# Patient Record
Sex: MALE | Race: WHITE | NOT HISPANIC OR LATINO | Employment: OTHER | URBAN - NONMETROPOLITAN AREA
[De-identification: names, ages, dates, MRNs, and addresses within clinical notes are randomized per-mention and may not be internally consistent; named-entity substitution may affect disease eponyms.]

---

## 2017-01-18 ENCOUNTER — OFFICE VISIT (OUTPATIENT)
Dept: FAMILY MEDICINE | Facility: OTHER | Age: 62
End: 2017-01-18
Attending: FAMILY MEDICINE
Payer: MEDICARE

## 2017-01-18 VITALS
HEART RATE: 87 BPM | HEIGHT: 68 IN | OXYGEN SATURATION: 98 % | DIASTOLIC BLOOD PRESSURE: 74 MMHG | RESPIRATION RATE: 16 BRPM | SYSTOLIC BLOOD PRESSURE: 124 MMHG | WEIGHT: 149 LBS | TEMPERATURE: 98.7 F | BODY MASS INDEX: 22.58 KG/M2

## 2017-01-18 DIAGNOSIS — Z79.899 CONTROLLED SUBSTANCE AGREEMENT SIGNED: ICD-10-CM

## 2017-01-18 DIAGNOSIS — M54.40 CHRONIC MIDLINE LOW BACK PAIN WITH SCIATICA, SCIATICA LATERALITY UNSPECIFIED: ICD-10-CM

## 2017-01-18 DIAGNOSIS — G89.4 CHRONIC PAIN SYNDROME: ICD-10-CM

## 2017-01-18 DIAGNOSIS — Z71.89 ACP (ADVANCE CARE PLANNING): Chronic | ICD-10-CM

## 2017-01-18 DIAGNOSIS — Z78.9 HEALTH MAINTENANCE ALTERATION: ICD-10-CM

## 2017-01-18 DIAGNOSIS — Z13.6 ENCOUNTER FOR SCREENING FOR CARDIOVASCULAR DISORDERS: Primary | ICD-10-CM

## 2017-01-18 DIAGNOSIS — Z13.220 LIPID SCREENING: ICD-10-CM

## 2017-01-18 DIAGNOSIS — Z12.11 COLON CANCER SCREENING: ICD-10-CM

## 2017-01-18 DIAGNOSIS — G89.29 CHRONIC MIDLINE LOW BACK PAIN WITH SCIATICA, SCIATICA LATERALITY UNSPECIFIED: ICD-10-CM

## 2017-01-18 DIAGNOSIS — L72.3 SEBACEOUS CYST: ICD-10-CM

## 2017-01-18 PROCEDURE — 99214 OFFICE O/P EST MOD 30 MIN: CPT | Performed by: FAMILY MEDICINE

## 2017-01-18 PROCEDURE — 99212 OFFICE O/P EST SF 10 MIN: CPT

## 2017-01-18 ASSESSMENT — ANXIETY QUESTIONNAIRES
6. BECOMING EASILY ANNOYED OR IRRITABLE: MORE THAN HALF THE DAYS
5. BEING SO RESTLESS THAT IT IS HARD TO SIT STILL: SEVERAL DAYS
2. NOT BEING ABLE TO STOP OR CONTROL WORRYING: MORE THAN HALF THE DAYS
GAD7 TOTAL SCORE: 14
IF YOU CHECKED OFF ANY PROBLEMS ON THIS QUESTIONNAIRE, HOW DIFFICULT HAVE THESE PROBLEMS MADE IT FOR YOU TO DO YOUR WORK, TAKE CARE OF THINGS AT HOME, OR GET ALONG WITH OTHER PEOPLE: SOMEWHAT DIFFICULT
7. FEELING AFRAID AS IF SOMETHING AWFUL MIGHT HAPPEN: NEARLY EVERY DAY
1. FEELING NERVOUS, ANXIOUS, OR ON EDGE: SEVERAL DAYS
3. WORRYING TOO MUCH ABOUT DIFFERENT THINGS: MORE THAN HALF THE DAYS

## 2017-01-18 ASSESSMENT — PAIN SCALES - GENERAL: PAINLEVEL: MODERATE PAIN (5)

## 2017-01-18 ASSESSMENT — PATIENT HEALTH QUESTIONNAIRE - PHQ9: 5. POOR APPETITE OR OVEREATING: NEARLY EVERY DAY

## 2017-01-18 NOTE — MR AVS SNAPSHOT
After Visit Summary   1/18/2017    Jorge A Mendosa    MRN: [id number]           Patient Information     Date Of Birth          1955        Visit Information        Provider Department      1/18/2017 10:15 AM Mukund Hamlin MD [facility]        Today's Diagnoses     Encounter for screening for cardiovascular disorders     -  1     Chronic pain syndrome         Chronic midline low back pain with sciatica, sciatica laterality unspecified         Controlled substance agreement signed         ACP (advance care planning)         Lipid screening         Health maintenance alteration         Sebaceous cyst         Colon cancer screening           Care Instructions    F/u with ongoing concerns.         Follow-ups after your visit        Additional Services     GENERAL SURG ADULT REFERRAL       Your provider has referred you to: general surgery    Please be aware that coverage of these services is subject to the terms and limitations of your health insurance plan.  Call member services at your health plan with any benefit or coverage questions.      Please bring the following with you to your appointment:    (1) Any X-Rays, CTs or MRIs which have been performed.  Contact the facility where they were done to arrange for  prior to your scheduled appointment.   (2) List of current medications   (3) This referral request   (4) Any documents/labs given to you for this referral                  Your next 10 appointments already scheduled     Feb 09, 2017  2:00 PM   (Arrive by 1:45 PM)   CONSULT with Washington Frazier DO   [facility] ([facility])    [address] [place] [address]   [phone]              Future tests that were ordered for you today     Open Future Orders        Priority Expected Expires Ordered    Hepatitis C antibody Routine  5/18/2017 1/18/2017    Lipid Profile (Chol, Trig, HDL, LDL calc) Routine  5/18/2017 1/18/2017            Who to  "contact     If you have questions or need follow up information about today's clinic visit or your schedule please contact [facility] directly at [phone].  Normal or non-critical lab and imaging results will be communicated to you by MyChart, letter or phone within 4 business days after the clinic has received the results. If you do not hear from us within 7 days, please contact the clinic through MyChart or phone. If you have a critical or abnormal lab result, we will notify you by phone as soon as possible.  Submit refill requests through [employer] or call your pharmacy and they will forward the refill request to us. Please allow 3 business days for your refill to be completed.          Additional Information About Your Visit        Care EveryWhere ID     This is your Care EveryWhere ID. This could be used by other organizations to access your [employer] medical records  [id number]        Your Vitals Were     Pulse Temperature Respirations Height BMI (Body Mass Index) Pulse Oximetry    87 98.7  F (37.1  C) (Tympanic) 16 5' 8\" (1.727 m) 22.66 kg/m2 98%       Blood Pressure from Last 3 Encounters:   01/18/17 124/74   08/17/16 124/86   04/07/16 128/80    Weight from Last 3 Encounters:   01/18/17 149 lb (67.586 kg)   08/17/16 144 lb (65.318 kg)   04/07/16 152 lb (68.947 kg)              We Performed the Following     GENERAL SURG ADULT REFERRAL        Primary Care Provider Office Phone # Fax #    Mukund Hamlin [phone]       [address] [address]        Thank you!     Thank you for choosing [facility]  for your care. Our goal is always to provide you with excellent care. Hearing back from our patients is one way we can continue to improve our services. Please take a few minutes to complete the written survey that you may receive in the mail after your visit with us. Thank you!             Your Updated Medication List - Protect " others around you: Learn how to safely use, store and throw away your medicines at www.disposemymeds.org.          This list is accurate as of: 1/18/17  1:00 PM.  Always use your most recent med list.                   Brand Name Dispense Instructions for use    ADVIL 200 MG capsule   Generic drug:  ibuprofen      Take 200 mg by mouth every 4 hours as needed.       ALEVE PO      Take 1 tablet by mouth. As directed when needed       EXCEDRIN PO      PRN if don't have aleve or advil       omeprazole 40 MG capsule    priLOSEC    90 capsule    Take 1 capsule (40 mg) by mouth daily Take 30-60 minutes before a meal.       oxyCODONE 30 MG 12 hr tablet    OXYCONTIN    60 tablet    Take 1 tablet (30 mg) by mouth every 12 hours       oxyCODONE-acetaminophen  MG per tablet    PERCOCET    120 tablet    Take 1 tablet by mouth every 6 hours as needed for pain       VITAMIN A      1 daily       VITAMIN C PO      1 daily

## 2017-01-18 NOTE — NURSING NOTE
"Chief Complaint   Patient presents with     Pain     chronic pain follow up & med refill     *_* Health Care Directive *_*     given paperwork today       Initial /74 mmHg  Pulse 87  Temp(Src) 98.7  F (37.1  C) (Tympanic)  Resp 16  Ht 5' 8\" (1.727 m)  Wt 149 lb (67.586 kg)  BMI 22.66 kg/m2  SpO2 98% Estimated body mass index is 22.66 kg/(m^2) as calculated from the following:    Height as of this encounter: 5' 8\" (1.727 m).    Weight as of this encounter: 149 lb (67.586 kg).  BP completed using cuff size: regular  Gay Chaves LPN      "
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
## 2017-01-19 ASSESSMENT — PATIENT HEALTH QUESTIONNAIRE - PHQ9: SUM OF ALL RESPONSES TO PHQ QUESTIONS 1-9: 13

## 2017-01-19 ASSESSMENT — ANXIETY QUESTIONNAIRES: GAD7 TOTAL SCORE: 14

## 2017-01-25 DIAGNOSIS — G89.4 CHRONIC PAIN SYNDROME: Primary | ICD-10-CM

## 2017-01-25 RX ORDER — OXYCODONE HYDROCHLORIDE 30 MG/1
30 TABLET, FILM COATED, EXTENDED RELEASE ORAL EVERY 12 HOURS
Qty: 60 TABLET | Refills: 0 | Status: SHIPPED | OUTPATIENT
Start: 2017-01-27 | End: 2017-02-23

## 2017-01-25 NOTE — TELEPHONE ENCOUNTER
oxyCODONE (OXYCONTIN) 30 MG 12 hr tablet      Last Written Prescription Date: 12/28/2016  Last Fill Quantity: 60,  # refills: 0   Last Office Visit with [facility], [facility] or [facility] prescribing provider: 01/18/2017

## 2017-01-30 DIAGNOSIS — G89.4 CHRONIC PAIN SYNDROME: Primary | ICD-10-CM

## 2017-01-30 NOTE — TELEPHONE ENCOUNTER
Notified patient via phone that Dr. Hamlin will look at his refill request tomorrow.  Gay Chaves LPN

## 2017-01-30 NOTE — TELEPHONE ENCOUNTER
percocet      Last Written Prescription Date: 12/28/16  Last Fill Quantity: 120,  # refills: 0   Last Office Visit with [facility], [facility] or [facility] prescribing provider: 1/18/17

## 2017-01-31 RX ORDER — OXYCODONE AND ACETAMINOPHEN 10; 325 MG/1; MG/1
1 TABLET ORAL EVERY 6 HOURS PRN
Qty: 120 TABLET | Refills: 0 | Status: SHIPPED | OUTPATIENT
Start: 2017-01-31 | End: 2017-02-28

## 2017-02-23 ENCOUNTER — TELEPHONE (OUTPATIENT)
Dept: FAMILY MEDICINE | Facility: OTHER | Age: 62
End: 2017-02-23

## 2017-02-23 DIAGNOSIS — G89.4 CHRONIC PAIN SYNDROME: ICD-10-CM

## 2017-02-23 RX ORDER — OXYCODONE HYDROCHLORIDE 30 MG/1
30 TABLET, FILM COATED, EXTENDED RELEASE ORAL EVERY 12 HOURS
Qty: 60 TABLET | Refills: 0 | Status: SHIPPED | OUTPATIENT
Start: 2017-02-26 | End: 2017-03-21

## 2017-02-23 NOTE — TELEPHONE ENCOUNTER
Reason for call:  Medication    1. Medication Name? Oxycodone and Percocet  2. Is this request for a refill?  Yes  3. What Pharmacy do you use?  [facility]  4. Have you contacted your pharmacy? Yes  5. If yes, when? Tuesday and today  (Please note that the turn-around-time for prescriptions is 72 business hours; I am sending your request at this time. SEND TO  [facility]  )  Description:  Jorge A only has pills for through Saturday and needs the prescription filled before weekend.  Was an appointment offered for this a call? No  Preferred method for responding to this   [phone]  If we cannot reach you directly, may we leave a detailed response at the number you provided ?  Yes

## 2017-02-23 NOTE — TELEPHONE ENCOUNTER
Pt calling stating that the pharmacy told him that they called stating they made a mistake last month and he was to call and confirm we received the call from them as his scripts are now  and he would like to get them on track again. So he could have them both together. If you have any questions please call him or the pharmacy manager Isaias at [facility].

## 2017-02-23 NOTE — TELEPHONE ENCOUNTER
Last visit: 1.18.17  Last refill: Oxycontin 1.27.17 #60 - This is pended and post dated for 2.26.17 (patients pharmacy is open Sunday)  Percocet 1.31.17 #120- did not pend this.  Should be good until March 1st.  Please advise if you want to fill this. Thank you

## 2017-02-23 NOTE — TELEPHONE ENCOUNTER
Rx has already been picked by the pt. Pt states last month's Rx's were not requested together and this caused him to have to use more of one pain med to make up for this and now he is short of the other. The pharmacist is trying to work with him on this. He states the pharmacist called the clinic and spoke to someone but there are no calls logged. I spoke to Dr Hamlin and notified the pt he needs to be seen to straighten this out. Please schedule him on 02/28/17 at 8:30 am. Pt is aware. Please return when done and I will call the pharmacy to discuss.

## 2017-02-27 NOTE — TELEPHONE ENCOUNTER
Pt was supposed to be scheduled for tomorrow at 8:30, but was scheduled for today at 8:30 am. Tomorrow's schedule is full at 8:30 am now, can you call him at schedule him at 3:15pm arriving at 3 pm instead?

## 2017-02-27 NOTE — TELEPHONE ENCOUNTER
Called at 10:33am 2/27/17 mail box of phone is full so cant leave a message. Need to schedule appt Tue 2/28/17 at 3:15pm arrive 3pm Dr Boubacar Blackwell

## 2017-02-28 ENCOUNTER — OFFICE VISIT (OUTPATIENT)
Dept: FAMILY MEDICINE | Facility: OTHER | Age: 62
End: 2017-02-28
Attending: FAMILY MEDICINE
Payer: MEDICARE

## 2017-02-28 VITALS
TEMPERATURE: 98.9 F | RESPIRATION RATE: 20 BRPM | HEIGHT: 68 IN | DIASTOLIC BLOOD PRESSURE: 68 MMHG | OXYGEN SATURATION: 95 % | BODY MASS INDEX: 22.58 KG/M2 | HEART RATE: 65 BPM | SYSTOLIC BLOOD PRESSURE: 132 MMHG | WEIGHT: 149 LBS

## 2017-02-28 DIAGNOSIS — G89.4 CHRONIC PAIN SYNDROME: ICD-10-CM

## 2017-02-28 PROCEDURE — 99213 OFFICE O/P EST LOW 20 MIN: CPT | Performed by: FAMILY MEDICINE

## 2017-02-28 PROCEDURE — 99212 OFFICE O/P EST SF 10 MIN: CPT

## 2017-02-28 RX ORDER — OXYCODONE AND ACETAMINOPHEN 10; 325 MG/1; MG/1
1 TABLET ORAL EVERY 6 HOURS PRN
Qty: 120 TABLET | Refills: 0 | Status: SHIPPED | OUTPATIENT
Start: 2017-02-28 | End: 2017-03-27

## 2017-02-28 ASSESSMENT — PAIN SCALES - GENERAL: PAINLEVEL: SEVERE PAIN (6)

## 2017-02-28 NOTE — NURSING NOTE
"Chief Complaint   Patient presents with     Musculoskeletal Problem     Follow up on pain medication for his back pain. Discuss refill fax from Pharmacy.        Initial /68  Pulse 65  Temp 98.9  F (37.2  C) (Tympanic)  Resp 20  Ht 5' 8\" (1.727 m)  Wt 149 lb (67.6 kg)  SpO2 95%  BMI 22.66 kg/m2 Estimated body mass index is 22.66 kg/(m^2) as calculated from the following:    Height as of this encounter: 5' 8\" (1.727 m).    Weight as of this encounter: 149 lb (67.6 kg).  Medication Reconciliation: complete   Dorothy Quintero      "

## 2017-02-28 NOTE — MR AVS SNAPSHOT
"              After Visit Summary   2/28/2017    Jorge A Mendosa    MRN: [id number]           Patient Information     Date Of Birth          1955        Visit Information        Provider Department      2/28/2017 3:15 PM Mukund Hamlin MD [facility]        Today's Diagnoses     Chronic pain syndrome          Care Instructions    F/u with ongoing concerns.         Follow-ups after your visit        Your next 10 appointments already scheduled     Aug 27, 2017 11:30 AM CDT   (Arrive by 11:15 AM)   CONSULT with Washington Frazier DO   [facility] ([facility]    [address] [facility] [address]   [phone]              Who to contact     If you have questions or need follow up information about today's clinic visit or your schedule please contact [facility] directly at [phone].  Normal or non-critical lab and imaging results will be communicated to you by MyChart, letter or phone within 4 business days after the clinic has received the results. If you do not hear from us within 7 days, please contact the clinic through MyChart or phone. If you have a critical or abnormal lab result, we will notify you by phone as soon as possible.  Submit refill requests through PHRQL or call your pharmacy and they will forward the refill request to us. Please allow 3 business days for your refill to be completed.          Additional Information About Your Visit        Care EveryWhere ID     This is your Care EveryWhere ID. This could be used by other organizations to access your [place] medical records  [id number]        Your Vitals Were     Pulse Temperature Respirations Height Pulse Oximetry BMI (Body Mass Index)    65 98.9  F (37.2  C) (Tympanic) 20 5' 8\" (1.727 m) 95% 22.66 kg/m2       Blood Pressure from Last 3 Encounters:   02/28/17 132/68   01/18/17 124/74   08/17/16 124/86    Weight from Last 3 Encounters:   02/28/17 149 lb (67.6 kg)   01/18/17 149 " lb (67.6 kg)   08/17/16 144 lb (65.3 kg)              Today, you had the following     No orders found for display         Where to get your medicines      Some of these will need a paper prescription and others can be bought over the counter.  Ask your nurse if you have questions.     Bring a paper prescription for each of these medications     oxyCODONE-acetaminophen  MG per tablet          Primary Care Provider Office Phone # Fax #    Mukund Hamlin [phone]       [address] [facility] [place] [address]        Thank you!     Thank you for choosing [facility]  for your care. Our goal is always to provide you with excellent care. Hearing back from our patients is one way we can continue to improve our services. Please take a few minutes to complete the written survey that you may receive in the mail after your visit with us. Thank you!             Your Updated Medication List - Protect others around you: Learn how to safely use, store and throw away your medicines at www.disposemymeds.org.          This list is accurate as of: 2/28/17 11:59 PM.  Always use your most recent med list.                   Brand Name Dispense Instructions for use    ADVIL 200 MG capsule   Generic drug:  ibuprofen      Take 200 mg by mouth every 4 hours as needed.       ALEVE PO      Take 1 tablet by mouth. As directed when needed       EXCEDRIN PO      PRN if don't have aleve or advil       omeprazole 40 MG capsule    priLOSEC    90 capsule    Take 1 capsule (40 mg) by mouth daily Take 30-60 minutes before a meal.       oxyCODONE 30 MG 12 hr tablet    OXYCONTIN    60 tablet    Take 1 tablet (30 mg) by mouth every 12 hours       oxyCODONE-acetaminophen  MG per tablet    PERCOCET    120 tablet    Take 1 tablet by mouth every 6 hours as needed for pain       VITAMIN A      1 daily       VITAMIN C PO      1 daily
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
## 2017-03-05 NOTE — TELEPHONE ENCOUNTER
Called informed written RX is ready to  at  [facility]   Normal rate, regular rhythm.  Heart sounds S1, S2.

## 2017-03-21 DIAGNOSIS — G89.4 CHRONIC PAIN SYNDROME: ICD-10-CM

## 2017-03-21 RX ORDER — OXYCODONE HYDROCHLORIDE 30 MG/1
30 TABLET, FILM COATED, EXTENDED RELEASE ORAL EVERY 12 HOURS
Qty: 60 TABLET | Refills: 0 | Status: SHIPPED | OUTPATIENT
Start: 2017-03-27 | End: 2017-04-25

## 2017-03-21 NOTE — TELEPHONE ENCOUNTER
oxycontin      Last Written Prescription Date: 2/26/17  Last Fill Quantity:60,  # refills: 0   Last Office Visit with [facility], [facility] or [facility] prescribing provider: 2/28/17

## 2017-03-27 DIAGNOSIS — G89.4 CHRONIC PAIN SYNDROME: ICD-10-CM

## 2017-03-27 RX ORDER — OXYCODONE AND ACETAMINOPHEN 10; 325 MG/1; MG/1
1 TABLET ORAL EVERY 6 HOURS PRN
Qty: 120 TABLET | Refills: 0 | Status: SHIPPED | OUTPATIENT
Start: 2017-03-27 | End: 2017-04-25

## 2017-03-27 NOTE — TELEPHONE ENCOUNTER
percocet      Last Written Prescription Date: 2/28/17  Last Fill Quantity: 120,  # refills: 0   Last Office Visit with [facility], [facility] or [facility] prescribing provider: 2/28/17

## 2017-04-25 DIAGNOSIS — G89.4 CHRONIC PAIN SYNDROME: ICD-10-CM

## 2017-04-25 RX ORDER — OXYCODONE AND ACETAMINOPHEN 10; 325 MG/1; MG/1
1 TABLET ORAL EVERY 6 HOURS PRN
Qty: 120 TABLET | Refills: 0 | Status: SHIPPED | OUTPATIENT
Start: 2017-04-25 | End: 2017-05-25

## 2017-04-25 RX ORDER — OXYCODONE HYDROCHLORIDE 30 MG/1
30 TABLET, FILM COATED, EXTENDED RELEASE ORAL EVERY 12 HOURS
Qty: 60 TABLET | Refills: 0 | Status: SHIPPED | OUTPATIENT
Start: 2017-04-25 | End: 2017-05-25

## 2017-04-25 NOTE — TELEPHONE ENCOUNTER
oxycontin      Last Written Prescription Date: 3/27/17  Last Fill Quantity: 60,  # refills: 0   Last Office Visit with [facility], [facility] or [facility] prescribing provider: 2/28/17                                             percocet      Last Written Prescription Date: 3/28/17  Last Fill Quantity: 120,  # refills: 0   Last Office Visit with [facility], [facility] or [facility] prescribing provider: 2/28/17

## 2017-05-25 ENCOUNTER — HOSPITAL ENCOUNTER (EMERGENCY)
Facility: HOSPITAL | Age: 62
Discharge: HOME | End: 2017-05-25
Attending: NURSE PRACTITIONER | Admitting: NURSE PRACTITIONER
Payer: MEDICARE

## 2017-05-25 VITALS
DIASTOLIC BLOOD PRESSURE: 103 MMHG | SYSTOLIC BLOOD PRESSURE: 140 MMHG | TEMPERATURE: 98.6 F | RESPIRATION RATE: 16 BRPM | OXYGEN SATURATION: 97 % | HEART RATE: 77 BPM

## 2017-05-25 DIAGNOSIS — G89.4 CHRONIC PAIN SYNDROME: ICD-10-CM

## 2017-05-25 DIAGNOSIS — G89.29 CHRONIC MIDLINE LOW BACK PAIN WITH SCIATICA, SCIATICA LATERALITY UNSPECIFIED: ICD-10-CM

## 2017-05-25 DIAGNOSIS — M54.40 CHRONIC MIDLINE LOW BACK PAIN WITH SCIATICA, SCIATICA LATERALITY UNSPECIFIED: ICD-10-CM

## 2017-05-25 PROCEDURE — 99213 OFFICE O/P EST LOW 20 MIN: CPT

## 2017-05-25 PROCEDURE — 99213 OFFICE O/P EST LOW 20 MIN: CPT | Performed by: NURSE PRACTITIONER

## 2017-05-25 RX ORDER — OXYCODONE HYDROCHLORIDE 30 MG/1
30 TABLET, FILM COATED, EXTENDED RELEASE ORAL EVERY 12 HOURS
Qty: 60 TABLET | Refills: 0 | Status: SHIPPED | OUTPATIENT
Start: 2017-05-25 | End: 2017-06-13

## 2017-05-25 RX ORDER — OXYCODONE AND ACETAMINOPHEN 10; 325 MG/1; MG/1
1 TABLET ORAL EVERY 4 HOURS PRN
Qty: 1 TABLET | Refills: 0 | Status: SHIPPED | OUTPATIENT
Start: 2017-05-25 | End: 2017-06-13

## 2017-05-25 RX ORDER — OXYCODONE AND ACETAMINOPHEN 10; 325 MG/1; MG/1
1 TABLET ORAL EVERY 6 HOURS PRN
Qty: 120 TABLET | Refills: 0 | Status: SHIPPED | OUTPATIENT
Start: 2017-05-25 | End: 2017-06-13

## 2017-05-25 RX ORDER — OXYCODONE HYDROCHLORIDE 30 MG/1
30 TABLET, FILM COATED, EXTENDED RELEASE ORAL EVERY 12 HOURS
Qty: 1 TABLET | Refills: 0 | Status: SHIPPED | OUTPATIENT
Start: 2017-05-25 | End: 2017-06-13

## 2017-05-25 ASSESSMENT — ENCOUNTER SYMPTOMS
CARDIOVASCULAR NEGATIVE: 1
RESPIRATORY NEGATIVE: 1
GASTROINTESTINAL NEGATIVE: 1
BACK PAIN: 1
CONSTITUTIONAL NEGATIVE: 1

## 2017-05-25 NOTE — TELEPHONE ENCOUNTER
percocet      Last Written Prescription Date: 4/25/17  Last Fill Quantity: 120,  # refills: 0   Last Office Visit with [facility] [place], [email or web address] or [facility] prescribing provider: 2/28/17                                             oxycontin      Last Written Prescription Date: 4/25/17  Last Fill Quantity: 60,  # refills: 0   Last Office Visit with [facility] [place], [email or web address] or [facility] prescribing provider: 2/28/17

## 2017-05-25 NOTE — ED AVS SNAPSHOT
[facility]    [address] [place] [address]    Phone:  [phone]                                       Jorge A Mendosa   MRN: [id number]    Department:  [facility]   Date of Visit:  5/25/2017           After Visit Summary Signature Page     I have received my discharge instructions, and my questions have been answered. I have discussed any challenges I see with this plan with the nurse or doctor.    ..........................................................................................................................................  Patient/Patient Representative Signature      ..........................................................................................................................................  Patient Representative Print Name and Relationship to Patient    ..................................................               ................................................  Date                                            Time    ..........................................................................................................................................  Reviewed by Signature/Title    ...................................................              ..............................................  Date                                                            Time

## 2017-05-25 NOTE — ED AVS SNAPSHOT
[facility]    [address]    [facility] [address]    Phone:  [phone]                                       Jorge A Mendosa   MRN: [id number]    Department:  [facility]   Date of Visit:  5/25/2017           Patient Information     Date Of Birth          1955        Your diagnoses for this visit were:     Chronic midline low back pain with sciatica, sciatica laterality unspecified        You were seen by Dorothy Hoskins NP.      Follow-up Information     Follow up with Mukund Hamlin MD.    Specialty:  Family Practice    Why:  As needed, If symptoms worsen    Contact information:    [facility]  [address]  [facility] [address] [phone]          Follow up with [facility].    Specialty:  EMERGENCY MEDICINE    Why:  As needed, If symptoms worsen    Contact information:    [address]  [facility] [address] [phone]    Additional information:    From [facility]: Take [address]. Turn left at [address]. Turn left at the first stoplight on [address]. At the first stop sign, take a right onto [address]. Take a left into the parking lot and continue through until you reach the North enterance of the building.       From [place]: Take [address]. Take the [address] ramp towards [place]. Turn left onto [address]. Take a slight right onto [address]. Turn left at the first stoplight on [address]. At the first stop sign, take a right onto [address]. Take a left into the parking lot and continue through until you reach the North enterance of the building.       From [place]: Take [address]. Take a right at [address]. At the first stop sign, take a right onto [address]. Take a left into the parking lot and continue through until you reach the North enterance of the building.         Discharge Instructions       Get to [place]  for a refill on narcotics tomorrow    Future Appointments        Provider Department Dept Phone Center    8/27/2017 11:30 AM Washington Frazier, DO [facility] [phone] [place]         Review of your medicines      Our records show that you are taking the medicines listed below. If these are incorrect, please call your family doctor or clinic.        Dose / Directions Last dose taken    ADVIL 200 MG capsule   Dose:  200 mg   Generic drug:  ibuprofen        Take 200 mg by mouth every 4 hours as needed.   Refills:  0        ALEVE PO   Dose:  1 tablet        Take 1 tablet by mouth. As directed when needed   Refills:  0        EXCEDRIN PO        PRN if don't have aleve or advil   Refills:  0        omeprazole 40 MG capsule   Commonly known as:  priLOSEC   Dose:  40 mg   Quantity:  90 capsule        Take 1 capsule (40 mg) by mouth daily Take 30-60 minutes before a meal.   Refills:  3        VITAMIN A        1 daily   Refills:  0        VITAMIN C PO        1 daily   Refills:  0          ASK your doctor about these medications        Dose / Directions Last dose taken    * oxyCODONE 30 MG 12 hr tablet   Commonly known as:  OXYCONTIN   Dose:  30 mg   What changed:  Another medication with the same name was added. Make sure you understand how and when to take each.   Quantity:  60 tablet   Ask about: Which instructions should I use?        Take 1 tablet (30 mg) by mouth every 12 hours   Refills:  0        * oxyCODONE 30 MG 12 hr tablet   Commonly known as:  OXYCONTIN   Dose:  30 mg   What changed:  You were already taking a medication with the same name, and this prescription was added. Make sure you understand how and when to take each.   Quantity:  1 tablet   Ask about: Which instructions should I use?        Take 1 tablet (30 mg) by mouth every 12 hours   Refills:  0        * oxyCODONE-acetaminophen  MG per tablet   Commonly known as:  PERCOCET   Dose:  1 tablet   What changed:  Another  medication with the same name was added. Make sure you understand how and when to take each.   Quantity:  120 tablet   Ask about: Which instructions should I use?        Take 1 tablet by mouth every 6 hours as needed for pain   Refills:  0        * oxyCODONE-acetaminophen  MG per tablet   Commonly known as:  PERCOCET   Dose:  1 tablet   What changed:  You were already taking a medication with the same name, and this prescription was added. Make sure you understand how and when to take each.   Quantity:  1 tablet   Ask about: Which instructions should I use?        Take 1 tablet by mouth every 4 hours as needed for moderate to severe pain   Refills:  0        * Notice:  This list has 4 medication(s) that are the same as other medications prescribed for you. Read the directions carefully, and ask your doctor or other care provider to review them with you.            Prescriptions were sent or printed at these locations (2 Prescriptions)                   [facility] - [place], [place] - [address] [facility] [address] [address] [facility] [address] [place] [address]    Telephone:  [phone]   Fax:  [phone]   Hours:                  Printed at Department/Unit printer (2 of 2)         oxyCODONE-acetaminophen (PERCOCET)  MG per tablet               oxyCODONE (OXYCONTIN) 30 MG 12 hr tablet                Orders Needing Specimen Collection     None      Pending Results     No orders found from 5/23/2017 to 5/26/2017.            Pending Culture Results     No orders found from 5/23/2017 to 5/26/2017.            Thank you for choosing [facility]       Thank you for choosing [facility] for your care. Our goal is always to provide you with excellent care. Hearing back from our patients is one way we can continue to improve our services. Please take a few minutes to complete the written survey that you may receive in the mail after you visit with us. Thank you!        [email or web address] Information     [employer] lets you send messages to your doctor,  "view your test results, renew your prescriptions, schedule appointments and more. To sign up, go to [email or web address] . Click on \"Log in\" on the left side of the screen, which will take you to the Welcome page. Then click on \"Sign up Now\" on the right side of the page.     You will be asked to enter the access code listed below, as well as some personal information. Please follow the directions to create your username and password.     Your access code is: [email or web address]  Expires: 2017  6:05 PM     Your access code will  in 90 days. If you need help or a new code, please call your [place] clinic or [phone].        Care EveryWhere ID     This is your Care EveryWhere ID. This could be used by other organizations to access your [place] medical records  [id number]        After Visit Summary       This is your record. Keep this with you and show to your community pharmacist(s) and doctor(s) at your next visit.                  "

## 2017-05-25 NOTE — ED PROVIDER NOTES
History     Chief Complaint   Patient presents with     Medication Refill     The history is provided by the patient. No  was used.     Jorge A Mendosa is a 61 year old male who presents needing a refill on his oxycontin 30 mg and his percocet 10/325.  He had requested this earlier in the day at [facility] and had not heard back from them. He takes these for chronic pain syndrome.  He was last filled on 4/25/2017. He states he was told that his rx would not be refilled however;  it appears in Epic from the documentation that it was refilled for a one month supply.  He is obviously anxious about this and is concerned about the long week end    I have reviewed the Medications, Allergies, Past Medical and Surgical History, and Social History in the Epic system.    Review of Systems   Constitutional: Negative.    HENT: Negative.    Respiratory: Negative.    Cardiovascular: Negative.    Gastrointestinal: Negative.    Genitourinary: Negative.    Musculoskeletal: Positive for back pain.   Skin: Negative.        Physical Exam   BP: (!) 140/103  Pulse: 77  Temp: 98.6  F (37  C)  Resp: 16  SpO2: 97 %  Physical Exam   Constitutional: He is oriented to person, place, and time. He appears well-developed and well-nourished. No distress.   HENT:   Head: Normocephalic and atraumatic.   Eyes: Conjunctivae are normal.   Neck: Normal range of motion.   Cardiovascular: Normal rate.    Pulmonary/Chest: Effort normal.   Musculoskeletal: Normal range of motion.   Moving adequately , gait is intact   Neurological: He is alert and oriented to person, place, and time.   Skin: Skin is warm and dry. He is not diaphoretic.   Nursing note and vitals reviewed.      ED Course     ED Course     Procedures               Labs Ordered and Resulted from Time of ED Arrival Up to the Time of Departure from the ED - No data to display    Assessments & Plan (with Medical Decision Making)     I have reviewed the nursing  notes.    I have reviewed the findings, diagnosis, plan and need for follow up with the patient.    Discharge Medication List as of 5/25/2017  6:05 PM      START taking these medications    Details   !! oxyCODONE-acetaminophen (PERCOCET)  MG per tablet Take 1 tablet by mouth every 4 hours as needed for moderate to severe pain, Disp-1 tablet, R-0, Local Print      !! oxyCODONE (OXYCONTIN) 30 MG 12 hr tablet Take 1 tablet (30 mg) by mouth every 12 hours, Disp-1 tablet, R-0, Local Print       !! - Potential duplicate medications found. Please discuss with provider.        Pt was provided with one each of the medications he usually takes .  He needs to check with [facility] in the AM for the rest of the refills and the hard copy  Pt verbalizes understanding and agreement with plan.  Follow up for worsening symptoms or concerns    Final diagnoses:   Chronic midline low back pain with sciatica, sciatica laterality unspecified       5/25/2017   [facility]     Brigid Thurman NP  05/1955

## 2017-05-25 NOTE — ED NOTES
Ambulated to . Here for medication refill because Primary Doctor is on vacation. He states he is having increased pain in both back and shoulders. The NP would not sign them. Complaints of generalized pain. Rating 7 out of 10.

## 2017-05-26 NOTE — TELEPHONE ENCOUNTER
Notified patient via phone that written Rx is ready for pickup at the  of the [facility].   aGy Chaves LPN

## 2017-06-13 ENCOUNTER — OFFICE VISIT (OUTPATIENT)
Dept: FAMILY MEDICINE | Facility: OTHER | Age: 62
End: 2017-06-13
Attending: FAMILY MEDICINE
Payer: MEDICARE

## 2017-06-13 VITALS
SYSTOLIC BLOOD PRESSURE: 110 MMHG | BODY MASS INDEX: 22.28 KG/M2 | DIASTOLIC BLOOD PRESSURE: 70 MMHG | HEIGHT: 68 IN | HEART RATE: 71 BPM | WEIGHT: 147 LBS | TEMPERATURE: 98.7 F | OXYGEN SATURATION: 97 %

## 2017-06-13 DIAGNOSIS — M54.40 CHRONIC MIDLINE LOW BACK PAIN WITH SCIATICA, SCIATICA LATERALITY UNSPECIFIED: Primary | ICD-10-CM

## 2017-06-13 DIAGNOSIS — G89.4 CHRONIC PAIN SYNDROME: ICD-10-CM

## 2017-06-13 DIAGNOSIS — G89.29 CHRONIC MIDLINE LOW BACK PAIN WITH SCIATICA, SCIATICA LATERALITY UNSPECIFIED: Primary | ICD-10-CM

## 2017-06-13 PROCEDURE — 99213 OFFICE O/P EST LOW 20 MIN: CPT | Performed by: FAMILY MEDICINE

## 2017-06-13 PROCEDURE — 99212 OFFICE O/P EST SF 10 MIN: CPT

## 2017-06-13 RX ORDER — OXYCODONE AND ACETAMINOPHEN 10; 325 MG/1; MG/1
1 TABLET ORAL EVERY 6 HOURS PRN
Qty: 120 TABLET | Refills: 0 | Status: SHIPPED | OUTPATIENT
Start: 2017-06-13 | End: 2017-07-20

## 2017-06-13 RX ORDER — OXYCODONE HYDROCHLORIDE 30 MG/1
30 TABLET, FILM COATED, EXTENDED RELEASE ORAL EVERY 12 HOURS
Qty: 60 TABLET | Refills: 0 | Status: SHIPPED | OUTPATIENT
Start: 2017-06-13 | End: 2017-07-20

## 2017-06-13 ASSESSMENT — PAIN SCALES - GENERAL: PAINLEVEL: MODERATE PAIN (5)

## 2017-06-13 NOTE — NURSING NOTE
"Chief Complaint   Patient presents with     Musculoskeletal Problem     Follow up from urgent care back pain.        Initial /70  Pulse 71  Temp 98.7  F (37.1  C) (Tympanic)  Ht 5' 8\" (1.727 m)  Wt 147 lb (66.7 kg)  SpO2 97%  BMI 22.35 kg/m2 Estimated body mass index is 22.35 kg/(m^2) as calculated from the following:    Height as of this encounter: 5' 8\" (1.727 m).    Weight as of this encounter: 147 lb (66.7 kg).  Medication Reconciliation: complete   Dorothy Quintero      "
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
## 2017-06-13 NOTE — MR AVS SNAPSHOT
"              After Visit Summary   6/13/2017    Jorge A Mendosa    MRN: [id number]           Patient Information     Date Of Birth          1955        Visit Information        Provider Department      6/13/2017 1:15 PM Mukund Hamlin MD [facility]        Today's Diagnoses     Chronic midline low back pain with sciatica, sciatica laterality unspecified    -  1    Chronic pain syndrome          Care Instructions    F/u with ongoing concerns.           Follow-ups after your visit        Your next 10 appointments already scheduled     Aug 27, 2017 11:30 AM CDT   (Arrive by 11:15 AM)   CONSULT with Washington Frazier,    [facility] ([facility]    [address]  [place] [address]   [phone]              Who to contact     If you have questions or need follow up information about today's clinic visit or your schedule please contact [facility] directly at [phone].  Normal or non-critical lab and imaging results will be communicated to you by MyChart, letter or phone within 4 business days after the clinic has received the results. If you do not hear from us within 7 days, please contact the clinic through [employer] or phone. If you have a critical or abnormal lab result, we will notify you by phone as soon as possible.  Submit refill requests through [employer] or call your pharmacy and they will forward the refill request to us. Please allow 3 business days for your refill to be completed.          Additional Information About Your Visit        [employer]where[employer] Information     [employer] lets you send messages to your doctor, view your test results, renew your prescriptions, schedule appointments and more. To sign up, go to [email or web address] . Click on \"Log in\" on the left side of the screen, which will take you to the Welcome page. Then click on \"Sign up Now\" on the right side of the page.     You will be asked to enter the access code " "listed below, as well as some personal information. Please follow the directions to create your username and password.     Your access code is: [email or web address]  Expires: 2017  6:05 PM     Your access code will  in 90 days. If you need help or a new code, please call your [facility] or [phone].        Care EveryWhere ID     This is your Care EveryWhere ID. This could be used by other organizations to access your [facility] medical records  [id number]        Your Vitals Were     Pulse Temperature Height Pulse Oximetry BMI (Body Mass Index)       71 98.7  F (37.1  C) (Tympanic) 5' 8\" (1.727 m) 97% 22.35 kg/m2        Blood Pressure from Last 3 Encounters:   17 110/70   17 (!) 140/103   17 132/68    Weight from Last 3 Encounters:   17 147 lb (66.7 kg)   17 149 lb (67.6 kg)   17 149 lb (67.6 kg)              Today, you had the following     No orders found for display         Today's Medication Changes          These changes are accurate as of: 17  1:26 PM.  If you have any questions, ask your nurse or doctor.               These medicines have changed or have updated prescriptions.        Dose/Directions    oxyCODONE 30 MG 12 hr tablet   Commonly known as:  OXYCONTIN   This may have changed:  Another medication with the same name was removed. Continue taking this medication, and follow the directions you see here.   Used for:  Chronic pain syndrome   Changed by:  Mukund Hamlin MD        Dose:  30 mg   Take 1 tablet (30 mg) by mouth every 12 hours   Quantity:  60 tablet   Refills:  0       oxyCODONE-acetaminophen  MG per tablet   Commonly known as:  PERCOCET   This may have changed:  Another medication with the same name was removed. Continue taking this medication, and follow the directions you see here.   Used for:  Chronic pain syndrome   Changed by:  Mukund Hamlin MD        Dose:  1 tablet   Take 1 tablet by mouth every 6 hours as needed for " pain   Quantity:  120 tablet   Refills:  0         Stop taking these medicines if you haven't already. Please contact your care team if you have questions.     VITAMIN A   Stopped by:  Mukund Hamlin MD                Where to get your medicines      Some of these will need a paper prescription and others can be bought over the counter.  Ask your nurse if you have questions.     Bring a paper prescription for each of these medications     oxyCODONE 30 MG 12 hr tablet    oxyCODONE-acetaminophen  MG per tablet                Primary Care Provider Office Phone # Fax #    Mukund Hamlin [phone]       [facility] [address] [facility] [address]        Thank you!     Thank you for choosing [facility]  for your care. Our goal is always to provide you with excellent care. Hearing back from our patients is one way we can continue to improve our services. Please take a few minutes to complete the written survey that you may receive in the mail after your visit with us. Thank you!             Your Updated Medication List - Protect others around you: Learn how to safely use, store and throw away your medicines at www.disposemymeds.org.          This list is accurate as of: 6/13/17  1:26 PM.  Always use your most recent med list.                   Brand Name Dispense Instructions for use    ADVIL 200 MG capsule   Generic drug:  ibuprofen      Take 200 mg by mouth every 4 hours as needed.       ALEVE PO      Take 1 tablet by mouth. As directed when needed       EXCEDRIN PO      PRN if don't have aleve or advil       omeprazole 40 MG capsule    priLOSEC    90 capsule    Take 1 capsule (40 mg) by mouth daily Take 30-60 minutes before a meal.       oxyCODONE 30 MG 12 hr tablet    OXYCONTIN    60 tablet    Take 1 tablet (30 mg) by mouth every 12 hours       oxyCODONE-acetaminophen  MG per tablet    PERCOCET    120 tablet    Take 1 tablet by mouth every 6 hours as  needed for pain       VITAMIN A PO      Take by mouth daily       VITAMIN C PO      1 daily

## 2017-07-20 DIAGNOSIS — G89.4 CHRONIC PAIN SYNDROME: ICD-10-CM

## 2017-07-20 RX ORDER — OXYCODONE HYDROCHLORIDE 30 MG/1
30 TABLET, FILM COATED, EXTENDED RELEASE ORAL EVERY 12 HOURS
Qty: 60 TABLET | Refills: 0 | Status: SHIPPED | OUTPATIENT
Start: 2017-07-20 | End: 2017-08-17

## 2017-07-20 RX ORDER — OXYCODONE AND ACETAMINOPHEN 10; 325 MG/1; MG/1
1 TABLET ORAL EVERY 6 HOURS PRN
Qty: 120 TABLET | Refills: 0 | Status: SHIPPED | OUTPATIENT
Start: 2017-07-20 | End: 2017-08-17

## 2017-07-20 NOTE — TELEPHONE ENCOUNTER
percocet      Last Written Prescription Date: 6/13/17  Last Fill Quantity: 120,  # refills: 0   Last Office Visit with [facility], [facility] or [facility] prescribing provider: 6/13/17                                             oxycontin      Last Written Prescription Date: 6/13/17  Last Fill Quantity: 60,  # refills: 0   Last Office Visit with [facility], [facility] or [facility] prescribing provider: 6/13/17

## 2017-08-17 DIAGNOSIS — G89.4 CHRONIC PAIN SYNDROME: ICD-10-CM

## 2017-08-17 RX ORDER — OXYCODONE HYDROCHLORIDE 30 MG/1
30 TABLET, FILM COATED, EXTENDED RELEASE ORAL EVERY 12 HOURS
Qty: 60 TABLET | Refills: 0 | Status: SHIPPED | OUTPATIENT
Start: 2017-08-17 | End: 2017-09-14

## 2017-08-17 RX ORDER — OXYCODONE AND ACETAMINOPHEN 10; 325 MG/1; MG/1
1 TABLET ORAL EVERY 6 HOURS PRN
Qty: 120 TABLET | Refills: 0 | Status: SHIPPED | OUTPATIENT
Start: 2017-08-17 | End: 2017-09-14

## 2017-08-17 NOTE — TELEPHONE ENCOUNTER
Last Visit: 6/13/17  FELIBERTO Hamlin    Controlled Substance Refill Request for Oxycontin  Problem List Complete:  Yes  Last fill: 7/20/17 #60, 0 R.      Controlled Substance Refill Request for Percocet  Problem List Complete:  Yes  Last fill: 7/20/17 #120, 0 R.    Medications pended.  Thank you.

## 2017-09-14 DIAGNOSIS — G89.4 CHRONIC PAIN SYNDROME: ICD-10-CM

## 2017-09-14 RX ORDER — OXYCODONE HYDROCHLORIDE 30 MG/1
30 TABLET, FILM COATED, EXTENDED RELEASE ORAL EVERY 12 HOURS
Qty: 60 TABLET | Refills: 0 | Status: SHIPPED | OUTPATIENT
Start: 2017-09-14 | End: 2017-10-12

## 2017-09-14 RX ORDER — OXYCODONE AND ACETAMINOPHEN 10; 325 MG/1; MG/1
1 TABLET ORAL EVERY 6 HOURS PRN
Qty: 120 TABLET | Refills: 0 | Status: SHIPPED | OUTPATIENT
Start: 2017-09-14 | End: 2017-10-12

## 2017-09-14 NOTE — TELEPHONE ENCOUNTER
oxycontin      Last Written Prescription Date: 8/17/17  Last Fill Quantity: 60,  # refills: 0   Last Office Visit with [facility], [facility] or [facility] prescribing provider: 6/13/17                                         Next 5 appointments (look out 90 days)     Sep 18, 2017  9:45 AM CDT   (Arrive by 9:30 AM)   SHORT with Muknud Hamlin MD   [facility] ([facility] )    [address]  [facility] [address]   [phone]                 percocet     Last Written Prescription Date: 8/17/17  Last Fill Quantity: 120,  # refills: 0   Last Office Visit with [facility], [facility] or [facility] prescribing provider: 6/13/17                                         Next 5 appointments (look out 90 days)     Sep 18, 2017  9:45 AM CDT   (Arrive by 9:30 AM)   SHORT with Mukund Hamlin MD   [facility] ([facility] )    [address]  [facility] [address]   [phone]

## 2017-09-18 ENCOUNTER — OFFICE VISIT (OUTPATIENT)
Dept: FAMILY MEDICINE | Facility: OTHER | Age: 62
End: 2017-09-18
Attending: FAMILY MEDICINE
Payer: MEDICARE

## 2017-09-18 VITALS
HEART RATE: 76 BPM | TEMPERATURE: 98.2 F | OXYGEN SATURATION: 97 % | HEIGHT: 68 IN | BODY MASS INDEX: 23.04 KG/M2 | DIASTOLIC BLOOD PRESSURE: 68 MMHG | SYSTOLIC BLOOD PRESSURE: 115 MMHG | WEIGHT: 152 LBS

## 2017-09-18 DIAGNOSIS — G89.4 CHRONIC PAIN SYNDROME: ICD-10-CM

## 2017-09-18 DIAGNOSIS — M54.40 CHRONIC MIDLINE LOW BACK PAIN WITH SCIATICA, SCIATICA LATERALITY UNSPECIFIED: ICD-10-CM

## 2017-09-18 DIAGNOSIS — G89.29 CHRONIC MIDLINE LOW BACK PAIN WITH SCIATICA, SCIATICA LATERALITY UNSPECIFIED: ICD-10-CM

## 2017-09-18 DIAGNOSIS — Z12.11 ENCOUNTER FOR SCREENING COLONOSCOPY: Primary | ICD-10-CM

## 2017-09-18 PROCEDURE — 99213 OFFICE O/P EST LOW 20 MIN: CPT | Performed by: FAMILY MEDICINE

## 2017-09-18 PROCEDURE — 99212 OFFICE O/P EST SF 10 MIN: CPT

## 2017-09-18 PROCEDURE — 80307 DRUG TEST PRSMV CHEM ANLYZR: CPT | Mod: ZL | Performed by: FAMILY MEDICINE

## 2017-09-18 ASSESSMENT — PAIN SCALES - GENERAL: PAINLEVEL: MILD PAIN (3)

## 2017-09-18 NOTE — NURSING NOTE
"Chief Complaint   Patient presents with     RECHECK     follow up colonoscopy       Initial /68 (BP Location: Right arm, Cuff Size: Adult Regular)  Pulse 76  Temp 98.2  F (36.8  C) (Tympanic)  Ht 5' 8\" (1.727 m)  Wt 152 lb (68.9 kg)  SpO2 97%  BMI 23.11 kg/m2 Estimated body mass index is 23.11 kg/(m^2) as calculated from the following:    Height as of this encounter: 5' 8\" (1.727 m).    Weight as of this encounter: 152 lb (68.9 kg).  Medication Reconciliation: complete     Ele Garcia      "

## 2017-09-18 NOTE — MR AVS SNAPSHOT
After Visit Summary   9/18/2017    Jorge A Mendosa    MRN: [id number]           Patient Information     Date Of Birth          1955        Visit Information        Provider Department      9/18/2017 9:45 AM Mukund Hamlin MD [facility]        Today's Diagnoses     Encounter for screening colonoscopy    -  1    Chronic pain syndrome        Chronic midline low back pain with sciatica, sciatica laterality unspecified          Care Instructions    F/u with ongoing concerns.           Follow-ups after your visit        Your next 10 appointments already scheduled     Sep 28, 2017  2:45 PM CDT   (Arrive by 2:30 PM)   CONSULT with Washington Frazier,    [facility] ([facility] )    [address]  [facility] [address]   [phone]            Dec 18, 2017  8:45 AM CST   (Arrive by 8:30 AM)   PHYSICAL with Mukund Hamlin MD   [facility] ([facility] )    [address]  [facility] [address]   [phone]              Who to contact     If you have questions or need follow up information about today's clinic visit or your schedule please contact [facility] directly at [phone].  Normal or non-critical lab and imaging results will be communicated to you by MyChart, letter or phone within 4 business days after the clinic has received the results. If you do not hear from us within 7 days, please contact the clinic through MyChart or phone. If you have a critical or abnormal lab result, we will notify you by phone as soon as possible.  Submit refill requests through [employer] or call your pharmacy and they will forward the refill request to us. Please allow 3 business days for your refill to be completed.          Additional Information About Your Visit        [employer] Information     [employer] lets you send messages to your doctor, view your test results, renew your prescriptions, schedule  "appointments and more. To sign up, go to [email or web address] . Click on \"Log in\" on the left side of the screen, which will take you to the Welcome page. Then click on \"Sign up Now\" on the right side of the page.     You will be asked to enter the access code listed below, as well as some personal information. Please follow the directions to create your username and password.     Your access code is: [email or web address]  Expires: 2017 11:02 AM     Your access code will  in 90 days. If you need help or a new code, please call your [place] clinic or [phone].        Care EveryWhere ID     This is your Care EveryWhere ID. This could be used by other organizations to access your [place] medical records  [id number]        Your Vitals Were     Pulse Temperature Height Pulse Oximetry BMI (Body Mass Index)       76 98.2  F (36.8  C) (Tympanic) 5' 8\" (1.727 m) 97% 23.11 kg/m2        Blood Pressure from Last 3 Encounters:   17 115/68   17 110/70   17 (!) 140/103    Weight from Last 3 Encounters:   17 152 lb (68.9 kg)   17 147 lb (66.7 kg)   17 149 lb (67.6 kg)              We Performed the Following     Pain Drug Scr UR W Rptd Meds        Primary Care Provider Office Phone # Fax #    Mukund Hamlin [phone]       [address] [address]        Equal Access to Services     MAXIMINO KOLB : Hadii faraz sheth Sodivya, waaxda luqadaha, qaybta kaalmada porfirio, veda amaya. So [facility] [phone].    ATENCIÓN: Si habla español, tiene a escalante disposición servicios gratuitos de asistencia lingüística. Chon al [phone].    We comply with applicable federal civil rights laws and [place] laws. We do not discriminate on the basis of race, color, national origin, age, disability sex, sexual orientation or gender identity.            Thank you!     Thank you for choosing [facility]  for your care. Our goal is always to provide you with excellent care. Hearing back from our patients is one way we can continue to improve our services. Please take a few minutes to complete the written survey that you may receive in the mail after your visit with us. Thank you!             Your Updated Medication List - Protect others around you: Learn how to safely use, store and throw away your medicines at www.disposemymeds.org.          This list is accurate as of: 9/18/17 11:02 AM.  Always use your most recent med list.                   Brand Name Dispense Instructions for use Diagnosis    ADVIL 200 MG capsule   Generic drug:  ibuprofen      Take 200 mg by mouth every 4 hours as needed.        ALEVE PO      Take 1 tablet by mouth. As directed when needed        EXCEDRIN PO      PRN if don't have aleve or advil        omeprazole 40 MG capsule    priLOSEC    90 capsule    Take 1 capsule (40 mg) by mouth daily Take 30-60 minutes before a meal.    Abdominal pain, epigastric       oxyCODONE 30 MG 12 hr tablet    OXYCONTIN    60 tablet    Take 1 tablet (30 mg) by mouth every 12 hours    Chronic pain syndrome       oxyCODONE-acetaminophen  MG per tablet    PERCOCET    120 tablet    Take 1 tablet by mouth every 6 hours as needed for pain    Chronic pain syndrome       VITAMIN A PO      Take by mouth daily        VITAMIN C PO      1 daily
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
## 2017-09-23 LAB — PAIN DRUG SCR UR W RPTD MEDS: NORMAL

## 2017-09-28 ENCOUNTER — OFFICE VISIT (OUTPATIENT)
Dept: FAMILY MEDICINE | Facility: OTHER | Age: 62
End: 2017-09-28
Attending: FAMILY MEDICINE
Payer: MEDICARE

## 2017-09-28 VITALS
SYSTOLIC BLOOD PRESSURE: 128 MMHG | HEART RATE: 104 BPM | DIASTOLIC BLOOD PRESSURE: 76 MMHG | HEIGHT: 68 IN | RESPIRATION RATE: 20 BRPM | TEMPERATURE: 99.3 F | WEIGHT: 145 LBS | OXYGEN SATURATION: 99 % | BODY MASS INDEX: 21.98 KG/M2

## 2017-09-28 DIAGNOSIS — R10.13 ABDOMINAL PAIN, EPIGASTRIC: ICD-10-CM

## 2017-09-28 DIAGNOSIS — G89.29 OTHER CHRONIC PAIN: Primary | ICD-10-CM

## 2017-09-28 DIAGNOSIS — Z72.0 TOBACCO ABUSE: ICD-10-CM

## 2017-09-28 DIAGNOSIS — Z71.6 TOBACCO ABUSE COUNSELING: ICD-10-CM

## 2017-09-28 PROCEDURE — 99212 OFFICE O/P EST SF 10 MIN: CPT

## 2017-09-28 PROCEDURE — 99213 OFFICE O/P EST LOW 20 MIN: CPT | Performed by: FAMILY MEDICINE

## 2017-09-28 RX ORDER — OMEPRAZOLE 40 MG/1
40 CAPSULE, DELAYED RELEASE ORAL DAILY
Qty: 90 CAPSULE | Refills: 3 | Status: SHIPPED | OUTPATIENT
Start: 2017-09-28 | End: 2018-09-18

## 2017-09-28 ASSESSMENT — ANXIETY QUESTIONNAIRES
1. FEELING NERVOUS, ANXIOUS, OR ON EDGE: MORE THAN HALF THE DAYS
5. BEING SO RESTLESS THAT IT IS HARD TO SIT STILL: SEVERAL DAYS
2. NOT BEING ABLE TO STOP OR CONTROL WORRYING: SEVERAL DAYS
6. BECOMING EASILY ANNOYED OR IRRITABLE: SEVERAL DAYS
3. WORRYING TOO MUCH ABOUT DIFFERENT THINGS: MORE THAN HALF THE DAYS
IF YOU CHECKED OFF ANY PROBLEMS ON THIS QUESTIONNAIRE, HOW DIFFICULT HAVE THESE PROBLEMS MADE IT FOR YOU TO DO YOUR WORK, TAKE CARE OF THINGS AT HOME, OR GET ALONG WITH OTHER PEOPLE: SOMEWHAT DIFFICULT
7. FEELING AFRAID AS IF SOMETHING AWFUL MIGHT HAPPEN: SEVERAL DAYS
GAD7 TOTAL SCORE: 10

## 2017-09-28 ASSESSMENT — PAIN SCALES - GENERAL: PAINLEVEL: SEVERE PAIN (6)

## 2017-09-28 ASSESSMENT — PATIENT HEALTH QUESTIONNAIRE - PHQ9
SUM OF ALL RESPONSES TO PHQ QUESTIONS 1-9: 13
5. POOR APPETITE OR OVEREATING: MORE THAN HALF THE DAYS
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
## 2017-09-28 NOTE — PATIENT INSTRUCTIONS

## 2017-09-28 NOTE — NURSING NOTE
"Chief Complaint   Patient presents with     Results     Pt is in for a FU on his UDS results.       Initial /76 (BP Location: Left arm, Patient Position: Chair, Cuff Size: Adult Regular)  Pulse 104  Temp 99.3  F (37.4  C) (Tympanic)  Resp 20  Ht 5' 8\" (1.727 m)  Wt 145 lb (65.8 kg)  SpO2 99%  BMI 22.05 kg/m2 Estimated body mass index is 22.05 kg/(m^2) as calculated from the following:    Height as of this encounter: 5' 8\" (1.727 m).    Weight as of this encounter: 145 lb (65.8 kg).  Medication Reconciliation: complete   Acacia Monzon    "

## 2017-09-29 ASSESSMENT — ANXIETY QUESTIONNAIRES: GAD7 TOTAL SCORE: 10

## 2017-10-12 DIAGNOSIS — G89.4 CHRONIC PAIN SYNDROME: ICD-10-CM

## 2017-10-12 RX ORDER — OXYCODONE HYDROCHLORIDE 30 MG/1
30 TABLET, FILM COATED, EXTENDED RELEASE ORAL EVERY 12 HOURS
Qty: 60 TABLET | Refills: 0 | Status: SHIPPED | OUTPATIENT
Start: 2017-10-12 | End: 2017-11-09

## 2017-10-12 RX ORDER — OXYCODONE AND ACETAMINOPHEN 10; 325 MG/1; MG/1
1 TABLET ORAL EVERY 6 HOURS PRN
Qty: 120 TABLET | Refills: 0 | Status: SHIPPED | OUTPATIENT
Start: 2017-10-12 | End: 2017-11-09

## 2017-10-12 NOTE — TELEPHONE ENCOUNTER
oxycontin      Last Written Prescription Date: 9/14/17  Last Fill Quantity: 60,  # refills: 0   Last Office Visit with [facility], [facility] or [facility] prescribing provider: 9/28/17                                         Next 5 appointments (look out 90 days)     Dec 18, 2017  8:45 AM CST   (Arrive by 8:30 AM)   PHYSICAL with Mukund Hamlin MD   [facility] ([facility] )    [address]  [facility] [place] [address]   [phone]                  percocet      Last Written Prescription Date: 9/14/17  Last Fill Quantity: 120,  # refills: 0   Last Office Visit with [facility], [facility] or [facility] prescribing provider: 9/28/17                                         Next 5 appointments (look out 90 days)     Dec 18, 2017  8:45 AM CST   (Arrive by 8:30 AM)   PHYSICAL with Mukund Hamlin MD   [facility] ([facility] )    [address]  [facility] [address]   [phone]

## 2017-10-13 ENCOUNTER — TELEPHONE (OUTPATIENT)
Dept: FAMILY MEDICINE | Facility: OTHER | Age: 62
End: 2017-10-13

## 2017-11-09 DIAGNOSIS — G89.4 CHRONIC PAIN SYNDROME: ICD-10-CM

## 2017-11-09 RX ORDER — OXYCODONE AND ACETAMINOPHEN 10; 325 MG/1; MG/1
1 TABLET ORAL EVERY 6 HOURS PRN
Qty: 120 TABLET | Refills: 0 | Status: SHIPPED | OUTPATIENT
Start: 2017-11-09 | End: 2017-12-07

## 2017-11-09 RX ORDER — OXYCODONE HYDROCHLORIDE 30 MG/1
30 TABLET, FILM COATED, EXTENDED RELEASE ORAL EVERY 12 HOURS
Qty: 60 TABLET | Refills: 0 | Status: SHIPPED | OUTPATIENT
Start: 2017-11-09 | End: 2017-12-07

## 2017-11-09 RX ORDER — OXYCODONE AND ACETAMINOPHEN 10; 325 MG/1; MG/1
1 TABLET ORAL EVERY 6 HOURS PRN
Qty: 120 TABLET | Refills: 0 | OUTPATIENT
Start: 2017-11-09

## 2017-11-09 NOTE — TELEPHONE ENCOUNTER
Oxycodone/Acetaminophen      Last Written Prescription Date: 10/12/17  Last Fill Quantity: 120,  # refills: 0   Last Office Visit with [facility], [facility] or [facility] prescribing provider: 9/28/17                                         Next 5 appointments (look out 90 days)     Dec 18, 2017  8:45 AM CST   (Arrive by 8:30 AM)   PHYSICAL with Mukund Hamlin MD   [facility] ([facility] )    [address] [facility] [address]   [phone]                   b

## 2017-11-09 NOTE — TELEPHONE ENCOUNTER
Oxycontin      Last Written Prescription Date: 10/12/17  Last Fill Quantity: 60,  # refills: 0   Last Office Visit with FM[facility], [facility] or  Health prescribing provider: 9/28/17                                         Next 5 appointments (look out 90 days)     Dec 18, 2017  8:45 AM CST   (Arrive by 8:30 AM)   PHYSICAL with Mukund Hamlin MD   [facility] ([facility] )    [address]  [facility] [address]   [phone]                    Oxycodone/Acetaminophen      Last Written Prescription Date:10/12/17  Last Fill Quantity: 120 ,  # refills: 0   Last Office Visit with G, UMP or  Health prescribing provider: 9/28/17                                         Next 5 appointments (look out 90 days)     Dec 18, 2017  8:45 AM CST   (Arrive by 8:30 AM)   PHYSICAL with Mukund Hamlin MD   [facility] ([facility] )    [address]  [facility] [address]   [phone]

## 2017-11-09 NOTE — TELEPHONE ENCOUNTER
Oxycodone/Acetaminaphen      Last Written Prescription Date: ***  Last Fill Quantity: ***,  # refills: ***   Last Office Visit with FM[facility], [facility] or [facility] prescribing provider: ***                                         Next 5 appointments (look out 90 days)     Dec 18, 2017  8:45 AM CST   (Arrive by 8:30 AM)   PHYSICAL with Mukund Hamlin MD   [facility] ([facility] )    [address] [facility] [address]   [phone]

## 2017-12-07 DIAGNOSIS — G89.4 CHRONIC PAIN SYNDROME: ICD-10-CM

## 2017-12-07 RX ORDER — OXYCODONE AND ACETAMINOPHEN 10; 325 MG/1; MG/1
1 TABLET ORAL EVERY 6 HOURS PRN
Qty: 120 TABLET | Refills: 0 | Status: SHIPPED | OUTPATIENT
Start: 2017-12-07 | End: 2017-12-27

## 2017-12-07 RX ORDER — OXYCODONE HYDROCHLORIDE 30 MG/1
30 TABLET, FILM COATED, EXTENDED RELEASE ORAL EVERY 12 HOURS
Qty: 60 TABLET | Refills: 0 | Status: SHIPPED | OUTPATIENT
Start: 2017-12-07 | End: 2017-12-18

## 2017-12-18 ENCOUNTER — OFFICE VISIT (OUTPATIENT)
Dept: FAMILY MEDICINE | Facility: OTHER | Age: 62
End: 2017-12-18
Attending: FAMILY MEDICINE
Payer: MEDICARE

## 2017-12-18 VITALS
BODY MASS INDEX: 20.88 KG/M2 | TEMPERATURE: 97.9 F | HEIGHT: 67 IN | WEIGHT: 133 LBS | SYSTOLIC BLOOD PRESSURE: 100 MMHG | DIASTOLIC BLOOD PRESSURE: 68 MMHG | HEART RATE: 92 BPM

## 2017-12-18 DIAGNOSIS — Z12.11 SPECIAL SCREENING FOR MALIGNANT NEOPLASMS, COLON: ICD-10-CM

## 2017-12-18 DIAGNOSIS — M54.40 CHRONIC MIDLINE LOW BACK PAIN WITH SCIATICA, SCIATICA LATERALITY UNSPECIFIED: ICD-10-CM

## 2017-12-18 DIAGNOSIS — G89.4 CHRONIC PAIN SYNDROME: ICD-10-CM

## 2017-12-18 DIAGNOSIS — R53.81 MALAISE: ICD-10-CM

## 2017-12-18 DIAGNOSIS — G89.29 CHRONIC MIDLINE LOW BACK PAIN WITH SCIATICA, SCIATICA LATERALITY UNSPECIFIED: ICD-10-CM

## 2017-12-18 DIAGNOSIS — Z00.00 ROUTINE GENERAL MEDICAL EXAMINATION AT A HEALTH CARE FACILITY: ICD-10-CM

## 2017-12-18 DIAGNOSIS — Z11.59 NEED FOR HEPATITIS C SCREENING TEST: Primary | ICD-10-CM

## 2017-12-18 LAB
ALBUMIN SERPL-MCNC: 4.6 G/DL (ref 3.4–5)
ALP SERPL-CCNC: 105 U/L (ref 40–150)
ALT SERPL W P-5'-P-CCNC: 43 U/L (ref 0–70)
ANION GAP SERPL CALCULATED.3IONS-SCNC: 15 MMOL/L (ref 3–14)
AST SERPL W P-5'-P-CCNC: 22 U/L (ref 0–45)
BASOPHILS # BLD AUTO: 0 10E9/L (ref 0–0.2)
BASOPHILS NFR BLD AUTO: 0.2 %
BILIRUB SERPL-MCNC: 0.9 MG/DL (ref 0.2–1.3)
BUN SERPL-MCNC: 34 MG/DL (ref 7–30)
CALCIUM SERPL-MCNC: 9.3 MG/DL (ref 8.5–10.1)
CHLORIDE SERPL-SCNC: 100 MMOL/L (ref 94–109)
CHOLEST SERPL-MCNC: 204 MG/DL
CO2 SERPL-SCNC: 21 MMOL/L (ref 20–32)
CREAT SERPL-MCNC: 1.1 MG/DL (ref 0.66–1.25)
DIFFERENTIAL METHOD BLD: ABNORMAL
EOSINOPHIL # BLD AUTO: 0 10E9/L (ref 0–0.7)
EOSINOPHIL NFR BLD AUTO: 0.1 %
ERYTHROCYTE [DISTWIDTH] IN BLOOD BY AUTOMATED COUNT: 13.5 % (ref 10–15)
GFR SERPL CREATININE-BSD FRML MDRD: 68 ML/MIN/1.7M2
GLUCOSE SERPL-MCNC: 129 MG/DL (ref 70–99)
HCT VFR BLD AUTO: 50.1 % (ref 40–53)
HDLC SERPL-MCNC: 52 MG/DL
HGB BLD-MCNC: 16.8 G/DL (ref 13.3–17.7)
LDLC SERPL CALC-MCNC: 128 MG/DL
LYMPHOCYTES # BLD AUTO: 1.4 10E9/L (ref 0.8–5.3)
LYMPHOCYTES NFR BLD AUTO: 10.4 %
MCH RBC QN AUTO: 31 PG (ref 26.5–33)
MCHC RBC AUTO-ENTMCNC: 33.5 G/DL (ref 31.5–36.5)
MCV RBC AUTO: 92 FL (ref 78–100)
MONOCYTES # BLD AUTO: 1.1 10E9/L (ref 0–1.3)
MONOCYTES NFR BLD AUTO: 8.5 %
NEUTROPHILS # BLD AUTO: 10.5 10E9/L (ref 1.6–8.3)
NEUTROPHILS NFR BLD AUTO: 80.8 %
NONHDLC SERPL-MCNC: 152 MG/DL
PLATELET # BLD AUTO: 282 10E9/L (ref 150–450)
POTASSIUM SERPL-SCNC: 3.2 MMOL/L (ref 3.4–5.3)
PROT SERPL-MCNC: 8.7 G/DL (ref 6.8–8.8)
RBC # BLD AUTO: 5.42 10E12/L (ref 4.4–5.9)
SODIUM SERPL-SCNC: 136 MMOL/L (ref 133–144)
TRIGL SERPL-MCNC: 121 MG/DL
WBC # BLD AUTO: 13 10E9/L (ref 4–11)

## 2017-12-18 PROCEDURE — 80061 LIPID PANEL: CPT | Mod: ZL | Performed by: FAMILY MEDICINE

## 2017-12-18 PROCEDURE — G0472 HEP C SCREEN HIGH RISK/OTHER: HCPCS | Mod: ZL | Performed by: FAMILY MEDICINE

## 2017-12-18 PROCEDURE — 85025 COMPLETE CBC W/AUTO DIFF WBC: CPT | Mod: ZL | Performed by: FAMILY MEDICINE

## 2017-12-18 PROCEDURE — 99396 PREV VISIT EST AGE 40-64: CPT | Performed by: FAMILY MEDICINE

## 2017-12-18 PROCEDURE — 36415 COLL VENOUS BLD VENIPUNCTURE: CPT | Mod: ZL | Performed by: FAMILY MEDICINE

## 2017-12-18 PROCEDURE — 80053 COMPREHEN METABOLIC PANEL: CPT | Mod: ZL | Performed by: FAMILY MEDICINE

## 2017-12-18 PROCEDURE — 80307 DRUG TEST PRSMV CHEM ANLYZR: CPT | Mod: ZL | Performed by: FAMILY MEDICINE

## 2017-12-18 ASSESSMENT — PAIN SCALES - GENERAL: PAINLEVEL: MODERATE PAIN (5)

## 2017-12-18 ASSESSMENT — ANXIETY QUESTIONNAIRES
2. NOT BEING ABLE TO STOP OR CONTROL WORRYING: MORE THAN HALF THE DAYS
GAD7 TOTAL SCORE: 13
1. FEELING NERVOUS, ANXIOUS, OR ON EDGE: MORE THAN HALF THE DAYS
4. TROUBLE RELAXING: MORE THAN HALF THE DAYS
IF YOU CHECKED OFF ANY PROBLEMS ON THIS QUESTIONNAIRE, HOW DIFFICULT HAVE THESE PROBLEMS MADE IT FOR YOU TO DO YOUR WORK, TAKE CARE OF THINGS AT HOME, OR GET ALONG WITH OTHER PEOPLE: VERY DIFFICULT
7. FEELING AFRAID AS IF SOMETHING AWFUL MIGHT HAPPEN: MORE THAN HALF THE DAYS
5. BEING SO RESTLESS THAT IT IS HARD TO SIT STILL: SEVERAL DAYS
3. WORRYING TOO MUCH ABOUT DIFFERENT THINGS: MORE THAN HALF THE DAYS
6. BECOMING EASILY ANNOYED OR IRRITABLE: MORE THAN HALF THE DAYS

## 2017-12-18 ASSESSMENT — PATIENT HEALTH QUESTIONNAIRE - PHQ9: SUM OF ALL RESPONSES TO PHQ QUESTIONS 1-9: 11

## 2017-12-18 NOTE — NURSING NOTE
"Chief Complaint   Patient presents with     Physical       Initial /68  Pulse 92  Temp 97.9  F (36.6  C)  Ht 5' 6.75\" (1.695 m)  Wt 133 lb (60.3 kg)  BMI 20.99 kg/m2 Estimated body mass index is 20.99 kg/(m^2) as calculated from the following:    Height as of this encounter: 5' 6.75\" (1.695 m).    Weight as of this encounter: 133 lb (60.3 kg).  Medication Reconciliation: complete   Sarah Paula    "

## 2017-12-18 NOTE — MR AVS SNAPSHOT
After Visit Summary   12/18/2017    Jorge A Mendosa    MRN: [id number]           Patient Information     Date Of Birth          1955        Visit Information        Provider Department      12/18/2017 8:45 AM Mukund Hamlin MD [facility]        Today's Diagnoses     Need for hepatitis C screening test    -  1    Special screening for malignant neoplasms, colon        Routine general medical examination at a health care facility        Chronic midline low back pain with sciatica, sciatica laterality unspecified        Chronic pain syndrome        Malaise          Care Instructions      Preventive Health Recommendations  Male Ages 50 - 64    Yearly exam:             See your health care provider every year in order to  o   Review health changes.   o   Discuss preventive care.    o   Review your medicines if your doctor has prescribed any.     Have a cholesterol test every 5 years, or more frequently if you are at risk for high cholesterol/heart disease.     Have a diabetes test (fasting glucose) every three years. If you are at risk for diabetes, you should have this test more often.     Have a colonoscopy at age 50, or have a yearly FIT test (stool test). These exams will check for colon cancer.      Talk with your health care provider about whether or not a prostate cancer screening test (PSA) is right for you.    You should be tested each year for STDs (sexually transmitted diseases), if you re at risk.     Shots: Get a flu shot each year. Get a tetanus shot every 10 years.     Nutrition:    Eat at least 5 servings of fruits and vegetables daily.     Eat whole-grain bread, whole-wheat pasta and brown rice instead of white grains and rice.     Talk to your provider about Calcium and Vitamin D.     Lifestyle    Exercise for at least 150 minutes a week (30 minutes a day, 5 days a week). This will help you control your weight and prevent disease.     Limit alcohol to one drink per  day.     No smoking.     Wear sunscreen to prevent skin cancer.     See your dentist every six months for an exam and cleaning.     See your eye doctor every 1 to 2 years.            Follow-ups after your visit        Additional Services     GENERAL SURG ADULT REFERRAL       Your provider has referred you to: [facility]: [facility] Telma Marques [phone]   [email or web address]    Please be aware that coverage of these services is subject to the terms and limitations of your health insurance plan.  Call member services at your health plan with any benefit or coverage questions.      Please bring the following with you to your appointment:    (1) Any X-Rays, CTs or MRIs which have been performed.  Contact the facility where they were done to arrange for  prior to your scheduled appointment.   (2) List of current medications   (3) This referral request   (4) Any documents/labs given to you for this referral                  Who to contact     If you have questions or need follow up information about today's clinic visit or your schedule please contact [facility] directly at [phone].  Normal or non-critical lab and imaging results will be communicated to you by MyChart, letter or phone within 4 business days after the clinic has received the results. If you do not hear from us within 7 days, please contact the clinic through MyChart or phone. If you have a critical or abnormal lab result, we will notify you by phone as soon as possible.  Submit refill requests through [employer] or call your pharmacy and they will forward the refill request to us. Please allow 3 business days for your refill to be completed.          Additional Information About Your Visit        Care EveryWhere ID     This is your Care EveryWhere ID. This could be used by other organizations to access your [place] medical records  [id number]        Your Vitals Were      "Pulse Temperature Height BMI (Body Mass Index)          92 97.9  F (36.6  C) 5' 6.75\" (1.695 m) 20.99 kg/m2         Blood Pressure from Last 3 Encounters:   12/18/17 100/68   09/28/17 128/76   09/18/17 115/68    Weight from Last 3 Encounters:   12/18/17 133 lb (60.3 kg)   09/28/17 145 lb (65.8 kg)   09/18/17 152 lb (68.9 kg)              We Performed the Following     CBC with platelets and differential     Comprehensive metabolic panel (BMP + Alb, Alk Phos, ALT, AST, Total. Bili, TP)     GENERAL SURG ADULT REFERRAL     Hepatitis C Screen Reflex to HCV RNA Quant and Genotype     Lipid Profile (Chol, Trig, HDL, LDL calc)     Pain Drug Scr UR W Rptd Meds          Today's Medication Changes          These changes are accurate as of: 12/18/17  9:16 AM.  If you have any questions, ask your nurse or doctor.               Stop taking these medicines if you haven't already. Please contact your care team if you have questions.     oxyCODONE 30 MG 12 hr tablet   Commonly known as:  OXYCONTIN   Stopped by:  Mukund Hamlin MD                    Primary Care Provider Office Phone # Fax #    Mukund Hamlin [phone]       [address] [address]        Equal Access to Services     LIZZ KOLB AH: Hadii aad ku hadasho Soomaali, waaxda luqadaha, qaybta kaalmada adeegyada, waxay caliin haytedn sue amaya. So [facility] [phone].    ATENCIÓN: Si habla español, tiene a escalante disposición servicios gratuitos de asistencia lingüística. Chon al [phone].    We comply with applicable federal civil rights laws and [place] laws. We do not discriminate on the basis of race, color, national origin, age, disability, sex, sexual orientation, or gender identity.            Thank you!     Thank you for choosing [facility]  for your care. Our goal is always to provide you with excellent care. Hearing back from our patients is one way we can continue to improve our " services. Please take a few minutes to complete the written survey that you may receive in the mail after your visit with us. Thank you!             Your Updated Medication List - Protect others around you: Learn how to safely use, store and throw away your medicines at www.disposemymeds.org.          This list is accurate as of: 12/18/17  9:16 AM.  Always use your most recent med list.                   Brand Name Dispense Instructions for use Diagnosis    ADVIL 200 MG capsule   Generic drug:  ibuprofen      Take 200 mg by mouth every 4 hours as needed.        ALEVE PO      Take 1 tablet by mouth. As directed when needed        EXCEDRIN PO      PRN if don't have aleve or advil        omeprazole 40 MG capsule    priLOSEC    90 capsule    Take 1 capsule (40 mg) by mouth daily Take 30-60 minutes before a meal.    Abdominal pain, epigastric       oxyCODONE-acetaminophen  MG per tablet    PERCOCET    120 tablet    Take 1 tablet by mouth every 6 hours as needed for pain    Chronic pain syndrome       VITAMIN A PO      Take by mouth daily        VITAMIN C PO      1 daily

## 2017-12-18 NOTE — PROGRESS NOTES
SUBJECTIVE:   CC: Jorge A Mendosa is an 62 year old male who presents for preventative health visit.     Healthy Habits:    Do you get at least three servings of calcium containing foods daily (dairy, green leafy vegetables, etc.)? yes    Amount of exercise or daily activities, outside of work: none    Problems taking medications regularly No    Medication side effects: No    Have you had an eye exam in the past two years? no    Do you see a dentist twice per year? No-once    Do you have sleep apnea, excessive snoring or daytime drowsiness?yes-daytime drowsiness           Today's PHQ-2 Score: PHQ-2 ( 1999 Pf[employer]) 3/11/2015 3/11/2015   Q1: Little interest or pleasure in doing things 0 0   Q2: Feeling down, depressed or hopeless 0 0   PHQ-2 Score 0 0         Abuse: Current or Past(Physical, Sexual or Emotional)- No  Do you feel safe in your environment - Yes  Social History   Substance Use Topics     Smoking status: Former Smoker     Packs/day: 0.30     Types: Cigarettes     Smokeless tobacco: Never Used      Comment: Passive Exposure (NO)     Alcohol use No      If you drink alcohol do you typically have >3 drinks per day or >7 drinks per week? No                      Last PSA: No results found for: PSA    Reviewed orders with patient. Reviewed health maintenance and updated orders accordingly - Yes  Labs reviewed in EPIC    Reviewed and updated as needed this visit by clinical staff  Tobacco  Allergies  Meds  Med Hx  Surg Hx  Fam Hx  Soc Hx        Reviewed and updated as needed this visit by Provider              ROS:  C: positive GI bug with vomiting and diarrhea, improving, as well as some prominent URI sx.    I: NEGATIVE for worrisome rashes, moles or lesions  E: NEGATIVE for vision changes or irritation  ENT: NEGATIVE for ear, mouth and throat problems  R: NEGATIVE for significant cough or SOB except above.   CV: NEGATIVE for chest pain, palpitations or peripheral edema  GI: NEGATIVE for nausea,  "abdominal pain, heartburn, or change in bowel habits   male: negative for dysuria, hematuria, decreased urinary stream, erectile dysfunction, urethral discharge  M: NEGATIVE for significant arthralgias or myalgia  N: NEGATIVE for weakness, dizziness or paresthesias  P: NEGATIVE for changes in mood or affect    OBJECTIVE:   /68  Pulse 92  Temp 97.9  F (36.6  C)  Ht 5' 6.75\" (1.695 m)  Wt 133 lb (60.3 kg)  BMI 20.99 kg/m2  EXAM:  GENERAL: healthy, alert and no distress  EYES: Eyes grossly normal to inspection, PERRL and conjunctivae and sclerae normal  HENT: ear canals and TM's normal, nose and mouth without ulcers or lesions  NECK: no adenopathy, no asymmetry, masses, or scars and thyroid normal to palpation  RESP: lungs clear to auscultation - no rales, rhonchi or wheezes  CV: regular rate and rhythm, normal S1 S2, no S3 or S4, no murmur, click or rub, no peripheral edema and peripheral pulses strong  ABDOMEN: soft, nontender, no hepatosplenomegaly, no masses and bowel sounds normal  MS: no gross musculoskeletal defects noted, no edema  SKIN: no suspicious lesions or rashes  NEURO: Normal strength and tone, mentation intact and speech normal  PSYCH: mentation appears normal, affect normal/bright    ASSESSMENT/PLAN:       ICD-10-CM    1. Need for hepatitis C screening test Z11.59 Hepatitis C Screen Reflex to HCV RNA Quant and Genotype     CBC with platelets and differential   2. Special screening for malignant neoplasms, colon Z12.11 GENERAL SURG ADULT REFERRAL     CBC with platelets and differential   3. Routine general medical examination at a health care facility Z00.00 Lipid Profile (Chol, Trig, HDL, LDL calc)     Comprehensive metabolic panel (BMP + Alb, Alk Phos, ALT, AST, Total. Bili, TP)     CBC with platelets and differential   4. Chronic midline low back pain with sciatica, sciatica laterality unspecified M54.40 Pain Drug Scr UR W Rptd Meds    G89.29    5. Chronic pain syndrome G89.4 Pain Drug " "Scr UR W Rptd Meds   6. Malaise R53.81 CBC with platelets and differential   update labs and chronic pain.  UDS and contract done today.  Passed on rectal exam with the diarrhea at his request.      COUNSELING:  Reviewed preventive health counseling, as reflected in patient instructions       reports that he has quit smoking. His smoking use included Cigarettes. He smoked 0.30 packs per day. He has never used smokeless tobacco.      Estimated body mass index is 20.99 kg/(m^2) as calculated from the following:    Height as of this encounter: 5' 6.75\" (1.695 m).    Weight as of this encounter: 133 lb (60.3 kg).         Counseling Resources:  ATP IV Guidelines  Pooled Cohorts Equation Calculator  FRAX Risk Assessment  ICSI Preventive Guidelines  Dietary Guidelines for Americans, 2010  USDA's MyPlate  ASA Prophylaxis  Lung CA Screening    Mukund Hamlin MD  [facility]  "

## 2017-12-18 NOTE — LETTER
[facility] [place]    12/18/17    Patient: Jorge A Mendosa  YOB: 1955  Medical Record Number: [id number]                                                                  Controlled Substance Agreement  I understand that my care provider has prescribed controlled substances (narcotics, tranquilizers, and/or stimulants) to help manage my condition(s).  I am taking this medicine to help me function or work.  I know that this is strong medicine.  It could have serious side effects and even cause a dependency on the drug.  If I stop these medicines suddenly, I could have severe withdrawal symptoms.    The risks, benefits, and side effects of these medication(s) were explained to me.  I agree that:  1. I will take part in other treatments as advised by my provider.  This may be psychiatry or counseling, physical therapy, behavioral therapy, group treatment, or a referral to a pain clinic.  I will reduce or stop my medicine when my provider tells me to do so.   2. I will take my medicines as prescribed.  I will not change the dose or schedule unless my provider tells me to.  There will be no refills if I  run out early.   I may be contacted at any time without warning and asked to complete a drug test or pill count.   3. I will keep all my appointments at the clinic.  If I miss appointments or fail to follow instructions, my provider may stop my medicine.  4. I will not ask other providers to prescribe controlled substances. And I will not accept controlled substances from other people. If I need another prescribed controlled substance for a new reason, I will notify my provider within one business day.  5. If I enroll in the [place] Medical Marijuana program, I will tell my provider.  I will also sign an agreement to share my medical records with my provider.  6. I will use one pharmacy to fill all of my controlled substance prescriptions.  If my prescription is mailed to my pharmacy, it may take 5 to 7 days  for my medicine to be ready.  7. I understand that my provider, clinic care team, and pharmacy can track controlled substance prescriptions from other providers through a central database (prescription monitoring program).  8. I will bring in my list of medications (or my medicine bottles) each time I come to the clinic.  REV-  04/2016                                                                                                                                            Page 1 of 2      [facility]    12/18/17    Patient: Jorge A Mendosa  YOB: 1955  Medical Record Number: [id number]    9. Refills of controlled substances will be made only during office hours.  It is up to me to make sure that I do not run out of my medicines on weekends or holidays.    10. I am responsible for my prescriptions.  If the medicine is lost or stolen, it will not be replaced.   I also agree not to share these medicines with anyone.  11. I agree to not use ANY illegal or recreational drugs.  This includes marijuana, cocaine, bath salts or other drugs.  I agree not to use alcohol unless my provider says I may.  I agree to give urine samples whenever asked.  If I fail to give a urine sample, the provider may stop my medicine.     12. I will tell my nurse or provider right away if I become pregnant or have a new medical problem treated outside of [facility].  13. I understand that this medicine can affect my thinking and judgment.  It may be unsafe for me to drive, use machinery and do dangerous tasks.  I will not do any of these things until I know how the medicine affects me.  If my dose changes, I will wait to see how it affects me.  I will contact my provider if I have concerns about medicine side effects.  I understand that if I do not follow any of the conditions above, my prescriptions or treatment may be stopped.    I agree that my provider, clinic care team, and pharmacy may work with any city, state or  federal law enforcement agency that investigates the misuse, sale, or other diversion of my controlled medicine. I will allow my provider to discuss my care with or share a copy of this agreement with any other treating provider, pharmacy or emergency room where I receive care.  I agree to give up (waive) any right of privacy or confidentiality with respect to these authorizations.   I have read this agreement and have asked questions about anything I did not understand.   ___________________________________    ___________________________  Patient Signature                                                           Date and Time  ___________________________________     ____________________________  Witness                                                                            Date and Time  ___________________________________  Mukund Hamlin MD  REV-  04/2016                                                                                                                                                                 Page 2 of 2

## 2017-12-19 LAB — HCV AB SERPL QL IA: NONREACTIVE

## 2017-12-19 ASSESSMENT — ANXIETY QUESTIONNAIRES: GAD7 TOTAL SCORE: 13

## 2017-12-22 LAB — PAIN DRUG SCR UR W RPTD MEDS: NORMAL

## 2017-12-27 ENCOUNTER — TELEPHONE (OUTPATIENT)
Dept: FAMILY MEDICINE | Facility: OTHER | Age: 62
End: 2017-12-27

## 2017-12-27 NOTE — TELEPHONE ENCOUNTER
10:11 AM    Reason for Call: OVERBOOK    Patient is having the following symptoms: med check for 2 days.    The patient is requesting an appointment for sometime the week of 01/01/18 with .    Was an appointment offered for this call? No  If yes : Appointment type              Date    Preferred method for responding to this message: Telephone Call  What is your phone number ?    If we cannot reach you directly, may we leave a detailed response at the number you provided? Yes    Can this message wait until your PCP/provider returns, if unavailable today? Not applicable, PCP is in    [facility]

## 2018-01-03 ENCOUNTER — OFFICE VISIT (OUTPATIENT)
Dept: FAMILY MEDICINE | Facility: OTHER | Age: 63
End: 2018-01-03
Attending: FAMILY MEDICINE
Payer: MEDICARE

## 2018-01-03 VITALS
DIASTOLIC BLOOD PRESSURE: 66 MMHG | HEIGHT: 67 IN | BODY MASS INDEX: 22.91 KG/M2 | HEART RATE: 69 BPM | WEIGHT: 146 LBS | SYSTOLIC BLOOD PRESSURE: 114 MMHG | TEMPERATURE: 98.2 F | OXYGEN SATURATION: 99 %

## 2018-01-03 DIAGNOSIS — G89.29 OTHER CHRONIC PAIN: Primary | ICD-10-CM

## 2018-01-03 PROCEDURE — G0463 HOSPITAL OUTPT CLINIC VISIT: HCPCS

## 2018-01-03 PROCEDURE — 99214 OFFICE O/P EST MOD 30 MIN: CPT | Performed by: FAMILY MEDICINE

## 2018-01-03 RX ORDER — OXYCODONE AND ACETAMINOPHEN 5; 325 MG/1; MG/1
1 TABLET ORAL EVERY 8 HOURS PRN
Qty: 30 TABLET | Refills: 0 | Status: SHIPPED | OUTPATIENT
Start: 2018-01-03 | End: 2018-09-18

## 2018-01-03 ASSESSMENT — ANXIETY QUESTIONNAIRES
GAD7 TOTAL SCORE: 14
6. BECOMING EASILY ANNOYED OR IRRITABLE: MORE THAN HALF THE DAYS
7. FEELING AFRAID AS IF SOMETHING AWFUL MIGHT HAPPEN: MORE THAN HALF THE DAYS
3. WORRYING TOO MUCH ABOUT DIFFERENT THINGS: MORE THAN HALF THE DAYS
5. BEING SO RESTLESS THAT IT IS HARD TO SIT STILL: MORE THAN HALF THE DAYS
IF YOU CHECKED OFF ANY PROBLEMS ON THIS QUESTIONNAIRE, HOW DIFFICULT HAVE THESE PROBLEMS MADE IT FOR YOU TO DO YOUR WORK, TAKE CARE OF THINGS AT HOME, OR GET ALONG WITH OTHER PEOPLE: VERY DIFFICULT
2. NOT BEING ABLE TO STOP OR CONTROL WORRYING: MORE THAN HALF THE DAYS
4. TROUBLE RELAXING: MORE THAN HALF THE DAYS
1. FEELING NERVOUS, ANXIOUS, OR ON EDGE: MORE THAN HALF THE DAYS

## 2018-01-03 ASSESSMENT — PATIENT HEALTH QUESTIONNAIRE - PHQ9: SUM OF ALL RESPONSES TO PHQ QUESTIONS 1-9: 11

## 2018-01-03 ASSESSMENT — PAIN SCALES - GENERAL: PAINLEVEL: SEVERE PAIN (7)
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
## 2018-01-03 NOTE — MR AVS SNAPSHOT
"              After Visit Summary   1/3/2018    Jorge A Mendosa    MRN: [id number]           Patient Information     Date Of Birth          1955        Visit Information        Provider Department      1/3/2018 9:30 AM Mukund Hamlin MD [facility]        Today's Diagnoses     Other chronic pain    -  1      Care Instructions    F/u with ongoing concerns.           Follow-ups after your visit        Who to contact     If you have questions or need follow up information about today's clinic visit or your schedule please contact [facility] directly at [phone].  Normal or non-critical lab and imaging results will be communicated to you by [employer], letter or phone within 4 business days after the clinic has received the results. If you do not hear from us within 7 days, please contact the clinic through [employer] or phone. If you have a critical or abnormal lab result, we will notify you by phone as soon as possible.  Submit refill requests through [employer] or call your pharmacy and they will forward the refill request to us. Please allow 3 business days for your refill to be completed.          Additional Information About Your Visit        MyChart Information     RawData lets you send messages to your doctor, view your test results, renew your prescriptions, schedule appointments and more. To sign up, go to [email or web address] . Click on \"Log in\" on the left side of the screen, which will take you to the Welcome page. Then click on \"Sign up Now\" on the right side of the page.     You will be asked to enter the access code listed below, as well as some personal information. Please follow the directions to create your username and password.     Your access code is: [email or web address]  Expires: 4/3/2018 12:32 PM     Your access code will  in 90 days. If you need help or a new code, please call your [facility] or [phone].        Care EveryWhere ID     This is your " "Care EveryWhere ID. This could be used by other organizations to access your [facility] medical records  [id number]        Your Vitals Were     Pulse Temperature Height Pulse Oximetry BMI (Body Mass Index)       69 98.2  F (36.8  C) 5' 6.75\" (1.695 m) 99% 23.04 kg/m2        Blood Pressure from Last 3 Encounters:   01/03/18 114/66   12/18/17 100/68   09/28/17 128/76    Weight from Last 3 Encounters:   01/03/18 146 lb (66.2 kg)   12/18/17 133 lb (60.3 kg)   09/28/17 145 lb (65.8 kg)              Today, you had the following     No orders found for display         Today's Medication Changes          These changes are accurate as of: 1/3/18 12:32 PM.  If you have any questions, ask your nurse or doctor.               Start taking these medicines.        Dose/Directions    oxyCODONE-acetaminophen 5-325 MG per tablet   Commonly known as:  PERCOCET   Used for:  Other chronic pain   Started by:  Mukund Hamlin MD        Dose:  1 tablet   Take 1 tablet by mouth every 8 hours as needed for pain   Quantity:  30 tablet   Refills:  0            Where to get your medicines      Some of these will need a paper prescription and others can be bought over the counter.  Ask your nurse if you have questions.     Bring a paper prescription for each of these medications     oxyCODONE-acetaminophen 5-325 MG per tablet                Primary Care Provider Office Phone # Fax #    Mukund Hamlin [phone]       [address] [address]        Equal Access to Services     MAXIMINO KOLB : Hadii faraz ku hadasho Soomaali, waaxda luqadaha, qaybta kaalmada adeegyada, veda idinitza amaya. So [facility] [phone].    ATENCIÓN: Si habla español, tiene a escalante disposición servicios gratuitos de asistencia lingüística. Llame al [phone].    We comply with applicable federal civil rights laws and [place] laws. We do not discriminate on the basis of race, color, national " origin, age, disability, sex, sexual orientation, or gender identity.            Thank you!     Thank you for choosing [facility]  for your care. Our goal is always to provide you with excellent care. Hearing back from our patients is one way we can continue to improve our services. Please take a few minutes to complete the written survey that you may receive in the mail after your visit with us. Thank you!             Your Updated Medication List - Protect others around you: Learn how to safely use, store and throw away your medicines at www.disposemymeds.org.          This list is accurate as of: 1/3/18 12:32 PM.  Always use your most recent med list.                   Brand Name Dispense Instructions for use Diagnosis    ADVIL 200 MG capsule   Generic drug:  ibuprofen      Take 200 mg by mouth every 4 hours as needed.        ALEVE PO      Take 1 tablet by mouth. As directed when needed        EXCEDRIN PO      PRN if don't have aleve or advil        omeprazole 40 MG capsule    priLOSEC    90 capsule    Take 1 capsule (40 mg) by mouth daily Take 30-60 minutes before a meal.    Abdominal pain, epigastric       oxyCODONE-acetaminophen 5-325 MG per tablet    PERCOCET    30 tablet    Take 1 tablet by mouth every 8 hours as needed for pain    Other chronic pain       VITAMIN A PO      Take by mouth daily        VITAMIN C PO      1 daily

## 2018-01-03 NOTE — NURSING NOTE
"Chief Complaint   Patient presents with     Abnormal Labs     Urine drug screen        Initial /66  Pulse 69  Temp 98.2  F (36.8  C)  Ht 5' 6.75\" (1.695 m)  Wt 146 lb (66.2 kg)  SpO2 99%  BMI 23.04 kg/m2 Estimated body mass index is 23.04 kg/(m^2) as calculated from the following:    Height as of this encounter: 5' 6.75\" (1.695 m).    Weight as of this encounter: 146 lb (66.2 kg).  Medication Reconciliation: complete   Sarah Paula    "

## 2018-01-04 ENCOUNTER — TELEPHONE (OUTPATIENT)
Dept: SURGERY | Facility: OTHER | Age: 63
End: 2018-01-04

## 2018-01-04 ASSESSMENT — ANXIETY QUESTIONNAIRES: GAD7 TOTAL SCORE: 14

## 2018-01-04 NOTE — TELEPHONE ENCOUNTER
2nd attempt to contact patient to schedule meet and greet colonoscopy. Writer left vm to call us back to get scheduled.     Krysten Tavarez

## 2018-01-24 ENCOUNTER — TELEPHONE (OUTPATIENT)
Dept: SURGERY | Facility: OTHER | Age: 63
End: 2018-01-24

## 2018-01-24 NOTE — TELEPHONE ENCOUNTER
Attempted to contact patient 3x via phone with no call back, letter sent with all contact information to set up meet and greet colonoscopy. Candy Dalton LPN
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
## 2018-09-18 ENCOUNTER — OFFICE VISIT (OUTPATIENT)
Dept: FAMILY MEDICINE | Facility: OTHER | Age: 63
End: 2018-09-18
Attending: FAMILY MEDICINE
Payer: MEDICARE

## 2018-09-18 VITALS
OXYGEN SATURATION: 97 % | DIASTOLIC BLOOD PRESSURE: 66 MMHG | HEART RATE: 74 BPM | SYSTOLIC BLOOD PRESSURE: 118 MMHG | WEIGHT: 139 LBS | BODY MASS INDEX: 21.82 KG/M2 | HEIGHT: 67 IN

## 2018-09-18 DIAGNOSIS — R49.0 HOARSENESS OF VOICE: ICD-10-CM

## 2018-09-18 DIAGNOSIS — Z11.4 SCREENING FOR HUMAN IMMUNODEFICIENCY VIRUS: Primary | ICD-10-CM

## 2018-09-18 DIAGNOSIS — R10.13 ABDOMINAL PAIN, EPIGASTRIC: ICD-10-CM

## 2018-09-18 DIAGNOSIS — Z71.6 TOBACCO ABUSE COUNSELING: ICD-10-CM

## 2018-09-18 DIAGNOSIS — Z72.0 TOBACCO ABUSE: ICD-10-CM

## 2018-09-18 PROCEDURE — 99214 OFFICE O/P EST MOD 30 MIN: CPT | Performed by: FAMILY MEDICINE

## 2018-09-18 PROCEDURE — G0463 HOSPITAL OUTPT CLINIC VISIT: HCPCS

## 2018-09-18 RX ORDER — OMEPRAZOLE 40 MG/1
40 CAPSULE, DELAYED RELEASE ORAL DAILY
Qty: 90 CAPSULE | Refills: 3 | Status: SHIPPED | OUTPATIENT
Start: 2018-09-18 | End: 2019-10-29

## 2018-09-18 ASSESSMENT — ANXIETY QUESTIONNAIRES
2. NOT BEING ABLE TO STOP OR CONTROL WORRYING: MORE THAN HALF THE DAYS
GAD7 TOTAL SCORE: 17
5. BEING SO RESTLESS THAT IT IS HARD TO SIT STILL: MORE THAN HALF THE DAYS
7. FEELING AFRAID AS IF SOMETHING AWFUL MIGHT HAPPEN: NEARLY EVERY DAY
1. FEELING NERVOUS, ANXIOUS, OR ON EDGE: MORE THAN HALF THE DAYS
6. BECOMING EASILY ANNOYED OR IRRITABLE: NEARLY EVERY DAY
IF YOU CHECKED OFF ANY PROBLEMS ON THIS QUESTIONNAIRE, HOW DIFFICULT HAVE THESE PROBLEMS MADE IT FOR YOU TO DO YOUR WORK, TAKE CARE OF THINGS AT HOME, OR GET ALONG WITH OTHER PEOPLE: VERY DIFFICULT
3. WORRYING TOO MUCH ABOUT DIFFERENT THINGS: MORE THAN HALF THE DAYS

## 2018-09-18 ASSESSMENT — PATIENT HEALTH QUESTIONNAIRE - PHQ9: 5. POOR APPETITE OR OVEREATING: NEARLY EVERY DAY

## 2018-09-18 ASSESSMENT — PAIN SCALES - GENERAL: PAINLEVEL: MODERATE PAIN (5)

## 2018-09-18 NOTE — NURSING NOTE
"Chief Complaint   Patient presents with     Throat Problem       Initial /66  Pulse 74  Ht 5' 6.75\" (1.695 m)  Wt 139 lb (63 kg)  SpO2 97%  BMI 21.93 kg/m2 Estimated body mass index is 21.93 kg/(m^2) as calculated from the following:    Height as of this encounter: 5' 6.75\" (1.695 m).    Weight as of this encounter: 139 lb (63 kg).  Medication Reconciliation: complete    Sarah Paula LPN  "

## 2018-09-18 NOTE — MR AVS SNAPSHOT
After Visit Summary   9/18/2018    Jorge A Mendosa    MRN: [id number]           Patient Information     Date Of Birth          1955        Visit Information        Provider Department      9/18/2018 10:15 AM Mukund Hamlin MD [facility]        Today's Diagnoses     Screening for human immunodeficiency virus    -  1    Abdominal pain, epigastric        Tobacco abuse        Tobacco abuse counseling        Hoarseness of voice          Care Instructions      HOW TO QUIT SMOKING  Smoking is one of the hardest habits to break. About half of all those who have ever smoked have been able to quit, and most of those (about 70%) who still smoke want to quit. Here are some of the best ways to stop smoking.     KEEP TRYING:  It takes most smokers about 8 tries before they are finally able to fully quit. So, the more often you try and fail, the better your chance of quitting the next time! So, don't give up!    GO COLD TURKEY:  Most ex-smokers quit cold turkey. Trying to cut back gradually doesn't seem to work as well, perhaps because it continues the smoking habit. Also, it is possible to fool yourself by inhaling more while smoking fewer cigarettes. This results in the same amount of nicotine in your body!    GET SUPPORT:  Support programs can make an important difference, especially for the heavy smoker. These groups offer lectures, methods to change your behavior and peer support. Call the free national Quitline for more information. 800-QUIT-NOW (888-776-4922). Low-cost or free programs are offered by many hospitals, local chapters of the American Lung Association (915-806-8404) and the American Cancer Society (760-904-2953). Support at home is important too. Non-smokers can help by offering praise and encouragement. If the smoker fails to quit, encourage them to try again!    OVER-THE-COUNTER MEDICINES:  For those who can't quit on their own, Nicotine Replacement Therapy (NRT)  may make quitting much easier. Certain aids such as the nicotine patch, gum and lozenge are available without a prescription. However, it is best to use these under the guidance of your doctor. The skin patch provides a steady supply of nicotine to the body. Nicotine gum and lozenge gives temporary bursts of low levels of nicotine. Both methods take the edge off the craving for cigarettes. WARNING: If you feel symptoms of nicotine overdose, such as nausea, vomiting, dizziness, weakness, or fast heartbeat, stop using these and see your doctor.    PRESCRIPTION MEDICINES:  After evaluating your smoking patterns and prior attempts at quitting, your doctor may offer a prescription medicine such as bupropion (Zyban, Wellbutrin), varenicline (Chantix, Champix), a niocotine inhaler or nasal spray. Each has its unique advantage and side effects which your doctor can review with you.    HEALTH BENEFITS OF QUITTING:  The benefits of quitting start right away and keep improving the longer you go without smokin minutes: blood pressure and pulse return to normal  8 hours: oxygen levels return to normal  2 days: ability to smell and taste begins to improve as damaged nerves start to regrow  2-3 weeks: circulation and lung function improves  1-9 months: decreased cough, congestion and shortness of breath; less tired  1 year: risk of heart attack decreases by half  5 years: risk of lung cancer decreases by half; risk of stroke becomes the same as a non-smoker  For information about how to quit smoking, visit the following links:  National Cancer [place] ,   Clearing the Air, Quit Smoking Today   - an online booklet. http://www.smokefree.gov/pubs/clearing_the_air.pdf  Smokefree.gov http://smokefree.gov/  QuitNet http://www.quitnet.com/    8460-4502 Raine Gomez, [address] [place] [address], [place] [address]. All rights reserved. This information is not intended as a substitute for professional medical care. Always follow your  healthcare professional's instructions.    The Benefits of Living Smoke Free  What do you want to gain from quitting? Check off some reasons to quit.  Health Benefits  ___ Reduce my risk of lung cancer, heart disease, chronic lung disease  ___ Have fewer wrinkles and softer skin  ___ Improve my sense of taste and smell  ___ For pregnant women--reduce the risk of having a miscarriage, stillbirth, premature birth, or low-birth-weight baby  Personal Benefits  ___ Feel more in control of my life  ___ Have better-smelling hair, breath, clothes, home, and car  ___ Save time by not having to take smoke breaks, buy cigarettes, or hunt for a light  ___ Have whiter teeth  Family Benefits  ___ Reduce my children s respiratory tract infections  ___ Set a good example for my children  ___ Reduce my family s cancer risk  Financial Benefits  ___ Save hundreds of dollars each year that would be spent on cigarettes  ___ Save money on medical bills  ___ Save on life, health, and car insurance premiums    Those Dollars Add Up!  Cigarettes are expensive, and getting more expensive all the time. Do you realize how much money you are spending on cigarettes per year? What is the average amount you spend on a pack of cigarettes? What is the average number of packs that you smoke per day? Using your answers to these questions, fill in this formula to help you find out:  ($ _____ per pack) ×  ( _____ number of packs per day) × (365 days) =  $ _____ yearly cost of smoking  Besides tobacco, there are other costs, including extra cleaning bills and replacement costs for clothing and furniture; medical expenses for smoking-related illnesses; and higher health, life, and car insurance premiums.    Cigars and Pipes Count Too!  Cigars and pipes are also dangerous. So are smokeless (chewing) tobacco and snuff. All of these products contain nicotine, a highly addictive substance that has harmful effects on your body. Quitting smoking means giving up  all tobacco products.      6426-7105 [facility], [address] [place] [address], [place] [address]. All rights reserved. This information is not intended as a substitute for professional medical care. Always follow your healthcare professional's instructions.          Follow-ups after your visit        Additional Services     OTOLARYNGOLOGY REFERRAL       Your provider has referred you to: ENT    Please be aware that coverage of these services is subject to the terms and limitations of your health insurance plan.  Call member services at your health plan with any benefit or coverage questions.      Please bring the following with you to your appointment:    (1) Any X-Rays, CTs or MRIs which have been performed.  Contact the facility where they were done to arrange for  prior to your scheduled appointment.   (2) List of current medications  (3) This referral request   (4) Any documents/labs given to you for this referral                  Your next 10 appointments already scheduled     Sep 27, 2018  9:45 AM CDT   (Arrive by 9:30 AM)   New Visit with Kailyn Campbell PA-C   [facility]    [address] [place] [address]   [phone]              Who to contact     If you have questions or need follow up information about today's clinic visit or your schedule please contact [facility] directly at [phone].  Normal or non-critical lab and imaging results will be communicated to you by MyChart, letter or phone within 4 business days after the clinic has received the results. If you do not hear from us within 7 days, please contact the clinic through MyChart or phone. If you have a critical or abnormal lab result, we will notify you by phone as soon as possible.  Submit refill requests through [employer] or call your pharmacy and they will forward the refill request to us. Please allow 3 business days for your refill to be completed.     "      Additional Information About Your Visit        Care EveryWhere ID     This is your Care EveryWhere ID. This could be used by other organizations to access your [place] medical records  [id number]        Your Vitals Were     Pulse Height Pulse Oximetry BMI (Body Mass Index)          74 5' 6.75\" (1.695 m) 97% 21.93 kg/m2         Blood Pressure from Last 3 Encounters:   09/18/18 118/66   01/03/18 114/66   12/18/17 100/68    Weight from Last 3 Encounters:   09/18/18 139 lb (63 kg)   01/03/18 146 lb (66.2 kg)   12/18/17 133 lb (60.3 kg)              We Performed the Following     OTOLARYNGOLOGY REFERRAL     Tobacco Cessation - Order to Satisfy Health Maintenance          Today's Medication Changes          These changes are accurate as of 9/18/18 12:36 PM.  If you have any questions, ask your nurse or doctor.               Stop taking these medicines if you haven't already. Please contact your care team if you have questions.     oxyCODONE-acetaminophen 5-325 MG per tablet   Commonly known as:  PERCOCET   Stopped by:  Mukund Hamlin MD                Where to get your medicines      These medications were sent to [id number] [place]  [address] [address]  [address], [place] [address]     Phone:  [phone]     omeprazole 40 MG capsule                Primary Care Provider Office Phone # Fax #    Mukund Hamlin [phone]       [address] [place] [address]        Equal Access to Services     [facility] : Hadii faraz ku hadasho Soomaali, waaxda luqadaha, qaybta kaalmada adeegyada, waxmadi rosario velasco . So [facility] [phone].    ATENCIÓN: Si habla español, tiene a escalante disposición servicios gratuitos de asistencia lingüística. Llame al [phone].    We comply with applicable federal civil rights laws and [place] laws. We do not discriminate on the basis of race, color, national origin, age, disability, sex, sexual orientation, or gender " identity.            Thank you!     Thank you for choosing [facility]  for your care. Our goal is always to provide you with excellent care. Hearing back from our patients is one way we can continue to improve our services. Please take a few minutes to complete the written survey that you may receive in the mail after your visit with us. Thank you!             Your Updated Medication List - Protect others around you: Learn how to safely use, store and throw away your medicines at www.disposemymeds.org.          This list is accurate as of 9/18/18 12:36 PM.  Always use your most recent med list.                   Brand Name Dispense Instructions for use Diagnosis    ADVIL 200 MG capsule   Generic drug:  ibuprofen      Take 200 mg by mouth every 4 hours as needed.        ALEVE PO      Take 1 tablet by mouth. As directed when needed        EXCEDRIN PO      PRN if don't have aleve or advil        omeprazole 40 MG capsule    priLOSEC    90 capsule    Take 1 capsule (40 mg) by mouth daily Take 30-60 minutes before a meal.    Abdominal pain, epigastric       VITAMIN A PO      Take by mouth daily        VITAMIN C PO      1 daily

## 2018-09-18 NOTE — PATIENT INSTRUCTIONS

## 2018-09-20 ASSESSMENT — ANXIETY QUESTIONNAIRES: GAD7 TOTAL SCORE: 17

## 2018-09-20 ASSESSMENT — PATIENT HEALTH QUESTIONNAIRE - PHQ9: SUM OF ALL RESPONSES TO PHQ QUESTIONS 1-9: 19

## 2018-09-27 ENCOUNTER — OFFICE VISIT (OUTPATIENT)
Dept: OTOLARYNGOLOGY | Facility: OTHER | Age: 63
End: 2018-09-27
Attending: FAMILY MEDICINE
Payer: MEDICARE

## 2018-09-27 ENCOUNTER — HOSPITAL ENCOUNTER (OUTPATIENT)
Dept: CT IMAGING | Facility: HOSPITAL | Age: 63
Discharge: HOME | End: 2018-09-27
Attending: PHYSICIAN ASSISTANT | Admitting: PHYSICIAN ASSISTANT
Payer: MEDICARE

## 2018-09-27 VITALS
DIASTOLIC BLOOD PRESSURE: 78 MMHG | SYSTOLIC BLOOD PRESSURE: 100 MMHG | BODY MASS INDEX: 20.82 KG/M2 | HEART RATE: 69 BPM | OXYGEN SATURATION: 98 % | HEIGHT: 68 IN | WEIGHT: 137.4 LBS | TEMPERATURE: 96.7 F

## 2018-09-27 DIAGNOSIS — J38.3 VOCAL CORD MASS: ICD-10-CM

## 2018-09-27 DIAGNOSIS — Z71.6 TOBACCO ABUSE COUNSELING: ICD-10-CM

## 2018-09-27 DIAGNOSIS — J34.2 DNS (DEVIATED NASAL SEPTUM): ICD-10-CM

## 2018-09-27 DIAGNOSIS — J38.3 VOCAL CORD MASS: Primary | ICD-10-CM

## 2018-09-27 DIAGNOSIS — J38.1 REINKE'S EDEMA OF VOCAL FOLDS: ICD-10-CM

## 2018-09-27 DIAGNOSIS — R49.0 DYSPHONIA: ICD-10-CM

## 2018-09-27 DIAGNOSIS — J34.89 NASAL SEPTAL SPUR: ICD-10-CM

## 2018-09-27 DIAGNOSIS — R49.0 HOARSENESS OF VOICE: ICD-10-CM

## 2018-09-27 PROCEDURE — 92511 NASOPHARYNGOSCOPY: CPT | Performed by: PHYSICIAN ASSISTANT

## 2018-09-27 PROCEDURE — 25500064 ZZH RX 255 OP 636: Performed by: RADIOLOGY

## 2018-09-27 PROCEDURE — 70491 CT SOFT TISSUE NECK W/DYE: CPT | Mod: TC

## 2018-09-27 PROCEDURE — 99214 OFFICE O/P EST MOD 30 MIN: CPT | Mod: 25 | Performed by: PHYSICIAN ASSISTANT

## 2018-09-27 PROCEDURE — G0463 HOSPITAL OUTPT CLINIC VISIT: HCPCS

## 2018-09-27 PROCEDURE — 31575 DIAGNOSTIC LARYNGOSCOPY: CPT | Performed by: PHYSICIAN ASSISTANT

## 2018-09-27 RX ORDER — IOPAMIDOL 612 MG/ML
100 INJECTION, SOLUTION INTRAVASCULAR ONCE
Status: COMPLETED | OUTPATIENT
Start: 2018-09-27 | End: 2018-09-27

## 2018-09-27 RX ADMIN — IOPAMIDOL 100 ML: 612 INJECTION, SOLUTION INTRAVENOUS at 10:42

## 2018-09-27 NOTE — PATIENT INSTRUCTIONS
Start Prilosec daily  Follow LPR precautions  Start Biotene as directed.   Follow up with Dr. Mcnally to schedule surgery.  Complete CT scans.   Quit tobacco.     Thank you for allowing [facility] and our ENT team to participate in your care.  If your medications are too expensive, please give the nurse a call.  We can possibly change this medication.  If you have a scheduling or an appointment question please contact [facility] at their direct line [phone].   ALL nursing questions or concerns can be directed to your ENT nurse at: [phone] [facility]    Adult lifestyle changes to prevent LPR reviewed      Avoid eating and drinking within two to three hours prior to bedtime    Do not drink alcohol    Eat small meals and slowly    Limit problem foods:    o Caffeine  o Carbonated drinks  o Chocolate  o Peppermint  o Tomato  o Citrus fruits  o Fatty and fried foods      Lose weight    Quit smoking    Wear loose clothing

## 2018-09-27 NOTE — NURSING NOTE
"Chief Complaint   Patient presents with     New Patient     Hoarseness of voice Dr. Hamlin referred        Initial /78 (BP Location: Left arm, Patient Position: Sitting, Cuff Size: Adult Large)  Pulse 69  Temp 96.7  F (35.9  C) (Tympanic)  Ht 5' 8\" (1.727 m)  Wt 137 lb 6.4 oz (62.3 kg)  SpO2 98%  BMI 20.89 kg/m2 Estimated body mass index is 20.89 kg/(m^2) as calculated from the following:    Height as of this encounter: 5' 8\" (1.727 m).    Weight as of this encounter: 137 lb 6.4 oz (62.3 kg).  Medication Reconciliation: complete    Irena Pino LPN    "

## 2018-09-27 NOTE — MR AVS SNAPSHOT
After Visit Summary   9/27/2018    Jorge A Mendosa    MRN: [id number]           Patient Information     Date Of Birth          1955        Visit Information        Provider Department      9/27/2018 9:45 AM Kailyn Campbell PA-C [facility]        Today's Diagnoses     Vocal cord mass    -  1    Hoarseness of voice        Ilia's edema of vocal folds          Care Instructions    Start Prilosec daily  Follow LPR precautions  Start Biotene as directed.   Follow up with Dr. Mcnally to schedule surgery.  Complete CT scans.   Quit tobacco.     Thank you for allowing [facility] and our ENT team to participate in your care.  If your medications are too expensive, please give the nurse a call.  We can possibly change this medication.  If you have a scheduling or an appointment question please contact [facility] Coordinator at their direct line [phone].   ALL nursing questions or concerns can be directed to your ENT nurse at: [phone] [facility]    Adult lifestyle changes to prevent LPR reviewed      Avoid eating and drinking within two to three hours prior to bedtime    Do not drink alcohol    Eat small meals and slowly    Limit problem foods:    o Caffeine  o Carbonated drinks  o Chocolate  o Peppermint  o Tomato  o Citrus fruits  o Fatty and fried foods      Lose weight    Quit smoking    Wear loose clothing            Follow-ups after your visit        Future tests that were ordered for you today     Open Future Orders        Priority Expected Expires Ordered    CT Soft Tissue Neck w Contrast Routine  9/27/2019 9/27/2018            Who to contact     If you have questions or need follow up information about today's clinic visit or your schedule please contact [facility] [place] [facility] directly at [phone].  Normal or non-critical lab and imaging results will be communicated to you by MyChart, letter or phone within 4 business days after the clinic  "has received the results. If you do not hear from us within 7 days, please contact the clinic through [employer] or phone. If you have a critical or abnormal lab result, we will notify you by phone as soon as possible.  Submit refill requests through [employer] or call your pharmacy and they will forward the refill request to us. Please allow 3 business days for your refill to be completed.          Additional Information About Your Visit        Care EveryWhere ID     This is your Care EveryWhere ID. This could be used by other organizations to access your [place] [facility] medical records  [id number]        Your Vitals Were     Pulse Temperature Height Pulse Oximetry BMI (Body Mass Index)       69 96.7  F (35.9  C) (Tympanic) 5' 8\" (1.727 m) 98% 20.89 kg/m2        Blood Pressure from Last 3 Encounters:   09/27/18 100/78   09/18/18 118/66   01/03/18 114/66    Weight from Last 3 Encounters:   09/27/18 137 lb 6.4 oz (62.3 kg)   09/18/18 139 lb (63 kg)   01/03/18 146 lb (66.2 kg)               Primary Care Provider Office Phone # Fax #    Mukund Hamlin [phone]       [address] [place] [address]        Equal Access to Services     LIZZ KOLB AH: Hadii aad ku hadasho Soomaali, waaxda luqadaha, qaybta kaalmada adeegyada, waxay idiin haytedn sue jenkins laadelina amaya. So [facility] [phone].    ATENCIÓN: Si habla español, tiene a escalante disposición servicios gratuitos de asistencia lingüística. Llame al [phone].    We comply with applicable federal civil rights laws and [place] laws. We do not discriminate on the basis of race, color, national origin, age, disability, sex, sexual orientation, or gender identity.            Thank you!     Thank you for choosing [facility]  for your care. Our goal is always to provide you with excellent care. Hearing back from our patients is one way we can continue to improve our services. Please take a few minutes to complete the written survey that " you may receive in the mail after your visit with us. Thank you!             Your Updated Medication List - Protect others around you: Learn how to safely use, store and throw away your medicines at www.disposemymeds.org.          This list is accurate as of 9/27/18 10:09 AM.  Always use your most recent med list.                   Brand Name Dispense Instructions for use Diagnosis    ADVIL 200 MG capsule   Generic drug:  ibuprofen      Take 200 mg by mouth every 4 hours as needed.        ALEVE PO      Take 1 tablet by mouth. As directed when needed        EXCEDRIN PO      PRN if don't have aleve or advil        omeprazole 40 MG capsule    priLOSEC    90 capsule    Take 1 capsule (40 mg) by mouth daily Take 30-60 minutes before a meal.    Abdominal pain, epigastric       VITAMIN A PO      Take by mouth daily        VITAMIN C PO      1 daily
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
## 2018-10-04 ENCOUNTER — OFFICE VISIT (OUTPATIENT)
Dept: OTOLARYNGOLOGY | Facility: OTHER | Age: 63
End: 2018-10-04
Attending: OTOLARYNGOLOGY
Payer: MEDICARE

## 2018-10-04 VITALS
SYSTOLIC BLOOD PRESSURE: 128 MMHG | HEIGHT: 68 IN | TEMPERATURE: 96.8 F | HEART RATE: 74 BPM | WEIGHT: 142 LBS | BODY MASS INDEX: 21.52 KG/M2 | DIASTOLIC BLOOD PRESSURE: 70 MMHG

## 2018-10-04 DIAGNOSIS — R49.0 DYSPHONIA: ICD-10-CM

## 2018-10-04 DIAGNOSIS — Z71.6 TOBACCO ABUSE COUNSELING: ICD-10-CM

## 2018-10-04 DIAGNOSIS — J38.7 LARYNGEAL MASS: Primary | ICD-10-CM

## 2018-10-04 DIAGNOSIS — J38.3 LESION OF TRUE VOCAL CORD: ICD-10-CM

## 2018-10-04 PROCEDURE — 99215 OFFICE O/P EST HI 40 MIN: CPT | Mod: 25 | Performed by: OTOLARYNGOLOGY

## 2018-10-04 PROCEDURE — G0463 HOSPITAL OUTPT CLINIC VISIT: HCPCS

## 2018-10-04 PROCEDURE — 31575 DIAGNOSTIC LARYNGOSCOPY: CPT | Performed by: OTOLARYNGOLOGY

## 2018-10-04 ASSESSMENT — PAIN SCALES - GENERAL: PAINLEVEL: SEVERE PAIN (7)

## 2018-10-04 NOTE — LETTER
"    10/4/2018         RE: Jorge A Mendosa  [address]   [address]  [facility] [address]        Dear Colleague,    Thank you for referring your patient, Jorge A Mendosa, to the [facility]. Please see a copy of my visit note below.    Otolaryngology Progress Note      History of Present Illness   Patient presents with:  vocal cord mass: Pt is here for a f/u vocal cord mass.  Pt had a CT neck completed.  Pt is very hoarse.      Jorge A Mendosa is a 63 year old male   who presents for f/u of dysphonia and a larygneal mass.    He has a several year hx of hoarseness  This progressed over the past month to the point where he felt he should seek care.  He saw Kailyn Campbell ENT PA last week and she noted a vocal cord mass.    He denies shortness of breath, wheezing or stridor  No weight loss, fever, chills or night sweats  No otalgia  No dysphagia    40 pack year history of tobacco use, states he is quitting today  Denies alcohol use  He drinks adequate water  No aphonia    He has a hx of severe heartburn but this has resolved with use of Prilosec  No current reflux complaints    Physical Exam  /70 (BP Location: Left arm, Cuff Size: Adult Regular)  Pulse 74  Temp 96.8  F (36  C) (Tympanic)  Ht 5' 8\" (1.727 m)  Wt 142 lb (64.4 kg)  BMI 21.59 kg/m2  General - The patient is well nourished and well developed, and appears to have good nutritional status.  Alert and oriented to person and place, interactive. Voice is rough in quality  Head and Face - Normocephalic and atraumatic, with no gross asymmetry noted of the contour of the facial features.  The facial nerve is intact, with strong symmetric movements.  Neck-no palpable lymphadenopathy or thyroid mass.  Trachea is midline.  Eyes - Extraocular movements intact.   Ears- External auditory canals are patent, tympanic membranes are intact without effusion or worrisome retractions   Nose - Nasal mucosa is pink and moist with no abnormal mucus.  " The septum was grossly non-obstructive, turbinates of normal size and position.  No polyps, masses, or purulence noted on examination.  Mouth - Examination of the oral cavity shows pink, healthy, moist mucosa.  No lesions or ulceration noted.  The dentition are in good repair.  The tongue is mobile and midline.  Throat - The walls of the oropharynx were smooth, pink, moist, symmetric, and had no lesions or ulcerations.  The tonsillar pillars and soft palate were symmetric.  The uvula was midline on elevation.    Grade 2 symmetric tonsils  Bimanual palpation reveals no oropharygneal mass at tongue base or tonsils    Attempts at mirror laryngoscopy were not possible due to gag reflex.  Therefore I proceeded with a fiberoptic examination after informed consent.  First I sprayed both sides of the nose with a mixture of lidocaine and neosynephrine.  I then passed the scope through the nasal cavity.     The nasopharynx was mucosally covered and symmetric.  The eustachian tube openings were unobstructed.  Going further down I had a clear view of the base of tongue which had normal appearing lingual tonsillar tissue.  The base of tongue was free of lesions, and the vallecula was open.  The epiglottis was smooth and mucosally covered.  The supraglottic larynx was then clearly visualized.  The vocal cords are mobile, no restriction in abduction or adduction.  There is an exophytic white mass at the anterior commissure.  This may be limited to the left anterior cord but it is impossible to tell on awake exam. Appears to cross or at least efface the anterior commissure and involve the right anterior true cord.   The pyriform sinuses were open and without sabrina mass or pooling of secretions upon valsalva, and the limited view of the postcricoid region did not show any lesions.  The patient tolerated the procedure well.    CT neck was personally reiviewed dated 9/27/18  Quality is poor due to motion artifact.  He states he felt  his airway was swelling and he felt he had to repeatedly swallow during the procedure as he felt he was choking.  This resolved quickly after the exam.   NO sabrina cervical lymphadenopathy noted  Remainder of laryngeal imaging is limited      Impression/Plan  Jorge A Mendosa is a 63 year old male    ICD-10-CM    1. Laryngeal mass J38.7    2. 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 Dysphonia R49.0    4. 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    I discussed the risks and complications of microdirect laryngoscopy with biopsies, frozens, including anesthesia, bleeding, infection, injury to teeth, gingiva, tongue, larynx, trachea and esophagus, benign versus malignant pathology and need for further surgery.   The patient was told this is diagnostic and not therapeutic.  No guarantees were given regarding resolution of dysphonia.  He understands surgery is not curative and he will need additional treatment.   All questions are answered and wishes are to proceed with surgery.  He will need absolute voice rest for 1 week after surgery  Jorge A is thankful and states he will quit smoking as of right now    Continue prilosec and reflux precautions  Continue adequate water intake  Tobacco cessation was strongly encouraged.  The associated risk of head and neck cancer was discussed.  Every year of cessation offers health benefits. This was discussed with the patient today and they voiced understanding.  Quit tools and a nicotine patch were offered.    Total exam time was over 40 minutes with over 25 minutes of this time spent in direct patient education, counseling and coordination of care.  We discussed in depth the importance of tobacco cession, post op voice rest, hydration, gastroesophageal reflux  disease control .      Taisha Mcnally D.O.  Otolaryngology/Head and Neck Surgery  Allergy            Again, thank you for allowing me to participate in the care of your patient.        Sincerely,        Taisha Mcnally MD

## 2018-10-04 NOTE — PATIENT INSTRUCTIONS
Thank you for allowing Dr. Mcnally and our ENT team to participate in your care.  If your medications are too expensive, please give the nurse a call.  We can possibly change this medication.  If you have a scheduling or an appointment question please contact [facility] Coordinator at their direct line [phone].   ALL nursing questions or concerns can be directed to your ENT nurse at: [phone] - [facility]    Follow up in surgery  Continue Adequate Water Intake  Congratulations on Quitting Smoking!    HOW TO PREPARE-      You need to have a scheduled Pre-Op with your primary care physician within 30 days of your scheduled surgery. You should be set up with this before you leave today.       You need a friend or family member available to drive you home AND stay with you for 24 hours after you leave the hospital. You will not be allowed to drive yourself. IF you need to take a taxi or the bus you MUST have a responsible person to ride with you. YOUR PROCEDURE WILL BE CANCELLED IF YOU DO NOT HAVE A RESPONSIBLE ADULT TO DRIVE YOU HOME.       You CANNOT have anything to eat or drink after midnight the night before your surgery, including water and coffee. Your stomach needs to be completely empty. Do NOT chew gum, suck on hard candy, or smoke. You can brush your teeth the morning of surgery.       The Surgery Education Nurses will call you  1-2 weeks prior to your surgery date at  [phone] or toll free [phone]. Please have your medication and allergy lists ready.      Stop your aspirin or other NSAIDs(Ibuprofen, Motrin, Aleve, Celebrex, Naproxen, etc...) 7 days before your surgery.      Hospital admitting will call you the day before your surgery with your exact arrival time.       Please call your primary care physician if you should become ill within 24 hours of scheduled surgery. (ex.vomiting, diarrhea, fever)          You will need to wash the night before AND the morning of you  procedure with a liquid antibacterial soap, like Dial.

## 2018-10-04 NOTE — MR AVS SNAPSHOT
After Visit Summary   10/4/2018    Jorge A Mendosa    MRN: [id number]           Patient Information     Date Of Birth          1955        Visit Information        Provider Department      10/4/2018 1:45 PM Taisha Mcnally MD [facility]        Care Instructions    Thank you for allowing Dr. Mcnally and our ENT team to participate in your care.  If your medications are too expensive, please give the nurse a call.  We can possibly change this medication.  If you have a scheduling or an appointment question please contact [facility] Unit Coordinator at their direct line [phone].   ALL nursing questions or concerns can be directed to your ENT nurse at: [phone] - [facility]    Follow up in surgery  Continue Adequate Water Intake  Congratulations on Quitting Smoking!    HOW TO PREPARE-      You need to have a scheduled Pre-Op with your primary care physician within 30 days of your scheduled surgery. You should be set up with this before you leave today.       You need a friend or family member available to drive you home AND stay with you for 24 hours after you leave the hospital. You will not be allowed to drive yourself. IF you need to take a taxi or the bus you MUST have a responsible person to ride with you. YOUR PROCEDURE WILL BE CANCELLED IF YOU DO NOT HAVE A RESPONSIBLE ADULT TO DRIVE YOU HOME.       You CANNOT have anything to eat or drink after midnight the night before your surgery, including water and coffee. Your stomach needs to be completely empty. Do NOT chew gum, suck on hard candy, or smoke. You can brush your teeth the morning of surgery.       The Surgery Education Nurses will call you  1-2 weeks prior to your surgery date at  [phone] or toll free [phone]. Please have your medication and allergy lists ready.      Stop your aspirin or other NSAIDs(Ibuprofen, Motrin, Aleve, Celebrex, Naproxen, etc...) 7 days before your  "surgery.      Hospital admitting will call you the day before your surgery with your exact arrival time.       Please call your primary care physician if you should become ill within 24 hours of scheduled surgery. (ex.vomiting, diarrhea, fever)          You will need to wash the night before AND the morning of you procedure with a liquid antibacterial soap, like Dial.           Follow-ups after your visit        Who to contact     If you have questions or need follow up information about today's clinic visit or your schedule please contact [facility] directly at [phone].  Normal or non-critical lab and imaging results will be communicated to you by MyChart, letter or phone within 4 business days after the clinic has received the results. If you do not hear from us within 7 days, please contact the clinic through MyChart or phone. If you have a critical or abnormal lab result, we will notify you by phone as soon as possible.  Submit refill requests through [facility] or call your pharmacy and they will forward the refill request to us. Please allow 3 business days for your refill to be completed.          Additional Information About Your Visit        Care EveryWhere ID     This is your Care EveryWhere ID. This could be used by other organizations to access your [facility] medical records  [id number]        Your Vitals Were     Pulse Temperature Height BMI (Body Mass Index)          74 96.8  F (36  C) (Tympanic) 5' 8\" (1.727 m) 21.59 kg/m2         Blood Pressure from Last 3 Encounters:   10/04/18 128/70   09/27/18 100/78   09/18/18 118/66    Weight from Last 3 Encounters:   10/04/18 142 lb (64.4 kg)   09/27/18 137 lb 6.4 oz (62.3 kg)   09/18/18 139 lb (63 kg)              Today, you had the following     No orders found for display       Primary Care Provider Office Phone # Fax #    Mukund Hamlin [phone]       [address] [place] [id number]        Equal Access " to Services     LIZZ KOLB : Kelsey Valencia, waamarilis garner, qasolomoneufemia majanomehdiveda mccall. So [facility] [phone].    ATENCIÓN: Si habla español, tiene a escalante disposición servicios gratuitos de asistencia lingüística. Llame al [phone].    We comply with applicable federal civil rights laws and [place] laws. We do not discriminate on the basis of race, color, national origin, age, disability, sex, sexual orientation, or gender identity.            Thank you!     Thank you for choosing [facility]  for your care. Our goal is always to provide you with excellent care. Hearing back from our patients is one way we can continue to improve our services. Please take a few minutes to complete the written survey that you may receive in the mail after your visit with us. Thank you!             Your Updated Medication List - Protect others around you: Learn how to safely use, store and throw away your medicines at www.disposemymeds.org.          This list is accurate as of 10/4/18  2:10 PM.  Always use your most recent med list.                   Brand Name Dispense Instructions for use Diagnosis    ADVIL 200 MG capsule   Generic drug:  ibuprofen      Take 200 mg by mouth every 4 hours as needed.        ALEVE PO      Take 1 tablet by mouth. As directed when needed        EXCEDRIN PO      PRN if don't have aleve or advil        omeprazole 40 MG capsule    priLOSEC    90 capsule    Take 1 capsule (40 mg) by mouth daily Take 30-60 minutes before a meal.    Abdominal pain, epigastric       VITAMIN A PO      Take by mouth daily        VITAMIN C PO      1 daily        VITAMIN D (CHOLECALCIFEROL) PO      Take 1,000 Units by mouth daily        VITAMIN E COMPLEX PO      Take 200 Units by mouth daily

## 2018-10-04 NOTE — NURSING NOTE
"Chief Complaint   Patient presents with     vocal cord mass     Pt is here for a f/u vocal cord mass.  Pt had a CT neck completed.  Pt is very hoarse.       Initial /70 (BP Location: Left arm, Cuff Size: Adult Regular)  Pulse 74  Temp 96.8  F (36  C) (Tympanic)  Ht 5' 8\" (1.727 m)  Wt 142 lb (64.4 kg)  BMI 21.59 kg/m2 Estimated body mass index is 21.59 kg/(m^2) as calculated from the following:    Height as of this encounter: 5' 8\" (1.727 m).    Weight as of this encounter: 142 lb (64.4 kg).  Medication Reconciliation: complete    Bibi Pichardo LPN    "

## 2018-10-05 NOTE — PROGRESS NOTES
"Otolaryngology Progress Note      History of Present Illness   Patient presents with:  vocal cord mass: Pt is here for a f/u vocal cord mass.  Pt had a CT neck completed.  Pt is very hoarse.      Jorge A Mendosa is a 63 year old male   who presents for f/u of dysphonia and a larygneal mass.    He has a several year hx of hoarseness  This progressed over the past month to the point where he felt he should seek care.  He saw Kailyn Campbell ENT PA last week and she noted a vocal cord mass.    He denies shortness of breath, wheezing or stridor  No weight loss, fever, chills or night sweats  No otalgia  No dysphagia    40 pack year history of tobacco use, states he is quitting today  Denies alcohol use  He drinks adequate water  No aphonia    He has a hx of severe heartburn but this has resolved with use of Prilosec  No current reflux complaints    Physical Exam  /70 (BP Location: Left arm, Cuff Size: Adult Regular)  Pulse 74  Temp 96.8  F (36  C) (Tympanic)  Ht 5' 8\" (1.727 m)  Wt 142 lb (64.4 kg)  BMI 21.59 kg/m2  General - The patient is well nourished and well developed, and appears to have good nutritional status.  Alert and oriented to person and place, interactive. Voice is rough in quality  Head and Face - Normocephalic and atraumatic, with no gross asymmetry noted of the contour of the facial features.  The facial nerve is intact, with strong symmetric movements.  Neck-no palpable lymphadenopathy or thyroid mass.  Trachea is midline.  Eyes - Extraocular movements intact.   Ears- External auditory canals are patent, tympanic membranes are intact without effusion or worrisome retractions   Nose - Nasal mucosa is pink and moist with no abnormal mucus.  The septum was grossly non-obstructive, turbinates of normal size and position.  No polyps, masses, or purulence noted on examination.  Mouth - Examination of the oral cavity shows pink, healthy, moist mucosa.  No lesions or ulceration noted.  The dentition " are in good repair.  The tongue is mobile and midline.  Throat - The walls of the oropharynx were smooth, pink, moist, symmetric, and had no lesions or ulcerations.  The tonsillar pillars and soft palate were symmetric.  The uvula was midline on elevation.    Grade 2 symmetric tonsils  Bimanual palpation reveals no oropharygneal mass at tongue base or tonsils    Attempts at mirror laryngoscopy were not possible due to gag reflex.  Therefore I proceeded with a fiberoptic examination after informed consent.  First I sprayed both sides of the nose with a mixture of lidocaine and neosynephrine.  I then passed the scope through the nasal cavity.     The nasopharynx was mucosally covered and symmetric.  The eustachian tube openings were unobstructed.  Going further down I had a clear view of the base of tongue which had normal appearing lingual tonsillar tissue.  The base of tongue was free of lesions, and the vallecula was open.  The epiglottis was smooth and mucosally covered.  The supraglottic larynx was then clearly visualized.  The vocal cords are mobile, no restriction in abduction or adduction.  There is an exophytic white mass at the anterior commissure.  This may be limited to the left anterior cord but it is impossible to tell on awake exam. Appears to cross or at least efface the anterior commissure and involve the right anterior true cord.   The pyriform sinuses were open and without sabrina mass or pooling of secretions upon valsalva, and the limited view of the postcricoid region did not show any lesions.  The patient tolerated the procedure well.    CT neck was personally reiviewed dated 9/27/18  Quality is poor due to motion artifact.  He states he felt his airway was swelling and he felt he had to repeatedly swallow during the procedure as he felt he was choking.  This resolved quickly after the exam.   NO sabrina cervical lymphadenopathy noted  Remainder of laryngeal imaging is  limited      Impression/Plan  Jorge A Mendosa is a 63 year old male    ICD-10-CM    1. Laryngeal mass J38.7    2. 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 Dysphonia R49.0    4. 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    I discussed the risks and complications of microdirect laryngoscopy with biopsies, frozens, including anesthesia, bleeding, infection, injury to teeth, gingiva, tongue, larynx, trachea and esophagus, benign versus malignant pathology and need for further surgery.   The patient was told this is diagnostic and not therapeutic.  No guarantees were given regarding resolution of dysphonia.  He understands surgery is not curative and he will need additional treatment.   All questions are answered and wishes are to proceed with surgery.  He will need absolute voice rest for 1 week after surgery  Jorge A is thankful and states he will quit smoking as of right now    Continue prilosec and reflux precautions  Continue adequate water intake  Tobacco cessation was strongly encouraged.  The associated risk of head and neck cancer was discussed.  Every year of cessation offers health benefits. This was discussed with the patient today and they voiced understanding.  Quit tools and a nicotine patch were offered.    Total exam time was over 40 minutes with over 25 minutes of this time spent in direct patient education, counseling and coordination of care.  We discussed in depth the importance of tobacco cession, post op voice rest, hydration, gastroesophageal reflux disease control .      Taisha Mcnally D.O.  Otolaryngology/Head and Neck Surgery  Allergy
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
## 2018-10-23 ENCOUNTER — OFFICE VISIT (OUTPATIENT)
Dept: FAMILY MEDICINE | Facility: OTHER | Age: 63
End: 2018-10-23
Attending: PHYSICIAN ASSISTANT
Payer: MEDICARE

## 2018-10-23 VITALS
DIASTOLIC BLOOD PRESSURE: 72 MMHG | WEIGHT: 143 LBS | RESPIRATION RATE: 18 BRPM | SYSTOLIC BLOOD PRESSURE: 124 MMHG | HEART RATE: 66 BPM | BODY MASS INDEX: 22.98 KG/M2 | OXYGEN SATURATION: 98 % | TEMPERATURE: 98.1 F | HEIGHT: 66 IN

## 2018-10-23 DIAGNOSIS — Z01.818 PREOP GENERAL PHYSICAL EXAM: Primary | ICD-10-CM

## 2018-10-23 LAB
ERYTHROCYTE [DISTWIDTH] IN BLOOD BY AUTOMATED COUNT: 13.6 % (ref 10–15)
HCT VFR BLD AUTO: 41.8 % (ref 40–53)
HGB BLD-MCNC: 13.3 G/DL (ref 13.3–17.7)
MCH RBC QN AUTO: 30.2 PG (ref 26.5–33)
MCHC RBC AUTO-ENTMCNC: 31.8 G/DL (ref 31.5–36.5)
MCV RBC AUTO: 95 FL (ref 78–100)
PLATELET # BLD AUTO: 234 10E9/L (ref 150–450)
RBC # BLD AUTO: 4.4 10E12/L (ref 4.4–5.9)
WBC # BLD AUTO: 8 10E9/L (ref 4–11)

## 2018-10-23 PROCEDURE — 85027 COMPLETE CBC AUTOMATED: CPT | Mod: ZL | Performed by: PHYSICIAN ASSISTANT

## 2018-10-23 PROCEDURE — G0463 HOSPITAL OUTPT CLINIC VISIT: HCPCS

## 2018-10-23 PROCEDURE — 80048 BASIC METABOLIC PNL TOTAL CA: CPT | Mod: ZL | Performed by: PHYSICIAN ASSISTANT

## 2018-10-23 PROCEDURE — 36415 COLL VENOUS BLD VENIPUNCTURE: CPT | Mod: ZL | Performed by: PHYSICIAN ASSISTANT

## 2018-10-23 PROCEDURE — 93005 ELECTROCARDIOGRAM TRACING: CPT

## 2018-10-23 PROCEDURE — 99214 OFFICE O/P EST MOD 30 MIN: CPT | Mod: 25 | Performed by: PHYSICIAN ASSISTANT

## 2018-10-23 PROCEDURE — 93010 ELECTROCARDIOGRAM REPORT: CPT | Performed by: INTERNAL MEDICINE

## 2018-10-23 ASSESSMENT — ANXIETY QUESTIONNAIRES
3. WORRYING TOO MUCH ABOUT DIFFERENT THINGS: MORE THAN HALF THE DAYS
GAD7 TOTAL SCORE: 11
5. BEING SO RESTLESS THAT IT IS HARD TO SIT STILL: SEVERAL DAYS
6. BECOMING EASILY ANNOYED OR IRRITABLE: SEVERAL DAYS
1. FEELING NERVOUS, ANXIOUS, OR ON EDGE: MORE THAN HALF THE DAYS
7. FEELING AFRAID AS IF SOMETHING AWFUL MIGHT HAPPEN: SEVERAL DAYS
2. NOT BEING ABLE TO STOP OR CONTROL WORRYING: MORE THAN HALF THE DAYS
IF YOU CHECKED OFF ANY PROBLEMS ON THIS QUESTIONNAIRE, HOW DIFFICULT HAVE THESE PROBLEMS MADE IT FOR YOU TO DO YOUR WORK, TAKE CARE OF THINGS AT HOME, OR GET ALONG WITH OTHER PEOPLE: SOMEWHAT DIFFICULT

## 2018-10-23 ASSESSMENT — PAIN SCALES - GENERAL: PAINLEVEL: MODERATE PAIN (5)

## 2018-10-23 ASSESSMENT — PATIENT HEALTH QUESTIONNAIRE - PHQ9: 5. POOR APPETITE OR OVEREATING: MORE THAN HALF THE DAYS

## 2018-10-23 NOTE — LETTER
October 25, 2018      Jorge A Mendosa  [address] [place] [address]   [facility] [address]        Dear ,    We are writing to inform you of your test results.    Your test results fall within the expected range(s) or remain unchanged from previous results.  Please continue with current treatment plan.    Resulted Orders   CBC with platelets   Result Value Ref Range    WBC 8.0 4.0 - 11.0 10e9/L    RBC Count 4.40 4.4 - 5.9 10e12/L    Hemoglobin 13.3 13.3 - 17.7 g/dL    Hematocrit 41.8 40.0 - 53.0 %    MCV 95 78 - 100 fl    MCH 30.2 26.5 - 33.0 pg    MCHC 31.8 31.5 - 36.5 g/dL    RDW 13.6 10.0 - 15.0 %    Platelet Count 234 150 - 450 10e9/L   Basic metabolic panel   Result Value Ref Range    Sodium 139 133 - 144 mmol/L    Potassium 4.1 3.4 - 5.3 mmol/L    Chloride 106 94 - 109 mmol/L    Carbon Dioxide 26 20 - 32 mmol/L    Anion Gap 7 3 - 14 mmol/L    Glucose 99 70 - 99 mg/dL    Urea Nitrogen 16 7 - 30 mg/dL    Creatinine 0.71 0.66 - 1.25 mg/dL    GFR Estimate >90 >60 mL/min/1.7m2      Comment:      Non  GFR Calc    GFR Estimate If Black >90 >60 mL/min/1.7m2      Comment:       GFR Calc    Calcium 8.6 8.5 - 10.1 mg/dL       If you have any questions or concerns, please call the clinic at the number listed above.       Sincerely,        BANG Sahni

## 2018-10-23 NOTE — MR AVS SNAPSHOT
After Visit Summary   10/23/2018    Jorge A Mendosa    MRN: [id number]           Patient Information     Date Of Birth          1955        Visit Information        Provider Department      10/23/2018 1:15 PM Rita Adair PA [facility]        Today's Diagnoses     Preop general physical exam    -  1      Care Instructions      Before Your Surgery      Call your surgeon if there is any change in your health. This includes signs of a cold or flu (such as a sore throat, runny nose, cough, rash or fever).    Do not smoke, drink alcohol or take over the counter medicine (unless your surgeon or primary care doctor tells you to) for the 24 hours before and after surgery.    If you take prescribed drugs: Follow your doctor s orders about which medicines to take and which to stop until after surgery.    Eating and drinking prior to surgery: follow the instructions from your surgeon    Take a shower or bath the night before surgery. Use the soap your surgeon gave you to gently clean your skin. If you do not have soap from your surgeon, use your regular soap. Do not shave or scrub the surgery site.  Wear clean pajamas and have clean sheets on your bed.           Follow-ups after your visit        Your next 10 appointments already scheduled     Oct 30, 2018   Procedure with Taisha Mcnally MD   [facility] ([address] [phone]              Who to contact     If you have questions or need follow up information about today's clinic visit or your schedule please contact [facility] directly at [phone].  Normal or non-critical lab and imaging results will be communicated to you by MyChart, letter or phone within 4 business days after the clinic has received the results. If you do not hear from us within 7 days, please contact the clinic through MyChart or phone. If you have a  "critical or abnormal lab result, we will notify you by phone as soon as possible.  Submit refill requests through [employer] or call your pharmacy and they will forward the refill request to us. Please allow 3 business days for your refill to be completed.          Additional Information About Your Visit        [employer] Information     [employer] gives you secure access to your electronic health record. If you see a primary care provider, you can also send messages to your care team and make appointments. If you have questions, please call your primary care clinic.  If you do not have a primary care provider, please call [phone] and they will assist you.        Care EveryWhere ID     This is your Care EveryWhere ID. This could be used by other organizations to access your [facility] medical records  [id number]        Your Vitals Were     Pulse Temperature Respirations Height Pulse Oximetry BMI (Body Mass Index)    66 98.1  F (36.7  C) (Tympanic) 18 5' 6.25\" (1.683 m) 98% 22.91 kg/m2       Blood Pressure from Last 3 Encounters:   10/23/18 124/72   10/04/18 128/70   09/27/18 100/78    Weight from Last 3 Encounters:   10/23/18 143 lb (64.9 kg)   10/04/18 142 lb (64.4 kg)   09/27/18 137 lb 6.4 oz (62.3 kg)              We Performed the Following     Basic metabolic panel     CBC with platelets     EKG 12-lead complete w/read - (Clinic Performed)        Primary Care Provider Office Phone # Fax #    Mukund Hamlin [phone]       [address] [place] [address]        Equal Access to Services     [facility] Center[facility] : Hadii aad ku hadasho Soomaali, waaxda luqadaha, qaybta kaalmada adeyelitza, veda velasco . So [facility] [phone].    ATENCIÓN: Si habla español, tiene a escalante disposición servicios gratuitos de asistencia lingüística. Llame al [phone].    We comply with applicable federal civil rights laws and [place] laws. We do not discriminate on the basis of race, color, " national origin, age, disability, sex, sexual orientation, or gender identity.            Thank you!     Thank you for choosing [facility]  for your care. Our goal is always to provide you with excellent care. Hearing back from our patients is one way we can continue to improve our services. Please take a few minutes to complete the written survey that you may receive in the mail after your visit with us. Thank you!             Your Updated Medication List - Protect others around you: Learn how to safely use, store and throw away your medicines at www.disposemymeds.org.          This list is accurate as of 10/23/18  2:35 PM.  Always use your most recent med list.                   Brand Name Dispense Instructions for use Diagnosis    ADVIL 200 MG capsule   Generic drug:  ibuprofen      Take 200 mg by mouth every 4 hours as needed.        ALEVE PO      Take 1 tablet by mouth. As directed when needed        EXCEDRIN PO      PRN if don't have aleve or advil        omeprazole 40 MG capsule    priLOSEC    90 capsule    Take 1 capsule (40 mg) by mouth daily Take 30-60 minutes before a meal.    Abdominal pain, epigastric       VITAMIN A PO      Take by mouth daily        VITAMIN C PO      1 daily        VITAMIN D (CHOLECALCIFEROL) PO      Take 1,000 Units by mouth daily        VITAMIN E COMPLEX PO      Take 200 Units by mouth daily

## 2018-10-23 NOTE — NURSING NOTE
"Chief Complaint   Patient presents with     Pre-Op Exam       Initial /72  Pulse 66  Temp 98.1  F (36.7  C) (Tympanic)  Resp 18  Ht 5' 6.25\" (1.683 m)  Wt 143 lb (64.9 kg)  SpO2 98%  BMI 22.91 kg/m2 Estimated body mass index is 22.91 kg/(m^2) as calculated from the following:    Height as of this encounter: 5' 6.25\" (1.683 m).    Weight as of this encounter: 143 lb (64.9 kg).  Medication Reconciliation: complete    Poornima Sampson LPN  "

## 2018-10-24 LAB
ANION GAP SERPL CALCULATED.3IONS-SCNC: 7 MMOL/L (ref 3–14)
BUN SERPL-MCNC: 16 MG/DL (ref 7–30)
CALCIUM SERPL-MCNC: 8.6 MG/DL (ref 8.5–10.1)
CHLORIDE SERPL-SCNC: 106 MMOL/L (ref 94–109)
CO2 SERPL-SCNC: 26 MMOL/L (ref 20–32)
CREAT SERPL-MCNC: 0.71 MG/DL (ref 0.66–1.25)
GFR SERPL CREATININE-BSD FRML MDRD: >90 ML/MIN/1.7M2
GLUCOSE SERPL-MCNC: 99 MG/DL (ref 70–99)
POTASSIUM SERPL-SCNC: 4.1 MMOL/L (ref 3.4–5.3)
SODIUM SERPL-SCNC: 139 MMOL/L (ref 133–144)

## 2018-10-24 ASSESSMENT — PATIENT HEALTH QUESTIONNAIRE - PHQ9: SUM OF ALL RESPONSES TO PHQ QUESTIONS 1-9: 12

## 2018-10-24 ASSESSMENT — ANXIETY QUESTIONNAIRES: GAD7 TOTAL SCORE: 11

## 2018-10-29 ENCOUNTER — ANESTHESIA EVENT (OUTPATIENT)
Dept: SURGERY | Facility: HOSPITAL | Age: 63
End: 2018-10-29
Payer: MEDICARE

## 2018-10-29 ASSESSMENT — LIFESTYLE VARIABLES: TOBACCO_USE: 1
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
## 2018-10-29 NOTE — ANESTHESIA PREPROCEDURE EVALUATION
Anesthesia Evaluation     . Pt has had prior anesthetic. Type: General    No history of anesthetic complications          ROS/MED HX    ENT/Pulmonary:     (+)tobacco use, Current use 1ppd packs/day  moderate COPD, , . .    Neurologic:  - neg neurologic ROS     Cardiovascular:  - neg cardiovascular ROS       METS/Exercise Tolerance:     Hematologic:         Musculoskeletal: Comment: Fibromyalgia and he was on percocet for many yrs.  But last percocet intake was one yr ago.  (+) arthritis, , , -       GI/Hepatic:     (+) GERD Asymptomatic on medication,      (-) appendicitis   Renal/Genitourinary:  - ROS Renal section negative       Endo:  - neg endo ROS       Psychiatric:     (+) psychiatric history anxiety and depression      Infectious Disease:  - neg infectious disease ROS       Malignancy:      - no malignancy   Other:    (+) H/O Chronic Pain,  - neg other ROS                 Physical Exam      Airway   Mallampati: II  TM distance: >3 FB  Neck ROM: limited    Dental   (+) upper dentures and lower dentures    Cardiovascular   Rhythm and rate: regular and normal      Pulmonary (+) stridor     PE comment: 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    ASA Status:  3 .    NPO Status:  > 8 hours    Plan for MAC with Intravenous induction. Reason for MAC:  Difficulty with conscious sedation (QS) and Procedure to face, neck, head or breast         Postoperative Care      Consents  Anesthetic plan, risks, benefits and alternatives discussed with:  Patient..                          .

## 2018-10-30 ENCOUNTER — SURGERY (OUTPATIENT)
Age: 63
End: 2018-10-30

## 2018-10-30 ENCOUNTER — ANESTHESIA (OUTPATIENT)
Dept: SURGERY | Facility: HOSPITAL | Age: 63
End: 2018-10-30
Payer: MEDICARE

## 2018-10-30 ENCOUNTER — TELEPHONE (OUTPATIENT)
Dept: OTOLARYNGOLOGY | Facility: OTHER | Age: 63
End: 2018-10-30

## 2018-10-30 ENCOUNTER — HOSPITAL ENCOUNTER (OUTPATIENT)
Facility: HOSPITAL | Age: 63
Discharge: HOME | End: 2018-10-30
Attending: OTOLARYNGOLOGY | Admitting: OTOLARYNGOLOGY
Payer: MEDICARE

## 2018-10-30 VITALS
WEIGHT: 140 LBS | RESPIRATION RATE: 18 BRPM | HEIGHT: 67 IN | OXYGEN SATURATION: 94 % | BODY MASS INDEX: 21.97 KG/M2 | SYSTOLIC BLOOD PRESSURE: 120 MMHG | DIASTOLIC BLOOD PRESSURE: 68 MMHG | TEMPERATURE: 98 F

## 2018-10-30 DIAGNOSIS — Z98.890 POST-OPERATIVE STATE: Primary | ICD-10-CM

## 2018-10-30 DIAGNOSIS — C32.0 SQUAMOUS CELL CARCINOMA OF GLOTTIS (H): Primary | ICD-10-CM

## 2018-10-30 PROCEDURE — 25000132 ZZH RX MED GY IP 250 OP 250 PS 637: Mod: GY | Performed by: NURSE ANESTHETIST, CERTIFIED REGISTERED

## 2018-10-30 PROCEDURE — 71000027 ZZH RECOVERY PHASE 2 EACH 15 MINS: Performed by: OTOLARYNGOLOGY

## 2018-10-30 PROCEDURE — 40000305 ZZH STATISTIC PRE PROC ASSESS I: Performed by: OTOLARYNGOLOGY

## 2018-10-30 PROCEDURE — 71000014 ZZH RECOVERY PHASE 1 LEVEL 2 FIRST HR: Performed by: OTOLARYNGOLOGY

## 2018-10-30 PROCEDURE — 25000566 ZZH SEVOFLURANE, EA 15 MIN: Performed by: ANESTHESIOLOGY

## 2018-10-30 PROCEDURE — 31536 LARYNGOSCOPY W/BX & OP SCOPE: CPT | Performed by: ANESTHESIOLOGY

## 2018-10-30 PROCEDURE — 36000056 ZZH SURGERY LEVEL 3 1ST 30 MIN: Performed by: OTOLARYNGOLOGY

## 2018-10-30 PROCEDURE — 36000058 ZZH SURGERY LEVEL 3 EA 15 ADDTL MIN: Performed by: OTOLARYNGOLOGY

## 2018-10-30 PROCEDURE — 25000128 H RX IP 250 OP 636: Performed by: NURSE ANESTHETIST, CERTIFIED REGISTERED

## 2018-10-30 PROCEDURE — 37000008 ZZH ANESTHESIA TECHNICAL FEE, 1ST 30 MIN: Performed by: OTOLARYNGOLOGY

## 2018-10-30 PROCEDURE — 37000009 ZZH ANESTHESIA TECHNICAL FEE, EACH ADDTL 15 MIN: Performed by: OTOLARYNGOLOGY

## 2018-10-30 PROCEDURE — 25000125 ZZHC RX 250: Performed by: OTOLARYNGOLOGY

## 2018-10-30 PROCEDURE — 25000128 H RX IP 250 OP 636: Performed by: ANESTHESIOLOGY

## 2018-10-30 PROCEDURE — A9270 NON-COVERED ITEM OR SERVICE: HCPCS | Mod: GY | Performed by: NURSE ANESTHETIST, CERTIFIED REGISTERED

## 2018-10-30 PROCEDURE — 31536 LARYNGOSCOPY W/BX & OP SCOPE: CPT | Performed by: OTOLARYNGOLOGY

## 2018-10-30 PROCEDURE — 31536 LARYNGOSCOPY W/BX & OP SCOPE: CPT | Performed by: NURSE ANESTHETIST, CERTIFIED REGISTERED

## 2018-10-30 PROCEDURE — 88305 TISSUE EXAM BY PATHOLOGIST: CPT | Mod: TC | Performed by: OTOLARYNGOLOGY

## 2018-10-30 PROCEDURE — 27110028 ZZH OR GENERAL SUPPLY NON-STERILE: Performed by: OTOLARYNGOLOGY

## 2018-10-30 PROCEDURE — 88331 PATH CONSLTJ SURG 1 BLK 1SPC: CPT | Mod: TC | Performed by: OTOLARYNGOLOGY

## 2018-10-30 PROCEDURE — 25000128 H RX IP 250 OP 636

## 2018-10-30 PROCEDURE — 27210794 ZZH OR GENERAL SUPPLY STERILE: Performed by: OTOLARYNGOLOGY

## 2018-10-30 PROCEDURE — A9270 NON-COVERED ITEM OR SERVICE: HCPCS | Performed by: OTOLARYNGOLOGY

## 2018-10-30 PROCEDURE — 25000125 ZZHC RX 250: Performed by: NURSE ANESTHETIST, CERTIFIED REGISTERED

## 2018-10-30 RX ORDER — MEPERIDINE HYDROCHLORIDE 50 MG/ML
12.5 INJECTION INTRAMUSCULAR; INTRAVENOUS; SUBCUTANEOUS
Status: DISCONTINUED | OUTPATIENT
Start: 2018-10-30 | End: 2018-10-31 | Stop reason: HOSPADM

## 2018-10-30 RX ORDER — FENTANYL CITRATE 50 UG/ML
25-50 INJECTION, SOLUTION INTRAMUSCULAR; INTRAVENOUS
Status: DISCONTINUED | OUTPATIENT
Start: 2018-10-30 | End: 2018-10-31 | Stop reason: HOSPADM

## 2018-10-30 RX ORDER — HYDROMORPHONE HYDROCHLORIDE 2 MG/ML
INJECTION, SOLUTION INTRAMUSCULAR; INTRAVENOUS; SUBCUTANEOUS
Status: COMPLETED
Start: 2018-10-30 | End: 2018-10-30

## 2018-10-30 RX ORDER — ONDANSETRON 4 MG/1
4 TABLET, ORALLY DISINTEGRATING ORAL EVERY 30 MIN PRN
Status: DISCONTINUED | OUTPATIENT
Start: 2018-10-30 | End: 2018-10-31 | Stop reason: HOSPADM

## 2018-10-30 RX ORDER — SODIUM CHLORIDE, SODIUM LACTATE, POTASSIUM CHLORIDE, CALCIUM CHLORIDE 600; 310; 30; 20 MG/100ML; MG/100ML; MG/100ML; MG/100ML
INJECTION, SOLUTION INTRAVENOUS CONTINUOUS
Status: DISCONTINUED | OUTPATIENT
Start: 2018-10-30 | End: 2018-10-30 | Stop reason: HOSPADM

## 2018-10-30 RX ORDER — LIDOCAINE HYDROCHLORIDE 20 MG/ML
INJECTION, SOLUTION INFILTRATION; PERINEURAL PRN
Status: DISCONTINUED | OUTPATIENT
Start: 2018-10-30 | End: 2018-10-30

## 2018-10-30 RX ORDER — FENTANYL CITRATE 50 UG/ML
INJECTION, SOLUTION INTRAMUSCULAR; INTRAVENOUS PRN
Status: DISCONTINUED | OUTPATIENT
Start: 2018-10-30 | End: 2018-10-30

## 2018-10-30 RX ORDER — KETOROLAC TROMETHAMINE 30 MG/ML
INJECTION, SOLUTION INTRAMUSCULAR; INTRAVENOUS PRN
Status: DISCONTINUED | OUTPATIENT
Start: 2018-10-30 | End: 2018-10-30

## 2018-10-30 RX ORDER — SODIUM CHLORIDE, SODIUM LACTATE, POTASSIUM CHLORIDE, CALCIUM CHLORIDE 600; 310; 30; 20 MG/100ML; MG/100ML; MG/100ML; MG/100ML
INJECTION, SOLUTION INTRAVENOUS CONTINUOUS
Status: DISCONTINUED | OUTPATIENT
Start: 2018-10-30 | End: 2018-10-31 | Stop reason: HOSPADM

## 2018-10-30 RX ORDER — HYDROCODONE BITARTRATE AND ACETAMINOPHEN 5; 325 MG/1; MG/1
1 TABLET ORAL EVERY 6 HOURS PRN
Qty: 25 TABLET | Refills: 0 | Status: SHIPPED | OUTPATIENT
Start: 2018-10-30 | End: 2018-11-14

## 2018-10-30 RX ORDER — ONDANSETRON 2 MG/ML
4 INJECTION INTRAMUSCULAR; INTRAVENOUS EVERY 30 MIN PRN
Status: DISCONTINUED | OUTPATIENT
Start: 2018-10-30 | End: 2018-10-31 | Stop reason: HOSPADM

## 2018-10-30 RX ORDER — HYDROCODONE BITARTRATE AND ACETAMINOPHEN 5; 325 MG/1; MG/1
1 TABLET ORAL
Status: COMPLETED | OUTPATIENT
Start: 2018-10-30 | End: 2018-10-30

## 2018-10-30 RX ORDER — ONDANSETRON 2 MG/ML
INJECTION INTRAMUSCULAR; INTRAVENOUS PRN
Status: DISCONTINUED | OUTPATIENT
Start: 2018-10-30 | End: 2018-10-30

## 2018-10-30 RX ORDER — FENTANYL CITRATE 50 UG/ML
25-50 INJECTION, SOLUTION INTRAMUSCULAR; INTRAVENOUS
Status: DISCONTINUED | OUTPATIENT
Start: 2018-10-30 | End: 2018-10-30 | Stop reason: HOSPADM

## 2018-10-30 RX ORDER — NALOXONE HYDROCHLORIDE 0.4 MG/ML
.1-.4 INJECTION, SOLUTION INTRAMUSCULAR; INTRAVENOUS; SUBCUTANEOUS
Status: DISCONTINUED | OUTPATIENT
Start: 2018-10-30 | End: 2018-10-31 | Stop reason: HOSPADM

## 2018-10-30 RX ORDER — OXYMETAZOLINE HYDROCHLORIDE 0.05 G/100ML
SPRAY NASAL PRN
Status: DISCONTINUED | OUTPATIENT
Start: 2018-10-30 | End: 2018-10-30 | Stop reason: HOSPADM

## 2018-10-30 RX ORDER — PROPOFOL 10 MG/ML
INJECTION, EMULSION INTRAVENOUS PRN
Status: DISCONTINUED | OUTPATIENT
Start: 2018-10-30 | End: 2018-10-30

## 2018-10-30 RX ADMIN — Medication 60 MG: at 13:07

## 2018-10-30 RX ADMIN — FENTANYL CITRATE 50 MCG: 50 INJECTION, SOLUTION INTRAMUSCULAR; INTRAVENOUS at 14:33

## 2018-10-30 RX ADMIN — HYDROMORPHONE HYDROCHLORIDE 0.5 MG: 2 INJECTION INTRAMUSCULAR; INTRAVENOUS; SUBCUTANEOUS at 15:15

## 2018-10-30 RX ADMIN — LIDOCAINE HYDROCHLORIDE 100 MG: 20 INJECTION, SOLUTION INFILTRATION; PERINEURAL at 13:07

## 2018-10-30 RX ADMIN — ONDANSETRON 4 MG: 2 INJECTION INTRAMUSCULAR; INTRAVENOUS at 12:59

## 2018-10-30 RX ADMIN — HYDROMORPHONE HYDROCHLORIDE 0.5 MG: 2 INJECTION INTRAMUSCULAR; INTRAVENOUS; SUBCUTANEOUS at 14:44

## 2018-10-30 RX ADMIN — HYDROMORPHONE HYDROCHLORIDE 0.5 MG: 2 INJECTION INTRAMUSCULAR; INTRAVENOUS; SUBCUTANEOUS at 14:54

## 2018-10-30 RX ADMIN — SODIUM CHLORIDE, POTASSIUM CHLORIDE, SODIUM LACTATE AND CALCIUM CHLORIDE: 600; 310; 30; 20 INJECTION, SOLUTION INTRAVENOUS at 12:41

## 2018-10-30 RX ADMIN — OXYMETAZOLINE HYDROCHLORIDE 15 ML: 5 SPRAY NASAL at 13:33

## 2018-10-30 RX ADMIN — PROPOFOL 100 MG: 10 INJECTION, EMULSION INTRAVENOUS at 13:15

## 2018-10-30 RX ADMIN — KETOROLAC TROMETHAMINE 30 MG: 30 INJECTION, SOLUTION INTRAMUSCULAR at 13:31

## 2018-10-30 RX ADMIN — PROPOFOL 100 MG: 10 INJECTION, EMULSION INTRAVENOUS at 13:07

## 2018-10-30 RX ADMIN — MIDAZOLAM 2 MG: 1 INJECTION INTRAMUSCULAR; INTRAVENOUS at 12:59

## 2018-10-30 RX ADMIN — FENTANYL CITRATE 50 MCG: 50 INJECTION, SOLUTION INTRAMUSCULAR; INTRAVENOUS at 14:37

## 2018-10-30 RX ADMIN — HYDROCODONE BITARTRATE AND ACETAMINOPHEN 1 TABLET: 5; 325 TABLET ORAL at 15:49

## 2018-10-30 RX ADMIN — FENTANYL CITRATE 50 MCG: 50 INJECTION, SOLUTION INTRAMUSCULAR; INTRAVENOUS at 14:40

## 2018-10-30 RX ADMIN — HYDROMORPHONE HYDROCHLORIDE 0.5 MG: 2 INJECTION INTRAMUSCULAR; INTRAVENOUS; SUBCUTANEOUS at 15:05

## 2018-10-30 RX ADMIN — FENTANYL CITRATE 100 MCG: 50 INJECTION, SOLUTION INTRAMUSCULAR; INTRAVENOUS at 12:59

## 2018-10-30 RX ADMIN — ONDANSETRON 4 MG: 2 INJECTION INTRAMUSCULAR; INTRAVENOUS at 13:58

## 2018-10-30 RX ADMIN — FENTANYL CITRATE 50 MCG: 50 INJECTION, SOLUTION INTRAMUSCULAR; INTRAVENOUS at 14:27

## 2018-10-30 RX ADMIN — PROPOFOL 50 MG: 10 INJECTION, EMULSION INTRAVENOUS at 13:32

## 2018-10-30 ASSESSMENT — COPD QUESTIONNAIRES
COPD: 1
CAT_SEVERITY: MODERATE

## 2018-10-30 ASSESSMENT — ENCOUNTER SYMPTOMS: STRIDOR: 1

## 2018-10-30 NOTE — IP AVS SNAPSHOT
MRN:[id number]                      After Visit Summary   10/30/2018    Jorge A Mendosa    MRN: [id number]           Thank you!     Thank you for choosing [facility] for your care. Our goal is always to provide you with excellent care. Hearing back from our patients is one way we can continue to improve our services. Please take a few minutes to complete the written survey that you may receive in the mail after you visit with us. Thank you!        Patient Information     Date Of Birth          1955        About your hospital stay     You were admitted on:  October 30, 2018 You last received care in the:  [facility] Preop/Phase II    You were discharged on:  October 30, 2018       Who to Call     For medical emergencies, please call 911.  For non-urgent questions about your medical care, please call your primary care provider or clinic, [phone]  For questions related to your surgery, please call your surgery clinic        Attending Provider     Provider Specialty    Taisha Mcnally MD Otolaryngology       Primary Care Provider Office Phone # Fax #    Mukund Hamlin [phone]      Further instructions from your care team           POST OPERATIVE PATIENT INFORMATION  Direct Laryngoscopy    A.  Absolute voice rest will be required for the first 7 days after surgery if a biopsy is done.  This means NO talking and NO whispering.  You must write notes for all necessary communications.    B. When talking is allowed, you should speak normally - no whispering.  Do not talk loudly at first.  Do not strain your voice.  Try not to talk to anymore more than five feet away from you.      C. No dietary restrictions are necessary.  You may resume your normal diet.  Avoid alcoholic beverages for 24 hours or while taking Rx pain meds.    D. Minimal discomfort is associated with this procedure.  Tylenol tablets (over-the-counter) should help with any sore throat sensation you may have for  the first 24 to 48 hours after the procedure.    E. Avoid cigarette smoking or exposure to smoke filled environments.  If you are a smoker and wish to stop, we have information to assist you.  Ask about it at your follow-up visit.    F. Anyone having a general anesthetic should not participate in any activity requiring mental alertness, physical coordination, or balance; such as driving or bicycling for 24 hours after the anesthetic.    Your first postoperative visit will be approximately one week from the date of surgery.  Call the [facility] at [phone].  After hours, please contact the Emergency Room at [phone].       Post-Anesthesia Patient Instructions    IMMEDIATELY FOLLOWING SURGERY:  Do not drive or operate machinery for the first twenty four hours after surgery.  Do not make any important decisions for twenty four hours after surgery or while taking narcotic pain medications or sedatives.  If you develop intractable nausea and vomiting or a severe headache please notify your doctor immediately.    FOLLOW-UP:  Please make an appointment with your surgeon as instructed. You do not need to follow up with anesthesia unless specifically instructed to do so.    WOUND CARE INSTRUCTIONS (if applicable):  Keep a dry clean dressing on the anesthesia/puncture wound site if there is drainage.  Once the wound has quit draining you may leave it open to air.  Generally you should leave the bandage intact for twenty four hours unless there is drainage.  If the epidural site drains for more than 36-48 hours please call the anesthesia department.    QUESTIONS?:  Please feel free to call your physician or the hospital  if you have any questions, and they will be happy to assist you.   Absolute voice rest for 7 days.    Take OTC tylenol or motrin as needed for discomfort.  Lortab can be taken if necessary    Follow up with Dr. Morin in radiation therapy and Dr. Lazar in Oncology  Our office will  "arrange these visits.    See Dr. Mcnally as directed (3 months after completing radiation therapy)     Diagnosis: Glottic Squamous Cell Carcinoma    Pending Results     No orders found from 10/28/2018 to 10/31/2018.            Admission Information     Date & Time Provider Department Dept. Phone    10/30/2018 Taisha Mcnally MD [facility] Preop/Phase [phone]      Your Vitals Were     Blood Pressure Temperature Respirations Height Weight Pulse Oximetry    123/95 98.3  F (36.8  C) (Oral) 20 1.702 m (5' 7\") 63.5 kg (140 lb) 93%    BMI (Body Mass Index)                   21.93 kg/m2           MyChart Information     [facility] gives you secure access to your electronic health record. If you see a primary care provider, you can also send messages to your care team and make appointments. If you have questions, please call your primary care clinic.  If you do not have a primary care provider, please call [phone] and they will assist you.        Care EveryWhere ID     This is your Care EveryWhere ID. This could be used by other organizations to access your [facility] medical records  [id number]        Equal Access to Services     LIZZ KOLB : Hadii faraz Valencia, rey garner, dylan monroy, veda amaya. So [facility] [phone].    ATENCIÓN: Si habla español, tiene a escalante disposición servicios gratuitos de asistencia lingüística. [facility] [phone].    We comply with applicable federal civil rights laws and [place] laws. We do not discriminate on the basis of race, color, national origin, age, disability, sex, sexual orientation, or gender identity.               Review of your medicines      START taking        Dose / Directions    HYDROcodone-acetaminophen 5-325 MG per tablet   Commonly known as:  NORCO   Used for:  Post-operative state        Dose:  1 tablet   Take 1 tablet by mouth every 6 hours as needed for severe pain   Quantity:  25 tablet "   Refills:  0       lidocaine (viscous) 2 % solution   Commonly known as:  XYLOCAINE   Used for:  Post-operative state        Use adult dose:  15 ml to swallow every 3 hours prn pain   Quantity:  100 mL   Refills:  4         CONTINUE these medicines which have NOT CHANGED        Dose / Directions    ADVIL 200 MG capsule   Generic drug:  ibuprofen        Dose:  200 mg   Take 200 mg by mouth every 4 hours as needed.   Refills:  0       ALEVE PO        Dose:  1 tablet   Take 1 tablet by mouth. As directed when needed   Refills:  0       omeprazole 40 MG capsule   Commonly known as:  priLOSEC   Used for:  Abdominal pain, epigastric        Dose:  40 mg   Take 1 capsule (40 mg) by mouth daily Take 30-60 minutes before a meal.   Quantity:  90 capsule   Refills:  3       VITAMIN A PO        Take by mouth daily   Refills:  0       VITAMIN C PO        1 daily   Refills:  0       VITAMIN D (CHOLECALCIFEROL) PO        Dose:  1000 Units   Take 1,000 Units by mouth daily   Refills:  0       VITAMIN E COMPLEX PO        Dose:  200 Units   Take 200 Units by mouth daily   Refills:  0            Where to get your medicines      These medications [facility] [id number]  [facility] [place] [facility] [address] [address]  [address] [address], [facility] [address]     Phone:  [phone]     lidocaine (viscous) 2 % solution         Some of these will need a paper prescription and others can be bought over the counter. Ask your nurse if you have questions.     Bring a paper prescription for each of these medications     HYDROcodone-acetaminophen 5-325 MG per tablet                Protect others around you: Learn how to safely use, store and throw away your medicines at www.disposemymeds.org.        Information about OPIOIDS     PRESCRIPTION OPIOIDS: WHAT YOU NEED TO KNOW   We gave you an opioid (narcotic) pain medicine. It is important to manage your pain, but opioids are not always the best choice. You should first try all the other options your care team  gave you. Take this medicine for as short a time (and as few doses) as possible.    Some activities can increase your pain, such as bandage changes or therapy sessions. It may help to take your pain medicine 30 to 60 minutes before these activities. Reduce your stress by getting enough sleep, working on hobbies you enjoy and practicing relaxation or meditation. Talk to your care team about ways to manage your pain beyond prescription opioids.    These medicines have risks:    DO NOT drive when on new or higher doses of pain medicine. These medicines can affect your alertness and reaction times, and you could be arrested for driving under the influence (DUI). If you need to use opioids long-term, talk to your care team about driving.    DO NOT operate heavy machinery    DO NOT do any other dangerous activities while taking these medicines.    DO NOT drink any alcohol while taking these medicines.     If the opioid prescribed includes acetaminophen, DO NOT take with any other medicines that contain acetaminophen. Read all labels carefully. Look for the word  acetaminophen  or  Tylenol.  Ask your pharmacist if you have questions or are unsure.    You can get addicted to pain medicines, especially if you have a history of addiction (chemical, alcohol or substance dependence). Talk to your care team about ways to reduce this risk.    All opioids tend to cause constipation. Drink plenty of water and eat foods that have a lot of fiber, such as fruits, vegetables, prune juice, apple juice and high-fiber cereal. Take a laxative (Miralax, milk of magnesia, Colace, Senna) if you don t move your bowels at least every other day. Other side effects include upset stomach, sleepiness, dizziness, throwing up, tolerance (needing more of the medicine to have the same effect), physical dependence and slowed breathing.    Store your pills in a secure place, locked if possible. We will not replace any lost or stolen medicine. If you  don t finish your medicine, please throw away (dispose) as directed by your pharmacist. The [place] Pollution Control Agency has more information about safe disposal: https://www.pca.state.[place]/living-green/managing-unwanted-medications             Medication List: This is a list of all your medications and when to take them. Check marks below indicate your daily home schedule. Keep this list as a reference.      Medications           Morning Afternoon Evening Bedtime As Needed    ADVIL 200 MG capsule   Take 200 mg by mouth every 4 hours as needed.   Generic drug:  ibuprofen                                ALEVE PO   Take 1 tablet by mouth. As directed when needed                                HYDROcodone-acetaminophen 5-325 MG per tablet   Commonly known as:  NORCO   Take 1 tablet by mouth every 6 hours as needed for severe pain   Last time this was given:  1 tablet on 10/30/2018  3:49 PM                                lidocaine (viscous) 2 % solution   Commonly known as:  XYLOCAINE   Use adult dose:  15 ml to swallow every 3 hours prn pain                                omeprazole 40 MG capsule   Commonly known as:  priLOSEC   Take 1 capsule (40 mg) by mouth daily Take 30-60 minutes before a meal.                                VITAMIN A PO   Take by mouth daily                                VITAMIN C PO   1 daily                                VITAMIN D (CHOLECALCIFEROL) PO   Take 1,000 Units by mouth daily                                VITAMIN E COMPLEX PO   Take 200 Units by mouth daily

## 2018-10-30 NOTE — OP NOTE
PREOPERATIVE DIAGNOSES:   1. Laryngeal Mass  2.  Dysphonia  3.  Tobacco abuse    POSTOPERATIVE DIAGNOSES:   1. same    PROCEDURE PERFORMED: Micro direct Laryngoscopy with biopsies  SURGEON: Taisha Mcnally D.O.  BLOOD LOSS: <5 mL.   COMPLICATIONS: None.   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  It appears to have originated on the left true cord crosses anterior commissure and involves the right true vocal cord    ANESTHESIA: GETA.     INDICATIONS: Jorge A Mendosa presented to me with persistent hoarseness and foreign body sensation.  On clinical evaluation with flexible endoscopy, there was a suspicious mass on the larynx.  Therefore, my recommendation was for biopsy with examination under anesthesia.  Preoperatively risks discussed included the risks on infection, bleeding, damage to the voice, and possible alteration of swallowing.  In addition the risk of injury to the teeth and oral cavity was also discussed.  We discussed that this procedure is diagnostic, and not therapeutic.  The patient understood and wished to proceed.    PROCEDURE: After induction of general anesthesia and intubation, the patient's bed was rotated 90 degrees and a head turban and shoulder roll were placed.  I introduced a dental guard on the upper gingiva, and a wet pad to protect the lower gingiva.  He is edentulous.   An anterior commissure scope was passed into the mouth.  The oropharynx was mucosally covered and symmetric, the tonsil fossa were normal in appearance.  Going further down I had a clear view of the base of tongue which had normal appearing lingual tonsillar tissue.  The base of tongue was free of lesions, and the vallecula was open.  The epiglottis was smooth and mucosally covered.  The supraglottic larynx was then clearly  visualized.  The pyriform sinuses were open, and the limited view of the postcricoid region did not show any lesions.  I gently pushed the larynx forward with the tip of the scope, and the post cricoid region of the cervical esophagus was normal in appearance.  There is an exophytic bulky mass involving the left true vocal cord which crosses the anterior commissure and involves the right true vocal cord.  The vocal cords moved grossly symmetrically on palpation with a blunt tipped probe.   Prior to performing biopsies, I placed a pledget soaked with 1:10,000 epinephrine and 4% lidocaine onto the area for several minutes.  I used a angled and straight biopsy cup forceps and took multiple biopsies passed off as specimen.  Left and right true vocal cord biopsies were positive for invasive squamous cell carcinoma.  The pledget was placed again for several minutes and removed, hemostasis is adequate.  Photos were taken.    After my biopsies was complete I removed the laryngoscope.  The dental guard and pad were removed and the procedure was complete. The patient was awakened, extubated, and sent to the recovery room in good condition.

## 2018-10-30 NOTE — IP AVS SNAPSHOT
[facility] Pre[facility]/Phase II    [address] [address]    Phone:  [phone]                                       After Visit Summary   10/30/2018    Jorge A Mendosa    MRN: [id number]           After Visit Summary Signature Page     I have received my discharge instructions, and my questions have been answered. I have discussed any challenges I see with this plan with the nurse or doctor.    ..........................................................................................................................................  Patient/Patient Representative Signature      ..........................................................................................................................................  Patient Representative Print Name and Relationship to Patient    ..................................................               ................................................  Date                                   Time    ..........................................................................................................................................  Reviewed by Signature/Title    ...................................................              ..............................................  Date                                               Time          22EPIC Rev 08/18

## 2018-10-30 NOTE — ANESTHESIA CARE TRANSFER NOTE
Patient: Jorge A Mendosa    Procedure(s):  MICRODIRECT LARYNGOSCOPY WITH BIOPSIES, FROZENS    Diagnosis: LARYNGEAL MASS, LESION OF TRUE VOCAL CORD  Diagnosis Additional Information: No value filed.    Anesthesia Type:   MAC     Note:  Airway :Face Mask  Patient transferred to:PACU  Handoff Report: Identifed the Patient, Identified the Reponsible Provider, Reviewed the pertinent medical history, Discussed the surgical course, Reviewed Intra-OP anesthesia mangement and issues during anesthesia, Set expectations for post-procedure period and Allowed opportunity for questions and acknowledgement of understanding      Vitals: (Last set prior to Anesthesia Care Transfer)    CRNA VITALS  10/30/2018 1345 - 10/30/2018 1421      10/30/2018             Pulse: 96    SpO2: 92 %    Resp Rate (set): 8                Electronically Signed By: CHERISE Roberts CRNA  October 30, 2018  2:21 PM

## 2018-10-30 NOTE — DISCHARGE INSTRUCTIONS
POST OPERATIVE PATIENT INFORMATION  Direct Laryngoscopy    A.  Absolute voice rest will be required for the first 7 days after surgery if a biopsy is done.  This means NO talking and NO whispering.  You must write notes for all necessary communications.    B. When talking is allowed, you should speak normally - no whispering.  Do not talk loudly at first.  Do not strain your voice.  Try not to talk to anymore more than five feet away from you.      C. No dietary restrictions are necessary.  You may resume your normal diet.  Avoid alcoholic beverages for 24 hours or while taking Rx pain meds.    D. Minimal discomfort is associated with this procedure.  Tylenol tablets (over-the-counter) should help with any sore throat sensation you may have for the first 24 to 48 hours after the procedure.    E. Avoid cigarette smoking or exposure to smoke filled environments.  If you are a smoker and wish to stop, we have information to assist you.  Ask about it at your follow-up visit.    F. Anyone having a general anesthetic should not participate in any activity requiring mental alertness, physical coordination, or balance; such as driving or bicycling for 24 hours after the anesthetic.    Your first postoperative visit will be approximately one week from the date of surgery.  Call the [facility] at [phone].  After hours, please contact the Emergency Room at [phone].       Post-Anesthesia Patient Instructions    IMMEDIATELY FOLLOWING SURGERY:  Do not drive or operate machinery for the first twenty four hours after surgery.  Do not make any important decisions for twenty four hours after surgery or while taking narcotic pain medications or sedatives.  If you develop intractable nausea and vomiting or a severe headache please notify your doctor immediately.    FOLLOW-UP:  Please make an appointment with your surgeon as instructed. You do not need to follow up with anesthesia unless specifically instructed to  do so.    WOUND CARE INSTRUCTIONS (if applicable):  Keep a dry clean dressing on the anesthesia/puncture wound site if there is drainage.  Once the wound has quit draining you may leave it open to air.  Generally you should leave the bandage intact for twenty four hours unless there is drainage.  If the epidural site drains for more than 36-48 hours please call the anesthesia department.    QUESTIONS?:  Please feel free to call your physician or the hospital  if you have any questions, and they will be happy to assist you.   Absolute voice rest for 7 days.    Take OTC tylenol or motrin as needed for discomfort.  Lortab can be taken if necessary    Follow up with Dr. Morin in radiation therapy and Dr. Lazar in Oncology  Our office will arrange these visits.    See Dr. Mcnally as directed (3 months after completing radiation therapy)     Diagnosis: Glottic Squamous Cell Carcinoma

## 2018-10-30 NOTE — ANESTHESIA POSTPROCEDURE EVALUATION
Patient: Jorge A Mendosa    Procedure(s):  MICRODIRECT LARYNGOSCOPY WITH BIOPSIES, FROZENS    Diagnosis:LARYNGEAL MASS, LESION OF TRUE VOCAL CORD  Diagnosis Additional Information: No value filed.    Anesthesia Type:  MAC    Note:  Anesthesia Post Evaluation    Patient location during evaluation: PACU  Patient participation: Able to fully participate in evaluation  Level of consciousness: awake and alert  Pain management: adequate  Airway patency: patent  Cardiovascular status: acceptable  Respiratory status: acceptable  Hydration status: acceptable  PONV: none             Last vitals:  Vitals:    10/30/18 1417 10/30/18 1422 10/30/18 1427   BP: 117/83 115/78 115/78   Resp: 16 17 25   Temp: 98.3  F (36.8  C)     SpO2: 95% 95% 93%         Electronically Signed By: Colt Walters MD  October 30, 2018  2:37 PM

## 2018-10-30 NOTE — TELEPHONE ENCOUNTER
Diagnosis :  Glottic Squamous Cell Carcinoma     Absolute voice rest for 7 days.     Take OTC tylenol or motrin as needed for discomfort.   Lortab can be taken if necessary     Follow up with Dr. Morin in radiation therapy and Dr. Lazar in Oncology   Our office will arrange these visits.     See Dr. Mcnally as directed (3 months after completing radiation therapy)                  Please check diagnosis and sign off on the orders.

## 2018-10-30 NOTE — OR NURSING
Patient and responsible adult given discharge instructions with no questions regarding instructions. Mariposa score 20/20. Pain level 5/10.  Discharged from unit via ambulatory. Patient discharged to home.

## 2018-11-01 LAB — COPATH REPORT: NORMAL

## 2018-11-01 ASSESSMENT — ACTIVITIES OF DAILY LIVING (ADL): DEPENDENT_IADLS:: INDEPENDENT

## 2018-11-09 DIAGNOSIS — Z98.890 POST-OPERATIVE STATE: ICD-10-CM

## 2018-11-09 NOTE — TELEPHONE ENCOUNTER
norco      Last Written Prescription Date:  10/30/18  Last Fill Quantity: 25,   # refills: 0  Last Office Visit: 10/23/18  Future Office visit:    Next 5 appointments (look out 90 days)     Nov 16, 2018  2:00 PM CST   (Arrive by 1:45 PM)   Return Visit with Kailyn Campbell PA-C   [facility] ([facility] )    [address] [place] [address]   [phone]                   Routing refill request to provider for review/approval because:  Drug not on the [facility], [facility] or  Health refill protocol or controlled substance

## 2018-11-12 RX ORDER — HYDROCODONE BITARTRATE AND ACETAMINOPHEN 5; 325 MG/1; MG/1
1 TABLET ORAL EVERY 6 HOURS PRN
Qty: 25 TABLET | Refills: 0 | OUTPATIENT
Start: 2018-11-12

## 2018-11-13 ENCOUNTER — PATIENT OUTREACH (OUTPATIENT)
Dept: ONCOLOGY | Facility: OTHER | Age: 63
End: 2018-11-13

## 2018-11-13 ENCOUNTER — OFFICE VISIT (OUTPATIENT)
Dept: FAMILY MEDICINE | Facility: OTHER | Age: 63
End: 2018-11-13
Attending: FAMILY MEDICINE
Payer: MEDICARE

## 2018-11-13 VITALS
SYSTOLIC BLOOD PRESSURE: 134 MMHG | DIASTOLIC BLOOD PRESSURE: 82 MMHG | HEART RATE: 74 BPM | WEIGHT: 142 LBS | TEMPERATURE: 98.5 F | OXYGEN SATURATION: 98 % | BODY MASS INDEX: 22.24 KG/M2

## 2018-11-13 DIAGNOSIS — H10.33 ACUTE CONJUNCTIVITIS OF BOTH EYES, UNSPECIFIED ACUTE CONJUNCTIVITIS TYPE: Primary | ICD-10-CM

## 2018-11-13 PROCEDURE — G0463 HOSPITAL OUTPT CLINIC VISIT: HCPCS

## 2018-11-13 PROCEDURE — 99213 OFFICE O/P EST LOW 20 MIN: CPT | Performed by: FAMILY MEDICINE

## 2018-11-13 RX ORDER — TOBRAMYCIN 3 MG/ML
1 SOLUTION/ DROPS OPHTHALMIC EVERY 4 HOURS
Qty: 1 BOTTLE | Refills: 0 | Status: SHIPPED | OUTPATIENT
Start: 2018-11-13 | End: 2018-11-20

## 2018-11-13 ASSESSMENT — PAIN SCALES - GENERAL: PAINLEVEL: WORST PAIN (10)
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
## 2018-11-13 NOTE — MR AVS SNAPSHOT
After Visit Summary   11/13/2018    Jorge A Mendosa    MRN: [id number]           Patient Information     Date Of Birth          1955        Visit Information        Provider Department      11/13/2018 3:00 PM Mukund Hamlin MD [facility]        Today's Diagnoses     Acute conjunctivitis of both eyes, unspecified acute conjunctivitis type    -  1      Care Instructions    F/u with ongoing concerns.             Follow-ups after your visit        Your next 10 appointments already scheduled     Nov 16, 2018  2:00 PM CST   (Arrive by 1:45 PM)   Return Visit with Kailyn Campbell PA-C   [facility]    [address]  [facility] [address]   [phone]              Who to contact     If you have questions or need follow up information about today's clinic visit or your schedule please contact [facility] directly at [phone].  Normal or non-critical lab and imaging results will be communicated to you by Bee[employer], letter or phone within 4 business days after the clinic has received the results. If you do not hear from us within 7 days, please contact the clinic through [employer] or phone. If you have a critical or abnormal lab result, we will notify you by phone as soon as possible.  Submit refill requests through [facility] or call your pharmacy and they will forward the refill request to us. Please allow 3 business days for your refill to be completed.          Additional Information About Your Visit        MyChart Information     [facility] gives you secure access to your electronic health record. If you see a primary care provider, you can also send messages to your care team and make appointments. If you have questions, please call your primary care clinic.  If you do not have a primary care provider, please call [phone] and they will assist you.        Care EveryWhere ID     This is your Care  EveryWhere ID. This could be used by other organizations to access your [facility] medical records  [id number]        Your Vitals Were     Pulse Temperature Pulse Oximetry BMI (Body Mass Index)          74 98.5  F (36.9  C) (Tympanic) 98% 22.24 kg/m2         Blood Pressure from Last 3 Encounters:   11/13/18 134/82   10/30/18 120/68   10/23/18 124/72    Weight from Last 3 Encounters:   11/13/18 142 lb (64.4 kg)   10/30/18 140 lb (63.5 kg)   10/23/18 143 lb (64.9 kg)              Today, you had the following     No orders found for display         Today's Medication Changes          These changes are accurate as of 11/13/18  3:14 PM.  If you have any questions, ask your nurse or doctor.               Start taking these medicines.        Dose/Directions    tobramycin 0.3 % ophthalmic solution   Commonly known as:  TOBREX   Used for:  Acute conjunctivitis of both eyes, unspecified acute conjunctivitis type   Started by:  Mukund Hamlin MD        Dose:  1 drop   Apply 1 drop to eye every 4 hours for 7 days   Quantity:  1 Bottle   Refills:  0            Where to get your medicines      These medications were sent to [id number] - [place], [place] - [address]   [id number] , [place] [address]     Phone:  [phone]     tobramycin 0.3 % ophthalmic solution                Primary Care Provider Office Phone # Fax #    Mukund Hamlin [phone]       [address] [place] [address] E  [facility] [address]        Equal Access to Services     LIZZ KOLB AH: Hadii aad ku hadasho Soomaali, waaxda luqadaha, qaybta kaalmada adeegyada, waxmadi rosario amaya. So [facility] [phone].    ATENCIÓN: Si habla español, tiene a escalante disposición servicios gratuitos de asistencia lingüística. Llame al [phone].    We comply with applicable federal civil rights laws and [place] laws. We do not discriminate on the basis of race, color, national origin, age, disability, sex, sexual orientation, or gender  identity.            Thank you!     Thank you for choosing [facility]  for your care. Our goal is always to provide you with excellent care. Hearing back from our patients is one way we can continue to improve our services. Please take a few minutes to complete the written survey that you may receive in the mail after your visit with us. Thank you!             Your Updated Medication List - Protect others around you: Learn how to safely use, store and throw away your medicines at www.disposemymeds.org.          This list is accurate as of 11/13/18  3:14 PM.  Always use your most recent med list.                   Brand Name Dispense Instructions for use Diagnosis    ADVIL 200 MG capsule   Generic drug:  ibuprofen      Take 200 mg by mouth every 4 hours as needed.        ALEVE PO      Take 1 tablet by mouth. As directed when needed        HYDROcodone-acetaminophen 5-325 MG per tablet    NORCO    25 tablet    Take 1 tablet by mouth every 6 hours as needed for severe pain    Post-operative state       lidocaine (viscous) 2 % solution    XYLOCAINE    100 mL    Use adult dose:  15 ml to swallow every 3 hours prn pain    Post-operative state       omeprazole 40 MG capsule    priLOSEC    90 capsule    Take 1 capsule (40 mg) by mouth daily Take 30-60 minutes before a meal.    Abdominal pain, epigastric       tobramycin 0.3 % ophthalmic solution    TOBREX    1 Bottle    Apply 1 drop to eye every 4 hours for 7 days    Acute conjunctivitis of both eyes, unspecified acute conjunctivitis type       VITAMIN A PO      Take by mouth daily        VITAMIN C PO      1 daily        VITAMIN D (CHOLECALCIFEROL) PO      Take 1,000 Units by mouth daily        VITAMIN E COMPLEX PO      Take 200 Units by mouth daily

## 2018-11-13 NOTE — NURSING NOTE
"Chief Complaint   Patient presents with     Eye Itching Both Eyes       Initial /82  Pulse 74  Temp 98.5  F (36.9  C) (Tympanic)  Wt 142 lb (64.4 kg)  SpO2 98%  BMI 22.24 kg/m2 Estimated body mass index is 22.24 kg/(m^2) as calculated from the following:    Height as of 10/30/18: 5' 7\" (1.702 m).    Weight as of this encounter: 142 lb (64.4 kg).  Medication Reconciliation: complete    Veronica Cole MA    "

## 2018-11-13 NOTE — PROGRESS NOTES
Clinic Care Coordination Contact  Care Team Conversations    OCC RN discussed with the pt via phone the need for an oncology visit with Dr Lazar.  Appt accepted and pt was informed where and how to register for this appt.      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  Date of symptom onset:  1 yr worsening hoarseness of the voice and more so since 9/18/18  Who did you first talk to about your symptoms - Dr Hamlin    Referring MD: Dr Mary Carmen Mcnally, [facility]  PCP:  Dr Mukund Hamlin, [facility]  Surgeon:  Dr Mary Carmen Mcnally  Have you seen any other provider for consultation or treatment of your cancer:  Referred to Dr Sher Morin; appt on 11/8/18 was cancelled      TREATMENT TO-DATE FOR THIS CANCER  Have you had a biopsy - True Mass - L/R Vocal Cords 10/20/18 - [facility]     Have you had surgery - Laryngoscopy - 10/30/18-[facility]     Have you had chemotherapy - None    Oncologist:  Dr Crystal Lazar  Are you on hormonal therapy - None  Other treatments for this Cancer (including over-the-counter)? None    PRIOR HISTORY OF CANCER  Previous cancer?:  None     Prior radiation?:  None      Prior chemotherapy?:  None       RECENT IMAGING STUDIES  PET/CT:  None  CT: - Soft Tissue Neck w/Contrast - 9/27/18 - [facility]  Bone Scan:  None  MRI Scan:  None  Muga:  None  X-Ray:  None  Other:  EKG-10/23/18-[facility]    LABS PERTINENT TO DIAGNOSIS  Have you had labs drawn:  BMP, CBC w/Platelets  Where were these done:  [facility] Date:  10/23/18    CENTRAL LINE/PORT   None    HEALTH SCREENING  Last colonoscopy.  Unknown  T-Dap - 10/30/2011  Pneumonia - 11/2/2011  Flu Shot - 11/2/2011  Shingles - None    FAMILY HISTORY  Unknown    SMOKING HISTORY  Current every day Cigarettes - x 30 days  Smokeless Tobacco Use - No    OTHER  Broken Controlled Substance Agreement - No more controlled substances    Roula Iglesias RN  [facility]  Care Coordinator

## 2018-11-13 NOTE — LETTER
[facility]  [address] [place] [address]  [phone]      November 13, 2018      Jorge A [place]  [address]  [address]   [facility] [address]      EMERGENCY CARE PLAN  Presenting Problem Treatment Plan   Questions or concerns during clinic hours          24 hour Nurse line available - Call main clinic line and follow prompts I will call the clinic directly: [phone]    ONCOLOGY ORGSJAXBUK-[phone]    24 Hour Nurse Line [phone]- Follow prompts and press option for nurse advisor    [facility]  [address] [place] [address]   Patient needs to schedule an appointment  I will call the scheduling team during business hours at [phone]   Same day treatment   I will call the clinic first, then urgent care if needed   Clinic Care Coordinators [facility]  ONCOLOGY RN CARE COORDINATOR  [phone]- Roula Luu    ONCOLOGY   [phone] - Joleen    GENERAL CARE COORDINATION  RN Clinic Care Coordinator  [phone]    [phone]   Crisis Services:  Behavioral or Mental Health [facility]  [phone]   Emergency treatment--Immediately CALL 911

## 2018-11-14 DIAGNOSIS — Z98.890 POST-OPERATIVE STATE: ICD-10-CM

## 2018-11-14 RX ORDER — HYDROCODONE BITARTRATE AND ACETAMINOPHEN 5; 325 MG/1; MG/1
1 TABLET ORAL EVERY 6 HOURS PRN
Qty: 25 TABLET | Refills: 0 | Status: SHIPPED | OUTPATIENT
Start: 2018-11-14 | End: 2018-12-04 | Stop reason: DRUGHIGH

## 2018-11-14 NOTE — TELEPHONE ENCOUNTER
Pt is looking for a refill on his Lortab.  His last prescription was 10/30/18 #25 with 0 RF.  Pt is s/p Micro direct Laryngoscopy with biopsies on 10/30.  Pt is here waiting.

## 2018-11-16 ENCOUNTER — OFFICE VISIT (OUTPATIENT)
Dept: OTOLARYNGOLOGY | Facility: OTHER | Age: 63
End: 2018-11-16
Attending: PHYSICIAN ASSISTANT
Payer: MEDICARE

## 2018-11-16 VITALS
SYSTOLIC BLOOD PRESSURE: 132 MMHG | HEART RATE: 82 BPM | TEMPERATURE: 98.4 F | WEIGHT: 142 LBS | BODY MASS INDEX: 22.29 KG/M2 | OXYGEN SATURATION: 98 % | HEIGHT: 67 IN | DIASTOLIC BLOOD PRESSURE: 74 MMHG

## 2018-11-16 DIAGNOSIS — C32.0 SQUAMOUS CELL CARCINOMA OF GLOTTIS (H): ICD-10-CM

## 2018-11-16 DIAGNOSIS — Z91.041 ALLERGY TO CONTRAST MEDIA (USED FOR DIAGNOSTIC X-RAYS): ICD-10-CM

## 2018-11-16 DIAGNOSIS — J38.7 LARYNGEAL MASS: ICD-10-CM

## 2018-11-16 DIAGNOSIS — Z98.890 POST-OPERATIVE STATE: Primary | ICD-10-CM

## 2018-11-16 DIAGNOSIS — Z71.6 TOBACCO ABUSE COUNSELING: ICD-10-CM

## 2018-11-16 PROCEDURE — G0463 HOSPITAL OUTPT CLINIC VISIT: HCPCS

## 2018-11-16 PROCEDURE — 99213 OFFICE O/P EST LOW 20 MIN: CPT | Performed by: PHYSICIAN ASSISTANT

## 2018-11-16 RX ORDER — METHYLPREDNISOLONE 16 MG/1
TABLET ORAL
Qty: 4 TABLET | Refills: 0 | Status: SHIPPED | OUTPATIENT
Start: 2018-11-16 | End: 2018-12-04

## 2018-11-16 RX ORDER — HYDROCODONE BITARTRATE AND ACETAMINOPHEN 7.5; 325 MG/1; MG/1
1 TABLET ORAL EVERY 6 HOURS PRN
Qty: 15 TABLET | Refills: 0 | Status: SHIPPED | OUTPATIENT
Start: 2018-11-16 | End: 2018-11-30

## 2018-11-16 ASSESSMENT — PAIN SCALES - GENERAL: PAINLEVEL: SEVERE PAIN (6)

## 2018-11-16 NOTE — PATIENT INSTRUCTIONS
Schedule appointments with Dr. Morin and Dr. Lazar.     Refilled lortab for short term pain control  Repeat Neck CT-    Chest CT to be completed.   Drink Ensure 1-2 cans daily.   Continue with postoperative instructions.   Vocal rest.   Follow up in 2-3 weeks.   Thank you for allowing BANG Ly and our ENT team to participate in your care.  If your medications are too expensive, please give the nurse a call.  We can possibly change this medication.  If you have a scheduling or an appointment question please contact our Health Unit Coordinator at their direct line [phone].   ALL nursing questions or concerns can be directed to your ENT nurse at: [phone] [place]

## 2018-11-16 NOTE — PROGRESS NOTES
Chief Complaint   Patient presents with     Surgical Followup     S/P MDL with Biopsies on 10/30/18     Jorge A presents for his postoperative exam. He completed MDL with Bx on 10/30/18 with Dr. Mcnally. He followed postoperative instructions  Jorge A has no fevers, chills, nausea or emesis.   He has increase in throat pain since the procedure. He describes neck/ cervical tightness as well. Increase in pain w/ swallowing. He has been eating soft foods at this time. Noticed a slight weight loss since his surgery. His voice remains hoarse, but states he did follow instructions of vocal rest.     His pathology was SCC.   He supposedly cancelled his appointment with Dr. Morin and did not see Dr. Lazar yet. Call was placed to arrange these.     His CT complete at his initial visit in ENT, was read with limited due to movement. Recommended repeat imaging.   He will need to arrange these, and due to recent swelling sensation after contrast will need associated ordered.     Jorge A has increase pain and states Lortab 5/325 does not help. He did get a refill on 11/13/18 and states he has no relief with the medication and requesting stronger meds.       Past Medical History:   Diagnosis Date     Chronic pain syndrome 03/07/2011     Lumbago 01/07/2002        Allergies   Allergen Reactions     Amitriptyline Other (See Comments) and Nausea     Dizziness     Celecoxib GI Disturbance     Celebrex     Contrast Dye Swelling     Difficulty swallowing during contrast given for CT neck     Current Outpatient Prescriptions   Medication     Ascorbic Acid (VITAMIN C PO)     HYDROcodone-acetaminophen (NORCO) 5-325 MG per tablet     ibuprofen (ADVIL) 200 MG capsule     lidocaine, viscous, (XYLOCAINE) 2 % solution     Naproxen Sodium (ALEVE PO)     omeprazole (PRILOSEC) 40 MG capsule     tobramycin (TOBREX) 0.3 % ophthalmic solution     VITAMIN A PO     VITAMIN D, CHOLECALCIFEROL, PO     VITAMIN E COMPLEX PO     No current  "facility-administered medications for this visit.       ROS: 10 point ROS neg other than the symptoms noted above in the HPI.  /74 (Cuff Size: Adult Regular)  Pulse 82  Temp 98.4  F (36.9  C) (Tympanic)  Ht 1.702 m (5' 7\")  Wt 64.4 kg (142 lb)  SpO2 98%  BMI 22.24 kg/m2    General - The patient is well nourished and well developed, and appears to have good nutritional status.  Alert and oriented to person and place, interactive. Voice is rough in quality  Head and Face - Normocephalic and atraumatic, with no gross asymmetry noted of the contour of the facial features.  The facial nerve is intact, with strong symmetric movements.  Neck-no palpable lymphadenopathy or thyroid mass.  Trachea is midline.  Eyes - Extraocular movements intact.   Ears- External auditory canals are patent, tympanic membranes are intact without effusion or worrisome retractions   Nose - Nasal mucosa is pink and moist with no abnormal mucus.  The septum was grossly non-obstructive, turbinates of normal size and position.  No polyps, masses, or purulence noted on examination.  Mouth - Examination of the oral cavity shows pink, healthy, moist mucosa.  No lesions or ulceration noted.  The dentition are in good repair.  The tongue is mobile and midline.  Throat - The walls of the oropharynx were smooth, pink, moist, symmetric, and had no lesions or ulcerations.  The tonsillar pillars and soft palate were symmetric.  The uvula was midline on elevation.    Grade 2 symmetric tonsils  Bimanual palpation reveals no oropharygneal mass at tongue base or tonsils  Neck- Supple. Trachea is midline. I do palpate cervical lymphadenopathy <1 cm bilaterally and are tender with exam.     ASSESSMENT:    ICD-10-CM    1. Post-operative state Z98.890 HYDROcodone-acetaminophen (NORCO) 7.5-325 MG per tablet   2. 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 Laryngeal mass J38.7 " HYDROcodone-acetaminophen (NORCO) 7.5-325 MG per tablet   4. Tobacco abuse counseling Z71.6      Repeat imaging of Neck and Chest.   Order placed for Medrol prior to imaging.     Continue prilosec and reflux precautions  Continue adequate water intake  Tobacco cessation was strongly encouraged.  The associated risk of head and neck cancer was discussed.  Every year of cessation offers health benefits. This was discussed with the patient today and they voiced understanding.  Quit tools and a nicotine patch were offered.  Follow postoperative instructions.     He was provided Lortab 7.5 #15. No further pain medications.   He is not to take his Lortab 5 mg as he felt no relief   He was cautioned on correct dosing and tylenol use.     He will need to continue with diet- soft.   Advance as tolerated.   Start Ensure 1-2 cans daily.     He must keep his appointments with Dr. Lazar and Dr. Morin.   He does need to start radiation in upcoming weeks/ month.     He agrees with this plan.   If symptoms worsen or new develop, present to ED.     Kailyn Campbell PA-C  [facility]  [phone]

## 2018-11-16 NOTE — LETTER
11/16/2018         RE: Jorge A Mendosa  [address]  [address]  [facility] [address]        Dear Colleague,    Thank you for referring your patient, Jorge A Mendosa, to the [facility]. Please see a copy of my visit note below.    Chief Complaint   Patient presents with     Surgical Followup     S/P MDL with Biopsies on 10/30/18     Jorge A presents for his postoperative exam. He completed MDL with Bx on 10/30/18 with Dr. Mcnally. He followed postoperative instructions  Jorge A has no fevers, chills, nausea or emesis.   He has increase in throat pain since the procedure. He describes neck/ cervical tightness as well. Increase in pain w/ swallowing. He has been eating soft foods at this time. Noticed a slight weight loss since his surgery. His voice remains hoarse, but states he did follow instructions of vocal rest.     His pathology was SCC.   He supposedly cancelled his appointment with Dr. Morin and did not see Dr. Lazar yet. Call was placed to arrange these.     His CT complete at his initial visit in ENT, was read with limited due to movement. Recommended repeat imaging.   He will need to arrange these, and due to recent swelling sensation after contrast will need associated ordered.     Jorge A has increase pain and states Lortab 5/325 does not help. He did get a refill on 11/13/18 and states he has no relief with the medication and requesting stronger meds.       Past Medical History:   Diagnosis Date     Chronic pain syndrome 03/07/2011     Lumbago 01/07/2002        Allergies   Allergen Reactions     Amitriptyline Other (See Comments) and Nausea     Dizziness     Celecoxib GI Disturbance     Celebrex     Contrast Dye Swelling     Difficulty swallowing during contrast given for CT neck     Current Outpatient Prescriptions   Medication     Ascorbic Acid (VITAMIN C PO)     HYDROcodone-acetaminophen (NORCO) 5-325 MG per tablet     ibuprofen (ADVIL) 200 MG capsule     lidocaine,  "viscous, (XYLOCAINE) 2 % solution     Naproxen Sodium (ALEVE PO)     omeprazole (PRILOSEC) 40 MG capsule     tobramycin (TOBREX) 0.3 % ophthalmic solution     VITAMIN A PO     VITAMIN D, CHOLECALCIFEROL, PO     VITAMIN E COMPLEX PO     No current facility-administered medications for this visit.       ROS: 10 point ROS neg other than the symptoms noted above in the HPI.  /74 (Cuff Size: Adult Regular)  Pulse 82  Temp 98.4  F (36.9  C) (Tympanic)  Ht 1.702 m (5' 7\")  Wt 64.4 kg (142 lb)  SpO2 98%  BMI 22.24 kg/m2    General - The patient is well nourished and well developed, and appears to have good nutritional status.  Alert and oriented to person and place, interactive. Voice is rough in quality  Head and Face - Normocephalic and atraumatic, with no gross asymmetry noted of the contour of the facial features.  The facial nerve is intact, with strong symmetric movements.  Neck-no palpable lymphadenopathy or thyroid mass.  Trachea is midline.  Eyes - Extraocular movements intact.   Ears- External auditory canals are patent, tympanic membranes are intact without effusion or worrisome retractions   Nose - Nasal mucosa is pink and moist with no abnormal mucus.  The septum was grossly non-obstructive, turbinates of normal size and position.  No polyps, masses, or purulence noted on examination.  Mouth - Examination of the oral cavity shows pink, healthy, moist mucosa.  No lesions or ulceration noted.  The dentition are in good repair.  The tongue is mobile and midline.  Throat - The walls of the oropharynx were smooth, pink, moist, symmetric, and had no lesions or ulcerations.  The tonsillar pillars and soft palate were symmetric.  The uvula was midline on elevation.    Grade 2 symmetric tonsils  Bimanual palpation reveals no oropharygneal mass at tongue base or tonsils  Neck- Supple. Trachea is midline. I do palpate cervical lymphadenopathy <1 cm bilaterally and are tender with exam.     ASSESSMENT:    " ICD-10-CM    1. Post-operative state Z98.890 HYDROcodone-acetaminophen (NORCO) 7.5-325 MG per tablet   2. 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 Laryngeal mass J38.7 HYDROcodone-acetaminophen (NORCO) 7.5-325 MG per tablet   4. Tobacco abuse counseling Z71.6      Repeat imaging of Neck and Chest.   Order placed for Medrol prior to imaging.     Continue prilosec and reflux precautions  Continue adequate water intake  Tobacco cessation was strongly encouraged.  The associated risk of head and neck cancer was discussed.  Every year of cessation offers health benefits. This was discussed with the patient today and they voiced understanding.  Quit tools and a nicotine patch were offered.  Follow postoperative instructions.     He was provided Lortab 7.5 #15. No further pain medications.   He is not to take his Lortab 5 mg as he felt no relief   He was cautioned on correct dosing and tylenol use.     He will need to continue with diet- soft.   Advance as tolerated.   Start Ensure 1-2 cans daily.     He must keep his appointments with Dr. Lazar and Dr. Morin.   He does need to start radiation in upcoming weeks/ month.     He agrees with this plan.   If symptoms worsen or new develop, present to ED.     Kailyn Campbell PA-C  ENT  [facility] [place]  [phone]        Again, thank you for allowing me to participate in the care of your patient.        Sincerely,        Kailyn Campbell PA-C

## 2018-11-16 NOTE — MR AVS SNAPSHOT
After Visit Summary   11/16/2018    Jorge A Mendosa    MRN: [id number]           Patient Information     Date Of Birth          1955        Visit Information        Provider Department      11/16/2018 2:00 PM Kailyn Campbell PA-C [facility]        Today's Diagnoses     Post-operative state    -  1    Squamous cell carcinoma of glottis (H)        Laryngeal mass        Tobacco abuse counseling          Care Instructions    Schedule appointments with Dr. Morin and Dr. Lazar.     Refilled lortab for short term pain control  Repeat Neck CT-    Chest CT to be completed.   Drink Ensure 1-2 cans daily.   Continue with postoperative instructions.   Vocal rest.   Follow up in 2-3 weeks.   Thank you for allowing BANG Ly and our ENT team to participate in your care.  If your medications are too expensive, please give the nurse a call.  We can possibly change this medication.  If you have a scheduling or an appointment question please contact our Health Unit Coordinator at their direct line [phone].   ALL nursing questions or concerns can be directed to your ENT nurse at: [phone] [facility]             Follow-ups after your visit        Your next 10 appointments already scheduled     Nov 16, 2018  2:00 PM CST   (Arrive by 1:45 PM)   Return Visit with Kailyn Campbell PA-C   [facility] ([facility] )    [address] [place] [address]   [phone]              Who to contact     If you have questions or need follow up information about today's clinic visit or your schedule please contact [facility] directly at [phone].  Normal or non-critical lab and imaging results will be communicated to you by MyChart, letter or phone within 4 business days after the clinic has received the results. If you do not hear from us within 7 days, please contact the clinic through MyChart or phone. If you have a critical or  "abnormal lab result, we will notify you by phone as soon as possible.  Submit refill requests through [email or web address] or call your pharmacy and they will forward the refill request to us. Please allow 3 business days for your refill to be completed.          Additional Information About Your Visit        [employer] Information     [email or web address] gives you secure access to your electronic health record. If you see a primary care provider, you can also send messages to your care team and make appointments. If you have questions, please call your primary care clinic.  If you do not have a primary care provider, please call [phone] and they will assist you.        Care EveryWhere ID     This is your Care EveryWhere ID. This could be used by other organizations to access your [facility] medical records  [id number]        Your Vitals Were     Pulse Temperature Height Pulse Oximetry BMI (Body Mass Index)       82 98.4  F (36.9  C) (Tympanic) 1.702 m (5' 7\") 98% 22.24 kg/m2        Blood Pressure from Last 3 Encounters:   11/16/18 132/74   11/13/18 134/82   10/30/18 120/68    Weight from Last 3 Encounters:   11/16/18 64.4 kg (142 lb)   11/13/18 64.4 kg (142 lb)   10/30/18 63.5 kg (140 lb)              Today, you had the following     No orders found for display         Today's Medication Changes          These changes are accurate as of 11/16/18  1:59 PM.  If you have any questions, ask your nurse or doctor.               These medicines have changed or have updated prescriptions.        Dose/Directions    * HYDROcodone-acetaminophen 5-325 MG per tablet   Commonly known as:  NORCO   This may have changed:  Another medication with the same name was added. Make sure you understand how and when to take each.   Used for:  Post-operative state   Changed by:  Kailyn Campbell PA-C        Dose:  1 tablet   Take 1 tablet by mouth every 6 hours as needed for severe pain   Quantity:  25 tablet   Refills:  0       * HYDROcodone-acetaminophen 7.5-325 MG " per tablet   Commonly known as:  NORCO   This may have changed:  You were already taking a medication with the same name, and this prescription was added. Make sure you understand how and when to take each.   Used for:  Laryngeal mass, Squamous cell carcinoma of glottis (H), Post-operative state   Changed by:  Kailyn Campbell PA-C        Dose:  1 tablet   Take 1 tablet by mouth every 6 hours as needed for severe pain   Quantity:  15 tablet   Refills:  0       * Notice:  This list has 2 medication(s) that are the same as other medications prescribed for you. Read the directions carefully, and ask your doctor or other care provider to review them with you.         Where to get your medicines      Some of these will need a paper prescription and others can be bought over the counter.  Ask your nurse if you have questions.     Bring a paper prescription for each of these medications     HYDROcodone-acetaminophen 7.5-325 MG per tablet               Information about OPIOIDS     PRESCRIPTION OPIOIDS: WHAT YOU NEED TO KNOW   We gave you an opioid (narcotic) pain medicine. It is important to manage your pain, but opioids are not always the best choice. You should first try all the other options your care team gave you. Take this medicine for as short a time (and as few doses) as possible.    Some activities can increase your pain, such as bandage changes or therapy sessions. It may help to take your pain medicine 30 to 60 minutes before these activities. Reduce your stress by getting enough sleep, working on hobbies you enjoy and practicing relaxation or meditation. Talk to your care team about ways to manage your pain beyond prescription opioids.    These medicines have risks:    DO NOT drive when on new or higher doses of pain medicine. These medicines can affect your alertness and reaction times, and you could be arrested for driving under the influence (DUI). If you need to use opioids long-term, talk to your care team  about driving.    DO NOT operate heavy machinery    DO NOT do any other dangerous activities while taking these medicines.    DO NOT drink any alcohol while taking these medicines.     If the opioid prescribed includes acetaminophen, DO NOT take with any other medicines that contain acetaminophen. Read all labels carefully. Look for the word  acetaminophen  or  Tylenol.  Ask your pharmacist if you have questions or are unsure.    You can get addicted to pain medicines, especially if you have a history of addiction (chemical, alcohol or substance dependence). Talk to your care team about ways to reduce this risk.    All opioids tend to cause constipation. Drink plenty of water and eat foods that have a lot of fiber, such as fruits, vegetables, prune juice, apple juice and high-fiber cereal. Take a laxative (Miralax, milk of magnesia, Colace, Senna) if you don t move your bowels at least every other day. Other side effects include upset stomach, sleepiness, dizziness, throwing up, tolerance (needing more of the medicine to have the same effect), physical dependence and slowed breathing.    Store your pills in a secure place, locked if possible. We will not replace any lost or stolen medicine. If you don t finish your medicine, please throw away (dispose) as directed by your pharmacist. The [place] Pollution Control Agency has more information about safe disposal: [email or web address].[email or web address]-medications         Primary Care Provider Office Phone # Fax #    Mukund Hamlin [phone]       [address] [place] [id number]        Equal Access to Services     LIZZ KOLB : Hadii aad ku hadasho Sokrystinaali, waaxda luqadaha, qaybta kaalmada veda monroy . So [facility] [phone].    ATENCIÓN: Si habla español, tiene a escalante disposición servicios gratuitos de asistencia lingüística. Llame al [phone].    We comply with applicable  federal civil rights laws and [place] laws. We do not discriminate on the basis of race, color, national origin, age, disability, sex, sexual orientation, or gender identity.            Thank you!     Thank you for choosing [facility]  for your care. Our goal is always to provide you with excellent care. Hearing back from our patients is one way we can continue to improve our services. Please take a few minutes to complete the written survey that you may receive in the mail after your visit with us. Thank you!             Your Updated Medication List - Protect others around you: Learn how to safely use, store and throw away your medicines at www.disposemymeds.org.          This list is accurate as of 11/16/18  1:59 PM.  Always use your most recent med list.                   Brand Name Dispense Instructions for use Diagnosis    ADVIL 200 MG capsule   Generic drug:  ibuprofen      Take 200 mg by mouth every 4 hours as needed.        ALEVE PO      Take 1 tablet by mouth. As directed when needed        * HYDROcodone-acetaminophen 5-325 MG per tablet    NORCO    25 tablet    Take 1 tablet by mouth every 6 hours as needed for severe pain    Post-operative state       * HYDROcodone-acetaminophen 7.5-325 MG per tablet    NORCO    15 tablet    Take 1 tablet by mouth every 6 hours as needed for severe pain    Laryngeal mass, Squamous cell carcinoma of glottis (H), Post-operative state       lidocaine (viscous) 2 % solution    XYLOCAINE    100 mL    Use adult dose:  15 ml to swallow every 3 hours prn pain    Post-operative state       omeprazole 40 MG capsule    priLOSEC    90 capsule    Take 1 capsule (40 mg) by mouth daily Take 30-60 minutes before a meal.    Abdominal pain, epigastric       tobramycin 0.3 % ophthalmic solution    TOBREX    1 Bottle    Apply 1 drop to eye every 4 hours for 7 days    Acute conjunctivitis of both eyes, unspecified acute conjunctivitis type       VITAMIN A PO      Take  by mouth daily        VITAMIN C PO      1 daily        VITAMIN D (CHOLECALCIFEROL) PO      Take 1,000 Units by mouth daily        VITAMIN E COMPLEX PO      Take 200 Units by mouth daily        * Notice:  This list has 2 medication(s) that are the same as other medications prescribed for you. Read the directions carefully, and ask your doctor or other care provider to review them with you.

## 2018-11-16 NOTE — NURSING NOTE
"Chief Complaint   Patient presents with     Surgical Followup     S/P MDL with Biopsies on 10/30/18       Initial /74 (Cuff Size: Adult Regular)  Pulse 82  Temp 98.4  F (36.9  C) (Tympanic)  Ht 1.702 m (5' 7\")  Wt 64.4 kg (142 lb)  SpO2 98%  BMI 22.24 kg/m2 Estimated body mass index is 22.24 kg/(m^2) as calculated from the following:    Height as of this encounter: 1.702 m (5' 7\").    Weight as of this encounter: 64.4 kg (142 lb).  Medication Reconciliation: complete    Xochilt Coates LPN    "

## 2018-11-23 ENCOUNTER — PATIENT OUTREACH (OUTPATIENT)
Dept: ONCOLOGY | Facility: OTHER | Age: 63
End: 2018-11-23

## 2018-11-23 NOTE — PROGRESS NOTES
Clinic Care Coordination Contact  Care Team Conversations    OCC RN telephoned/left voice message with the pt this day in an effort to reschedule an initial Oncology appt.  Pt had previously agreed to an initial Oncology appt for 11/13/18 at 2:00pm.  Pt failed to show or call to cancel this appt.  It is also noted the pt failed to show for his appt with Dr Morin, Rad Therapy.  Dr Hamlin notified of the failure.    Roula Iglesias RN  [facility] Oncology Care Coordinator

## 2018-11-27 ENCOUNTER — HOSPITAL ENCOUNTER (OUTPATIENT)
Dept: CT IMAGING | Facility: HOSPITAL | Age: 63
Discharge: HOME | End: 2018-11-27
Attending: PHYSICIAN ASSISTANT | Admitting: PHYSICIAN ASSISTANT
Payer: MEDICARE

## 2018-11-27 ENCOUNTER — HOSPITAL ENCOUNTER (OUTPATIENT)
Dept: CT IMAGING | Facility: HOSPITAL | Age: 63
End: 2018-11-27
Attending: PHYSICIAN ASSISTANT
Payer: MEDICARE

## 2018-11-27 DIAGNOSIS — C32.0 SQUAMOUS CELL CARCINOMA OF GLOTTIS (H): ICD-10-CM

## 2018-11-27 DIAGNOSIS — J38.7 LARYNGEAL MASS: ICD-10-CM

## 2018-11-27 DIAGNOSIS — Z71.6 TOBACCO ABUSE COUNSELING: ICD-10-CM

## 2018-11-27 DIAGNOSIS — Z98.890 POST-OPERATIVE STATE: ICD-10-CM

## 2018-11-27 PROCEDURE — 25500064 ZZH RX 255 OP 636: Performed by: RADIOLOGY

## 2018-11-27 PROCEDURE — 70491 CT SOFT TISSUE NECK W/DYE: CPT | Mod: TC

## 2018-11-27 PROCEDURE — 71260 CT THORAX DX C+: CPT | Mod: TC

## 2018-11-27 RX ORDER — IOPAMIDOL 612 MG/ML
100 INJECTION, SOLUTION INTRAVASCULAR ONCE
Status: COMPLETED | OUTPATIENT
Start: 2018-11-27 | End: 2018-11-27

## 2018-11-27 RX ADMIN — IOPAMIDOL 50 ML: 612 INJECTION, SOLUTION INTRAVENOUS at 13:10

## 2018-11-27 RX ADMIN — IOPAMIDOL 100 ML: 612 INJECTION, SOLUTION INTRAVENOUS at 13:10

## 2018-11-30 ENCOUNTER — ALLIED HEALTH/NURSE VISIT (OUTPATIENT)
Dept: OTOLARYNGOLOGY | Facility: OTHER | Age: 63
End: 2018-11-30
Attending: PHYSICIAN ASSISTANT
Payer: MEDICARE

## 2018-11-30 DIAGNOSIS — Z98.890 POST-OPERATIVE STATE: Primary | ICD-10-CM

## 2018-11-30 DIAGNOSIS — C32.0 SQUAMOUS CELL CARCINOMA OF GLOTTIS (H): ICD-10-CM

## 2018-11-30 DIAGNOSIS — J38.7 LARYNGEAL MASS: ICD-10-CM

## 2018-11-30 DIAGNOSIS — Z53.9 ERRONEOUS ENCOUNTER--DISREGARD: ICD-10-CM

## 2018-11-30 RX ORDER — HYDROCODONE BITARTRATE AND ACETAMINOPHEN 7.5; 325 MG/1; MG/1
1 TABLET ORAL EVERY 6 HOURS PRN
Qty: 10 TABLET | Refills: 0 | Status: SHIPPED | OUTPATIENT
Start: 2018-11-30 | End: 2018-12-04

## 2018-11-30 NOTE — MR AVS SNAPSHOT
After Visit Summary   11/30/2018    Jorge A Mendosa    MRN: [id number]           Patient Information     Date Of Birth          1955        Visit Information        Provider Department      11/30/2018 1:00 PM HC ENT NURSE [facility]        Today's Diagnoses     Post-operative state    -  1    Squamous cell carcinoma of glottis (H)        Laryngeal mass        ERRONEOUS ENCOUNTER--DISREGARD           Follow-ups after your visit        Your next 10 appointments already scheduled     Dec 04, 2018  9:15 AM CST   (Arrive by 9:00 AM)   Post Op with Kailyn Campbell PA-C   [facility]    [address] [facility] [address]   [phone]            Dec 04, 2018 10:00 AM CST   CONSULT with Sher Morin MD   [facility] ([facility] )    [address] [id number] [phone]            Dec 11, 2018  2:00 PM CST   (Arrive by 1:45 PM)   New Visit with Crystal Lazar MD   [facility]    [address] [facility] [address]   [phone]              Who to contact     If you have questions or need follow up information about today's clinic visit or your schedule please contact [facility] directly at [phone].  Normal or non-critical lab and imaging results will be communicated to you by MyChart, letter or phone within 4 business days after the clinic has received the results. If you do not hear from us within 7 days, please contact the clinic through MyChart or phone. If you have a critical or abnormal lab result, we will notify you by phone as soon as possible.  Submit refill requests through [employer] or call your pharmacy and they will forward the refill request to us. Please allow 3 business days for your refill to be completed.          Additional Information About Your Visit        [facility]  Information     [employer] gives you secure access to your electronic health record. If you see a primary care provider, you can also send messages to your care team and make appointments. If you have questions, please call your primary care clinic.  If you do not have a primary care provider, please call [phone] and they will assist you.        Care EveryWhere ID     This is your Care EveryWhere ID. This could be used by other organizations to access your [facility] medical records  [id number]         Blood Pressure from Last 3 Encounters:   11/16/18 132/74   11/13/18 134/82   10/30/18 120/68    Weight from Last 3 Encounters:   11/16/18 64.4 kg (142 lb)   11/13/18 64.4 kg (142 lb)   10/30/18 63.5 kg (140 lb)              Today, you had the following     No orders found for display         Where to get your medicines      Some of these will need a paper prescription and others can be bought over the counter.  Ask your nurse if you have questions.     Bring a paper prescription for each of these medications     HYDROcodone-acetaminophen 7.5-325 MG per tablet         Information about OPIOIDS     PRESCRIPTION OPIOIDS: WHAT YOU NEED TO KNOW   We gave you an opioid (narcotic) pain medicine. It is important to manage your pain, but opioids are not always the best choice. You should first try all the other options your care team gave you. Take this medicine for as short a time (and as few doses) as possible.    Some activities can increase your pain, such as bandage changes or therapy sessions. It may help to take your pain medicine 30 to 60 minutes before these activities. Reduce your stress by getting enough sleep, working on hobbies you enjoy and practicing relaxation or meditation. Talk to your care team about ways to manage your pain beyond prescription opioids.    These medicines have risks:    DO NOT drive when on new or higher doses of pain medicine. These medicines can affect your alertness and reaction times,  and you could be arrested for driving under the influence (DUI). If you need to use opioids long-term, talk to your care team about driving.    DO NOT operate heavy machinery    DO NOT do any other dangerous activities while taking these medicines.    DO NOT drink any alcohol while taking these medicines.     If the opioid prescribed includes acetaminophen, DO NOT take with any other medicines that contain acetaminophen. Read all labels carefully. Look for the word  acetaminophen  or  Tylenol.  Ask your pharmacist if you have questions or are unsure.    You can get addicted to pain medicines, especially if you have a history of addiction (chemical, alcohol or substance dependence). Talk to your care team about ways to reduce this risk.    All opioids tend to cause constipation. Drink plenty of water and eat foods that have a lot of fiber, such as fruits, vegetables, prune juice, apple juice and high-fiber cereal. Take a laxative (Miralax, milk of magnesia, Colace, Senna) if you don t move your bowels at least every other day. Other side effects include upset stomach, sleepiness, dizziness, throwing up, tolerance (needing more of the medicine to have the same effect), physical dependence and slowed breathing.    Store your pills in a secure place, locked if possible. We will not replace any lost or stolen medicine. If you don t finish your medicine, please throw away (dispose) as directed by your pharmacist. The [place] Pollution Control Agency has more information about safe disposal: [email or web address].[email or web address]/living-green/managing-unwanted-medications         Primary Care Provider Office Phone # Fax #    Mukund Hamlin [phone]       [address] [place] [address]        Equal Access to Services     MAXIMINO KOLB : Hadii faraz ramsey hadashtati Sodivya, waaxda luqadaha, qaybta kaveda sorensen. [facility] [phone].    ATENCIÓN: Mireille cuevas,  tiene a escalante disposición servicios gratuitos de asistencia lingüística. Chon joiner [phone].    We comply with applicable federal civil rights laws and [place] laws. We do not discriminate on the basis of race, color, national origin, age, disability, sex, sexual orientation, or gender identity.            Thank you!     Thank you for choosing [facility]  for your care. Our goal is always to provide you with excellent care. Hearing back from our patients is one way we can continue to improve our services. Please take a few minutes to complete the written survey that you may receive in the mail after your visit with us. Thank you!             Your Updated Medication List - Protect others around you: Learn how to safely use, store and throw away your medicines at www.disposemymeds.org.          This list is accurate as of 11/30/18 11:59 PM.  Always use your most recent med list.                   Brand Name Dispense Instructions for use Diagnosis    ADVIL 200 MG capsule   Generic drug:  ibuprofen      Take 200 mg by mouth every 4 hours as needed.        ALEVE PO      Take 1 tablet by mouth. As directed when needed        * HYDROcodone-acetaminophen 5-325 MG tablet    NORCO    25 tablet    Take 1 tablet by mouth every 6 hours as needed for severe pain    Post-operative state       * HYDROcodone-acetaminophen 7.5-325 MG per tablet    NORCO    10 tablet    Take 1 tablet by mouth every 6 hours as needed for severe pain    Laryngeal mass, Squamous cell carcinoma of glottis (H), Post-operative state       lidocaine VISCOUS 2 % solution    XYLOCAINE    100 mL    Use adult dose:  15 ml to swallow every 3 hours prn pain    Post-operative state       methylPREDNISolone 16 MG tablet    MEDROL    4 tablet    Take 32 mg by mouth 12 hours before the procedure and repeat 32 mg by mouth 2 hours before the procedure to prevent contrast reaction.    Post-operative state, Allergy to contrast media (used for  diagnostic x-rays)       omeprazole 40 MG DR capsule    priLOSEC    90 capsule    Take 1 capsule (40 mg) by mouth daily Take 30-60 minutes before a meal.    Abdominal pain, epigastric       VITAMIN A PO      Take by mouth daily        VITAMIN C PO      1 daily        VITAMIN D (CHOLECALCIFEROL) PO      Take 1,000 Units by mouth daily        VITAMIN E COMPLEX PO      Take 200 Units by mouth daily        * Notice:  This list has 2 medication(s) that are the same as other medications prescribed for you. Read the directions carefully, and ask your doctor or other care provider to review them with you.

## 2018-11-30 NOTE — NURSING NOTE
Pt came in for a refill on his Lortab.  Kailyn was notified and she will refill this.  Pt will need to follow up with his pcp or Oncology for further refills.

## 2018-11-30 NOTE — PROGRESS NOTES
Patient presented to ENT for medication refill.  He is requesting Lortab Rx.     I provided patient #10 Lortab 7.5/325 mg  He needs to obtain further pain medication/ management from his PCP or Oncology.   He has hx of prior broken pain management contract    Nurse informed patient- he needs to see PCP or oncology for future refills/ management.     He is scheduled for consults with Dr. Morin, Dr. Lazar.     Kailyn Campbell PA-C  [facility], [place]  [phone]      Patient presented for Rx refill. He was not seen by Provider Please disregard.

## 2018-12-04 ENCOUNTER — ONCOLOGY VISIT (OUTPATIENT)
Dept: RADIATION ONCOLOGY | Facility: HOSPITAL | Age: 63
End: 2018-12-04
Attending: RADIOLOGY
Payer: MEDICARE

## 2018-12-04 ENCOUNTER — OFFICE VISIT (OUTPATIENT)
Dept: OTOLARYNGOLOGY | Facility: OTHER | Age: 63
End: 2018-12-04
Attending: PHYSICIAN ASSISTANT
Payer: MEDICARE

## 2018-12-04 VITALS
HEIGHT: 67 IN | HEART RATE: 72 BPM | RESPIRATION RATE: 16 BRPM | SYSTOLIC BLOOD PRESSURE: 116 MMHG | BODY MASS INDEX: 22.44 KG/M2 | DIASTOLIC BLOOD PRESSURE: 78 MMHG | WEIGHT: 143 LBS

## 2018-12-04 VITALS
HEART RATE: 85 BPM | OXYGEN SATURATION: 98 % | SYSTOLIC BLOOD PRESSURE: 122 MMHG | HEIGHT: 67 IN | BODY MASS INDEX: 22.26 KG/M2 | WEIGHT: 141.8 LBS | TEMPERATURE: 98.7 F | DIASTOLIC BLOOD PRESSURE: 70 MMHG

## 2018-12-04 DIAGNOSIS — C32.0 SQUAMOUS CELL CARCINOMA OF GLOTTIS (H): ICD-10-CM

## 2018-12-04 DIAGNOSIS — Z98.890 POST-OPERATIVE STATE: Primary | ICD-10-CM

## 2018-12-04 DIAGNOSIS — C32.0 SQUAMOUS CELL CARCINOMA OF GLOTTIS (H): Primary | ICD-10-CM

## 2018-12-04 DIAGNOSIS — Z98.890 POST-OPERATIVE STATE: ICD-10-CM

## 2018-12-04 DIAGNOSIS — C32.9 LARYNGEAL CANCER (H): ICD-10-CM

## 2018-12-04 DIAGNOSIS — J38.7 LARYNGEAL MASS: ICD-10-CM

## 2018-12-04 DIAGNOSIS — Z71.6 TOBACCO ABUSE COUNSELING: ICD-10-CM

## 2018-12-04 PROCEDURE — G0463 HOSPITAL OUTPT CLINIC VISIT: HCPCS

## 2018-12-04 PROCEDURE — G0463 HOSPITAL OUTPT CLINIC VISIT: HCPCS | Performed by: RADIOLOGY

## 2018-12-04 PROCEDURE — 99213 OFFICE O/P EST LOW 20 MIN: CPT | Performed by: PHYSICIAN ASSISTANT

## 2018-12-04 RX ORDER — HYDROCODONE BITARTRATE AND ACETAMINOPHEN 7.5; 325 MG/1; MG/1
1 TABLET ORAL EVERY 6 HOURS PRN
Qty: 15 TABLET | Refills: 0 | Status: SHIPPED | OUTPATIENT
Start: 2018-12-04 | End: 2018-12-10

## 2018-12-04 ASSESSMENT — PAIN SCALES - GENERAL
PAINLEVEL: SEVERE PAIN (7)
PAINLEVEL: SEVERE PAIN (7)

## 2018-12-04 ASSESSMENT — PATIENT HEALTH QUESTIONNAIRE - PHQ9: SUM OF ALL RESPONSES TO PHQ QUESTIONS 1-9: 12

## 2018-12-04 NOTE — MR AVS SNAPSHOT
After Visit Summary   12/4/2018    Jorge A Mendosa    MRN: [id number]           Patient Information     Date Of Birth          1955        Visit Information        Provider Department      12/4/2018 9:15 AM Kailyn Campbell PA-C [facility]        Today's Diagnoses     S/p MDL with biopsy.     -  1    Squamous cell carcinoma of glottis (H)        Laryngeal mass        Tobacco abuse counseling        Post-operative state          Care Instructions    Maintain your next appointments with Dr. Morin and Dr. Lazar.  Maintain good oral hydration. Protein shakes.   Quit tobacco. Congrats on less use.   Refilled Lortab. Return to PCP for pain management.    Thank you for allowing [facility]  and our ENT team to participate in your care.  If your medications are too expensive, please give the nurse a call.  We can possibly change this medication.  If you have a scheduling or an appointment question please contact our Health Unit Coordinator at their direct line [phone].   ALL nursing questions or concerns can be directed to your ENT nurse at: [phone] Bibi               Follow-ups after your visit        Your next 10 appointments already scheduled     Dec 04, 2018 10:00 AM CST   CONSULT with Sher Morin MD   [facility] ([facility] )    [address] [place] [id number] [phone]            Dec 11, 2018  2:00 PM CST   (Arrive by 1:45 PM)   New Visit with Crystal Lazar MD   [facility] ([facility] )    [address] [place] [id number] [phone]              Who to contact     If you have questions or need follow up information about today's clinic visit or your schedule please contact [facility] directly at [phone].  Normal or non-critical lab and imaging results will be communicated to you by MyChart, letter or phone within 4 business days after  "the clinic has received the results. If you do not hear from us within 7 days, please contact the clinic through [employer] or phone. If you have a critical or abnormal lab result, we will notify you by phone as soon as possible.  Submit refill requests through [employer] or call your pharmacy and they will forward the refill request to us. Please allow 3 business days for your refill to be completed.          Additional Information About Your Visit        [employer] Information     [employer] gives you secure access to your electronic health record. If you see a primary care provider, you can also send messages to your care team and make appointments. If you have questions, please call your primary care clinic.  If you do not have a primary care provider, please call [phone] and they will assist you.        Care EveryWhere ID     This is your Care EveryWhere ID. This could be used by other organizations to access your [facility] medical records  [id number]        Your Vitals Were     Pulse Temperature Height Pulse Oximetry BMI (Body Mass Index)       85 98.7  F (37.1  C) (Tympanic) 1.702 m (5' 7\") 98% 22.21 kg/m2        Blood Pressure from Last 3 Encounters:   12/04/18 122/70   11/16/18 132/74   11/13/18 134/82    Weight from Last 3 Encounters:   12/04/18 64.3 kg (141 lb 12.8 oz)   11/16/18 64.4 kg (142 lb)   11/13/18 64.4 kg (142 lb)              Today, you had the following     No orders found for display         Today's Medication Changes          These changes are accurate as of 12/4/18  9:38 AM.  If you have any questions, ask your nurse or doctor.               These medicines have changed or have updated prescriptions.        Dose/Directions    HYDROcodone-acetaminophen 7.5-325 MG per tablet   Commonly known as:  NORCO   This may have changed:  Another medication with the same name was removed. Continue taking this medication, and follow the directions you see here.   Used for:  Laryngeal mass, Squamous cell " carcinoma of glottis (H), Post-operative state   Changed by:  Kailyn Campbell PA-C        Dose:  1 tablet   Take 1 tablet by mouth every 6 hours as needed for severe pain   Quantity:  15 tablet   Refills:  0            Where to get your medicines      These medications were sent to [id number] - [place], [place] - [address]   [address] , [place] [address]     Phone:  [phone]     lidocaine VISCOUS 2 % solution         Some of these will need a paper prescription and others can be bought over the counter.  Ask your nurse if you have questions.     Bring a paper prescription for each of these medications     HYDROcodone-acetaminophen 7.5-325 MG per tablet               Information about OPIOIDS     PRESCRIPTION OPIOIDS: WHAT YOU NEED TO KNOW   We gave you an opioid (narcotic) pain medicine. It is important to manage your pain, but opioids are not always the best choice. You should first try all the other options your care team gave you. Take this medicine for as short a time (and as few doses) as possible.    Some activities can increase your pain, such as bandage changes or therapy sessions. It may help to take your pain medicine 30 to 60 minutes before these activities. Reduce your stress by getting enough sleep, working on hobbies you enjoy and practicing relaxation or meditation. Talk to your care team about ways to manage your pain beyond prescription opioids.    These medicines have risks:    DO NOT drive when on new or higher doses of pain medicine. These medicines can affect your alertness and reaction times, and you could be arrested for driving under the influence (DUI). If you need to use opioids long-term, talk to your care team about driving.    DO NOT operate heavy machinery    DO NOT do any other dangerous activities while taking these medicines.    DO NOT drink any alcohol while taking these medicines.     If the opioid prescribed includes acetaminophen, DO NOT take with any other medicines  that contain acetaminophen. Read all labels carefully. Look for the word  acetaminophen  or  Tylenol.  Ask your pharmacist if you have questions or are unsure.    You can get addicted to pain medicines, especially if you have a history of addiction (chemical, alcohol or substance dependence). Talk to your care team about ways to reduce this risk.    All opioids tend to cause constipation. Drink plenty of water and eat foods that have a lot of fiber, such as fruits, vegetables, prune juice, apple juice and high-fiber cereal. Take a laxative (Miralax, milk of magnesia, Colace, Senna) if you don t move your bowels at least every other day. Other side effects include upset stomach, sleepiness, dizziness, throwing up, tolerance (needing more of the medicine to have the same effect), physical dependence and slowed breathing.    Store your pills in a secure place, locked if possible. We will not replace any lost or stolen medicine. If you don t finish your medicine, please throw away (dispose) as directed by your pharmacist. The [place] Pollution Control Agency has more information about safe disposal: [email or web address]-medications         Primary Care Provider Office Phone # Fax #    Mukund Hamlin -[phone]       [address] [place] [address]        Equal Access to Services     LIZZ KOLB : Kelsey sanchezo Soomaali, waaxda luqadaha, qaybta kaalmada adeegyada, veda amaya. [facility] [phone].    ATENCIÓN: Si habla español, tiene a escalante disposición servicios gratuitos de asistencia lingüística. Llame al [phone].    We comply with applicable federal civil rights laws and [place] laws. We do not discriminate on the basis of race, color, national origin, age, disability, sex, sexual orientation, or gender identity.            Thank you!     Thank you for choosing [facility]  for your  care. Our goal is always to provide you with excellent care. Hearing back from our patients is one way we can continue to improve our services. Please take a few minutes to complete the written survey that you may receive in the mail after your visit with us. Thank you!             Your Updated Medication List - Protect others around you: Learn how to safely use, store and throw away your medicines at www.disposemymeds.org.          This list is accurate as of 12/4/18  9:38 AM.  Always use your most recent med list.                   Brand Name Dispense Instructions for use Diagnosis    ADVIL 200 MG capsule   Generic drug:  ibuprofen      Take 200 mg by mouth every 4 hours as needed.        ALEVE PO      Take 1 tablet by mouth. As directed when needed        HYDROcodone-acetaminophen 7.5-325 MG per tablet    NORCO    15 tablet    Take 1 tablet by mouth every 6 hours as needed for severe pain    Laryngeal mass, Squamous cell carcinoma of glottis (H), Post-operative state       lidocaine VISCOUS 2 % solution    XYLOCAINE    100 mL    Use adult dose:  15 ml to swallow every 3 hours prn pain    Post-operative state       omeprazole 40 MG DR capsule    priLOSEC    90 capsule    Take 1 capsule (40 mg) by mouth daily Take 30-60 minutes before a meal.    Abdominal pain, epigastric       VITAMIN A PO      Take by mouth daily        VITAMIN C PO      1 daily        VITAMIN D (CHOLECALCIFEROL) PO      Take 1,000 Units by mouth daily        VITAMIN E COMPLEX PO      Take 200 Units by mouth daily

## 2018-12-04 NOTE — PROGRESS NOTES
Chief Complaint   Patient presents with     Follow Up For     s/p MDL with bx 10/30/18     Jorge A presents for his postoperative exam. He completed MDL with Bx on 10/30/18 with Dr. Mcnally. He followed postoperative instructions  Jorge A has no fevers, chills, nausea or emesis.   He has increase in throat pain since the procedure. He describes neck/ cervical tightness as well. Increase in pain w/ swallowing. He has been drinking more protein shakes at this time.    He has been eating soft foods at this time. Noticed a slight weight loss since his surgery, but has since been stable. His voice remains hoarse, but states he did follow instructions of vocal rest.    He has almost quit tobacco. He does smoke about a few drags at times. No ETOH (quit 10 years ago)      His pathology was SCC.   He has yet to be seen in Radiation or Oncology. We were able to obtain these appointments recently. He is scheduled to See Dr. Morin right after this appointment.   Oncology appointment on 12/11/18.      Jorge A did complete Neck, Chest CT. Reviewed results.   Jorge A has ongoing pain issues since his surgery. I have been providing pain meds since his surgery, and recommended to f/u with PCP and/ or oncology for further refills.   He has no         PROCEDURE PERFORMED: Micro direct Laryngoscopy with biopsies  SURGEON: Taisha Mcnally D.O.  BLOOD LOSS: <5 mL.   COMPLICATIONS: None.   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  It appears to have originated on the left true cord crosses anterior commissure and involves the right true vocal cord     FINAL DIAGNOSIS:   A: Left true vocal cord mass, biopsy   - Invasive, moderately differentiated, focally keratinizing squamous cell   carcinoma     B: Left true vocal cord mass, additional  "tissue, biopsy   - Squamous cell carcinoma in situ   - Dysplastic squamous mucosa     C: Right true vocal cord mass, biopsy   - Moderately differentiated squamous cell carcinoma, focally invasive     I have personally reviewed all specimens and/or slides, including the   listed special stains, and used them   with my medical judgement to determine or confirm the final diagnosis.     Electronically signed out by:     Sascha Aguilar M.D.   Past Medical History:   Diagnosis Date     Chronic pain syndrome 03/07/2011     Lumbago 01/07/2002        Allergies   Allergen Reactions     Amitriptyline Other (See Comments) and Nausea     Dizziness     Celecoxib GI Disturbance     Celebrex     Contrast Dye Swelling     Difficulty swallowing during contrast given for CT neck     Current Outpatient Prescriptions   Medication     Ascorbic Acid (VITAMIN C PO)     HYDROcodone-acetaminophen (NORCO) 5-325 MG per tablet     HYDROcodone-acetaminophen (NORCO) 7.5-325 MG per tablet     ibuprofen (ADVIL) 200 MG capsule     lidocaine, viscous, (XYLOCAINE) 2 % solution     methylPREDNISolone (MEDROL) 16 MG tablet     Naproxen Sodium (ALEVE PO)     omeprazole (PRILOSEC) 40 MG capsule     VITAMIN A PO     VITAMIN D, CHOLECALCIFEROL, PO     VITAMIN E COMPLEX PO     No current facility-administered medications for this visit.       ROS: 10 point ROS neg other than the symptoms noted above in the HPI.  /70 (BP Location: Left arm, Patient Position: Sitting, Cuff Size: Adult Regular)  Pulse 85  Temp 98.7  F (37.1  C) (Tympanic)  Ht 1.702 m (5' 7\")  Wt 64.3 kg (141 lb 12.8 oz)  SpO2 98%  BMI 22.21 kg/m2  General - The patient is well nourished and well developed, and appears to have good nutritional status.  Alert and oriented to person and place, interactive. Voice is rough in quality. Patient gravely.   Head and Face - Normocephalic and atraumatic, with no gross asymmetry noted of the contour of the facial features.  The facial nerve is " intact, with strong symmetric movements.  Neck-no palpable lymphadenopathy or thyroid mass.  Trachea is midline.  Eyes - Extraocular movements intact.   Ears- External auditory canals are patent, tympanic membranes are intact without effusion or worrisome retractions   Nose - Nasal mucosa is pink and moist with no abnormal mucus.  The septum was grossly non-obstructive, turbinates of normal size and position.  No polyps, masses, or purulence noted on examination.  Mouth - Examination of the oral cavity shows pink, healthy, moist mucosa.  No lesions or ulceration noted.  The dentition are in good repair.  The tongue is mobile and midline.  Throat - The walls of the oropharynx were smooth, pink, moist, symmetric, and had no lesions or ulcerations.  The tonsillar pillars and soft palate were symmetric.  The uvula was midline on elevation.    Grade 2 symmetric tonsils       ASSESSMENT:    ICD-10-CM    1. S/p MDL with biopsy.  Z98.890    2. Squamous cell carcinoma of glottis (H) C32.0    3. Laryngeal mass J38.7    4. Tobacco abuse counseling Z71.6        He is congratulated on decreasing tobacco. He needs to continue to quit tobacco.   Maintain good oral cares.   Maintain good po intake. He is adding Protein shakes which have helped him at this time.     He is scheduled for Radiation appointment with Dr. Morin. He will complete consult today, staff informed/ showed patient where to go. He has missed several consults recently.     He has consult with Dr. Lazar next week on 12/11/18. He needs to maintain this appointment.     He is going to address further pain management with his PCP. Lortab #15 provided.     Follow up pending consults with Dr. Mcnally  At minimum, generally seen 4-6 weeks post therapy.       He states he is ready to address and manage his health today.         Kailyn Campbell PA-C  ENT  [facility], [place]  [phone]

## 2018-12-04 NOTE — PROGRESS NOTES
"INITIAL PATIENT ASSESSMENT    Chief Complaint   Patient presents with     Radiation Therapy       Initial /78 (BP Location: Left arm, Patient Position: Chair, Cuff Size: Adult Regular)  Pulse 72  Resp 16  Ht 1.702 m (5' 7\")  Wt 64.9 kg (143 lb)  BMI 22.4 kg/m2 Estimated body mass index is 22.4 kg/(m^2) as calculated from the following:    Height as of this encounter: 1.702 m (5' 7\").    Weight as of this encounter: 64.9 kg (143 lb).  Medication Reconciliation: complete    Referring Physician: Nagi Mcnally  Other Physicians: Boubacar    Diagnosis: squamous cell carcinoma of glottis    Prior radiation therapy: None    Prior chemotherapy: None    Prior hormonal therapy:N/A    Pain Eval:  Current history of pain associated with this visit:   Intensity: 7/10  Current: sore  Location: throat  Treatment: hydrocodone    Psychosocial  Marital Status:  single  Spouse/Significant other: n/a   Children: 2   Occupation:  at [employer]    Retired: disability  Living arrangements: home with brother  Do you feel safe at home? Yes  Activity status: independent   referral needs: Not needed    Advanced Directive: No    Patient was assessed for the influenza, pneumo-poly, prevnar 13, Tdap, and shingles immunizations. \"Vaccinations and Cancer Treatment\" flyer given.  Instructed patient to discuss vaccination status with his/her PCM.     Patient was assessed using the NCCN psychosocial distress thermometer. Patient rated the score as a 6-7/10. Patient rated current stressors as \"difficulty eating due to pain and difficulty sleeping due to pain\". Stressors will be brought to the attention of provider or Oncology RN Care Coordinator for a score of 6 or greater or per nurses discretion.     Follow up needed for patient's psychosocial distress screen of 6-7/10 related to difficulty sleeping/eating due to pain, however, patient declines any further work up/ assistance with this and states " he's doing fine and has support.   Pt is here today for a consult for radiation therapy for squamous cell carcinoma of glottis.  Educated patient on the mapping process and the possible side effects of XRT to the throat, to include: fatigue, skin reaction, sore throat, dry mouth, taste changes, hoarseness, and loss of any hair in tx area (ie, beard).  Pt verbalizes an understanding and has no questions at this time.   Leandro Cota RN

## 2018-12-04 NOTE — LETTER
12/4/2018         RE: Jorge A Mendosa  [address]   [address]  [facility] [address]        Dear Colleague,    Thank you for referring your patient, Jorge A Mendosa, to the [facility]. Please see a copy of my visit note below.    Chief Complaint   Patient presents with     Follow Up For     s/p MDL with bx 10/30/18     Jorge A presents for his postoperative exam. He completed MDL with Bx on 10/30/18 with Dr. Mcnally. He followed postoperative instructions  Jorge A has no fevers, chills, nausea or emesis.   He has increase in throat pain since the procedure. He describes neck/ cervical tightness as well. Increase in pain w/ swallowing. He has been drinking more protein shakes at this time.    He has been eating soft foods at this time. Noticed a slight weight loss since his surgery, but has since been stable. His voice remains hoarse, but states he did follow instructions of vocal rest.    He has almost quit tobacco. He does smoke about a few drags at times. No ETOH (quit 10 years ago)      His pathology was SCC.   He has yet to be seen in Radiation or Oncology. We were able to obtain these appointments recently. He is scheduled to See Dr. Morin right after this appointment.   Oncology appointment on 12/11/18.      Jorge A did complete Neck, Chest CT. Reviewed results.   Jorge A has ongoing pain issues since his surgery. I have been providing pain meds since his surgery, and recommended to f/u with PCP and/ or oncology for further refills.   He has no         PROCEDURE PERFORMED: Micro direct Laryngoscopy with biopsies  SURGEON: Taisha Mcnally D.O.  BLOOD LOSS: <5 mL.   COMPLICATIONS: None.   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  It appears to have originated on the left true cord crosses  "anterior commissure and involves the right true vocal cord     FINAL DIAGNOSIS:   A: Left true vocal cord mass, biopsy   - Invasive, moderately differentiated, focally keratinizing squamous cell   carcinoma     B: Left true vocal cord mass, additional tissue, biopsy   - Squamous cell carcinoma in situ   - Dysplastic squamous mucosa     C: Right true vocal cord mass, biopsy   - Moderately differentiated squamous cell carcinoma, focally invasive     I have personally reviewed all specimens and/or slides, including the   listed special stains, and used them   with my medical judgement to determine or confirm the final diagnosis.     Electronically signed out by:     Sascha Aguilar M.D.   Past Medical History:   Diagnosis Date     Chronic pain syndrome 03/07/2011     Lumbago 01/07/2002        Allergies   Allergen Reactions     Amitriptyline Other (See Comments) and Nausea     Dizziness     Celecoxib GI Disturbance     Celebrex     Contrast Dye Swelling     Difficulty swallowing during contrast given for CT neck     Current Outpatient Prescriptions   Medication     Ascorbic Acid (VITAMIN C PO)     HYDROcodone-acetaminophen (NORCO) 5-325 MG per tablet     HYDROcodone-acetaminophen (NORCO) 7.5-325 MG per tablet     ibuprofen (ADVIL) 200 MG capsule     lidocaine, viscous, (XYLOCAINE) 2 % solution     methylPREDNISolone (MEDROL) 16 MG tablet     Naproxen Sodium (ALEVE PO)     omeprazole (PRILOSEC) 40 MG capsule     VITAMIN A PO     VITAMIN D, CHOLECALCIFEROL, PO     VITAMIN E COMPLEX PO     No current facility-administered medications for this visit.       ROS: 10 point ROS neg other than the symptoms noted above in the HPI.  /70 (BP Location: Left arm, Patient Position: Sitting, Cuff Size: Adult Regular)  Pulse 85  Temp 98.7  F (37.1  C) (Tympanic)  Ht 1.702 m (5' 7\")  Wt 64.3 kg (141 lb 12.8 oz)  SpO2 98%  BMI 22.21 kg/m2  General - The patient is well nourished and well developed, and appears to have good " nutritional status.  Alert and oriented to person and place, interactive. Voice is rough in quality. Patient gravely.   Head and Face - Normocephalic and atraumatic, with no gross asymmetry noted of the contour of the facial features.  The facial nerve is intact, with strong symmetric movements.  Neck-no palpable lymphadenopathy or thyroid mass.  Trachea is midline.  Eyes - Extraocular movements intact.   Ears- External auditory canals are patent, tympanic membranes are intact without effusion or worrisome retractions   Nose - Nasal mucosa is pink and moist with no abnormal mucus.  The septum was grossly non-obstructive, turbinates of normal size and position.  No polyps, masses, or purulence noted on examination.  Mouth - Examination of the oral cavity shows pink, healthy, moist mucosa.  No lesions or ulceration noted.  The dentition are in good repair.  The tongue is mobile and midline.  Throat - The walls of the oropharynx were smooth, pink, moist, symmetric, and had no lesions or ulcerations.  The tonsillar pillars and soft palate were symmetric.  The uvula was midline on elevation.    Grade 2 symmetric tonsils       ASSESSMENT:    ICD-10-CM    1. S/p MDL with biopsy.  Z98.890    2. Squamous cell carcinoma of glottis (H) C32.0    3. Laryngeal mass J38.7    4. Tobacco abuse counseling Z71.6        He is congratulated on decreasing tobacco. He needs to continue to quit tobacco.   Maintain good oral cares.   Maintain good po intake. He is adding Protein shakes which have helped him at this time.     He is scheduled for Radiation appointment with Dr. Morin. He will complete consult today, staff informed/ showed patient where to go. He has missed several consults recently.     He has consult with Dr. Lazar next week on 12/11/18. He needs to maintain this appointment.     He is going to address further pain management with his PCP. Lortab #15 provided.     Follow up pending consults with Dr. Mcnally  At  minimum, generally seen 4-6 weeks post therapy.       He states he is ready to address and manage his health today.         Kailyn Campbell PA-C  [facility], [place]  [phone]      Again, thank you for allowing me to participate in the care of your patient.        Sincerely,        Kailyn Campbell PA-C
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
## 2018-12-04 NOTE — MR AVS SNAPSHOT
After Visit Summary   12/4/2018    Jorge A Mendosa    MRN: [id number]           Patient Information     Date Of Birth          1955        Visit Information        Provider Department      12/4/2018 10:00 AM Sher Morin MD [facility]        Today's Diagnoses     Squamous cell carcinoma of glottis (H)    -  1    Laryngeal cancer (H)           Follow-ups after your visit        Additional Services     NUTRITION REFERRAL       Your provider has referred you to: [facility] [phone]   [email or web address]    Please be aware that coverage of these services is subject to the terms and limitations of your health insurance plan.  Call member services at your health plan with any benefit or coverage questions.      Please bring the following with you to your appointment:    (1) This referral request  (2) Any documents given to you regarding this referral  (3) Any specific questions you have about diet and/or food choices                  Your next 10 appointments already scheduled     Dec 10, 2018  1:00 PM CST   Simulation with [facility]   [facility] ([facility] )    [address] [place] [id number] [phone]            Dec 11, 2018  2:00 PM CST   (Arrive by 1:45 PM)   New Visit with Crystal Lazar MD   [facility] ([facility] )    [address] [place] [address] [phone]              Who to contact     If you have questions or need follow up information about today's clinic visit or your schedule please contact [facility] directly at [phone].  Normal or non-critical lab and imaging results will be communicated to you by MyChart, letter or phone within 4 business days after the clinic has received the results. If you do not hear from us within 7 days, please contact the clinic through MyChart or phone. If you have a critical or  "abnormal lab result, we will notify you by phone as soon as possible.  Submit refill requests through [employer] or call your pharmacy and they will forward the refill request to us. Please allow 3 business days for your refill to be completed.          Additional Information About Your Visit        [facility] Information     [employer] gives you secure access to your electronic health record. If you see a primary care provider, you can also send messages to your care team and make appointments. If you have questions, please call your primary care clinic.  If you do not have a primary care provider, please call [phone] and they will assist you.        Care EveryWhere ID     This is your Care EveryWhere ID. This could be used by other organizations to access your [place] medical records  [id number]        Your Vitals Were     Pulse Respirations Height BMI (Body Mass Index)          72 16 1.702 m (5' 7\") 22.4 kg/m2         Blood Pressure from Last 3 Encounters:   12/04/18 116/78   12/04/18 122/70   11/16/18 132/74    Weight from Last 3 Encounters:   12/04/18 64.9 kg (143 lb)   12/04/18 64.3 kg (141 lb 12.8 oz)   11/16/18 64.4 kg (142 lb)              We Performed the Following     NUTRITION REFERRAL          Today's Medication Changes          These changes are accurate as of 12/4/18  1:06 PM.  If you have any questions, ask your nurse or doctor.               These medicines have changed or have updated prescriptions.        Dose/Directions    HYDROcodone-acetaminophen 7.5-325 MG per tablet   Commonly known as:  NORCO   This may have changed:  Another medication with the same name was removed. Continue taking this medication, and follow the directions you see here.   Used for:  Laryngeal mass, Squamous cell carcinoma of glottis (H), Post-operative state   Changed by:  Kailyn Campbell PA-C        Dose:  1 tablet   Take 1 tablet by mouth every 6 hours as needed for severe pain   Quantity:  15 tablet   Refills:  0          "   Where to get your medicines      These medications were sent to [id number] - [place], [place] - [address]  [address] , [place] [address]     Phone:  [phone]     lidocaine VISCOUS 2 % solution         Some of these will need a paper prescription and others can be bought over the counter.  Ask your nurse if you have questions.     Bring a paper prescription for each of these medications     HYDROcodone-acetaminophen 7.5-325 MG per tablet                Primary Care Provider Office Phone # Fax #    Mukund Hamlin [phone]       [address] [place] [id number]        Equal Access to Services     [facility]: Hadii aad ku hadasho Soomaali, waaxda luqadaha, qaybta kaalmada adeegyada, vdea velasco . So [facility] [phone].    ATENCIÓN: Si habla español, tiene a escalante disposición servicios gratuitos de asistencia lingüística. [facility] [phone].    We comply with applicable federal civil rights laws and [place] laws. We do not discriminate on the basis of race, color, national origin, age, disability, sex, sexual orientation, or gender identity.            Thank you!     Thank you for choosing [facility]  for your care. Our goal is always to provide you with excellent care. Hearing back from our patients is one way we can continue to improve our services. Please take a few minutes to complete the written survey that you may receive in the mail after your visit with us. Thank you!             Your Updated Medication List - Protect others around you: Learn how to safely use, store and throw away your medicines at www.disposemymeds.org.          This list is accurate as of 12/4/18  1:06 PM.  Always use your most recent med list.                   Brand Name Dispense Instructions for use Diagnosis    ADVIL 200 MG capsule   Generic drug:  ibuprofen      Take 200 mg by mouth every 4 hours as needed.        ALEVE PO      Take 1 tablet by  mouth. As directed when needed        HYDROcodone-acetaminophen 7.5-325 MG per tablet    NORCO    15 tablet    Take 1 tablet by mouth every 6 hours as needed for severe pain    Laryngeal mass, Squamous cell carcinoma of glottis (H), Post-operative state       lidocaine VISCOUS 2 % solution    XYLOCAINE    100 mL    Use adult dose:  15 ml to swallow every 3 hours prn pain    Post-operative state       MULTIVITAMIN ADULT PO           omeprazole 40 MG DR capsule    priLOSEC    90 capsule    Take 1 capsule (40 mg) by mouth daily Take 30-60 minutes before a meal.    Abdominal pain, epigastric       VITAMIN C PO      1 daily

## 2018-12-04 NOTE — PATIENT INSTRUCTIONS
Maintain your next appointments with Dr. Morin and Dr. Lazar.  Maintain good oral hydration. Protein shakes.   Quit tobacco. Congrats on less use.   Refilled Lortab. Return to PCP for pain management.    Thank you for allowing [facility]  and our ENT team to participate in your care.  If your medications are too expensive, please give the nurse a call.  We can possibly change this medication.  If you have a scheduling or an appointment question please contact our Health Unit Coordinator at their direct line [phone].   ALL nursing questions or concerns can be directed to your ENT nurse at: [phone] [place]

## 2018-12-04 NOTE — NURSING NOTE
"Chief Complaint   Patient presents with     Follow Up For     s/p MDL with bx 10/30/18       Initial /70 (BP Location: Left arm, Patient Position: Sitting, Cuff Size: Adult Regular)  Pulse 85  Temp 98.7  F (37.1  C) (Tympanic)  Ht 1.702 m (5' 7\")  Wt 64.3 kg (141 lb 12.8 oz)  SpO2 98%  BMI 22.21 kg/m2 Estimated body mass index is 22.21 kg/(m^2) as calculated from the following:    Height as of this encounter: 1.702 m (5' 7\").    Weight as of this encounter: 64.3 kg (141 lb 12.8 oz).  Medication Reconciliation: complete    Uzma Clemens LPN  "

## 2018-12-04 NOTE — PROGRESS NOTES
"[facility]  Nutrition Assessment    Jorge A Mendosa    1955   Dec 4, 2018    Diagnosis: squamous cell carcinoma of glottis    Height: 5' 7\" Weight: 143 lbs 0 oz    Usual Weight: 141 Weight Change: +2      Past Medical History:   Diagnosis Date     Chronic pain syndrome 03/07/2011     Lumbago 01/07/2002       1. Receiving Chemotherapy? No - 0 points  2. Weight Loss per Month (in pounds) Based on Usual Weight: n/a    100# 100-120# 120-150# 150-200# >200# Pt. System   > 5 5 - 6 6 - 8 7 - 10 > 10 1 Point   > 7 7- 9 9 - 11 11 - 14 > 15 2 Points   > 10 10 - 12 12 - 15 15 - 20 > 20 3 Points       3. Eating Problems: ( 1 Point for Each Problem)       Loss of Appetite     No - 0 points    Difficulty Swallowing    Yes  - 1 point   Nausea    No - 0 points    Early Satiety    No - 0 points   Vomiting    No - 0 points    Taste/Smell Aversions  No - 0 points    Diarrhea    No - 0 points    Esophageal Reflux   No - 0 points   Mouth Soreness/Difficulty Chewing  No - 0 points          Total: 1    4.  Automatic Referral for the Following Diagnosis or Situations: Head & Neck Cancer - Dietician Consult ordered     Comments: n/a    Total Score: 1 (Scores >/= 4 will receive a Dietician Consult)        Leandro Cota RN    "

## 2018-12-10 ENCOUNTER — ALLIED HEALTH/NURSE VISIT (OUTPATIENT)
Dept: RADIATION ONCOLOGY | Facility: HOSPITAL | Age: 63
End: 2018-12-10
Attending: RADIOLOGY
Payer: MEDICARE

## 2018-12-10 DIAGNOSIS — J38.7 LARYNGEAL MASS: ICD-10-CM

## 2018-12-10 DIAGNOSIS — C32.0 SQUAMOUS CELL CARCINOMA OF GLOTTIS (H): Primary | ICD-10-CM

## 2018-12-10 DIAGNOSIS — Z98.890 POST-OPERATIVE STATE: ICD-10-CM

## 2018-12-10 PROCEDURE — 77334 RADIATION TREATMENT AID(S): CPT | Performed by: RADIOLOGY

## 2018-12-10 PROCEDURE — 77290 THER RAD SIMULAJ FIELD CPLX: CPT | Performed by: RADIOLOGY

## 2018-12-10 RX ORDER — HYDROCODONE BITARTRATE AND ACETAMINOPHEN 7.5; 325 MG/1; MG/1
1 TABLET ORAL EVERY 4 HOURS PRN
Qty: 30 TABLET | Refills: 0 | Status: SHIPPED | OUTPATIENT
Start: 2018-12-10 | End: 2020-02-11

## 2018-12-10 NOTE — PROGRESS NOTES
Service Date: 2018      RADIATION THERAPY SIMULATION      The patient was brought into the simulation suite and placed in a modified supine position.  Careful alignment was accomplished using orthogonal lasers.  Positioning was aided with the use of an Aquaplast mask.  Imaging was acquired through the larynx and isocenter placed to facilitate target coverage and treatment planning for definitive management of early stage glottic larynx carcinoma.            DAIVD FLOWERS MD             D: 12/10/2018   T: 12/10/2018   MT: ANATOLIY      Name:     LAURA YOUNG   MRN:      [id number]        Account:      [id number]   :      1955           Service Date: 2018      Document: [id number]

## 2018-12-12 PROCEDURE — 77334 RADIATION TREATMENT AID(S): CPT | Performed by: RADIOLOGY

## 2018-12-12 PROCEDURE — 77307 TELETHX ISODOSE PLAN CPLX: CPT | Performed by: RADIOLOGY

## 2018-12-12 NOTE — PROGRESS NOTES
Service Date: 12/10/2018      RADIATION THERAPY SIMULATION      The patient was brought into the simulation suite and placed in a modified supine position.  Careful alignment was accomplished using orthogonal lasers.  Positioning was aided with the use of an Aquaplast mask.  Imaging was acquired through the larynx and isocenter placed to facilitate target coverage and treatment planning for definitive management of early stage glottic larynx carcinoma.                  DAVID FLOWERS MD             D: 12/10/2018   T: 12/10/2018   MT:       Name:     LAURA YOUNG   MRN:      [id number]        Account:      [id number]   :      1955           Service Date: 12/10/2018      Document: [id number]

## 2018-12-17 ENCOUNTER — RESULTS ONLY (OUTPATIENT)
Dept: RADIATION ONCOLOGY | Facility: HOSPITAL | Age: 63
End: 2018-12-17

## 2018-12-17 ENCOUNTER — ALLIED HEALTH/NURSE VISIT (OUTPATIENT)
Dept: RADIATION ONCOLOGY | Facility: HOSPITAL | Age: 63
End: 2018-12-17
Attending: RADIOLOGY
Payer: MEDICARE

## 2018-12-17 DIAGNOSIS — R52 PAIN: Primary | ICD-10-CM

## 2018-12-17 DIAGNOSIS — C32.0 SQUAMOUS CELL CARCINOMA OF GLOTTIS (H): ICD-10-CM

## 2018-12-17 LAB
RAD ONC ARIA COURSE ID: NORMAL
RAD ONC ARIA COURSE LAST TREATMENT DATE: NORMAL
RAD ONC ARIA COURSE START DATE: NORMAL
RAD ONC ARIA COURSE TREATMENT ELAPSED DAYS: 0
RAD ONC ARIA FIRST TREATMENT DATE: NORMAL
RAD ONC ARIA PLAN FRACTIONS TREATED TO DATE: 1
RAD ONC ARIA PLAN ID: NORMAL
RAD ONC ARIA PLAN NAME: NORMAL
RAD ONC ARIA PLAN PRESCRIBED DOSE PER FRACTION: 2.12 GY
RAD ONC ARIA PLAN TOTAL FRACTIONS PRESCRIBED: 33
RAD ONC ARIA PLAN TOTAL PRESCRIBED DOSE: 6996 CGY
RAD ONC ARIA REFERENCE POINT DOSAGE GIVEN TO DATE: NORMAL GY
RAD ONC ARIA REFERENCE POINT ID: NORMAL

## 2018-12-17 PROCEDURE — 77387 GUIDANCE FOR RADJ TX DLVR: CPT | Performed by: RADIOLOGY

## 2018-12-17 PROCEDURE — 77412 RADIATION TX DELIVERY LVL 3: CPT | Performed by: RADIOLOGY

## 2018-12-17 PROCEDURE — 77280 THER RAD SIMULAJ FIELD SMPL: CPT | Performed by: RADIOLOGY

## 2018-12-17 RX ORDER — OXYCODONE HYDROCHLORIDE 10 MG/1
10 TABLET ORAL EVERY 4 HOURS PRN
Qty: 60 TABLET | Refills: 0 | Status: SHIPPED | OUTPATIENT
Start: 2018-12-17 | End: 2018-12-20

## 2018-12-17 NOTE — PROGRESS NOTES
Jorge A Mendosa received radiation therapy treatment today 12/17/18.    Lola Uribe  December 17, 2018  8:46 AM

## 2018-12-18 ENCOUNTER — RESULTS ONLY (OUTPATIENT)
Dept: RADIATION ONCOLOGY | Facility: HOSPITAL | Age: 63
End: 2018-12-18

## 2018-12-18 ENCOUNTER — ALLIED HEALTH/NURSE VISIT (OUTPATIENT)
Dept: RADIATION ONCOLOGY | Facility: HOSPITAL | Age: 63
End: 2018-12-18

## 2018-12-18 DIAGNOSIS — C32.0 SQUAMOUS CELL CARCINOMA OF GLOTTIS (H): Primary | ICD-10-CM

## 2018-12-18 LAB
RAD ONC ARIA COURSE ID: NORMAL
RAD ONC ARIA COURSE LAST TREATMENT DATE: NORMAL
RAD ONC ARIA COURSE START DATE: NORMAL
RAD ONC ARIA COURSE TREATMENT ELAPSED DAYS: 1
RAD ONC ARIA FIRST TREATMENT DATE: NORMAL
RAD ONC ARIA PLAN FRACTIONS TREATED TO DATE: 2
RAD ONC ARIA PLAN ID: NORMAL
RAD ONC ARIA PLAN NAME: NORMAL
RAD ONC ARIA PLAN PRESCRIBED DOSE PER FRACTION: 2.12 GY
RAD ONC ARIA PLAN TOTAL FRACTIONS PRESCRIBED: 33
RAD ONC ARIA PLAN TOTAL PRESCRIBED DOSE: 6996 CGY
RAD ONC ARIA REFERENCE POINT DOSAGE GIVEN TO DATE: NORMAL GY
RAD ONC ARIA REFERENCE POINT ID: NORMAL

## 2018-12-18 PROCEDURE — 77412 RADIATION TX DELIVERY LVL 3: CPT | Performed by: RADIOLOGY

## 2018-12-18 PROCEDURE — 77387 GUIDANCE FOR RADJ TX DLVR: CPT | Performed by: RADIOLOGY

## 2018-12-18 NOTE — PROCEDURES
Jorge A Mendosa received radiation therapy treatment today 12/18/18.    Florecita Skelton  December 18, 2018  8:49 AM

## 2018-12-19 ENCOUNTER — RESULTS ONLY (OUTPATIENT)
Dept: RADIATION ONCOLOGY | Facility: HOSPITAL | Age: 63
End: 2018-12-19

## 2018-12-19 ENCOUNTER — ALLIED HEALTH/NURSE VISIT (OUTPATIENT)
Dept: RADIATION ONCOLOGY | Facility: HOSPITAL | Age: 63
End: 2018-12-19

## 2018-12-19 ENCOUNTER — OFFICE VISIT (OUTPATIENT)
Dept: RADIATION ONCOLOGY | Facility: HOSPITAL | Age: 63
End: 2018-12-19
Attending: RADIOLOGY
Payer: MEDICARE

## 2018-12-19 VITALS
SYSTOLIC BLOOD PRESSURE: 120 MMHG | DIASTOLIC BLOOD PRESSURE: 76 MMHG | HEART RATE: 64 BPM | BODY MASS INDEX: 23.02 KG/M2 | RESPIRATION RATE: 16 BRPM | WEIGHT: 147 LBS

## 2018-12-19 DIAGNOSIS — C32.0 SQUAMOUS CELL CARCINOMA OF GLOTTIS (H): Primary | ICD-10-CM

## 2018-12-19 LAB
RAD ONC ARIA COURSE ID: NORMAL
RAD ONC ARIA COURSE LAST TREATMENT DATE: NORMAL
RAD ONC ARIA COURSE START DATE: NORMAL
RAD ONC ARIA COURSE TREATMENT ELAPSED DAYS: 2
RAD ONC ARIA FIRST TREATMENT DATE: NORMAL
RAD ONC ARIA PLAN FRACTIONS TREATED TO DATE: 3
RAD ONC ARIA PLAN ID: NORMAL
RAD ONC ARIA PLAN NAME: NORMAL
RAD ONC ARIA PLAN PRESCRIBED DOSE PER FRACTION: 2.12 GY
RAD ONC ARIA PLAN TOTAL FRACTIONS PRESCRIBED: 33
RAD ONC ARIA PLAN TOTAL PRESCRIBED DOSE: 6996 CGY
RAD ONC ARIA REFERENCE POINT DOSAGE GIVEN TO DATE: NORMAL GY
RAD ONC ARIA REFERENCE POINT ID: NORMAL

## 2018-12-19 PROCEDURE — 77387 GUIDANCE FOR RADJ TX DLVR: CPT | Performed by: RADIOLOGY

## 2018-12-19 PROCEDURE — 77412 RADIATION TX DELIVERY LVL 3: CPT | Performed by: RADIOLOGY

## 2018-12-19 ASSESSMENT — PAIN SCALES - GENERAL: PAINLEVEL: MODERATE PAIN (5)

## 2018-12-19 NOTE — PROGRESS NOTES
Jorge A Mendosa received radiation therapy treatment today 12/19/18.    Lola Uribe  December 19, 2018  8:44 AM

## 2018-12-19 NOTE — PROGRESS NOTES
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      RADIATION TREATMENT:  The patient has received 636 cGy for treatment of the above-described vocal cord carcinoma.      SUBJECTIVE:  He continues to have significant odynophagia which predates his treatment.  He is using some narcotic pain medication for this with good results.      OBJECTIVE:  Weight, 147.0 pounds.  No exam findings related to his radiation.      IMPRESSION:  Routine tolerance to radiation therapy early in the course of treatment for glottic larynx carcinoma.      PLAN:  Continue treatment as planned.            DAVID FLOWERS MD             D: 2018   T: 2018   MT: KIRTI      Name:     LAURA YOUNG   MRN:      [id number]        Account:      [id number]   :      1955           Service Date: 12/10/2018      Document: [id number]       cc: Mukund Mcnally DO

## 2018-12-20 ENCOUNTER — RESULTS ONLY (OUTPATIENT)
Dept: RADIATION ONCOLOGY | Facility: HOSPITAL | Age: 63
End: 2018-12-20

## 2018-12-20 ENCOUNTER — ALLIED HEALTH/NURSE VISIT (OUTPATIENT)
Dept: RADIATION ONCOLOGY | Facility: HOSPITAL | Age: 63
End: 2018-12-20

## 2018-12-20 DIAGNOSIS — C32.0 SQUAMOUS CELL CARCINOMA OF GLOTTIS (H): Primary | ICD-10-CM

## 2018-12-20 LAB
RAD ONC ARIA COURSE ID: NORMAL
RAD ONC ARIA COURSE LAST TREATMENT DATE: NORMAL
RAD ONC ARIA COURSE START DATE: NORMAL
RAD ONC ARIA COURSE TREATMENT ELAPSED DAYS: 3
RAD ONC ARIA FIRST TREATMENT DATE: NORMAL
RAD ONC ARIA PLAN FRACTIONS TREATED TO DATE: 4
RAD ONC ARIA PLAN ID: NORMAL
RAD ONC ARIA PLAN NAME: NORMAL
RAD ONC ARIA PLAN PRESCRIBED DOSE PER FRACTION: 2.12 GY
RAD ONC ARIA PLAN TOTAL FRACTIONS PRESCRIBED: 33
RAD ONC ARIA PLAN TOTAL PRESCRIBED DOSE: 6996 CGY
RAD ONC ARIA REFERENCE POINT DOSAGE GIVEN TO DATE: NORMAL GY
RAD ONC ARIA REFERENCE POINT ID: NORMAL

## 2018-12-20 PROCEDURE — 77412 RADIATION TX DELIVERY LVL 3: CPT | Performed by: RADIOLOGY

## 2018-12-20 PROCEDURE — 77387 GUIDANCE FOR RADJ TX DLVR: CPT | Performed by: RADIOLOGY

## 2018-12-20 NOTE — PROGRESS NOTES
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      RADIATION TREATMENT:  The patient has received 636 cGy for treatment of the above-described vocal cord carcinoma.      SUBJECTIVE:  He continues to have significant odynophagia which predates his treatment.  He is using some narcotic pain medication for this with good results.      OBJECTIVE:  Weight, 147.0 pounds.  No exam findings related to his radiation.      IMPRESSION:  Routine tolerance to radiation therapy early in the course of treatment for glottic larynx carcinoma.      PLAN:  Continue treatment as planned.            DAVID FLOWERS MD             D: 2018   T: 2018   MT: KIRTI      Name:     LAURA YOUNG   MRN:      [id number]        Account:      [id number]   :      1955           Service Date: 2018      Document: [id number]       cc: Mukund Mcnally DO

## 2018-12-20 NOTE — PROGRESS NOTES
Jorge A Mendosa received radiation therapy treatment today 12/20/18.    Lola Uribe  December 20, 2018  8:33 AM

## 2018-12-21 ENCOUNTER — ALLIED HEALTH/NURSE VISIT (OUTPATIENT)
Dept: RADIATION ONCOLOGY | Facility: HOSPITAL | Age: 63
End: 2018-12-21

## 2018-12-21 ENCOUNTER — RESULTS ONLY (OUTPATIENT)
Dept: RADIATION ONCOLOGY | Facility: HOSPITAL | Age: 63
End: 2018-12-21

## 2018-12-21 DIAGNOSIS — C32.0 SQUAMOUS CELL CARCINOMA OF GLOTTIS (H): Primary | ICD-10-CM

## 2018-12-21 LAB
RAD ONC ARIA COURSE ID: NORMAL
RAD ONC ARIA COURSE LAST TREATMENT DATE: NORMAL
RAD ONC ARIA COURSE START DATE: NORMAL
RAD ONC ARIA COURSE TREATMENT ELAPSED DAYS: 4
RAD ONC ARIA FIRST TREATMENT DATE: NORMAL
RAD ONC ARIA PLAN FRACTIONS TREATED TO DATE: 5
RAD ONC ARIA PLAN ID: NORMAL
RAD ONC ARIA PLAN NAME: NORMAL
RAD ONC ARIA PLAN PRESCRIBED DOSE PER FRACTION: 2.12 GY
RAD ONC ARIA PLAN TOTAL FRACTIONS PRESCRIBED: 33
RAD ONC ARIA PLAN TOTAL PRESCRIBED DOSE: 6996 CGY
RAD ONC ARIA REFERENCE POINT DOSAGE GIVEN TO DATE: NORMAL GY
RAD ONC ARIA REFERENCE POINT ID: NORMAL

## 2018-12-21 PROCEDURE — 77336 RADIATION PHYSICS CONSULT: CPT | Performed by: RADIOLOGY

## 2018-12-21 PROCEDURE — 77387 GUIDANCE FOR RADJ TX DLVR: CPT | Performed by: RADIOLOGY

## 2018-12-21 PROCEDURE — 77412 RADIATION TX DELIVERY LVL 3: CPT | Performed by: RADIOLOGY

## 2018-12-21 NOTE — PROCEDURES
Jorge A Mendosa received radiation therapy treatment today 12/21/18.    Florecita Skelton  December 21, 2018  8:59 AM

## 2018-12-24 ENCOUNTER — RESULTS ONLY (OUTPATIENT)
Dept: RADIATION ONCOLOGY | Facility: HOSPITAL | Age: 63
End: 2018-12-24

## 2018-12-24 ENCOUNTER — ALLIED HEALTH/NURSE VISIT (OUTPATIENT)
Dept: RADIATION ONCOLOGY | Facility: HOSPITAL | Age: 63
End: 2018-12-24

## 2018-12-24 DIAGNOSIS — C32.0 SQUAMOUS CELL CARCINOMA OF GLOTTIS (H): Primary | ICD-10-CM

## 2018-12-24 LAB
RAD ONC ARIA COURSE ID: NORMAL
RAD ONC ARIA COURSE LAST TREATMENT DATE: NORMAL
RAD ONC ARIA COURSE START DATE: NORMAL
RAD ONC ARIA COURSE TREATMENT ELAPSED DAYS: 7
RAD ONC ARIA FIRST TREATMENT DATE: NORMAL
RAD ONC ARIA PLAN FRACTIONS TREATED TO DATE: 6
RAD ONC ARIA PLAN ID: NORMAL
RAD ONC ARIA PLAN NAME: NORMAL
RAD ONC ARIA PLAN PRESCRIBED DOSE PER FRACTION: 2.12 GY
RAD ONC ARIA PLAN TOTAL FRACTIONS PRESCRIBED: 33
RAD ONC ARIA PLAN TOTAL PRESCRIBED DOSE: 6996 CGY
RAD ONC ARIA REFERENCE POINT DOSAGE GIVEN TO DATE: NORMAL GY
RAD ONC ARIA REFERENCE POINT ID: NORMAL

## 2018-12-24 PROCEDURE — 77412 RADIATION TX DELIVERY LVL 3: CPT | Performed by: RADIOLOGY

## 2018-12-24 PROCEDURE — 77387 GUIDANCE FOR RADJ TX DLVR: CPT | Performed by: RADIOLOGY

## 2018-12-26 ENCOUNTER — ALLIED HEALTH/NURSE VISIT (OUTPATIENT)
Dept: RADIATION ONCOLOGY | Facility: HOSPITAL | Age: 63
End: 2018-12-26

## 2018-12-26 ENCOUNTER — RESULTS ONLY (OUTPATIENT)
Dept: RADIATION ONCOLOGY | Facility: HOSPITAL | Age: 63
End: 2018-12-26

## 2018-12-26 DIAGNOSIS — C32.9 LARYNGEAL CANCER (H): Primary | ICD-10-CM

## 2018-12-26 LAB
RAD ONC ARIA COURSE ID: NORMAL
RAD ONC ARIA COURSE LAST TREATMENT DATE: NORMAL
RAD ONC ARIA COURSE START DATE: NORMAL
RAD ONC ARIA COURSE TREATMENT ELAPSED DAYS: 9
RAD ONC ARIA FIRST TREATMENT DATE: NORMAL
RAD ONC ARIA PLAN FRACTIONS TREATED TO DATE: 7
RAD ONC ARIA PLAN ID: NORMAL
RAD ONC ARIA PLAN NAME: NORMAL
RAD ONC ARIA PLAN PRESCRIBED DOSE PER FRACTION: 2.12 GY
RAD ONC ARIA PLAN TOTAL FRACTIONS PRESCRIBED: 33
RAD ONC ARIA PLAN TOTAL PRESCRIBED DOSE: 6996 CGY
RAD ONC ARIA REFERENCE POINT DOSAGE GIVEN TO DATE: NORMAL GY
RAD ONC ARIA REFERENCE POINT ID: NORMAL

## 2018-12-26 PROCEDURE — 77412 RADIATION TX DELIVERY LVL 3: CPT | Performed by: RADIOLOGY

## 2018-12-26 PROCEDURE — 77387 GUIDANCE FOR RADJ TX DLVR: CPT | Performed by: RADIOLOGY

## 2018-12-26 NOTE — PROGRESS NOTES
Jorge A Mendosa received radiation therapy treatment today 12/26/18.    Jonathan Francis  December 26, 2018  1:35 PM

## 2018-12-27 ENCOUNTER — OFFICE VISIT (OUTPATIENT)
Dept: RADIATION ONCOLOGY | Facility: HOSPITAL | Age: 63
End: 2018-12-27
Attending: RADIOLOGY
Payer: MEDICARE

## 2018-12-27 ENCOUNTER — RESULTS ONLY (OUTPATIENT)
Dept: RADIATION ONCOLOGY | Facility: HOSPITAL | Age: 63
End: 2018-12-27

## 2018-12-27 ENCOUNTER — ALLIED HEALTH/NURSE VISIT (OUTPATIENT)
Dept: RADIATION ONCOLOGY | Facility: HOSPITAL | Age: 63
End: 2018-12-27

## 2018-12-27 VITALS
WEIGHT: 150 LBS | RESPIRATION RATE: 16 BRPM | DIASTOLIC BLOOD PRESSURE: 80 MMHG | SYSTOLIC BLOOD PRESSURE: 124 MMHG | BODY MASS INDEX: 23.49 KG/M2 | HEART RATE: 76 BPM

## 2018-12-27 DIAGNOSIS — R52 PAIN: Primary | ICD-10-CM

## 2018-12-27 DIAGNOSIS — C32.9 LARYNGEAL CANCER (H): ICD-10-CM

## 2018-12-27 DIAGNOSIS — C32.9 LARYNGEAL CANCER (H): Primary | ICD-10-CM

## 2018-12-27 LAB
RAD ONC ARIA COURSE ID: NORMAL
RAD ONC ARIA COURSE LAST TREATMENT DATE: NORMAL
RAD ONC ARIA COURSE START DATE: NORMAL
RAD ONC ARIA COURSE TREATMENT ELAPSED DAYS: 10
RAD ONC ARIA FIRST TREATMENT DATE: NORMAL
RAD ONC ARIA PLAN FRACTIONS TREATED TO DATE: 8
RAD ONC ARIA PLAN ID: NORMAL
RAD ONC ARIA PLAN NAME: NORMAL
RAD ONC ARIA PLAN PRESCRIBED DOSE PER FRACTION: 2.12 GY
RAD ONC ARIA PLAN TOTAL FRACTIONS PRESCRIBED: 33
RAD ONC ARIA PLAN TOTAL PRESCRIBED DOSE: 6996 CGY
RAD ONC ARIA REFERENCE POINT DOSAGE GIVEN TO DATE: NORMAL GY
RAD ONC ARIA REFERENCE POINT ID: NORMAL

## 2018-12-27 PROCEDURE — 77412 RADIATION TX DELIVERY LVL 3: CPT | Performed by: RADIOLOGY

## 2018-12-27 PROCEDURE — 77387 GUIDANCE FOR RADJ TX DLVR: CPT | Performed by: RADIOLOGY

## 2018-12-27 RX ORDER — OXYCODONE HYDROCHLORIDE 10 MG/1
10 TABLET ORAL EVERY 4 HOURS PRN
COMMUNITY
End: 2018-12-27

## 2018-12-27 RX ORDER — OXYCODONE HYDROCHLORIDE 10 MG/1
10 TABLET ORAL EVERY 4 HOURS PRN
Qty: 60 TABLET | Refills: 0 | Status: SHIPPED | OUTPATIENT
Start: 2018-12-27 | End: 2020-02-11

## 2018-12-27 ASSESSMENT — PAIN SCALES - GENERAL: PAINLEVEL: EXTREME PAIN (8)

## 2018-12-27 NOTE — PROGRESS NOTES
Jorge A Mendosa received radiation therapy treatment today 12/27/18.    Mukund Soliman  December 27, 2018  1:29 PM

## 2018-12-28 ENCOUNTER — ALLIED HEALTH/NURSE VISIT (OUTPATIENT)
Dept: RADIATION ONCOLOGY | Facility: HOSPITAL | Age: 63
End: 2018-12-28

## 2018-12-28 ENCOUNTER — RESULTS ONLY (OUTPATIENT)
Dept: RADIATION ONCOLOGY | Facility: HOSPITAL | Age: 63
End: 2018-12-28

## 2018-12-28 DIAGNOSIS — C32.9 LARYNGEAL CANCER (H): Primary | ICD-10-CM

## 2018-12-28 LAB
RAD ONC ARIA COURSE ID: NORMAL
RAD ONC ARIA COURSE LAST TREATMENT DATE: NORMAL
RAD ONC ARIA COURSE START DATE: NORMAL
RAD ONC ARIA COURSE TREATMENT ELAPSED DAYS: 11
RAD ONC ARIA FIRST TREATMENT DATE: NORMAL
RAD ONC ARIA PLAN FRACTIONS TREATED TO DATE: 9
RAD ONC ARIA PLAN ID: NORMAL
RAD ONC ARIA PLAN NAME: NORMAL
RAD ONC ARIA PLAN PRESCRIBED DOSE PER FRACTION: 2.12 GY
RAD ONC ARIA PLAN TOTAL FRACTIONS PRESCRIBED: 33
RAD ONC ARIA PLAN TOTAL PRESCRIBED DOSE: 6996 CGY
RAD ONC ARIA REFERENCE POINT DOSAGE GIVEN TO DATE: NORMAL GY
RAD ONC ARIA REFERENCE POINT ID: NORMAL

## 2018-12-28 PROCEDURE — 77387 GUIDANCE FOR RADJ TX DLVR: CPT | Performed by: RADIOLOGY

## 2018-12-28 PROCEDURE — 77412 RADIATION TX DELIVERY LVL 3: CPT | Performed by: RADIOLOGY

## 2018-12-28 NOTE — PROCEDURES
Jorge A Mendosa received radiation therapy treatment today 12/28/18.    Florecita Skelton  December 28, 2018  1:26 PM

## 2018-12-31 NOTE — PROGRESS NOTES
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      RADIATION RX:  The patient has received 1696 cGy to the glottic larynx for treatment of the above-described vocal cord carcinoma.      SUBJECTIVE:  Continues to have significant odynophagia, predating his treatment.  Getting good results with narcotic pain medication 10 mg oxycodone q.4 hours p.r.n.      OBJECTIVE:  Weight is 150.4 pounds.  No visible findings from his radiation therapy.  Oral cavity and oropharynx appear just fine.  No evidence of thrush.      IMPRESSION:     1.  Routine tolerance to radiation therapy early in the course of treatment for glottic larynx carcinoma.     2.  Significant odynophagia of unknown etiology.      PLAN:  Continue treatment as planned.         DAVID FLOWERS MD             D: 2018   T: 2018   MT:       Name:     LAURA YOUNG   MRN:      [id number]        Account:      [id number]   :      1955           Service Date: 2018      Document: [id number]

## 2019-01-02 ENCOUNTER — RESULTS ONLY (OUTPATIENT)
Dept: RADIATION ONCOLOGY | Facility: HOSPITAL | Age: 64
End: 2019-01-02

## 2019-01-02 ENCOUNTER — OFFICE VISIT (OUTPATIENT)
Dept: RADIATION ONCOLOGY | Facility: HOSPITAL | Age: 64
End: 2019-01-02
Attending: RADIOLOGY
Payer: MEDICARE

## 2019-01-02 VITALS
BODY MASS INDEX: 22.71 KG/M2 | WEIGHT: 145 LBS | RESPIRATION RATE: 16 BRPM | DIASTOLIC BLOOD PRESSURE: 70 MMHG | SYSTOLIC BLOOD PRESSURE: 126 MMHG | HEART RATE: 64 BPM

## 2019-01-02 DIAGNOSIS — C32.9 LARYNGEAL CANCER (H): Primary | ICD-10-CM

## 2019-01-02 LAB
RAD ONC ARIA COURSE ID: NORMAL
RAD ONC ARIA COURSE LAST TREATMENT DATE: NORMAL
RAD ONC ARIA COURSE START DATE: NORMAL
RAD ONC ARIA COURSE TREATMENT ELAPSED DAYS: 16
RAD ONC ARIA FIRST TREATMENT DATE: NORMAL
RAD ONC ARIA PLAN FRACTIONS TREATED TO DATE: 10
RAD ONC ARIA PLAN ID: NORMAL
RAD ONC ARIA PLAN NAME: NORMAL
RAD ONC ARIA PLAN PRESCRIBED DOSE PER FRACTION: 2.12 GY
RAD ONC ARIA PLAN TOTAL FRACTIONS PRESCRIBED: 33
RAD ONC ARIA PLAN TOTAL PRESCRIBED DOSE: 6996 CGY
RAD ONC ARIA REFERENCE POINT DOSAGE GIVEN TO DATE: NORMAL GY
RAD ONC ARIA REFERENCE POINT ID: NORMAL

## 2019-01-02 PROCEDURE — 77387 GUIDANCE FOR RADJ TX DLVR: CPT | Performed by: RADIOLOGY

## 2019-01-02 PROCEDURE — 77412 RADIATION TX DELIVERY LVL 3: CPT | Performed by: RADIOLOGY

## 2019-01-02 PROCEDURE — 77336 RADIATION PHYSICS CONSULT: CPT | Performed by: RADIOLOGY

## 2019-01-02 ASSESSMENT — PAIN SCALES - GENERAL: PAINLEVEL: WORST PAIN (10)

## 2019-01-02 NOTE — PROGRESS NOTES
Jorge A Mendosa received radiation therapy treatment today 01/02/19.    Chelsi Paul  January 2, 2019  2:01 PM

## 2019-01-03 ENCOUNTER — ALLIED HEALTH/NURSE VISIT (OUTPATIENT)
Dept: RADIATION ONCOLOGY | Facility: HOSPITAL | Age: 64
End: 2019-01-03

## 2019-01-03 ENCOUNTER — RESULTS ONLY (OUTPATIENT)
Dept: RADIATION ONCOLOGY | Facility: HOSPITAL | Age: 64
End: 2019-01-03

## 2019-01-03 DIAGNOSIS — C32.9 LARYNGEAL CANCER (H): Primary | ICD-10-CM

## 2019-01-03 LAB
RAD ONC ARIA COURSE ID: NORMAL
RAD ONC ARIA COURSE LAST TREATMENT DATE: NORMAL
RAD ONC ARIA COURSE START DATE: NORMAL
RAD ONC ARIA COURSE TREATMENT ELAPSED DAYS: 17
RAD ONC ARIA FIRST TREATMENT DATE: NORMAL
RAD ONC ARIA PLAN FRACTIONS TREATED TO DATE: 11
RAD ONC ARIA PLAN ID: NORMAL
RAD ONC ARIA PLAN NAME: NORMAL
RAD ONC ARIA PLAN PRESCRIBED DOSE PER FRACTION: 2.12 GY
RAD ONC ARIA PLAN TOTAL FRACTIONS PRESCRIBED: 33
RAD ONC ARIA PLAN TOTAL PRESCRIBED DOSE: 6996 CGY
RAD ONC ARIA REFERENCE POINT DOSAGE GIVEN TO DATE: NORMAL GY
RAD ONC ARIA REFERENCE POINT ID: NORMAL

## 2019-01-03 PROCEDURE — 77412 RADIATION TX DELIVERY LVL 3: CPT | Performed by: RADIOLOGY

## 2019-01-03 PROCEDURE — 77387 GUIDANCE FOR RADJ TX DLVR: CPT | Performed by: RADIOLOGY

## 2019-01-03 NOTE — PROGRESS NOTES
Jorge A Mendosa received radiation therapy treatment today 01/03/19.    Chelsi Paul  January 3, 2019  1:47 PM

## 2019-01-03 NOTE — PROGRESS NOTES
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      RADIATION RX:  The patient has received 1696 cGy to the glottic larynx for treatment of the above-described vocal cord carcinoma.      SUBJECTIVE:  Continues to have significant odynophagia, predating his treatment.  Getting good results with narcotic pain medication 10 mg oxycodone q.4 hours p.r.n.      OBJECTIVE:  Weight is 150.4 pounds.  No visible findings from his radiation therapy.  Oral cavity and oropharynx appear just fine.  No evidence of thrush.      IMPRESSION:     1.  Routine tolerance to radiation therapy early in the course of treatment for glottic larynx carcinoma.     2.  Significant odynophagia of unknown etiology.      PLAN:  Continue treatment as planned.               DAVID FLOWERS MD             D: 2018   T: 2018   MT:       Name:     LAURA YOUNG   MRN:      [id number]        Account:      [id number]   :      1955           Service Date: 2018      Document: [id number]

## 2019-01-03 NOTE — PROGRESS NOTES
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      RADIATION RX:  The patient has received 1696 cGy to the glottic larynx for treatment of the above-described vocal cord carcinoma.      SUBJECTIVE:  Continues to have significant odynophagia, predating his treatment.  Getting good results with narcotic pain medication 10 mg oxycodone q.4 hours p.r.n.      OBJECTIVE:  Weight is 150.4 pounds.  No visible findings from his radiation therapy.  Oral cavity and oropharynx appear just fine.  No evidence of thrush.      IMPRESSION:     1.  Routine tolerance to radiation therapy early in the course of treatment for glottic larynx carcinoma.     2.  Significant odynophagia of unknown etiology.      PLAN:  Continue treatment as planned.               DAVID FLOWERS MD             D: 2018   T: 2018   MT:       Name:     LAURA YOUNG   MRN:      [id number]        Account:      [id number]   :      1955           Service Date: 2018      Document: [id number]       cc: Mukund Mcnally DO

## 2019-01-03 NOTE — PROGRESS NOTES
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      RADIATION THERAPY:  The patient has received 2120 cGy to date for treatment of an early stage vocal cord carcinoma.      SUBJECTIVE:  Continues to complain of significant odynophagia as well as referred otalgia.  He had been getting good pain medication according to my note last week with 10 mg hydrocodone q.4h.  This week he is saying that his pain control is inadequate.      OBJECTIVE:  Weight, 144.8 pounds.  Oral cavity and oropharynx look fine.  No exam findings related to his radiation therapy.      IMPRESSION:  Apparently the patient has had a broken pain contract and technically Dr. Hamlin is supposed to manage his narcotic medication.  I have actually never seen odynophagia this significant with a glottic larynx.      PLAN:  I will continue treatment as planned and call  Dr. Hamlin's office regarding his pain.         DAVID FLOWERS MD             D: 2019   T: 2019   MT: GAVIN      Name:     LAURA YOUNG   MRN:      [id number]        Account:      [id number]   :      1955           Service Date: 2019      Document: [id number]

## 2019-01-04 ENCOUNTER — ALLIED HEALTH/NURSE VISIT (OUTPATIENT)
Dept: RADIATION ONCOLOGY | Facility: HOSPITAL | Age: 64
End: 2019-01-04

## 2019-01-04 ENCOUNTER — RESULTS ONLY (OUTPATIENT)
Dept: RADIATION ONCOLOGY | Facility: HOSPITAL | Age: 64
End: 2019-01-04

## 2019-01-04 DIAGNOSIS — C32.9 LARYNGEAL CANCER (H): Primary | ICD-10-CM

## 2019-01-04 LAB
RAD ONC ARIA COURSE ID: NORMAL
RAD ONC ARIA COURSE LAST TREATMENT DATE: NORMAL
RAD ONC ARIA COURSE START DATE: NORMAL
RAD ONC ARIA COURSE TREATMENT ELAPSED DAYS: 18
RAD ONC ARIA FIRST TREATMENT DATE: NORMAL
RAD ONC ARIA PLAN FRACTIONS TREATED TO DATE: 12
RAD ONC ARIA PLAN ID: NORMAL
RAD ONC ARIA PLAN NAME: NORMAL
RAD ONC ARIA PLAN PRESCRIBED DOSE PER FRACTION: 2.12 GY
RAD ONC ARIA PLAN TOTAL FRACTIONS PRESCRIBED: 33
RAD ONC ARIA PLAN TOTAL PRESCRIBED DOSE: 6996 CGY
RAD ONC ARIA REFERENCE POINT DOSAGE GIVEN TO DATE: NORMAL GY
RAD ONC ARIA REFERENCE POINT ID: NORMAL

## 2019-01-04 PROCEDURE — 77412 RADIATION TX DELIVERY LVL 3: CPT | Performed by: RADIOLOGY

## 2019-01-04 PROCEDURE — 77387 GUIDANCE FOR RADJ TX DLVR: CPT | Performed by: RADIOLOGY

## 2019-01-04 NOTE — PROCEDURES
Jorge A Mendosa received radiation therapy treatment today 01/04/19.    Florecita Skelton  January 4, 2019  1:22 PM

## 2019-01-07 ENCOUNTER — ALLIED HEALTH/NURSE VISIT (OUTPATIENT)
Dept: RADIATION ONCOLOGY | Facility: HOSPITAL | Age: 64
End: 2019-01-07

## 2019-01-07 ENCOUNTER — RESULTS ONLY (OUTPATIENT)
Dept: RADIATION ONCOLOGY | Facility: HOSPITAL | Age: 64
End: 2019-01-07

## 2019-01-07 DIAGNOSIS — C32.9 LARYNGEAL CANCER (H): Primary | ICD-10-CM

## 2019-01-07 LAB
RAD ONC ARIA COURSE ID: NORMAL
RAD ONC ARIA COURSE LAST TREATMENT DATE: NORMAL
RAD ONC ARIA COURSE START DATE: NORMAL
RAD ONC ARIA COURSE TREATMENT ELAPSED DAYS: 21
RAD ONC ARIA FIRST TREATMENT DATE: NORMAL
RAD ONC ARIA PLAN FRACTIONS TREATED TO DATE: 13
RAD ONC ARIA PLAN ID: NORMAL
RAD ONC ARIA PLAN NAME: NORMAL
RAD ONC ARIA PLAN PRESCRIBED DOSE PER FRACTION: 2.12 GY
RAD ONC ARIA PLAN TOTAL FRACTIONS PRESCRIBED: 33
RAD ONC ARIA PLAN TOTAL PRESCRIBED DOSE: 6996 CGY
RAD ONC ARIA REFERENCE POINT DOSAGE GIVEN TO DATE: NORMAL GY
RAD ONC ARIA REFERENCE POINT ID: NORMAL

## 2019-01-07 PROCEDURE — 77412 RADIATION TX DELIVERY LVL 3: CPT | Performed by: RADIOLOGY

## 2019-01-07 PROCEDURE — 77387 GUIDANCE FOR RADJ TX DLVR: CPT | Performed by: RADIOLOGY

## 2019-01-07 NOTE — PROGRESS NOTES
Jorge A Mendosa received radiation therapy treatment today 01/07/19.    Chelsi Paul  January 7, 2019  1:28 PM

## 2019-01-08 ENCOUNTER — RESULTS ONLY (OUTPATIENT)
Dept: RADIATION ONCOLOGY | Facility: HOSPITAL | Age: 64
End: 2019-01-08

## 2019-01-08 ENCOUNTER — ALLIED HEALTH/NURSE VISIT (OUTPATIENT)
Dept: RADIATION ONCOLOGY | Facility: HOSPITAL | Age: 64
End: 2019-01-08

## 2019-01-08 DIAGNOSIS — C32.9 LARYNGEAL CANCER (H): Primary | ICD-10-CM

## 2019-01-08 LAB
RAD ONC ARIA COURSE ID: NORMAL
RAD ONC ARIA COURSE LAST TREATMENT DATE: NORMAL
RAD ONC ARIA COURSE START DATE: NORMAL
RAD ONC ARIA COURSE TREATMENT ELAPSED DAYS: 22
RAD ONC ARIA FIRST TREATMENT DATE: NORMAL
RAD ONC ARIA PLAN FRACTIONS TREATED TO DATE: 14
RAD ONC ARIA PLAN ID: NORMAL
RAD ONC ARIA PLAN NAME: NORMAL
RAD ONC ARIA PLAN PRESCRIBED DOSE PER FRACTION: 2.12 GY
RAD ONC ARIA PLAN TOTAL FRACTIONS PRESCRIBED: 33
RAD ONC ARIA PLAN TOTAL PRESCRIBED DOSE: 6996 CGY
RAD ONC ARIA REFERENCE POINT DOSAGE GIVEN TO DATE: NORMAL GY
RAD ONC ARIA REFERENCE POINT ID: NORMAL

## 2019-01-08 PROCEDURE — 77412 RADIATION TX DELIVERY LVL 3: CPT | Performed by: RADIOLOGY

## 2019-01-08 PROCEDURE — 77387 GUIDANCE FOR RADJ TX DLVR: CPT | Performed by: RADIOLOGY

## 2019-01-08 NOTE — PROGRESS NOTES
Jorge A Mendosa received radiation therapy treatment today 01/08/19.    Mukund Soliman  January 8, 2019  1:26 PM

## 2019-01-18 ENCOUNTER — ALLIED HEALTH/NURSE VISIT (OUTPATIENT)
Dept: RADIATION ONCOLOGY | Facility: HOSPITAL | Age: 64
End: 2019-01-18

## 2019-01-18 ENCOUNTER — RESULTS ONLY (OUTPATIENT)
Dept: RADIATION ONCOLOGY | Facility: HOSPITAL | Age: 64
End: 2019-01-18

## 2019-01-18 DIAGNOSIS — C32.9 LARYNGEAL CANCER (H): Primary | ICD-10-CM

## 2019-01-18 LAB
RAD ONC ARIA COURSE ID: NORMAL
RAD ONC ARIA COURSE LAST TREATMENT DATE: NORMAL
RAD ONC ARIA COURSE START DATE: NORMAL
RAD ONC ARIA COURSE TREATMENT ELAPSED DAYS: 32
RAD ONC ARIA FIRST TREATMENT DATE: NORMAL
RAD ONC ARIA PLAN FRACTIONS TREATED TO DATE: 15
RAD ONC ARIA PLAN ID: NORMAL
RAD ONC ARIA PLAN NAME: NORMAL
RAD ONC ARIA PLAN PRESCRIBED DOSE PER FRACTION: 2.12 GY
RAD ONC ARIA PLAN TOTAL FRACTIONS PRESCRIBED: 33
RAD ONC ARIA PLAN TOTAL PRESCRIBED DOSE: 6996 CGY
RAD ONC ARIA REFERENCE POINT DOSAGE GIVEN TO DATE: NORMAL GY
RAD ONC ARIA REFERENCE POINT ID: NORMAL

## 2019-01-18 PROCEDURE — 77387 GUIDANCE FOR RADJ TX DLVR: CPT | Performed by: RADIOLOGY

## 2019-01-18 PROCEDURE — 77336 RADIATION PHYSICS CONSULT: CPT | Performed by: RADIOLOGY

## 2019-01-18 PROCEDURE — 77412 RADIATION TX DELIVERY LVL 3: CPT | Performed by: RADIOLOGY

## 2019-01-18 NOTE — PROGRESS NOTES
Jorge A Mendosa received radiation therapy treatment today 01/18/19.    Jonathan Francis  January 18, 2019  1:40 PM

## 2019-01-21 ENCOUNTER — RESULTS ONLY (OUTPATIENT)
Dept: RADIATION ONCOLOGY | Facility: HOSPITAL | Age: 64
End: 2019-01-21

## 2019-01-21 ENCOUNTER — ALLIED HEALTH/NURSE VISIT (OUTPATIENT)
Dept: RADIATION ONCOLOGY | Facility: HOSPITAL | Age: 64
End: 2019-01-21

## 2019-01-21 DIAGNOSIS — C32.9 LARYNGEAL CANCER (H): Primary | ICD-10-CM

## 2019-01-21 LAB
RAD ONC ARIA COURSE ID: NORMAL
RAD ONC ARIA COURSE LAST TREATMENT DATE: NORMAL
RAD ONC ARIA COURSE START DATE: NORMAL
RAD ONC ARIA COURSE TREATMENT ELAPSED DAYS: 35
RAD ONC ARIA FIRST TREATMENT DATE: NORMAL
RAD ONC ARIA PLAN FRACTIONS TREATED TO DATE: 16
RAD ONC ARIA PLAN ID: NORMAL
RAD ONC ARIA PLAN NAME: NORMAL
RAD ONC ARIA PLAN PRESCRIBED DOSE PER FRACTION: 2.12 GY
RAD ONC ARIA PLAN TOTAL FRACTIONS PRESCRIBED: 33
RAD ONC ARIA PLAN TOTAL PRESCRIBED DOSE: 6996 CGY
RAD ONC ARIA REFERENCE POINT DOSAGE GIVEN TO DATE: NORMAL GY
RAD ONC ARIA REFERENCE POINT ID: NORMAL

## 2019-01-21 PROCEDURE — 77387 GUIDANCE FOR RADJ TX DLVR: CPT | Performed by: RADIOLOGY

## 2019-01-21 PROCEDURE — 77412 RADIATION TX DELIVERY LVL 3: CPT | Performed by: RADIOLOGY

## 2019-01-21 NOTE — PROGRESS NOTES
Jorge A Mendosa received radiation therapy treatment today 01/21/19.    Mukund Soliman  January 21, 2019  1:37 PM

## 2019-01-22 ENCOUNTER — RESULTS ONLY (OUTPATIENT)
Dept: RADIATION ONCOLOGY | Facility: HOSPITAL | Age: 64
End: 2019-01-22

## 2019-01-22 ENCOUNTER — ALLIED HEALTH/NURSE VISIT (OUTPATIENT)
Dept: RADIATION ONCOLOGY | Facility: HOSPITAL | Age: 64
End: 2019-01-22

## 2019-01-22 DIAGNOSIS — C32.9 LARYNGEAL CANCER (H): Primary | ICD-10-CM

## 2019-01-22 LAB
RAD ONC ARIA COURSE ID: NORMAL
RAD ONC ARIA COURSE LAST TREATMENT DATE: NORMAL
RAD ONC ARIA COURSE START DATE: NORMAL
RAD ONC ARIA COURSE TREATMENT ELAPSED DAYS: 36
RAD ONC ARIA FIRST TREATMENT DATE: NORMAL
RAD ONC ARIA PLAN FRACTIONS TREATED TO DATE: 17
RAD ONC ARIA PLAN ID: NORMAL
RAD ONC ARIA PLAN NAME: NORMAL
RAD ONC ARIA PLAN PRESCRIBED DOSE PER FRACTION: 2.12 GY
RAD ONC ARIA PLAN TOTAL FRACTIONS PRESCRIBED: 33
RAD ONC ARIA PLAN TOTAL PRESCRIBED DOSE: 6996 CGY
RAD ONC ARIA REFERENCE POINT DOSAGE GIVEN TO DATE: NORMAL GY
RAD ONC ARIA REFERENCE POINT ID: NORMAL

## 2019-01-22 PROCEDURE — 77387 GUIDANCE FOR RADJ TX DLVR: CPT | Performed by: RADIOLOGY

## 2019-01-22 PROCEDURE — 77412 RADIATION TX DELIVERY LVL 3: CPT | Performed by: RADIOLOGY

## 2019-01-22 NOTE — PROGRESS NOTES
Jorge A Mendosa received radiation therapy treatment today 01/22/19.    Jonathan Francis  January 22, 2019  1:38 PM

## 2019-01-23 ENCOUNTER — ALLIED HEALTH/NURSE VISIT (OUTPATIENT)
Dept: RADIATION ONCOLOGY | Facility: HOSPITAL | Age: 64
End: 2019-01-23

## 2019-01-23 ENCOUNTER — RESULTS ONLY (OUTPATIENT)
Dept: RADIATION ONCOLOGY | Facility: HOSPITAL | Age: 64
End: 2019-01-23

## 2019-01-23 DIAGNOSIS — C32.9 LARYNGEAL CANCER (H): Primary | ICD-10-CM

## 2019-01-23 LAB
RAD ONC ARIA COURSE ID: NORMAL
RAD ONC ARIA COURSE LAST TREATMENT DATE: NORMAL
RAD ONC ARIA COURSE START DATE: NORMAL
RAD ONC ARIA COURSE TREATMENT ELAPSED DAYS: 37
RAD ONC ARIA FIRST TREATMENT DATE: NORMAL
RAD ONC ARIA PLAN FRACTIONS TREATED TO DATE: 18
RAD ONC ARIA PLAN ID: NORMAL
RAD ONC ARIA PLAN NAME: NORMAL
RAD ONC ARIA PLAN PRESCRIBED DOSE PER FRACTION: 2.12 GY
RAD ONC ARIA PLAN TOTAL FRACTIONS PRESCRIBED: 33
RAD ONC ARIA PLAN TOTAL PRESCRIBED DOSE: 6996 CGY
RAD ONC ARIA REFERENCE POINT DOSAGE GIVEN TO DATE: NORMAL GY
RAD ONC ARIA REFERENCE POINT ID: NORMAL

## 2019-01-23 NOTE — PROGRESS NOTES
Jorge A Mendosa received radiation therapy treatment today 01/23/19.    Mukund Soliman  January 23, 2019  1:21 PM

## 2019-01-24 ENCOUNTER — ALLIED HEALTH/NURSE VISIT (OUTPATIENT)
Dept: RADIATION ONCOLOGY | Facility: HOSPITAL | Age: 64
End: 2019-01-24

## 2019-01-24 ENCOUNTER — RESULTS ONLY (OUTPATIENT)
Dept: RADIATION ONCOLOGY | Facility: HOSPITAL | Age: 64
End: 2019-01-24

## 2019-01-24 DIAGNOSIS — C32.9 LARYNGEAL CANCER (H): Primary | ICD-10-CM

## 2019-01-24 LAB
RAD ONC ARIA COURSE ID: NORMAL
RAD ONC ARIA COURSE LAST TREATMENT DATE: NORMAL
RAD ONC ARIA COURSE START DATE: NORMAL
RAD ONC ARIA COURSE TREATMENT ELAPSED DAYS: 38
RAD ONC ARIA FIRST TREATMENT DATE: NORMAL
RAD ONC ARIA PLAN FRACTIONS TREATED TO DATE: 19
RAD ONC ARIA PLAN ID: NORMAL
RAD ONC ARIA PLAN NAME: NORMAL
RAD ONC ARIA PLAN PRESCRIBED DOSE PER FRACTION: 2.12 GY
RAD ONC ARIA PLAN TOTAL FRACTIONS PRESCRIBED: 33
RAD ONC ARIA PLAN TOTAL PRESCRIBED DOSE: 6996 CGY
RAD ONC ARIA REFERENCE POINT DOSAGE GIVEN TO DATE: NORMAL GY
RAD ONC ARIA REFERENCE POINT ID: NORMAL

## 2019-01-24 PROCEDURE — 77387 GUIDANCE FOR RADJ TX DLVR: CPT | Performed by: RADIOLOGY

## 2019-01-24 PROCEDURE — 77412 RADIATION TX DELIVERY LVL 3: CPT | Performed by: RADIOLOGY

## 2019-01-24 NOTE — PROGRESS NOTES
Jorge A Mendosa received radiation therapy treatment today 01/24/19.    Chelsi Paul  January 24, 2019  1:36 PM

## 2019-01-28 ENCOUNTER — ALLIED HEALTH/NURSE VISIT (OUTPATIENT)
Dept: RADIATION ONCOLOGY | Facility: HOSPITAL | Age: 64
End: 2019-01-28

## 2019-01-28 ENCOUNTER — RESULTS ONLY (OUTPATIENT)
Dept: RADIATION ONCOLOGY | Facility: HOSPITAL | Age: 64
End: 2019-01-28

## 2019-01-28 DIAGNOSIS — C32.9 LARYNGEAL CANCER (H): Primary | ICD-10-CM

## 2019-01-28 LAB
RAD ONC ARIA COURSE ID: NORMAL
RAD ONC ARIA COURSE LAST TREATMENT DATE: NORMAL
RAD ONC ARIA COURSE START DATE: NORMAL
RAD ONC ARIA COURSE TREATMENT ELAPSED DAYS: 42
RAD ONC ARIA FIRST TREATMENT DATE: NORMAL
RAD ONC ARIA PLAN FRACTIONS TREATED TO DATE: 20
RAD ONC ARIA PLAN ID: NORMAL
RAD ONC ARIA PLAN NAME: NORMAL
RAD ONC ARIA PLAN PRESCRIBED DOSE PER FRACTION: 2.12 GY
RAD ONC ARIA PLAN TOTAL FRACTIONS PRESCRIBED: 33
RAD ONC ARIA PLAN TOTAL PRESCRIBED DOSE: 6996 CGY
RAD ONC ARIA REFERENCE POINT DOSAGE GIVEN TO DATE: NORMAL GY
RAD ONC ARIA REFERENCE POINT ID: NORMAL

## 2019-01-28 PROCEDURE — 77387 GUIDANCE FOR RADJ TX DLVR: CPT | Performed by: RADIOLOGY

## 2019-01-28 PROCEDURE — 77336 RADIATION PHYSICS CONSULT: CPT | Performed by: RADIOLOGY

## 2019-01-28 PROCEDURE — 77412 RADIATION TX DELIVERY LVL 3: CPT | Performed by: RADIOLOGY

## 2019-01-28 NOTE — PROGRESS NOTES
Jorge A Mendosa received radiation therapy treatment today 01/28/19.    Chelsi Paul  January 28, 2019  1:38 PM

## 2019-02-01 ENCOUNTER — ALLIED HEALTH/NURSE VISIT (OUTPATIENT)
Dept: RADIATION ONCOLOGY | Facility: HOSPITAL | Age: 64
End: 2019-02-01
Attending: RADIOLOGY
Payer: MEDICARE

## 2019-02-01 ENCOUNTER — RESULTS ONLY (OUTPATIENT)
Dept: RADIATION ONCOLOGY | Facility: HOSPITAL | Age: 64
End: 2019-02-01

## 2019-02-01 DIAGNOSIS — C32.9 LARYNGEAL CANCER (H): Primary | ICD-10-CM

## 2019-02-01 LAB
RAD ONC ARIA COURSE ID: NORMAL
RAD ONC ARIA COURSE LAST TREATMENT DATE: NORMAL
RAD ONC ARIA COURSE START DATE: NORMAL
RAD ONC ARIA COURSE TREATMENT ELAPSED DAYS: 46
RAD ONC ARIA FIRST TREATMENT DATE: NORMAL
RAD ONC ARIA PLAN FRACTIONS TREATED TO DATE: 21
RAD ONC ARIA PLAN ID: NORMAL
RAD ONC ARIA PLAN NAME: NORMAL
RAD ONC ARIA PLAN PRESCRIBED DOSE PER FRACTION: 2.12 GY
RAD ONC ARIA PLAN TOTAL FRACTIONS PRESCRIBED: 33
RAD ONC ARIA PLAN TOTAL PRESCRIBED DOSE: 6996 CGY
RAD ONC ARIA REFERENCE POINT DOSAGE GIVEN TO DATE: NORMAL GY
RAD ONC ARIA REFERENCE POINT ID: NORMAL

## 2019-02-01 PROCEDURE — 77412 RADIATION TX DELIVERY LVL 3: CPT | Performed by: RADIOLOGY

## 2019-02-01 PROCEDURE — 77387 GUIDANCE FOR RADJ TX DLVR: CPT | Performed by: RADIOLOGY

## 2019-02-01 NOTE — PROCEDURES
Jorge A Mendosa received radiation therapy treatment today 02/01/19.    Florecita Skelton  February 1, 2019  1:25 PM

## 2019-02-11 ENCOUNTER — RESULTS ONLY (OUTPATIENT)
Dept: RADIATION ONCOLOGY | Facility: HOSPITAL | Age: 64
End: 2019-02-11

## 2019-02-11 ENCOUNTER — ALLIED HEALTH/NURSE VISIT (OUTPATIENT)
Dept: RADIATION ONCOLOGY | Facility: HOSPITAL | Age: 64
End: 2019-02-11

## 2019-02-11 DIAGNOSIS — C32.9 LARYNGEAL CANCER (H): Primary | ICD-10-CM

## 2019-02-11 LAB
RAD ONC ARIA COURSE ID: NORMAL
RAD ONC ARIA COURSE LAST TREATMENT DATE: NORMAL
RAD ONC ARIA COURSE START DATE: NORMAL
RAD ONC ARIA COURSE TREATMENT ELAPSED DAYS: 56
RAD ONC ARIA FIRST TREATMENT DATE: NORMAL
RAD ONC ARIA PLAN FRACTIONS TREATED TO DATE: 22
RAD ONC ARIA PLAN ID: NORMAL
RAD ONC ARIA PLAN NAME: NORMAL
RAD ONC ARIA PLAN PRESCRIBED DOSE PER FRACTION: 2.12 GY
RAD ONC ARIA PLAN TOTAL FRACTIONS PRESCRIBED: 33
RAD ONC ARIA PLAN TOTAL PRESCRIBED DOSE: 6996 CGY
RAD ONC ARIA REFERENCE POINT DOSAGE GIVEN TO DATE: NORMAL GY
RAD ONC ARIA REFERENCE POINT ID: NORMAL

## 2019-02-11 PROCEDURE — 77412 RADIATION TX DELIVERY LVL 3: CPT | Performed by: RADIOLOGY

## 2019-02-11 PROCEDURE — 77387 GUIDANCE FOR RADJ TX DLVR: CPT | Performed by: RADIOLOGY

## 2019-02-11 NOTE — PROGRESS NOTES
Jorge A Mendosa received radiation therapy treatment today 02/11/19.    Lola Uribe  February 11, 2019  1:26 PM

## 2019-02-12 ENCOUNTER — RESULTS ONLY (OUTPATIENT)
Dept: RADIATION ONCOLOGY | Facility: HOSPITAL | Age: 64
End: 2019-02-12

## 2019-02-12 ENCOUNTER — ALLIED HEALTH/NURSE VISIT (OUTPATIENT)
Dept: RADIATION ONCOLOGY | Facility: HOSPITAL | Age: 64
End: 2019-02-12

## 2019-02-12 DIAGNOSIS — C32.9 LARYNGEAL CANCER (H): Primary | ICD-10-CM

## 2019-02-12 LAB
RAD ONC ARIA COURSE ID: NORMAL
RAD ONC ARIA COURSE LAST TREATMENT DATE: NORMAL
RAD ONC ARIA COURSE START DATE: NORMAL
RAD ONC ARIA COURSE TREATMENT ELAPSED DAYS: 57
RAD ONC ARIA FIRST TREATMENT DATE: NORMAL
RAD ONC ARIA PLAN FRACTIONS TREATED TO DATE: 23
RAD ONC ARIA PLAN ID: NORMAL
RAD ONC ARIA PLAN NAME: NORMAL
RAD ONC ARIA PLAN PRESCRIBED DOSE PER FRACTION: 2.12 GY
RAD ONC ARIA PLAN TOTAL FRACTIONS PRESCRIBED: 33
RAD ONC ARIA PLAN TOTAL PRESCRIBED DOSE: 6996 CGY
RAD ONC ARIA REFERENCE POINT DOSAGE GIVEN TO DATE: NORMAL GY
RAD ONC ARIA REFERENCE POINT ID: NORMAL

## 2019-02-12 PROCEDURE — 77387 GUIDANCE FOR RADJ TX DLVR: CPT | Performed by: RADIOLOGY

## 2019-02-12 PROCEDURE — 77412 RADIATION TX DELIVERY LVL 3: CPT | Performed by: RADIOLOGY

## 2019-02-12 NOTE — PROCEDURES
Jorge A Mendosa received radiation therapy treatment today 02/12/19.    Florecita Skelton  February 12, 2019  1:40 PM

## 2019-02-13 ENCOUNTER — ALLIED HEALTH/NURSE VISIT (OUTPATIENT)
Dept: RADIATION ONCOLOGY | Facility: HOSPITAL | Age: 64
End: 2019-02-13

## 2019-02-13 ENCOUNTER — RESULTS ONLY (OUTPATIENT)
Dept: RADIATION ONCOLOGY | Facility: HOSPITAL | Age: 64
End: 2019-02-13

## 2019-02-13 DIAGNOSIS — C32.9 LARYNGEAL CANCER (H): Primary | ICD-10-CM

## 2019-02-13 LAB
RAD ONC ARIA COURSE ID: NORMAL
RAD ONC ARIA COURSE LAST TREATMENT DATE: NORMAL
RAD ONC ARIA COURSE START DATE: NORMAL
RAD ONC ARIA COURSE TREATMENT ELAPSED DAYS: 58
RAD ONC ARIA FIRST TREATMENT DATE: NORMAL
RAD ONC ARIA PLAN FRACTIONS TREATED TO DATE: 24
RAD ONC ARIA PLAN ID: NORMAL
RAD ONC ARIA PLAN NAME: NORMAL
RAD ONC ARIA PLAN PRESCRIBED DOSE PER FRACTION: 2.12 GY
RAD ONC ARIA PLAN TOTAL FRACTIONS PRESCRIBED: 33
RAD ONC ARIA PLAN TOTAL PRESCRIBED DOSE: 6996 CGY
RAD ONC ARIA REFERENCE POINT DOSAGE GIVEN TO DATE: NORMAL GY
RAD ONC ARIA REFERENCE POINT ID: NORMAL

## 2019-02-13 PROCEDURE — 77412 RADIATION TX DELIVERY LVL 3: CPT | Performed by: RADIOLOGY

## 2019-02-13 PROCEDURE — 77387 GUIDANCE FOR RADJ TX DLVR: CPT | Performed by: RADIOLOGY

## 2019-02-13 NOTE — PROGRESS NOTES
Jorge A Mendosa received radiation therapy treatment today 02/13/19.    Lola Uribe  February 13, 2019  1:26 PM

## 2019-02-14 ENCOUNTER — RESULTS ONLY (OUTPATIENT)
Dept: RADIATION ONCOLOGY | Facility: HOSPITAL | Age: 64
End: 2019-02-14

## 2019-02-14 ENCOUNTER — ALLIED HEALTH/NURSE VISIT (OUTPATIENT)
Dept: RADIATION ONCOLOGY | Facility: HOSPITAL | Age: 64
End: 2019-02-14

## 2019-02-14 ENCOUNTER — OFFICE VISIT (OUTPATIENT)
Dept: RADIATION ONCOLOGY | Facility: HOSPITAL | Age: 64
End: 2019-02-14

## 2019-02-14 VITALS
DIASTOLIC BLOOD PRESSURE: 78 MMHG | RESPIRATION RATE: 16 BRPM | SYSTOLIC BLOOD PRESSURE: 120 MMHG | BODY MASS INDEX: 23.49 KG/M2 | WEIGHT: 150 LBS | HEART RATE: 68 BPM

## 2019-02-14 DIAGNOSIS — C32.9 LARYNGEAL CANCER (H): Primary | ICD-10-CM

## 2019-02-14 LAB
RAD ONC ARIA COURSE ID: NORMAL
RAD ONC ARIA COURSE LAST TREATMENT DATE: NORMAL
RAD ONC ARIA COURSE START DATE: NORMAL
RAD ONC ARIA COURSE TREATMENT ELAPSED DAYS: 59
RAD ONC ARIA FIRST TREATMENT DATE: NORMAL
RAD ONC ARIA PLAN FRACTIONS TREATED TO DATE: 25
RAD ONC ARIA PLAN ID: NORMAL
RAD ONC ARIA PLAN NAME: NORMAL
RAD ONC ARIA PLAN PRESCRIBED DOSE PER FRACTION: 2.12 GY
RAD ONC ARIA PLAN TOTAL FRACTIONS PRESCRIBED: 33
RAD ONC ARIA PLAN TOTAL PRESCRIBED DOSE: 6996 CGY
RAD ONC ARIA REFERENCE POINT DOSAGE GIVEN TO DATE: NORMAL GY
RAD ONC ARIA REFERENCE POINT ID: NORMAL

## 2019-02-14 PROCEDURE — 77387 GUIDANCE FOR RADJ TX DLVR: CPT | Performed by: RADIOLOGY

## 2019-02-14 PROCEDURE — 77412 RADIATION TX DELIVERY LVL 3: CPT | Performed by: RADIOLOGY

## 2019-02-14 PROCEDURE — 77336 RADIATION PHYSICS CONSULT: CPT | Performed by: RADIOLOGY

## 2019-02-14 ASSESSMENT — PAIN SCALES - GENERAL: PAINLEVEL: MODERATE PAIN (5)

## 2019-02-14 NOTE — PROGRESS NOTES
Service Date: 2019      RADIATION THERAPY PROGRESS NOTE      REFERRING PHYSICIAN:  Taisha Mcnally DO and Mukund Hamlin MD.      DIAGNOSIS:  moderately differentiated squamous carcinoma of the glottic larynx, stage N0 M0.        RADIATION THERAPY:  The patient has received 5300 cGy to date for treatment of an early stage vocal cord carcinoma.        SUBJECTIVE:  Continues to complain of marked odynophagia.      OBJECTIVE:  Weight 150 pounds, actually up approximately 5 pounds from his lowest weight on .  Examination reveals just slight hyperpigmentation in the small anterior neck treatment volume.        IMPRESSION:  Undergoing radiation therapy for early stage vocal cord carcinoma, extremely prolonged course likely affecting the success of his treatment, although I believe we still should continue to completion.         DAVID FLOWERS MD             D: 2019   T: 2019   MT: ANATOLIY      Name:     LAURA YOUNG   MRN:      [id number]        Account:      [id number]   :      1955           Service Date: 2019      Document: [id number]       cc: Mukund Mcnally DO

## 2019-02-14 NOTE — PROGRESS NOTES
Jorge A Mendosa received radiation therapy treatment today 02/14/19.    Lola Uribe  February 14, 2019  1:40 PM

## 2019-02-15 ENCOUNTER — ALLIED HEALTH/NURSE VISIT (OUTPATIENT)
Dept: RADIATION ONCOLOGY | Facility: HOSPITAL | Age: 64
End: 2019-02-15

## 2019-02-15 ENCOUNTER — RESULTS ONLY (OUTPATIENT)
Dept: RADIATION ONCOLOGY | Facility: HOSPITAL | Age: 64
End: 2019-02-15

## 2019-02-15 DIAGNOSIS — C32.9 LARYNGEAL CANCER (H): Primary | ICD-10-CM

## 2019-02-15 LAB
RAD ONC ARIA COURSE ID: NORMAL
RAD ONC ARIA COURSE LAST TREATMENT DATE: NORMAL
RAD ONC ARIA COURSE START DATE: NORMAL
RAD ONC ARIA COURSE TREATMENT ELAPSED DAYS: 60
RAD ONC ARIA FIRST TREATMENT DATE: NORMAL
RAD ONC ARIA PLAN FRACTIONS TREATED TO DATE: 26
RAD ONC ARIA PLAN ID: NORMAL
RAD ONC ARIA PLAN NAME: NORMAL
RAD ONC ARIA PLAN PRESCRIBED DOSE PER FRACTION: 2.12 GY
RAD ONC ARIA PLAN TOTAL FRACTIONS PRESCRIBED: 33
RAD ONC ARIA PLAN TOTAL PRESCRIBED DOSE: 6996 CGY
RAD ONC ARIA REFERENCE POINT DOSAGE GIVEN TO DATE: NORMAL GY
RAD ONC ARIA REFERENCE POINT ID: NORMAL

## 2019-02-15 PROCEDURE — 77387 GUIDANCE FOR RADJ TX DLVR: CPT | Performed by: RADIOLOGY

## 2019-02-15 PROCEDURE — 77412 RADIATION TX DELIVERY LVL 3: CPT | Performed by: RADIOLOGY

## 2019-02-15 NOTE — PROGRESS NOTES
Jorge A Mendosa received radiation therapy treatment today 02/15/19.    Jonathan Francis  February 15, 2019  1:20 PM

## 2019-04-26 NOTE — PROGRESS NOTES
Otolaryngology Progress Note      History of Present Illness   Patient presents with:  RECHECK: Follow Up Squamous Cell Carcinoma of the Glottic Larynx; [id number]      Jorge A Mendosa is a 63 year old male  Presents for routine surveillance of a T1b N0 M0 glottic SCC    He denies any further odynophagia dysphagia otalgia or new onset weight loss.  He is tolerating a normal diet  He states he has quit smoking    Jorge A initially presented to me in October 2018 and noted a bulky glottic mass.  I took him to surgery on October 30 and noted bilateral true vocal cord masses positive for invasive squamous cell carcinoma.  Vocal cord mobility was intact.  This was a bulky exophytic mass that crossed the anterior commissure.  It appeared to have originated on the left true cord crosses anterior commissure and involves the right true vocal cord    He was sent to Dr. Morin for radiation therapy but he was noncompliant and had a prolonged treatment course.      Jorge A states he has severe odynophagia during the midcourse of radiation therapy.  He said he had too much pain to  Dr. Morin felt his treatment was ineffective because of noncompliance.  He did not feel treatment would be successful if he returned for completion radiation therapy.    He received 6,996 cGy   He had continued odynophagia throughout treatment    Radiation therapy was initiated in December 2018 and I am just seeing him back now     No recent imaging  Last CT was performed preoperatively, no cervical lymphadenopathy  there was some assymmetry at the larynx larynx with asymmetrical right piriform sinus secondary to mass-effect    He denies alcohol use he quit 10 years ago.    Physical Exam  /74 (BP Location: Left arm)   Pulse 84   Temp 98.7  F (37.1  C) (Tympanic)   Wt 64 kg (141 lb)   SpO2 98%   BMI 22.08 kg/m      General - The patient is well nourished and well developed, and appears to have good nutritional status.  Alert and oriented  to person and place, interactive. Voice is chronically rough in quality.   Head and Face - Normocephalic and atraumatic, with no gross asymmetry noted of the contour of the facial features.  The facial nerve is intact, with strong symmetric movements.  Neck-no palpable lymphadenopathy or thyroid mass.  Trachea is midline.  Eyes - Extraocular movements intact.   Ears- External auditory canals are patent, tympanic membranes are intact without effusion or worrisome retractions   Nose - Nasal mucosa is pink and moist with no abnormal mucus.  The septum was grossly midline and non-obstructive, turbinates of normal size and position.  No polyps, masses, or purulence noted on examination.  Mouth - Examination of the oral cavity shows pink, healthy, moist mucosa.  No lesions or ulceration noted.  Upper denture removed for exam.  The tongue is mobile and midline.  Throat - The walls of the oropharynx were smooth, pink, moist, symmetric, and had no lesions or ulcerations.  The tonsillar pillars and soft palate were symmetric.  The uvula was midline on elevation.      Attempts at mirror laryngoscopy were not possible due to gag reflex.  Therefore I proceeded with a fiberoptic examination after informed consent.  First I sprayed both sides of the nose with a mixture of lidocaine and neosynephrine.  I then passed the scope through the nasal cavity.     The nasopharynx was mucosally covered and symmetric.  The eustachian tube openings were unobstructed.  Going further down I had a clear view of the base of tongue which had normal appearing lingual tonsillar tissue.  The base of tongue was free of lesions, and the vallecula was open.  The epiglottis was smooth and mucosally covered.  The supraglottic larynx was then clearly visualized.  There is no glottic mass or asymmetry, no laryngeal edema.   There has been resolution of the former glottic mass. The vocal cords moved smoothly and symmetrically.  The pyriform sinuses were open  and without sabrina mass or pooling of secretions upon valsalva, and the limited view of the postcricoid region did not show any lesions.  The patient tolerated the procedure well.      Impression/Plan  Jorge A Mendosa is a 63 year old male    ICD-10-CM    1. Laryngeal cancer (H) C32.9    2. History of external beam radiation therapy Z92.3    3. Dysphonia R49.0    4. Personal history of tobacco use, presenting hazards to health Z87.891          No evidence of recurrence glottic carcinoma    Jorge A is very happy with this news    I did tell him my concerns about recurrent carcinoma because his radiation treatment course was so prolonged.   He needs to be followed closely.   I will see him back in 3-4 months, sooner with any new onset dysphonia, dysphagia, otalgia or weight loss    I spoke with Dr. Xavier in Radiation therapy, and she is doubtful any additional radiation would be beneficial .  She states she needs to review his chart in greater detail but there may be some room in the future for additional radiation therapy if there is recurrence.      At this juncture I will hold off in any additional imaging as there is clinical resolution of his glottic carcinoma and imaging would not alter any treatment course      I congratulated Jorge A on tobacco cessation    Total exam time was over 40 minutes with over 25 minutes of this time spent in direct patient education, counseling and coordination of care with radiation therapy.  I reviewed prior imaging, notes and consults.  We discussed in depth the importance of continued tobacco cessation, importance of contacting us immediately with change in symptoms.    .               Taisha Mcnally D.O.  Otolaryngology/Head and Neck Surgery  Allergy

## 2019-04-29 ENCOUNTER — OFFICE VISIT (OUTPATIENT)
Dept: OTOLARYNGOLOGY | Facility: OTHER | Age: 64
End: 2019-04-29
Attending: OTOLARYNGOLOGY
Payer: MEDICARE

## 2019-04-29 VITALS
DIASTOLIC BLOOD PRESSURE: 74 MMHG | BODY MASS INDEX: 22.08 KG/M2 | SYSTOLIC BLOOD PRESSURE: 134 MMHG | WEIGHT: 141 LBS | HEART RATE: 84 BPM | OXYGEN SATURATION: 98 % | TEMPERATURE: 98.7 F

## 2019-04-29 DIAGNOSIS — Z87.891 PERSONAL HISTORY OF TOBACCO USE, PRESENTING HAZARDS TO HEALTH: ICD-10-CM

## 2019-04-29 DIAGNOSIS — R49.0 DYSPHONIA: ICD-10-CM

## 2019-04-29 DIAGNOSIS — Z92.3 HISTORY OF EXTERNAL BEAM RADIATION THERAPY: ICD-10-CM

## 2019-04-29 DIAGNOSIS — C32.9 LARYNGEAL CANCER (H): Primary | ICD-10-CM

## 2019-04-29 PROCEDURE — 99215 OFFICE O/P EST HI 40 MIN: CPT | Mod: 25 | Performed by: OTOLARYNGOLOGY

## 2019-04-29 PROCEDURE — G0463 HOSPITAL OUTPT CLINIC VISIT: HCPCS

## 2019-04-29 PROCEDURE — 31575 DIAGNOSTIC LARYNGOSCOPY: CPT | Performed by: OTOLARYNGOLOGY

## 2019-04-29 ASSESSMENT — PAIN SCALES - GENERAL: PAINLEVEL: NO PAIN (0)

## 2019-04-29 NOTE — NURSING NOTE
"Chief Complaint   Patient presents with     RECHECK     Follow Up Squamous Cell Carcinoma of the Glottic Larynx; [id number]       Initial /74 (BP Location: Left arm)   Pulse 84   Temp 98.7  F (37.1  C) (Tympanic)   Wt 64 kg (141 lb)   SpO2 98%   BMI 22.08 kg/m   Estimated body mass index is 22.08 kg/m  as calculated from the following:    Height as of 12/4/18: 1.702 m (5' 7\").    Weight as of this encounter: 64 kg (141 lb).  Medication Reconciliation: complete    Uzma Clemens LPN  "

## 2019-04-29 NOTE — PATIENT INSTRUCTIONS
Thank you for allowing Dr. Mcnally and our ENT team to participate in your care.  If your medications are too expensive, please give the nurse a call.  We can possibly change this medication.  If you have a scheduling or an appointment question please contact our Health Unit Coordinator at their direct line [phone].   ALL nursing questions or concerns can be directed to your ENT nurse at: [phone] - [facility]    Follow up with Dr. Mcnally in 3 months    We will contact Dr. Morin regarding Radiation Therapy

## 2019-04-29 NOTE — LETTER
4/29/2019         RE: Jorge A Mendosa  [address]   [address]  [facility] [address]        Dear Colleague,    Thank you for referring your patient, Jorge A Mendosa, to the [facility]. Please see a copy of my visit note below.    Otolaryngology Progress Note      History of Present Illness   Patient presents with:  RECHECK: Follow Up Squamous Cell Carcinoma of the Glottic Larynx; [id number]      Jorge A Mendosa is a 63 year old male  Presents for routine surveillance of a T1b N0 M0 glottic SCC  Initially presented to me in October 2018 and noted a bulky glottic mass.  I took him to surgery on October 30 and noted bilateral true vocal cord masses positive for invasive squamous cell carcinoma.  Vocal cord mobility was intact.  This was a bulky exophytic mass that crossed the anterior commissure.  It appeared to have originated on the left true cord crosses anterior commissure and involves the right true vocal cord    He was sent to Dr. Morin for radiation therapy but he was noncompliant and had a prolonged treatment course.      Jorge A states he has severe odynophagia during the midcourse of radiation therapy.  He said he had too much pain to  Dr. Morin felt his treatment was ineffective because of noncompliance.  He did not feel treatment would be successful if he returned for completion radiation therapy.    He received 6,996 cGy   He had continued odynophagia throughout treatment    Radiation therapy was initiated in December 2018 and I am just seeing him back now     No recent imaging  Last CT was performed preoperatively, no cervical lymphadenopathy  there was some assymmetry at the larynx larynx with asymmetrical right piriform sinus secondary to mass-effect    He denies alcohol use he quit 10 years ago.  He states he has quit smoking    Physical Exam  /74 (BP Location: Left arm)   Pulse 84   Temp 98.7  F (37.1  C) (Tympanic)   Wt 64 kg (141 lb)   SpO2 98%   BMI 22.08  kg/m       General - The patient is well nourished and well developed, and appears to have good nutritional status.  Alert and oriented to person and place, interactive. Voice is chronically rough in quality.   Head and Face - Normocephalic and atraumatic, with no gross asymmetry noted of the contour of the facial features.  The facial nerve is intact, with strong symmetric movements.  Neck-no palpable lymphadenopathy or thyroid mass.  Trachea is midline.  Eyes - Extraocular movements intact.   Ears- External auditory canals are patent, tympanic membranes are intact without effusion or worrisome retractions   Nose - Nasal mucosa is pink and moist with no abnormal mucus.  The septum was grossly midline and non-obstructive, turbinates of normal size and position.  No polyps, masses, or purulence noted on examination.  Mouth - Examination of the oral cavity shows pink, healthy, moist mucosa.  No lesions or ulceration noted.  Upper denture removed for exam.  The tongue is mobile and midline.  Throat - The walls of the oropharynx were smooth, pink, moist, symmetric, and had no lesions or ulcerations.  The tonsillar pillars and soft palate were symmetric.  The uvula was midline on elevation.      Attempts at mirror laryngoscopy were not possible due to gag reflex.  Therefore I proceeded with a fiberoptic examination after informed consent.  First I sprayed both sides of the nose with a mixture of lidocaine and neosynephrine.  I then passed the scope through the nasal cavity.     The nasopharynx was mucosally covered and symmetric.  The eustachian tube openings were unobstructed.  Going further down I had a clear view of the base of tongue which had normal appearing lingual tonsillar tissue.  The base of tongue was free of lesions, and the vallecula was open.  The epiglottis was smooth and mucosally covered.  The supraglottic larynx was then clearly visualized.  There is no glottic mass or asymmetry, no laryngeal edema.    There has been resolution of the former glottic mass. The vocal cords moved smoothly and symmetrically.  The pyriform sinuses were open and without sabrina mass or pooling of secretions upon valsalva, and the limited view of the postcricoid region did not show any lesions.  The patient tolerated the procedure well.      Impression/Plan  Jorge A Mendosa is a 63 year old male    ICD-10-CM    1. Laryngeal cancer (H) C32.9    2. History of external beam radiation therapy Z92.3    3. Dysphonia R49.0    4. Personal history of tobacco use, presenting hazards to health Z87.891          No evidence of recurrence glottic carcinoma    Jorge A is very happy with this news    I did tell him my concerns about recurrent carcinoma because his radiation treatment course was so prolonged.   He needs to be followed closely.   I will see him back in 3-4 months, sooner with any new onset dysphonia, dysphagia, otalgia or weight loss    I spoke with Dr. Xavier in Radiation therapy, and she is doubtful any additional radiation would be beneficial .  She states she needs to review his chart in greater detail but there may be some room in the future for additional radiation therapy if there is recurrence.      At this juncture I will hold off in any additional imaging as there is clinical resolution of his glottic carcinoma and imaging would not alter any treatment course      I congratulated Jorge A on tobacco cessation    Total exam time was over 40 minutes with over 25 minutes of this time spent in direct patient education, counseling and coordination of care with radiation therapy.  I reviewed prior imaging, notes and consults.  We discussed in depth the importance of continued tobacco cessation, importance of contacting us immediately with change in symptoms.    .               Taisha Mcnally D.O.  Otolaryngology/Head and Neck Surgery  Allergy            Again, thank you for allowing me to participate in the care of your  patient.        Sincerely,        Taisha Mcnally MD

## 2019-07-29 ENCOUNTER — OFFICE VISIT (OUTPATIENT)
Dept: OTOLARYNGOLOGY | Facility: OTHER | Age: 64
End: 2019-07-29
Attending: OTOLARYNGOLOGY
Payer: MEDICARE

## 2019-07-29 VITALS
WEIGHT: 143 LBS | DIASTOLIC BLOOD PRESSURE: 84 MMHG | OXYGEN SATURATION: 98 % | SYSTOLIC BLOOD PRESSURE: 120 MMHG | HEART RATE: 77 BPM | TEMPERATURE: 97.7 F | BODY MASS INDEX: 22.4 KG/M2

## 2019-07-29 DIAGNOSIS — Z71.6 TOBACCO ABUSE COUNSELING: ICD-10-CM

## 2019-07-29 DIAGNOSIS — C32.0 CARCINOMA OF GLOTTIS (H): Primary | ICD-10-CM

## 2019-07-29 DIAGNOSIS — Z92.3 HISTORY OF RADIATION THERAPY: ICD-10-CM

## 2019-07-29 PROCEDURE — 99214 OFFICE O/P EST MOD 30 MIN: CPT | Mod: 25 | Performed by: OTOLARYNGOLOGY

## 2019-07-29 PROCEDURE — G0463 HOSPITAL OUTPT CLINIC VISIT: HCPCS | Mod: 25

## 2019-07-29 PROCEDURE — 31575 DIAGNOSTIC LARYNGOSCOPY: CPT | Performed by: OTOLARYNGOLOGY

## 2019-07-29 ASSESSMENT — PAIN SCALES - GENERAL: PAINLEVEL: NO PAIN (0)

## 2019-07-29 NOTE — LETTER
7/29/2019         RE: Jorge A Mendosa  [address]  [address]  [facility] [address]        Dear Colleague,    Thank you for referring your patient, Jorge A Mendosa, to the [facility]. Please see a copy of my visit note below.    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   Date of completion 2/14/19    I reviewed Dr. Morin's most recent note dated 2/19/2019.  Because of his lack of compliance and a very prolonged overall treatment course, there is concern that radiation therapy was ineffective.  Return for completion would certainly not be effective according to Dr. Sharp note.      He has had odynophagia prior to surgery or radiation and continues to have occasional raw or sore sensation in throat and dysphonia.  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      Last weight 150# on 2/14    Drinking water, cutting back on coffee, limited soda use      Physical Exam  /84   Pulse 77   Temp 97.7  F (36.5  C) (Tympanic)   Wt 64.9 kg (143 lb)   SpO2 98%   BMI 22.40 kg/m     General - The patient is well nourished and well developed, and appears to have good nutritional status.  Alert and oriented to person and place, interactive.  Head and Face - Normocephalic and atraumatic, with no gross asymmetry noted of the contour of the facial features.  The facial nerve is intact, with strong symmetric movements.  Neck-no palpable lymphadenopathy or thyroid mass.  Trachea is  midline.  Eyes - Extraocular movements intact.   Ears- External auditory canals are patent, tympanic membranes are intact without effusion or worrisome retractions   Nose - Nasal mucosa is pink and moist with no abnormal mucus.  The septum was irregular, turbinates of normal size and position.  No polyps, masses, or purulence noted on examination.  Mouth - Examination of the oral cavity shows pink, healthy, moist mucosa.  No lesions or ulceration noted.  The dentition are in good repair.  The tongue is mobile and midline.  Upper dentures in place  Throat - The walls of the oropharynx were smooth, pink, moist, symmetric, and had no lesions or ulcerations.  The tonsillar pillars and soft palate were symmetric.  Grade 2 symmetric tonsils bilaterally.  The uvula was midline on elevation.        Attempts at mirror laryngoscopy were not possible due to gag reflex.  Therefore I proceeded with a fiberoptic examination after informed consent.  First I sprayed both sides of the nose with a mixture of lidocaine and neosynephrine.  I then passed the scope through the nasal cavity.     The nasopharynx was mucosally covered and symmetric.  The eustachian tube openings were unobstructed.  Going further down I had a clear view of the base of tongue which had normal appearing lingual tonsillar tissue.  The base of tongue was free of lesions, and the vallecula was open.  The epiglottis was smooth and mucosally covered.  The supraglottic larynx was then clearly visualized.  The vocal cords moved smoothly and symmetrically and were without mass and symmetric with phonation.  Mild true cord edema.   The pyriform sinuses were open and without sabrina mass or pooling of secretions upon valsalva, and the limited view of the postcricoid region did not show any lesions.  The patient tolerated the procedure well.      Impression/Plan  Jorge A Mendosa is a 63 year old male    ICD-10-CM    1. Carcinoma of glottis (H) C32.0    2. Tobacco abuse  counseling Z71.6    3. History of radiation therapy Z92.3      [id number] glottic squamous cell carcinoma  GADIEL  Guarded prognosis due to prolonged treatment course  Follow up with BANG Ly in 3 months  Follow up with Dr. Mcnally in 6 months  Quit Smoking  Increase Water  We will share visits so I will see him every 6 months sooner if necessary    Fransisco is very thankful there is no evidence of recurrent disease but I did reiterate his risk of recurrence glottic cancer or a second primary head neck cancer due to continued tobacco use and prolonged radiation therapy course.  He has cut back on tobacco and today has only minimal odynophagia.   Tobacco cessation was strongly encouraged.  The associated risk of head and neck cancer was discussed.  Every year of cessation offers health benefits. This was discussed with the patient today and they voiced understanding.  Quit tools and a nicotine patch were offered.            Taisha Mcnally D.O.  Otolaryngology/Head and Neck Surgery  Allergy              Impression/Plan  ST/swallow therapy       Again, thank you for allowing me to participate in the care of your patient.        Sincerely,        Taisha Mcnally MD

## 2019-07-29 NOTE — NURSING NOTE
"Chief Complaint   Patient presents with     Follow Up     Laryngeal Cancer, Dysphonia       Initial /84   Pulse 77   Temp 97.7  F (36.5  C) (Tympanic)   Wt 64.9 kg (143 lb)   SpO2 98%   BMI 22.40 kg/m   Estimated body mass index is 22.4 kg/m  as calculated from the following:    Height as of 12/4/18: 1.702 m (5' 7\").    Weight as of this encounter: 64.9 kg (143 lb).  Medication Reconciliation: complete  "

## 2019-07-29 NOTE — PROGRESS NOTES
Otolaryngology Progress Note        Presents for evaluation of a [id number] glottic squamous cell carcinoma  He has completed radiation therapy but had a prolonged course of treatment due to continued complaints of odynophagia.   Date of completion 2/14/19    I reviewed Dr. Morin's most recent note dated 2/19/2019.  Because of his lack of compliance and a very prolonged overall treatment course, there is concern that radiation therapy was ineffective.  Return for completion would certainly not be effective according to Dr. Morins note.      He has had odynophagia prior to surgery or radiation and continues to have occasional raw or sore sensation in throat and dysphonia.  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      Last weight 150# on 2/14    Drinking water, cutting back on coffee, limited soda use      Physical Exam  /84   Pulse 77   Temp 97.7  F (36.5  C) (Tympanic)   Wt 64.9 kg (143 lb)   SpO2 98%   BMI 22.40 kg/m    General - The patient is well nourished and well developed, and appears to have good nutritional status.  Alert and oriented to person and place, interactive.  Head and Face - Normocephalic and atraumatic, with no gross asymmetry noted of the contour of the facial features.  The facial nerve is intact, with strong symmetric movements.  Neck-no palpable lymphadenopathy or thyroid mass.  Trachea is midline.  Eyes - Extraocular movements intact.   Ears- External auditory canals are patent, tympanic membranes are intact without effusion or worrisome retractions   Nose - Nasal mucosa is pink and moist with no abnormal mucus.  The septum was irregular,  turbinates of normal size and position.  No polyps, masses, or purulence noted on examination.  Mouth - Examination of the oral cavity shows pink, healthy, moist mucosa.  No lesions or ulceration noted.  The dentition are in good repair.  The tongue is mobile and midline.  Upper dentures in place  Throat - The walls of the oropharynx were smooth, pink, moist, symmetric, and had no lesions or ulcerations.  The tonsillar pillars and soft palate were symmetric.  Grade 2 symmetric tonsils bilaterally.  The uvula was midline on elevation.        Attempts at mirror laryngoscopy were not possible due to gag reflex.  Therefore I proceeded with a fiberoptic examination after informed consent.  First I sprayed both sides of the nose with a mixture of lidocaine and neosynephrine.  I then passed the scope through the nasal cavity.     The nasopharynx was mucosally covered and symmetric.  The eustachian tube openings were unobstructed.  Going further down I had a clear view of the base of tongue which had normal appearing lingual tonsillar tissue.  The base of tongue was free of lesions, and the vallecula was open.  The epiglottis was smooth and mucosally covered.  The supraglottic larynx was then clearly visualized.  The vocal cords moved smoothly and symmetrically and were without mass and symmetric with phonation.  Mild true cord edema.   The pyriform sinuses were open and without sabrina mass or pooling of secretions upon valsalva, and the limited view of the postcricoid region did not show any lesions.  The patient tolerated the procedure well.      Impression/Plan  Jorge A Mendosa is a 63 year old male    ICD-10-CM    1. Carcinoma of glottis (H) C32.0    2. Tobacco abuse counseling Z71.6    3. History of radiation therapy Z92.3      [id number] glottic squamous cell carcinoma  GADIEL  Guarded prognosis due to prolonged treatment course  Follow up with BANG Ly in 3 months  Follow up with Dr. Mcnally in 6 months  Quit  Smoking  Increase Water  We will share visits so I will see him every 6 months sooner if necessary    Fransisco is very thankful there is no evidence of recurrent disease but I did reiterate his risk of recurrence glottic cancer or a second primary head neck cancer due to continued tobacco use and prolonged radiation therapy course.  He has cut back on tobacco and today has only minimal odynophagia.   Tobacco cessation was strongly encouraged.  The associated risk of head and neck cancer was discussed.  Every year of cessation offers health benefits. This was discussed with the patient today and they voiced understanding.  Quit tools and a nicotine patch were offered.            Taisha Mcnally D.O.  Otolaryngology/Head and Neck Surgery  Allergy              Impression/Plan  ST/swallow therapy

## 2019-07-29 NOTE — PATIENT INSTRUCTIONS
Thank you for allowing Dr. Mcnally and our ENT team to participate in your care.  If your medications are too expensive, please give the nurse a call.  We can possibly change this medication.  If you have a scheduling or an appointment question please contact our Health Unit Coordinator at their direct line [phone].   ALL nursing questions or concerns can be directed to your ENT nurse at: [phone] - [place]    Follow up with BANG Ly in 3 months  Follow up with Dr. Mcnally in 6 months  Quit Smoking  Increase Water

## 2019-09-23 PROBLEM — F11.90 CHRONIC, CONTINUOUS USE OF OPIOIDS: Chronic | Status: ACTIVE | Noted: 2019-09-23

## 2019-10-29 ENCOUNTER — OFFICE VISIT (OUTPATIENT)
Dept: OTOLARYNGOLOGY | Facility: OTHER | Age: 64
End: 2019-10-29
Attending: PHYSICIAN ASSISTANT
Payer: MEDICARE

## 2019-10-29 VITALS
WEIGHT: 155 LBS | DIASTOLIC BLOOD PRESSURE: 82 MMHG | OXYGEN SATURATION: 99 % | HEIGHT: 68 IN | SYSTOLIC BLOOD PRESSURE: 136 MMHG | HEART RATE: 73 BPM | TEMPERATURE: 96.2 F | BODY MASS INDEX: 23.49 KG/M2

## 2019-10-29 DIAGNOSIS — R49.0 HOARSENESS: ICD-10-CM

## 2019-10-29 DIAGNOSIS — R10.13 ABDOMINAL PAIN, EPIGASTRIC: ICD-10-CM

## 2019-10-29 DIAGNOSIS — C32.0 CARCINOMA OF GLOTTIS (H): Primary | ICD-10-CM

## 2019-10-29 DIAGNOSIS — Z71.6 TOBACCO ABUSE COUNSELING: ICD-10-CM

## 2019-10-29 DIAGNOSIS — Z92.3 HISTORY OF RADIATION THERAPY: ICD-10-CM

## 2019-10-29 PROCEDURE — G0463 HOSPITAL OUTPT CLINIC VISIT: HCPCS | Mod: 25

## 2019-10-29 PROCEDURE — 31575 DIAGNOSTIC LARYNGOSCOPY: CPT | Performed by: PHYSICIAN ASSISTANT

## 2019-10-29 PROCEDURE — 99213 OFFICE O/P EST LOW 20 MIN: CPT | Mod: 25 | Performed by: PHYSICIAN ASSISTANT

## 2019-10-29 RX ORDER — PREDNISONE 20 MG/1
TABLET ORAL
Qty: 11 TABLET | Refills: 0 | Status: SHIPPED | OUTPATIENT
Start: 2019-10-29 | End: 2020-02-11

## 2019-10-29 RX ORDER — OMEPRAZOLE 40 MG/1
40 CAPSULE, DELAYED RELEASE ORAL DAILY
Qty: 90 CAPSULE | Refills: 3 | Status: SHIPPED | OUTPATIENT
Start: 2019-10-29 | End: 2020-04-23

## 2019-10-29 ASSESSMENT — PAIN SCALES - GENERAL: PAINLEVEL: WORST PAIN (10)

## 2019-10-29 ASSESSMENT — MIFFLIN-ST. JEOR: SCORE: 1462.58

## 2019-10-29 NOTE — PATIENT INSTRUCTIONS
Congratulated on tobacco cessation.   Continue with Prilosec.   Maintain good water intake.   Maintain limited caffeine intake    Start Prednisone taper.   Start Magic mouthwash  Follow up in 2-3 weeks  Speech therapy referral.     Establish with primary care provider.       Follow up with Dr. Mcnally in 3 months for f/u. ( Dr. Mcnally will monitor every 6 months)   Follow up with LORRIE Campbell in 6 months.     Thank you for allowing Kailyn Campbell PA-C and our ENT team to participate in your care.  If your medications are too expensive, please give the nurse a call.  We can possibly change this medication.  If you have a scheduling or an appointment question please contact our Health Unit Coordinator at their direct line [phone].   ALL nursing questions or concerns can be directed to your ENT nurse at: [phone] [facility]      Adult lifestyle changes to prevent LPR reviewed      Avoid eating and drinking within two to three hours prior to bedtime    Do not drink alcohol    Eat small meals and slowly    Limit problem foods:    o Caffeine  o Carbonated drinks  o Chocolate  o Peppermint  o Tomato  o Citrus fruits  o Fatty and fried foods      Lose weight    Quit smoking    Wear loose clothing

## 2019-10-29 NOTE — LETTER
10/29/2019         RE: Jorge A Mendosa  [address]  [address]  [facility] [address]        Dear Colleague,    Thank you for referring your patient, Jorge A Mendosa, to the [facility]. Please see a copy of my visit note below.    Chief Complaint   Patient presents with     Throat Problem     Pt is here for a f/u carcinoma of glottis.  [id number].        Presents for evaluation of a [id number] glottic squamous cell carcinoma  He has completed radiation therapy but had a prolonged course of treatment due to continued complaints of odynophagia.   Date of completion 2/14/19     I reviewed Dr. Morin's most recent note dated 2/19/2019.  Because of his lack of compliance and a very prolonged overall treatment course, there is concern that radiation therapy was ineffective.  Return for completion would certainly not be effective according to Dr. Sharp note.       He has had odynophagia prior to surgery or radiation and continues to have occasional raw or sore sensation in throat and dysphonia.  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  His last visit with Dr. Mcnally on 7/29/19.    Last weight 150# on 2/14     Drinking water, cutting back on coffee, limited soda use       Past Medical History:   Diagnosis Date     Chronic pain syndrome 03/07/2011     Lumbago 01/07/2002        Allergies   Allergen Reactions     Amitriptyline Other (See Comments) and Nausea     Dizziness     Celecoxib GI Disturbance     Celebrex     Contrast Dye Swelling     Difficulty swallowing during contrast given for CT neck     Current Outpatient Medications   Medication     Ascorbic Acid (VITAMIN C PO)     HYDROcodone-acetaminophen (NORCO) 7.5-325 MG  "per tablet     ibuprofen (ADVIL) 200 MG capsule     lidocaine VISCOUS (XYLOCAINE) 2 % solution     Multiple Vitamins-Minerals (MULTIVITAMIN ADULT PO)     Naproxen Sodium (ALEVE PO)     omeprazole (PRILOSEC) 40 MG capsule     oxyCODONE IR (ROXICODONE) 10 MG tablet     No current facility-administered medications for this visit.       ROS: 10 point ROS neg other than the symptoms noted above in the HPI.  /82   Pulse 73   Temp 96.2  F (35.7  C) (Tympanic)   Ht 1.727 m (5' 8\")   Wt 70.3 kg (155 lb)   SpO2 99%   BMI 23.57 kg/m       General - The patient is well nourished and well developed, and appears to have good nutritional status.  Alert and oriented to person and place, interactive.  Head and Face - Normocephalic and atraumatic, with no gross asymmetry noted of the contour of the facial features.  The facial nerve is intact, with strong symmetric movements.  Neck-no palpable lymphadenopathy or thyroid mass.  Trachea is midline.  Eyes - Extraocular movements intact.   Ears- External auditory canals are patent, tympanic membranes are intact without effusion or worrisome retractions   Nose - Nasal mucosa is pink and moist with no abnormal mucus.  The septum was irregular, turbinates of normal size and position.  No polyps, masses, or purulence noted on examination.  Mouth - Examination of the oral cavity shows pink, healthy, moist mucosa.  No lesions or ulceration noted.  The dentition are in good repair.  The tongue is mobile and midline.  Upper dentures in place  Throat - The walls of the oropharynx were smooth, pink, moist, symmetric, and had no lesions or ulcerations.  The tonsillar pillars and soft palate were symmetric.  Grade 2 symmetric tonsils bilaterally.  The uvula was midline on elevation.          Attempts at mirror laryngoscopy were not possible due to gag reflex.  Therefore I proceeded with a fiberoptic examination after informed consent.  First I sprayed both sides of the nose with a " mixture of lidocaine and neosynephrine.  I then passed the scope through the nasal cavity.      The nasopharynx was mucosally covered and symmetric.  The eustachian tube openings were unobstructed.  Going further down I had a clear view of the base of tongue which had normal appearing lingual tonsillar tissue.  The base of tongue was free of lesions, and the vallecula was open.  The epiglottis was smooth and mucosally covered.  The supraglottic larynx was then clearly visualized.  The vocal cords moved smoothly and symmetrically and were without mass and symmetric with phonation.  Mild true cord edema.    Supraglottic appears with mild edema and drainage. The pyriform sinuses were open and without sabrina mass or pooling of secretions upon valsalva, and the limited view of the postcricoid region did not show any lesions.  The patient tolerated the procedure well.    ASSESSMENT:    ICD-10-CM    1. Carcinoma of glottis (H) C32.0    2. Tobacco abuse counseling Z71.6    3. History of radiation therapy Z92.3          [id number] glottic squamous cell carcinoma  GADIEL  Guarded prognosis due to prolonged treatment course    Congratulated on tobacco cessation.   Continue with Prilosec.   Maintain good water intake.   Maintain limited caffeine intake    Start Prednisone taper.   Start Magic mouthwash  Follow up in 2-3 weeks  Speech therapy referral.     Establish with primary care provider.     Follow up with Dr. Mcnally in 3 months for f/u. ( Dr. Mcnally will monitor every 6 months)   Follow up with LORRIE Campbell in 6 months.     Fransisco is very thankful there is no evidence of recurrent disease but I did reiterate his risk of recurrence glottic cancer or a second primary head neck cancer due to continued tobacco use and prolonged radiation therapy course.  He has cut back on tobacco and today has only minimal odynophagia.   Tobacco cessation was strongly encouraged.  The associated risk of head and neck cancer was discussed.   Every year of cessation offers health benefits. This was discussed with the patient today and they voiced understanding.  Risks of oral steroid use were discussed and include psychiatric/mood changes, insomnia, stomach ulcers and potential GI bleeding, blood sugar elevation/worsening diabetes, hip/bone necrosis or bone demineralization.        Kailyn Campbell PA-C  [facility], [place]  [phone]            Again, thank you for allowing me to participate in the care of your patient.        Sincerely,        Kailyn Campbell PA-C

## 2019-10-29 NOTE — NURSING NOTE
"Chief Complaint   Patient presents with     Throat Problem     Pt is here for a f/u carcinoma of glottis.  [id number].       Initial /82   Pulse 73   Temp 96.2  F (35.7  C) (Tympanic)   Ht 1.727 m (5' 8\")   Wt 70.3 kg (155 lb)   SpO2 99%   BMI 23.57 kg/m   Estimated body mass index is 23.57 kg/m  as calculated from the following:    Height as of this encounter: 1.727 m (5' 8\").    Weight as of this encounter: 70.3 kg (155 lb).  Medication Reconciliation: complete  Ashley Castaneda LPN  "

## 2019-10-29 NOTE — PROGRESS NOTES
Chief Complaint   Patient presents with     Throat Problem     Pt is here for a f/u carcinoma of glottis.  [id number].        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   Date of completion 2/14/19     I reviewed Dr. Morin's most recent note dated 2/19/2019.  Because of his lack of compliance and a very prolonged overall treatment course, there is concern that radiation therapy was ineffective.  Return for completion would certainly not be effective according to Dr. Morins note.       He has had odynophagia prior to surgery or radiation and continues to have occasional raw or sore sensation in throat and dysphonia.  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  His last visit with Dr. Mcnally on 7/29/19.    Last weight 150# on 2/14     Drinking water, cutting back on coffee, limited soda use       Past Medical History:   Diagnosis Date     Chronic pain syndrome 03/07/2011     Lumbago 01/07/2002        Allergies   Allergen Reactions     Amitriptyline Other (See Comments) and Nausea     Dizziness     Celecoxib GI Disturbance     Celebrex     Contrast Dye Swelling     Difficulty swallowing during contrast given for CT neck     Current Outpatient Medications   Medication     Ascorbic Acid (VITAMIN C PO)     HYDROcodone-acetaminophen (NORCO) 7.5-325 MG per tablet     ibuprofen (ADVIL) 200 MG capsule     lidocaine VISCOUS (XYLOCAINE) 2 % solution     Multiple Vitamins-Minerals (MULTIVITAMIN ADULT PO)     Naproxen Sodium (ALEVE PO)     omeprazole (PRILOSEC) 40 MG capsule     oxyCODONE IR (ROXICODONE) 10 MG  "tablet     No current facility-administered medications for this visit.       ROS: 10 point ROS neg other than the symptoms noted above in the HPI.  /82   Pulse 73   Temp 96.2  F (35.7  C) (Tympanic)   Ht 1.727 m (5' 8\")   Wt 70.3 kg (155 lb)   SpO2 99%   BMI 23.57 kg/m      General - The patient is well nourished and well developed, and appears to have good nutritional status.  Alert and oriented to person and place, interactive.  Head and Face - Normocephalic and atraumatic, with no gross asymmetry noted of the contour of the facial features.  The facial nerve is intact, with strong symmetric movements.  Neck-no palpable lymphadenopathy or thyroid mass.  Trachea is midline.  Eyes - Extraocular movements intact.   Ears- External auditory canals are patent, tympanic membranes are intact without effusion or worrisome retractions   Nose - Nasal mucosa is pink and moist with no abnormal mucus.  The septum was irregular, turbinates of normal size and position.  No polyps, masses, or purulence noted on examination.  Mouth - Examination of the oral cavity shows pink, healthy, moist mucosa.  No lesions or ulceration noted.  The dentition are in good repair.  The tongue is mobile and midline.  Upper dentures in place  Throat - The walls of the oropharynx were smooth, pink, moist, symmetric, and had no lesions or ulcerations.  The tonsillar pillars and soft palate were symmetric.  Grade 2 symmetric tonsils bilaterally.  The uvula was midline on elevation.          Attempts at mirror laryngoscopy were not possible due to gag reflex.  Therefore I proceeded with a fiberoptic examination after informed consent.  First I sprayed both sides of the nose with a mixture of lidocaine and neosynephrine.  I then passed the scope through the nasal cavity.      The nasopharynx was mucosally covered and symmetric.  The eustachian tube openings were unobstructed.  Going further down I had a clear view of the base of tongue which " had normal appearing lingual tonsillar tissue.  The base of tongue was free of lesions, and the vallecula was open.  The epiglottis was smooth and mucosally covered.  The supraglottic larynx was then clearly visualized.  The vocal cords moved smoothly and symmetrically and were without mass and symmetric with phonation.  Mild true cord edema.   Supraglottic appears with mild edema and drainage. The pyriform sinuses were open and without sabrina mass or pooling of secretions upon valsalva, and the limited view of the postcricoid region did not show any lesions.  The patient tolerated the procedure well.    ASSESSMENT:    ICD-10-CM    1. Carcinoma of glottis (H) C32.0    2. Tobacco abuse counseling Z71.6    3. History of radiation therapy Z92.3          [id number] glottic squamous cell carcinoma  GADIEL  Guarded prognosis due to prolonged treatment course    Congratulated on tobacco cessation.   Continue with Prilosec.   Maintain good water intake.   Maintain limited caffeine intake    Start Prednisone taper.   Start Magic mouthwash  Follow up in 2-3 weeks  Speech therapy referral.     Establish with primary care provider.     Follow up with Dr. Mcnally in 3 months for f/u. ( Dr. Mcnally will monitor every 6 months)   Follow up with LORRIE Campbell in 6 months.     Fransisco is very thankful there is no evidence of recurrent disease but I did reiterate his risk of recurrence glottic cancer or a second primary head neck cancer due to continued tobacco use and prolonged radiation therapy course.  He has cut back on tobacco and today has only minimal odynophagia.   Tobacco cessation was strongly encouraged.  The associated risk of head and neck cancer was discussed.  Every year of cessation offers health benefits. This was discussed with the patient today and they voiced understanding.  Risks of oral steroid use were discussed and include psychiatric/mood changes, insomnia, stomach ulcers and potential GI bleeding, blood sugar  elevation/worsening diabetes, hip/bone necrosis or bone demineralization.        Kailyn Campbell PA-C  [facility], [place]  [phone]

## 2020-02-06 NOTE — PROGRESS NOTES
"  Otolaryngology Progress Note          Jorge A Mendosa is a 64 year old male    Presents for routine surveillance of a T1b N0 M0 glottic squamous cell carcinoma.    He completed radiation therapy but had a prolonged course of treatment due to continued complaints of odynophagia.  Date of completion was 2/14/2019    Jorge A states he feels horrible and refuses laryngoscopy today.  He has had 1 week of a scratchy throat, fever and severe myalgias associated with right elbow swelling.      He feels his voice has slowly worsened x 1 week  Notes increased shortness of breath and thick mucous with lower throat globus  No dysphagia or otalgia    I reviewed Dr. Morin's most recent note dated 2/19/2019.  Because of his lack of compliance and a very prolonged overall treatment course, there is concern that radiation therapy was ineffective.  Return for completion would certainly not be effective according to Dr. Sharp note    He states he smokes 2 cigarettes/day  Denies any alcohol use    Rescheduled to see me January 30 but no showed    He saw Kailyn on October 29 and there is no evidence of disease    Imaging  CT neck negative 11/27/18 aside from slight asymmetry piriform sinus on the left    Physical Exam  BP (!) 150/86 (BP Location: Left arm, Cuff Size: Adult Regular)   Pulse 118   Temp 101.3  F (38.5  C) (Tympanic)   Ht 1.727 m (5' 8\")   Wt 66.7 kg (147 lb)   SpO2 97%   BMI 22.35 kg/m    General - The patient is appears uncomfortable, agitated,  and slightly jaundiced, tolerating secretions, no stridor  Voice quality breathy and rough  Head and Face - Normocephalic and atraumatic, with no gross asymmetry noted of the contour of the facial features.  The facial nerve is intact, with strong symmetric movements.  Neck-no palpable lymphadenopathy or thyroid mass.  Trachea is midline.  Eyes - Extraocular movements intact.   Ears- External auditory canals are patent, tympanic membranes are intact without effusion " or worrisome retractions   Nose - Nasal mucosa is pink and moist with no abnormal mucus.  The septum was grossly midline and non-obstructive, turbinates of normal size and position.  No polyps, masses, or purulence noted on examination.  Mouth - Examination of the oral cavity shows pink, healthy, moist mucosa.  No lesions or ulceration noted.  The dentition are in good repair.  The tongue is mobile and midline.  Throat - The walls of the oropharynx were smooth, pink, moist, symmetric, and had no lesions or ulcerations.  The tonsillar pillars and soft palate were symmetric.  The uvula was midline on elevation.    Grade 2 symmetric tonsils no erythema, exudates or soft palate asymmetry  He shows me his swollen tender erythematous right elbow    Refuses laryngoscopy      Impression/Plan  Jorge A Mendosa is a 64 year old male    ICD-10-CM    1. Fever R50.9 Influenza A and B and RSV PCR   2. septic arthritis right elbow M00.9 clindamycin (CLEOCIN) 300 MG capsule   3. Laryngeal cancer (H) C32.9    4. History of radiation therapy Z92.3    5. Dysphonia R49.0    6. Acute pharyngitis, unspecified etiology J02.9          Should be sent directly to the ED but he refuses.  I told Cm he may have septic arthritis which may be associated with very serious complications including sudden death from staph sepsis.  This needs to be treated aggressively with aspiration and systemic antibiotics but he refuses all treatment    He refuses any further lab draws or swabs today, but did let us swab for flu    Will require strep culture, CBC and may require joint aspiration with culture, defer to Dr. Hamlin whom I have messaged, also called his office today regarding Jorge A.      T1b N0 M0 glottic squamous cell carcinoma, prolonged course of treatment, completed radiation 2/14/19  High risk of recurrence due to prolonged treatment course and continued tobacco use     Flu pending    Return for laryngoscopy within 2 weeks, he agrees to  follow up with me    Taisha Mcnally D.O.  Otolaryngology/Head and Neck Surgery  Allergy

## 2020-02-10 ENCOUNTER — OFFICE VISIT (OUTPATIENT)
Dept: OTOLARYNGOLOGY | Facility: OTHER | Age: 65
End: 2020-02-10
Attending: OTOLARYNGOLOGY
Payer: MEDICARE

## 2020-02-10 ENCOUNTER — TELEPHONE (OUTPATIENT)
Dept: FAMILY MEDICINE | Facility: OTHER | Age: 65
End: 2020-02-10

## 2020-02-10 VITALS
HEART RATE: 118 BPM | DIASTOLIC BLOOD PRESSURE: 86 MMHG | WEIGHT: 147 LBS | OXYGEN SATURATION: 97 % | HEIGHT: 68 IN | SYSTOLIC BLOOD PRESSURE: 150 MMHG | TEMPERATURE: 101.3 F | BODY MASS INDEX: 22.28 KG/M2

## 2020-02-10 DIAGNOSIS — J02.9 ACUTE PHARYNGITIS, UNSPECIFIED ETIOLOGY: ICD-10-CM

## 2020-02-10 DIAGNOSIS — Z92.3 HISTORY OF RADIATION THERAPY: ICD-10-CM

## 2020-02-10 DIAGNOSIS — M00.9 PYOGENIC ARTHRITIS OF RIGHT ELBOW, DUE TO UNSPECIFIED ORGANISM (H): Primary | ICD-10-CM

## 2020-02-10 DIAGNOSIS — R49.0 DYSPHONIA: ICD-10-CM

## 2020-02-10 DIAGNOSIS — C32.9 LARYNGEAL CANCER (H): ICD-10-CM

## 2020-02-10 LAB
FLUAV+FLUBV RNA SPEC QL NAA+PROBE: NEGATIVE
FLUAV+FLUBV RNA SPEC QL NAA+PROBE: NEGATIVE
RSV RNA SPEC NAA+PROBE: NEGATIVE
SPECIMEN SOURCE: NORMAL

## 2020-02-10 PROCEDURE — 87631 RESP VIRUS 3-5 TARGETS: CPT | Mod: ZL | Performed by: OTOLARYNGOLOGY

## 2020-02-10 PROCEDURE — 99214 OFFICE O/P EST MOD 30 MIN: CPT | Performed by: OTOLARYNGOLOGY

## 2020-02-10 PROCEDURE — G0463 HOSPITAL OUTPT CLINIC VISIT: HCPCS

## 2020-02-10 RX ORDER — CLINDAMYCIN HCL 300 MG
600 CAPSULE ORAL 3 TIMES DAILY
Qty: 42 CAPSULE | Refills: 0 | Status: SHIPPED | OUTPATIENT
Start: 2020-02-10 | End: 2020-02-11

## 2020-02-10 ASSESSMENT — MIFFLIN-ST. JEOR: SCORE: 1426.29

## 2020-02-10 NOTE — TELEPHONE ENCOUNTER
Nurse from ENT called to get patient in with pcp today or tomorrow.  At ent with fever, aches, Right elbow  Swollen and hot.   Advised to go to Emergency dept.     Hyun MCWILLIAMS LPN

## 2020-02-10 NOTE — LETTER
"    2/10/2020         RE: Jorge A Mendosa  [address]   [address]  [facility] [address]        Dear Colleague,    Thank you for referring your patient, Jorge A Mendosa, to the [facility]. Please see a copy of my visit note below.      Otolaryngology Progress Note          Jorge A Mendosa is a 64 year old male    Presents for routine surveillance of a T1b N0 M0 glottic squamous cell carcinoma.    He completed radiation therapy but had a prolonged course of treatment due to continued complaints of odynophagia.  Date of completion was 2/14/2019    Jorge A states he feels horrible and refuses laryngoscopy today.  He has had 1 week of a scratchy throat, fever and severe myalgias associated with right elbow swelling.      He feels his voice has slowly worsened x 1 week  Notes increased shortness of breath and thick mucous with lower throat globus  No dysphagia or otalgia    I reviewed Dr. Morin's most recent note dated 2/19/2019.  Because of his lack of compliance and a very prolonged overall treatment course, there is concern that radiation therapy was ineffective.  Return for completion would certainly not be effective according to Dr. Sharp note    He states he smokes 2 cigarettes/day  Denies any alcohol use    Rescheduled to see me January 30 but no showed    He saw Kailyn on October 29 and there is no evidence of disease    Imaging  CT neck negative 11/27/18 aside from slight asymmetry piriform sinus on the left    Physical Exam  BP (!) 150/86 (BP Location: Left arm, Cuff Size: Adult Regular)   Pulse 118   Temp 101.3  F (38.5  C) (Tympanic)   Ht 1.727 m (5' 8\")   Wt 66.7 kg (147 lb)   SpO2 97%   BMI 22.35 kg/m     General - The patient is appears uncomfortable, agitated,  and slightly jaundiced, tolerating secretions, no stridor  Voice quality breathy and rough  Head and Face - Normocephalic and atraumatic, with no gross asymmetry noted of the contour of the facial features.  The " facial nerve is intact, with strong symmetric movements.  Neck-no palpable lymphadenopathy or thyroid mass.  Trachea is midline.  Eyes - Extraocular movements intact.   Ears- External auditory canals are patent, tympanic membranes are intact without effusion or worrisome retractions   Nose - Nasal mucosa is pink and moist with no abnormal mucus.  The septum was grossly midline and non-obstructive, turbinates of normal size and position.  No polyps, masses, or purulence noted on examination.  Mouth - Examination of the oral cavity shows pink, healthy, moist mucosa.  No lesions or ulceration noted.  The dentition are in good repair.  The tongue is mobile and midline.  Throat - The walls of the oropharynx were smooth, pink, moist, symmetric, and had no lesions or ulcerations.  The tonsillar pillars and soft palate were symmetric.  The uvula was midline on elevation.    Grade 2 symmetric tonsils no erythema, exudates or soft palate asymmetry  He shows me his swollen tender erythematous right elbow    Refuses laryngoscopy      Impression/Plan  Jorge A PHOENIX Mendosa is a 64 year old male    ICD-10-CM    1. Fever R50.9 Influenza A and B and RSV PCR   2. septic arthritis right elbow M00.9 clindamycin (CLEOCIN) 300 MG capsule   3. Laryngeal cancer (H) C32.9    4. History of radiation therapy Z92.3    5. Dysphonia R49.0    6. Acute pharyngitis, unspecified etiology J02.9          Should be sent directly to the ED but he refuses.  I told Cm he may have septic arthritis which may be associated with very serious complications including sudden death from staph sepsis.  This needs to be treated aggressively with aspiration and systemic antibiotics but he refuses all treatment    He refuses any further lab draws or swabs today, but did let us swab for flu    Will require strep culture, CBC and may require joint aspiration with culture, defer to Dr. Hamlin whom I have messaged, also called his office today regarding  Jorge A.      T1b N0 M0 glottic squamous cell carcinoma, prolonged course of treatment, completed radiation 2/14/19  High risk of recurrence due to prolonged treatment course and continued tobacco use     Flu pending    Return for laryngoscopy within 2 weeks, he agrees to follow up with me    Taisha Mcnally D.O.  Otolaryngology/Head and Neck Surgery  Allergy                  Again, thank you for allowing me to participate in the care of your patient.        Sincerely,        Taisha Mcnally MD

## 2020-02-10 NOTE — NURSING NOTE
"Chief Complaint   Patient presents with     RECHECK     Follow Up Glottic Squamous Cell Carcinoma---[id number]       Initial BP (!) 150/86 (BP Location: Left arm, Cuff Size: Adult Regular)   Pulse 118   Temp 101.3  F (38.5  C) (Tympanic)   Ht 1.727 m (5' 8\")   Wt 66.7 kg (147 lb)   SpO2 97%   BMI 22.35 kg/m   Estimated body mass index is 22.35 kg/m  as calculated from the following:    Height as of this encounter: 1.727 m (5' 8\").    Weight as of this encounter: 66.7 kg (147 lb).  Medication Reconciliation: complete  Ele Garcia LPN  "

## 2020-02-10 NOTE — PATIENT INSTRUCTIONS
Thank you for allowing Dr. Mcnally and our ENT team to participate in your care.  If your medications are too expensive, please give the nurse a call.  We can possibly change this medication.  If you have a scheduling or an appointment question please contact our Health Unit Coordinator at their direct line [phone].   ALL nursing questions or concerns can be directed to your ENT nurse at: [phone] - [place]    Increase water intake.  We will call with your lab results.  Follow up with Dr. Hamlin tomorrow.  Recheck in ENT within 2 weeks.

## 2020-02-11 ENCOUNTER — OFFICE VISIT (OUTPATIENT)
Dept: FAMILY MEDICINE | Facility: OTHER | Age: 65
End: 2020-02-11
Attending: FAMILY MEDICINE
Payer: MEDICARE

## 2020-02-11 VITALS
HEART RATE: 110 BPM | WEIGHT: 144 LBS | OXYGEN SATURATION: 97 % | SYSTOLIC BLOOD PRESSURE: 110 MMHG | BODY MASS INDEX: 21.9 KG/M2 | TEMPERATURE: 99.7 F | DIASTOLIC BLOOD PRESSURE: 72 MMHG

## 2020-02-11 DIAGNOSIS — M71.121 SEPTIC OLECRANON BURSITIS OF RIGHT ELBOW: Primary | ICD-10-CM

## 2020-02-11 LAB
BASOPHILS # BLD AUTO: 0 10E9/L (ref 0–0.2)
BASOPHILS NFR BLD AUTO: 0 %
DIFFERENTIAL METHOD BLD: ABNORMAL
EOSINOPHIL # BLD AUTO: 0 10E9/L (ref 0–0.7)
EOSINOPHIL NFR BLD AUTO: 0 %
ERYTHROCYTE [DISTWIDTH] IN BLOOD BY AUTOMATED COUNT: 14.4 % (ref 10–15)
HCT VFR BLD AUTO: 43.7 % (ref 40–53)
HGB BLD-MCNC: 14.5 G/DL (ref 13.3–17.7)
LYMPHOCYTES # BLD AUTO: 1.4 10E9/L (ref 0.8–5.3)
LYMPHOCYTES NFR BLD AUTO: 5.1 %
MCH RBC QN AUTO: 29.5 PG (ref 26.5–33)
MCHC RBC AUTO-ENTMCNC: 33.2 G/DL (ref 31.5–36.5)
MCV RBC AUTO: 89 FL (ref 78–100)
MONOCYTES # BLD AUTO: 2.7 10E9/L (ref 0–1.3)
MONOCYTES NFR BLD AUTO: 9.8 %
NEUTROPHILS # BLD AUTO: 23.7 10E9/L (ref 1.6–8.3)
NEUTROPHILS NFR BLD AUTO: 85.1 %
PLATELET # BLD AUTO: 244 10E9/L (ref 150–450)
PLATELET # BLD EST: ABNORMAL 10*3/UL
RBC # BLD AUTO: 4.91 10E12/L (ref 4.4–5.9)
RBC MORPH BLD: NORMAL
WBC # BLD AUTO: 27.8 10E9/L (ref 4–11)

## 2020-02-11 PROCEDURE — 87077 CULTURE AEROBIC IDENTIFY: CPT | Performed by: FAMILY MEDICINE

## 2020-02-11 PROCEDURE — 85025 COMPLETE CBC W/AUTO DIFF WBC: CPT | Performed by: FAMILY MEDICINE

## 2020-02-11 PROCEDURE — 36415 COLL VENOUS BLD VENIPUNCTURE: CPT | Performed by: FAMILY MEDICINE

## 2020-02-11 PROCEDURE — 87184 SC STD DISK METHOD PER PLATE: CPT | Performed by: FAMILY MEDICINE

## 2020-02-11 PROCEDURE — 87070 CULTURE OTHR SPECIMN AEROBIC: CPT | Performed by: FAMILY MEDICINE

## 2020-02-11 PROCEDURE — 20605 DRAIN/INJ JOINT/BURSA W/O US: CPT | Performed by: FAMILY MEDICINE

## 2020-02-11 PROCEDURE — 87205 SMEAR GRAM STAIN: CPT | Performed by: FAMILY MEDICINE

## 2020-02-11 PROCEDURE — 99214 OFFICE O/P EST MOD 30 MIN: CPT | Mod: 25 | Performed by: FAMILY MEDICINE

## 2020-02-11 RX ORDER — DICLOXACILLIN SODIUM 500 MG
500 CAPSULE ORAL 4 TIMES DAILY
Qty: 40 CAPSULE | Refills: 0 | Status: SHIPPED | OUTPATIENT
Start: 2020-02-11 | End: 2020-04-23

## 2020-02-11 ASSESSMENT — PAIN SCALES - GENERAL: PAINLEVEL: WORST PAIN (10)

## 2020-02-11 ASSESSMENT — PATIENT HEALTH QUESTIONNAIRE - PHQ9: SUM OF ALL RESPONSES TO PHQ QUESTIONS 1-9: 17
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
## 2020-02-11 NOTE — NURSING NOTE
"Chief Complaint   Patient presents with     Musculoskeletal Problem       Initial /72   Pulse 110   Temp 99.7  F (37.6  C) (Tympanic)   Wt 65.3 kg (144 lb)   SpO2 97%   BMI 21.90 kg/m   Estimated body mass index is 21.9 kg/m  as calculated from the following:    Height as of 2/10/20: 1.727 m (5' 8\").    Weight as of this encounter: 65.3 kg (144 lb).  Medication Reconciliation: complete  Veronica Cole MA    "

## 2020-02-12 ENCOUNTER — TELEPHONE (OUTPATIENT)
Dept: FAMILY MEDICINE | Facility: OTHER | Age: 65
End: 2020-02-12

## 2020-02-12 LAB
GRAM STN SPEC: ABNORMAL
SPECIMEN SOURCE: ABNORMAL

## 2020-02-12 NOTE — TELEPHONE ENCOUNTER
DATE:  2/12/2020   TIME OF RECEIPT FROM LAB:  0833  LAB TEST:  Gram stain  LAB VALUE:  Many gram positive cocci in chains  RESULTS GIVEN WITH READ-BACK TO (PROVIDER):  LAWRENCE MORALES  TIME LAB VALUE REPORTED TO PROVIDER:   1400  Sarah Paula LPN

## 2020-02-13 ENCOUNTER — HOSPITAL ENCOUNTER (EMERGENCY)
Facility: HOSPITAL | Age: 65
Discharge: HOME | End: 2020-02-13
Attending: EMERGENCY MEDICINE | Admitting: EMERGENCY MEDICINE
Payer: MEDICARE

## 2020-02-13 VITALS
SYSTOLIC BLOOD PRESSURE: 114 MMHG | HEIGHT: 68 IN | BODY MASS INDEX: 21.82 KG/M2 | WEIGHT: 144 LBS | OXYGEN SATURATION: 97 % | RESPIRATION RATE: 16 BRPM | HEART RATE: 84 BPM | DIASTOLIC BLOOD PRESSURE: 61 MMHG | TEMPERATURE: 98.1 F

## 2020-02-13 DIAGNOSIS — L03.113 CELLULITIS OF RIGHT ELBOW: ICD-10-CM

## 2020-02-13 LAB
BASOPHILS # BLD AUTO: 0 10E9/L (ref 0–0.2)
BASOPHILS NFR BLD AUTO: 0.1 %
DIFFERENTIAL METHOD BLD: ABNORMAL
EOSINOPHIL # BLD AUTO: 0 10E9/L (ref 0–0.7)
EOSINOPHIL NFR BLD AUTO: 0.2 %
ERYTHROCYTE [DISTWIDTH] IN BLOOD BY AUTOMATED COUNT: 13.7 % (ref 10–15)
HCT VFR BLD AUTO: 42.8 % (ref 40–53)
HGB BLD-MCNC: 13.9 G/DL (ref 13.3–17.7)
IMM GRANULOCYTES # BLD: 0.1 10E9/L (ref 0–0.4)
IMM GRANULOCYTES NFR BLD: 0.7 %
LYMPHOCYTES # BLD AUTO: 1.4 10E9/L (ref 0.8–5.3)
LYMPHOCYTES NFR BLD AUTO: 9.6 %
MCH RBC QN AUTO: 28.7 PG (ref 26.5–33)
MCHC RBC AUTO-ENTMCNC: 32.5 G/DL (ref 31.5–36.5)
MCV RBC AUTO: 88 FL (ref 78–100)
MONOCYTES # BLD AUTO: 1.1 10E9/L (ref 0–1.3)
MONOCYTES NFR BLD AUTO: 8 %
NEUTROPHILS # BLD AUTO: 11.4 10E9/L (ref 1.6–8.3)
NEUTROPHILS NFR BLD AUTO: 81.4 %
NRBC # BLD AUTO: 0 10*3/UL
NRBC BLD AUTO-RTO: 0 /100
PLATELET # BLD AUTO: 272 10E9/L (ref 150–450)
RBC # BLD AUTO: 4.85 10E12/L (ref 4.4–5.9)
WBC # BLD AUTO: 14 10E9/L (ref 4–11)

## 2020-02-13 PROCEDURE — 99283 EMERGENCY DEPT VISIT LOW MDM: CPT | Mod: Z6 | Performed by: EMERGENCY MEDICINE

## 2020-02-13 PROCEDURE — 85025 COMPLETE CBC W/AUTO DIFF WBC: CPT | Performed by: EMERGENCY MEDICINE

## 2020-02-13 PROCEDURE — 36415 COLL VENOUS BLD VENIPUNCTURE: CPT | Performed by: EMERGENCY MEDICINE

## 2020-02-13 PROCEDURE — 99283 EMERGENCY DEPT VISIT LOW MDM: CPT

## 2020-02-13 ASSESSMENT — MIFFLIN-ST. JEOR: SCORE: 1412.68
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
## 2020-02-13 NOTE — DISCHARGE INSTRUCTIONS
Return if any concerns or any worsening in any fashion.  Continue antibiotics as discussed.  Follow-up with your doctor.

## 2020-02-13 NOTE — ED NOTES
"Pt to the ED with friend with report of right arm cellulitis. Pt states had wound drained on Tuesday by Dr Hamlin. Redness was outlined and all redness is decreased from borders with exception of distal RFA. No drainage, no increase warmth, decrease pain.  Afebrile. Pt reports \"it's better\". Assessment is complete. MD remains in with pt. Call light is within reach.   "

## 2020-02-14 DIAGNOSIS — M71.121 SEPTIC OLECRANON BURSITIS OF RIGHT ELBOW: Primary | ICD-10-CM

## 2020-02-15 LAB
BACTERIA SPEC CULT: ABNORMAL
BACTERIA SPEC CULT: ABNORMAL
SPECIMEN SOURCE: ABNORMAL

## 2020-03-02 ENCOUNTER — HEALTH MAINTENANCE LETTER (OUTPATIENT)
Age: 65
End: 2020-03-02

## 2020-04-03 ENCOUNTER — PATIENT OUTREACH (OUTPATIENT)
Dept: CARE COORDINATION | Facility: OTHER | Age: 65
End: 2020-04-03

## 2020-04-03 DIAGNOSIS — F11.90 OPIOID USE DISORDER: Primary | ICD-10-CM

## 2020-04-03 ASSESSMENT — ACTIVITIES OF DAILY LIVING (ADL): DEPENDENT_IADLS:: INDEPENDENT
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
## 2020-04-06 ENCOUNTER — APPOINTMENT (OUTPATIENT)
Dept: LAB | Facility: OTHER | Age: 65
End: 2020-04-06
Attending: FAMILY MEDICINE
Payer: MEDICARE

## 2020-04-06 ENCOUNTER — PATIENT OUTREACH (OUTPATIENT)
Dept: CARE COORDINATION | Facility: OTHER | Age: 65
End: 2020-04-06

## 2020-04-06 ENCOUNTER — OFFICE VISIT (OUTPATIENT)
Dept: FAMILY MEDICINE | Facility: OTHER | Age: 65
End: 2020-04-06
Attending: FAMILY MEDICINE
Payer: MEDICARE

## 2020-04-06 VITALS
WEIGHT: 156 LBS | HEART RATE: 76 BPM | BODY MASS INDEX: 23.72 KG/M2 | OXYGEN SATURATION: 98 % | DIASTOLIC BLOOD PRESSURE: 76 MMHG | RESPIRATION RATE: 22 BRPM | TEMPERATURE: 98 F | SYSTOLIC BLOOD PRESSURE: 106 MMHG

## 2020-04-06 DIAGNOSIS — F11.90 OPIOID USE DISORDER: ICD-10-CM

## 2020-04-06 DIAGNOSIS — Z11.59 ENCOUNTER FOR SCREENING FOR OTHER VIRAL DISEASES: ICD-10-CM

## 2020-04-06 DIAGNOSIS — R89.2 ABNORMAL DRUG SCREEN: ICD-10-CM

## 2020-04-06 DIAGNOSIS — F11.90 OPIOID USE DISORDER: Primary | ICD-10-CM

## 2020-04-06 LAB
ALBUMIN SERPL-MCNC: 3.9 G/DL (ref 3.4–5)
ALP SERPL-CCNC: 112 U/L (ref 40–150)
ALT SERPL W P-5'-P-CCNC: 93 U/L (ref 0–70)
AMPHETAMINES UR QL: ABNORMAL NG/ML
ANION GAP SERPL CALCULATED.3IONS-SCNC: 3 MMOL/L (ref 3–14)
AST SERPL W P-5'-P-CCNC: 48 U/L (ref 0–45)
BARBITURATES UR QL SCN: NOT DETECTED NG/ML
BENZODIAZ UR QL SCN: NOT DETECTED NG/ML
BILIRUB DIRECT SERPL-MCNC: 0.1 MG/DL (ref 0–0.2)
BILIRUB SERPL-MCNC: 0.4 MG/DL (ref 0.2–1.3)
BUN SERPL-MCNC: 13 MG/DL (ref 7–30)
BUPRENORPHINE UR QL: NOT DETECTED NG/ML
CALCIUM SERPL-MCNC: 8.8 MG/DL (ref 8.5–10.1)
CANNABINOIDS UR QL: NOT DETECTED NG/ML
CHLORIDE SERPL-SCNC: 107 MMOL/L (ref 94–109)
CO2 SERPL-SCNC: 28 MMOL/L (ref 20–32)
COCAINE UR QL SCN: NOT DETECTED NG/ML
CREAT SERPL-MCNC: 0.7 MG/DL (ref 0.66–1.25)
D-METHAMPHET UR QL: ABNORMAL NG/ML
ERYTHROCYTE [DISTWIDTH] IN BLOOD BY AUTOMATED COUNT: 14.9 % (ref 10–15)
GFR SERPL CREATININE-BSD FRML MDRD: >90 ML/MIN/{1.73_M2}
GLUCOSE SERPL-MCNC: 104 MG/DL (ref 70–99)
HCT VFR BLD AUTO: 43.8 % (ref 40–53)
HGB BLD-MCNC: 14.1 G/DL (ref 13.3–17.7)
MCH RBC QN AUTO: 29.2 PG (ref 26.5–33)
MCHC RBC AUTO-ENTMCNC: 32.2 G/DL (ref 31.5–36.5)
MCV RBC AUTO: 91 FL (ref 78–100)
METHADONE UR QL SCN: NOT DETECTED NG/ML
OPIATES UR QL SCN: NOT DETECTED NG/ML
OXYCODONE UR QL SCN: NOT DETECTED NG/ML
PCP UR QL SCN: NOT DETECTED NG/ML
PLATELET # BLD AUTO: 228 10E9/L (ref 150–450)
POTASSIUM SERPL-SCNC: 4 MMOL/L (ref 3.4–5.3)
PROPOXYPH UR QL: NOT DETECTED NG/ML
PROT SERPL-MCNC: 7.4 G/DL (ref 6.8–8.8)
RBC # BLD AUTO: 4.83 10E12/L (ref 4.4–5.9)
SODIUM SERPL-SCNC: 138 MMOL/L (ref 133–144)
TRICYCLICS UR QL SCN: NOT DETECTED NG/ML
WBC # BLD AUTO: 7.4 10E9/L (ref 4–11)

## 2020-04-06 PROCEDURE — 85027 COMPLETE CBC AUTOMATED: CPT | Mod: ZL | Performed by: FAMILY MEDICINE

## 2020-04-06 PROCEDURE — 80307 DRUG TEST PRSMV CHEM ANLYZR: CPT | Mod: ZL | Performed by: FAMILY MEDICINE

## 2020-04-06 PROCEDURE — G0463 HOSPITAL OUTPT CLINIC VISIT: HCPCS

## 2020-04-06 PROCEDURE — G0472 HEP C SCREEN HIGH RISK/OTHER: HCPCS | Mod: ZL | Performed by: FAMILY MEDICINE

## 2020-04-06 PROCEDURE — G0499 HEPB SCREEN HIGH RISK INDIV: HCPCS | Mod: ZL,GZ | Performed by: FAMILY MEDICINE

## 2020-04-06 PROCEDURE — 80048 BASIC METABOLIC PNL TOTAL CA: CPT | Mod: ZL | Performed by: FAMILY MEDICINE

## 2020-04-06 PROCEDURE — 99214 OFFICE O/P EST MOD 30 MIN: CPT | Performed by: FAMILY MEDICINE

## 2020-04-06 PROCEDURE — 87389 HIV-1 AG W/HIV-1&-2 AB AG IA: CPT | Mod: ZL | Performed by: FAMILY MEDICINE

## 2020-04-06 PROCEDURE — 86706 HEP B SURFACE ANTIBODY: CPT | Mod: ZL | Performed by: FAMILY MEDICINE

## 2020-04-06 PROCEDURE — 80306 DRUG TEST PRSMV INSTRMNT: CPT | Mod: ZL | Performed by: FAMILY MEDICINE

## 2020-04-06 PROCEDURE — 80076 HEPATIC FUNCTION PANEL: CPT | Mod: ZL | Performed by: FAMILY MEDICINE

## 2020-04-06 PROCEDURE — 36415 COLL VENOUS BLD VENIPUNCTURE: CPT | Mod: ZL | Performed by: FAMILY MEDICINE

## 2020-04-06 RX ORDER — BUPRENORPHINE AND NALOXONE 4; 1 MG/1; MG/1
1 FILM, SOLUBLE BUCCAL; SUBLINGUAL 2 TIMES DAILY
Qty: 10 EACH | Refills: 0 | Status: SHIPPED | OUTPATIENT
Start: 2020-04-06 | End: 2020-04-09

## 2020-04-06 ASSESSMENT — ACTIVITIES OF DAILY LIVING (ADL): DEPENDENT_IADLS:: INDEPENDENT

## 2020-04-06 ASSESSMENT — PAIN SCALES - GENERAL: PAINLEVEL: MODERATE PAIN (5)

## 2020-04-06 NOTE — PATIENT INSTRUCTIONS
Telephone follow up visit this Thursday.  Start Suboxone 4 mg twice daily.  Drug screen sent out for confirmatory regarding methamphetamine/amphetamine.    Consent forms signed.

## 2020-04-06 NOTE — PROGRESS NOTES
Clinic Care Coordination Contact  Care Team Conversations    CC attended office visit with pt and Dr. Hicks this date.   Please refer to Dr. Hicks's note for plan of care.  Suboxone program went over in detail with pt.  Informed consent and treatment agreement signed.  Electronic communication form signed.  Narcan education provided.  Dicussed risks/benefits.  Risks of benzos/ETOH use discussed.  All questions answered.  Encouraged pt to call/text CC with any questions/concerns/problems.  Verbalized understanding.    Margot Joel RN-BSN  Clinic Care Coordinator  [phone] opt. #[phone]

## 2020-04-06 NOTE — LETTER
Informed  Consent  and  Agreement  for  Buprenorphine/Naloxone Treatment  Of Opioid Dependence   Jorge A [place]  [id number]         April 6, 2020        Buprenorphine/naloxone can control symptoms of withdrawal and can decrease cravings for other opioids,  We recommend not trying to stop this medication on your own, most people who stop on their own have a relapse of their drug use.      This  agreement  provides important information  on the potential benefits and  risks of opioid medications and shows that both  you and your provider agree on a care plan so  that opioid medications are used in a way that is  safe and effective in treating your withdrawal symptoms and opioid dependence.  This agreement is reviewed and signed by all patients in our practice who  receive Buprenorphine/Naloxone (Suboxone/Subutex).           Expected  Benefits or  Goals of Buprenorphine Treatment     Decreased craving     Improved  ability to engage  in work, social, recreational  and/or physical activities     Improved  quality of life           Potential Risks or Side Effects of Buprenorphine Treatment    Overdose Taking more than the prescribed amount of medication or using with alcohol or other drugs can cause you to stop breathing, resulting in coma, brain damage, or even death.    Risk of overdose to other family members, children are at high risk of being harmed by buprenorphine    Physical side effects May include mood changes, drowsiness, nausea, constipation, urination difficulties, depressed breathing, itching, bone thinning and sexual difficulties, such as lowering male hormone in men and stopping menstrual periods in women.    Withdrawal Buprenorphine/naloxone can bring on severe withdrawal if taken with other opioids.   Physical dependence Suddenly stopping buprenorphine may lead to withdrawal symptoms including abdominal cramping, pain, diarrhea, sweating, anxiety, irritability and aching.   Hyperalgesia The use of  opioids can increase the experience of pain. If this happens, it may require change or discontinuation of medication.    Sleep apnea  Sleep apnea (periods of not breathing while asleep) may be caused or worsened by opioids.   Risk to unborn child Risks to unborn children may include: physical dependence at birth, possible alterations in pain perception, possible increased risk for addiction later in life, among others. Tell your provider if you are or intend to become pregnant.  Do NOT stop buprenorphine if you become pregnant without discussing with your provider.    Victimization There is a risk that you or someone in your household may be the victim of theft, deceit, assault or abuse by people trying to take your medications to misuse them.                 Responsibilities  in Buprenorphine/naloxone Therapy  of Opioid Addiction   Your provider's responsibilities:   Listening carefully to your concerns, treating you with care and with due respect, and making clinical decisions based on what he/she believes is in your best interest.    Your responsibilities:   In order to maximize the potential benefits of buprenorphine and to minimize the potential risks, it is important that you accept the following responsibilities.    We are a primary care clinic, not an addiction clinic.    In signing this agreement, you agree to:  1. Use your buprenorphine medications as prescribed for the purpose of treating opioid addiction/opioid use disorder.  2. BE HONEST! We will work with you to help with your opioid dependence if you are upfront with us.  3. Be on time for appointments. Frequent late or missed appointments will result in termination from our program.  4. Be respectful and considerate to all staff and providers at our clinic.  Rude or aggressive behavior to staff including providers, nursing staff,  and any others will result in termination from the program.  5. Avoid excessive alcohol or other dangerous  recreational drug use, especially Benzodiazepines (like Xanax/alprozolam, Ativan/lorazepam, Valium/diazepam) while being prescribed buprenorphine. Evidence of this occurring may result in referral for consultation or higher level of care and possible termination from our program.  6. Not share, sell, trade or in any way provide your medications to others.  Evidence of this occurring may result in termination from our buprenorphine program.  7. Not receive controlled substances from other clinics without discussion with your buprenorphine physician.  Evidence of this occurring may result in termination from our buprenorphine program.  8. Starting or increasing dosage may result in difficulty concentrating; so we would recommend having a  to bring you to appointments where this may happen   9. If opioids are prescribed unexpectedly by another office (for example due to an accident or dental procedure), inform this office within 24 hours and bring documentation of the visit.  10. Have urine/blood drug tests on a random basis and as requested by your provider. Be ready for this at each appointment.  11. Bring your medication to the clinic when requested.  12. Store your medication in a secure location away from children.  13. Participate in other chemical dependency treatments or other forms of care agreed to with your provider.  You are expected to keep these appointments and follow your care plan. Buprenorphine alone is not enough to overcome addiction/dependence; you are required to have a care plan beyond just Buprenorphine.  14. Permit this practice to communicate with other care providers and/or your significant  others as needed to assure buprenorphine is being used appropriately and is beneficial to  your health and well-being.  15.  Plan ahead for refills. You are responsible for planning your appointments to get refills on time. If you cannot schedule with your primary provider, it is your responsibility  to schedule with another buprenorphine provider.  If you are restricted to just on provider then arrangements must be made in advance if you need refills when your provider is not available.  16. No phone refills - you must be seen in clinic by one of our physicians that prescribe buprenorphine.  17. Agree to purchase all buprenorphine/naloxone, and any other medications related to my treatment from one pharmacy.   18. No early refills. If you lose your meds, run out early, or have your meds stolen, we will not give early refills. We CAN, however, help with symptoms of withdrawal. Call us if you have any trouble, and we will try to help. We recommend a system to help lock up meds.  19.  I understand that my clinic can track controlled substance prescriptions from other providers through a central database (prescription monitoring program).  20  To tell my clinic right away if I become pregnant or have a new medical problem treated at another clinic.    Your medications may be continued if they assist you in maintaining sobriety, help you engage in valued activities, and/or enhance your quality of life and you follow the above responsibilities.  Your medications may be discontinued if your goals for treatment are not met, if you experience negative effects from using them, or if you do not follow this agreement.    I have reviewed this document and have been given the opportunity to have any questions  answered. I understand the possible benefits and risks of buprenorphine medications and I accept the  responsibilities described above.    __________________________________        ____________________________________  Patient     Date          Regina Hobbs MD                     Date

## 2020-04-06 NOTE — NURSING NOTE
"No chief complaint on file.      Initial /76 (BP Location: Left arm, Patient Position: Sitting, Cuff Size: Adult Regular)   Pulse 76   Temp 98  F (36.7  C) (Tympanic)   Resp 22   Wt 70.8 kg (156 lb)   SpO2 98%   BMI 23.72 kg/m   Estimated body mass index is 23.72 kg/m  as calculated from the following:    Height as of 2/13/20: 1.727 m (5' 8\").    Weight as of this encounter: 70.8 kg (156 lb).  Medication Reconciliation: complete  Yuli Thompson LPN    "

## 2020-04-07 ENCOUNTER — PATIENT OUTREACH (OUTPATIENT)
Dept: CARE COORDINATION | Facility: OTHER | Age: 65
End: 2020-04-07

## 2020-04-07 LAB
HBV SURFACE AB SERPL IA-ACNC: 0 M[IU]/ML
HBV SURFACE AG SERPL QL IA: NONREACTIVE
HCV AB SERPL QL IA: NONREACTIVE
HIV 1+2 AB+HIV1 P24 AG SERPL QL IA: NONREACTIVE

## 2020-04-08 LAB — PAIN DRUG SCR UR W RPTD MEDS: NORMAL

## 2020-04-08 NOTE — PROGRESS NOTES
"Subjective Initial Suboxone Visit    Jorge A Mendosa is a 64 year old male who is being evaluated via a billable telephone visit.      The patient has been notified of following:     \"This telephone visit will be conducted via a call between you and your physician/provider. We have found that certain health care needs can be provided without the need for a physical exam.  This service lets us provide the care you need with a short phone conversation.  If a prescription is necessary we can send it directly to your pharmacy.  If lab work is needed we can place an order for that and you can then stop by our lab to have the test done at a later time.    Telephone visits are billed at different rates depending on your insurance coverage. During this emergency period, for some insurers they may be billed the same as an in-person visit.  Please reach out to your insurance provider with any questions.    If during the course of the call the physician/provider feels a telephone visit is not appropriate, you will not be charged for this service.\"    Patient has given verbal consent for Telephone visit?  Yes    How would you like to obtain your AVS? MyChart    Follow-up Buprenorphine Visit           HPI       Jorge A Mendosa is here for f/u on buprenophine/naloxone (Suboxone) therapy for opioid use disorder.  opioid abuse disorder    Jorge A is currently taking 4/1 mg of Buprenorphine/Naloxone 2 times daily.     Status since last visit:    Since last visit patient has been: doing well.     Intensity:     There has been: no craving.      Suboxone Dose: adequate.    Progression of Symptoms:     Cues to use and relapse end of day    Recovery program has been: solid.   Accompanying Signs & Symptoms:    Side Effects: nausea first few days.  Resolved. Mild sedation at first.  Sobriety:     Status: no use since last visit.      Drug Screen:   Unable due to Covid 19.  Precipitating factors:    Triggers have been: mild. "   Alleviating factors:    Contact with sponsor has been: no sponsor.     Family and support system has been: helpful. Daughter, sister  Other Therapies Tried :     Patient has been going to recovery meetings:not at all.      Other medical concerns: No    Any ED visits? No     Labs - no immunity to hepatitis B.  Needs hepatitis A and B vaccines.  Hep C negative.  Mild liver enzyme elevation.    Drug screen was positive for methamphetamine on confirmatory.  Patient still denies known use.  States he was around it being smoked that 1 time.  Possible that he may have taken a pill that was something other than narcotic and not known.  Adamant he doesn't use methamphetamines.  Willing to do repeat screen at any time.    Staying busy with reading, tv, short walks.      History   Smoking Status     Former Smoker     Packs/day: 0.50     Years: 30.00     Types: Cigarettes     Start date: 1/1/1976   Smokeless Tobacco     Never Used     Comment: only smokes a little bit-noted 2-       Problem, Medication and Allergy Lists were reviewed and updated if needed.  reviewed and are current..    Pharmacy Database reviewed? Yes, 4.9.20, as expected.    Patient is an established patient of this clinic..         Review of Systems:   Review of Systems  CONSTITUTIONAL: NEGATIVE for fever, chills, change in weight  INTEGUMENTARY/SKIN: NEGATIVE for worrisome rashes, moles or lesions  EYES: NEGATIVE for vision changes or irritation  ENT/MOUTH: NEGATIVE for ear, mouth and throat problems  RESP: NEGATIVE for significant cough or SOB  CV: NEGATIVE for chest pain, palpitations or peripheral edema  GI: NEGATIVE for nausea, abdominal pain, heartburn, or change in bowel habits  : NEGATIVE for frequency, dysuria, or hematuria  MUSCULOSKELETAL: NEGATIVE for significant arthralgias or myalgia  NEURO: NEGATIVE for weakness, dizziness or paresthesias  ENDOCRINE: NEGATIVE for temperature intolerance, skin/hair changes  PSYCHIATRIC: NEGATIVE for  changes in mood or affect       Assessment and Plan     Was naloxone nasal spray prescribed? No Details: Has some at home.    1. Opioid use disorder (H)  Responded well to suboxone.  Mild side effects initially.  Controlling his cravings.  Feels normal.  - buprenorphine HCl-naloxone HCl (SUBOXONE) 4-1 MG per film; Place 1 Film under the tongue 2 times daily  Dispense: 30 each; Refill: 0      Did discuss drug test result as noted above.  Will see if pattern once able to do drug screens again.    Hep A/B vaccines at next in person visit along with hepatic panel.    Dosage will not need adjustment today.  Continue at 4 mg  2 time(s) a day of  buprenorphine/naloxone (Suboxone).     Follow up Plan  Stage 2 (2 weeks): Please make a clinic appointment for follow up with me (REGINA MAGAÑA) or another Suboxone provider in 2 weeks for suboxone follow up.    Greater than 10 minutes was spent with this patient, over half of which was on counseling and coordination of care which includes importance of recovery activities,which may include  attendance at NA/AA meetings, sober support network, and the importance of not isolating, being open, honest, and willing to change, possible triggers and how to avoid them, and managing cravings.    There are no discontinued medications.    Options for treatment and follow-up care were reviewed with the patient. Jorge A engaged in the decision making process and verbalized understanding of the options discussed and agreed with the final plan.    Regina Magaña MD      Phone call duration:  13 minutes    Regina Magaña MD

## 2020-04-09 ENCOUNTER — PATIENT OUTREACH (OUTPATIENT)
Dept: CARE COORDINATION | Facility: OTHER | Age: 65
End: 2020-04-09

## 2020-04-09 ENCOUNTER — VIRTUAL VISIT (OUTPATIENT)
Dept: FAMILY MEDICINE | Facility: OTHER | Age: 65
End: 2020-04-09
Attending: FAMILY MEDICINE
Payer: MEDICARE

## 2020-04-09 DIAGNOSIS — F11.90 OPIOID USE DISORDER: Primary | ICD-10-CM

## 2020-04-09 PROCEDURE — G2012 BRIEF CHECK IN BY MD/QHP: HCPCS | Performed by: FAMILY MEDICINE

## 2020-04-09 RX ORDER — BUPRENORPHINE AND NALOXONE 4; 1 MG/1; MG/1
1 FILM, SOLUBLE BUCCAL; SUBLINGUAL 2 TIMES DAILY
Qty: 30 EACH | Refills: 0 | Status: SHIPPED | OUTPATIENT
Start: 2020-04-09 | End: 2020-04-23

## 2020-04-09 ASSESSMENT — PAIN SCALES - GENERAL: PAINLEVEL: MODERATE PAIN (5)

## 2020-04-09 ASSESSMENT — ANXIETY QUESTIONNAIRES
7. FEELING AFRAID AS IF SOMETHING AWFUL MIGHT HAPPEN: NOT AT ALL
IF YOU CHECKED OFF ANY PROBLEMS ON THIS QUESTIONNAIRE, HOW DIFFICULT HAVE THESE PROBLEMS MADE IT FOR YOU TO DO YOUR WORK, TAKE CARE OF THINGS AT HOME, OR GET ALONG WITH OTHER PEOPLE: SOMEWHAT DIFFICULT
4. TROUBLE RELAXING: MORE THAN HALF THE DAYS
1. FEELING NERVOUS, ANXIOUS, OR ON EDGE: SEVERAL DAYS
3. WORRYING TOO MUCH ABOUT DIFFERENT THINGS: SEVERAL DAYS
GAD7 TOTAL SCORE: 7
6. BECOMING EASILY ANNOYED OR IRRITABLE: SEVERAL DAYS
5. BEING SO RESTLESS THAT IT IS HARD TO SIT STILL: MORE THAN HALF THE DAYS
2. NOT BEING ABLE TO STOP OR CONTROL WORRYING: NOT AT ALL

## 2020-04-09 ASSESSMENT — ACTIVITIES OF DAILY LIVING (ADL): DEPENDENT_IADLS:: INDEPENDENT

## 2020-04-09 NOTE — PROGRESS NOTES
"Clinic Care Coordination Contact  Care Team Conversations    CC \"attended\" telephone visit with pt and Dr. Hicks this date.  Please refer to Dr. Hicks's note for plan of care.  All questions answered.  Encouraged him to call CC with any questions/concerns/problems.  Verbalized understanding.    Margot Joel RN-BSN  Clinic Care Coordinator  [phone] opt. #[phone]            "

## 2020-04-09 NOTE — NURSING NOTE
"Chief Complaint   Patient presents with     opioid abuse disorder       Initial There were no vitals taken for this visit. Estimated body mass index is 23.72 kg/m  as calculated from the following:    Height as of 2/13/20: 1.727 m (5' 8\").    Weight as of 4/6/20: 70.8 kg (156 lb).  Medication Reconciliation: complete   Unable to get vitals at home  Sherley Griffith LPN    "

## 2020-04-10 ASSESSMENT — ANXIETY QUESTIONNAIRES: GAD7 TOTAL SCORE: 7

## 2020-04-23 ENCOUNTER — PATIENT OUTREACH (OUTPATIENT)
Dept: CARE COORDINATION | Facility: OTHER | Age: 65
End: 2020-04-23

## 2020-04-23 ENCOUNTER — VIRTUAL VISIT (OUTPATIENT)
Dept: FAMILY MEDICINE | Facility: OTHER | Age: 65
End: 2020-04-23
Attending: FAMILY MEDICINE
Payer: MEDICARE

## 2020-04-23 VITALS — HEIGHT: 68 IN | BODY MASS INDEX: 23.64 KG/M2 | WEIGHT: 156 LBS

## 2020-04-23 DIAGNOSIS — F11.90 OPIOID USE DISORDER: ICD-10-CM

## 2020-04-23 DIAGNOSIS — R10.13 ABDOMINAL PAIN, EPIGASTRIC: ICD-10-CM

## 2020-04-23 PROCEDURE — 99441 ZZC PHYSICIAN TELEPHONE EVALUATION 5-10 MIN: CPT | Performed by: FAMILY MEDICINE

## 2020-04-23 RX ORDER — BUPRENORPHINE AND NALOXONE 4; 1 MG/1; MG/1
1 FILM, SOLUBLE BUCCAL; SUBLINGUAL 2 TIMES DAILY
Qty: 56 EACH | Refills: 0 | Status: SHIPPED | OUTPATIENT
Start: 2020-04-23 | End: 2020-05-21

## 2020-04-23 RX ORDER — OMEPRAZOLE 40 MG/1
40 CAPSULE, DELAYED RELEASE ORAL DAILY
Qty: 90 CAPSULE | Refills: 3 | Status: SHIPPED | OUTPATIENT
Start: 2020-04-23 | End: 2020-09-28

## 2020-04-23 ASSESSMENT — MIFFLIN-ST. JEOR: SCORE: 1467.11

## 2020-04-23 ASSESSMENT — ACTIVITIES OF DAILY LIVING (ADL): DEPENDENT_IADLS:: INDEPENDENT

## 2020-04-23 ASSESSMENT — PAIN SCALES - GENERAL: PAINLEVEL: SEVERE PAIN (6)

## 2020-04-23 NOTE — TELEPHONE ENCOUNTER
"Clinic Care Coordination Contact  Care Team Conversations    CC \"attended\" telephone visit with pt and Dr. Hicks this date.  Please refer to Dr. Hicks's note for plan of care.  Encouraged him to call CC with any questions/concerns/problems.  Verbalized understanding.    Margot Joel RN-BSN  Clinic Care Coordinator  [phone] opt. #[phone]            "

## 2020-04-23 NOTE — NURSING NOTE
"Chief Complaint   Patient presents with     Recheck Medication       Initial Ht 1.727 m (5' 8\")   Wt 70.8 kg (156 lb)   BMI 23.72 kg/m   Estimated body mass index is 23.72 kg/m  as calculated from the following:    Height as of this encounter: 1.727 m (5' 8\").    Weight as of this encounter: 70.8 kg (156 lb).  Medication Reconciliation: complete  Shani Dalal LPN  "

## 2020-04-23 NOTE — PROGRESS NOTES
"Jorge A Mendosa is a 64 year old male who is being evaluated via a billable telephone visit.      The patient has been notified of following:     \"This telephone visit will be conducted via a call between you and your physician/provider. We have found that certain health care needs can be provided without the need for a physical exam.  This service lets us provide the care you need with a short phone conversation.  If a prescription is necessary we can send it directly to your pharmacy.  If lab work is needed we can place an order for that and you can then stop by our lab to have the test done at a later time.    Telephone visits are billed at different rates depending on your insurance coverage. During this emergency period, for some insurers they may be billed the same as an in-person visit.  Please reach out to your insurance provider with any questions.    If during the course of the call the physician/provider feels a telephone visit is not appropriate, you will not be charged for this service.\"    Patient has given verbal consent for Telephone visit?  Yes    How would you like to obtain your AVS? Mail a copy    Follow-up Buprenorphine Visit           HPI       Jorge A Mendosa is here for f/u on buprenophine/naloxone (Suboxone) therapy for opioid use disorder.  Recheck Medication    Jorge A is currently taking 4/1 mg of Buprenorphine/Naloxone 2 times daily.     Status since last visit:     Since last visit patient has been: stable.     Intensity:     There has been: mild craving initially - when was having nausea/vomiting - didn't get full dose; took an extra strip x 4 days    Suboxone Dose: adequate.    Progression of Symptoms:     Cues to use and relapse rare; turned down 2 different people - 1 for Lortab; told them to not ask; another friend had Percocet    Recovery program has been: . n/a  Accompanying Signs & Symptoms:    Side Effects: nausea -only first few days.  Using dramamine.  Taking only as " needed.  No side effects.  OTC stool softener as needed.  Sobriety:     Status: no use since last visit.      Drug Screen:   Unable to obtain due to Covid 19    Precipitating factors:    Triggers have been: mild.   Alleviating factors:    Contact with sponsor has been: no sponsor.     Family and support system has been: helpful. daughter  Other Therapies Tried :      Patient has been going to recovery meetings:not at all.      Other medical concerns: No    Any ED visits? No     History   Smoking Status     Former Smoker     Packs/day: 0.50     Years: 30.00     Types: Cigarettes     Start date: 1/1/1976   Smokeless Tobacco     Never Used     Comment: only smokes a little bit-noted 2-       Problem, Medication and Allergy Lists were reviewed and updated if needed.  reviewed and are current..    Pharmacy Database reviewed? Yes, 4.23.20, as expected.    Patient is an established patient of this clinic..    Assessment and Plan     Was naloxone nasal spray prescribed? No Details: has some at home.  1. Abdominal pain, epigastric  Reflux managed by PPI  - omeprazole (PRILOSEC) 40 MG DR capsule; Take 1 capsule (40 mg) by mouth daily Take 30-60 minutes before a meal.  Dispense: 90 capsule; Refill: 3    2. Opioid use disorder (H)  Stable.  Mild nausea, constipation.  Managed with OTC.  Caution.  Consider 8 mg dose at night if nausea persists.  - buprenorphine HCl-naloxone HCl (SUBOXONE) 4-1 MG per film; Place 1 Film under the tongue 2 times daily  Dispense: 56 each; Refill: 0    Dosage will not need adjustment today.  Continue at 4 mg  2 time(s) a day of  buprenorphine/naloxone (Suboxone).       At follow up - hepatitis vaccines and lab to be done.      Follow up Plan  Stage 3 (4 weeks): Please make a clinic appointment for follow up with me (ABHILASH MAGAÑA) or another Suboxone provider in 1 month for suboxone follow up.    Greater than 8 minutes was spent with this patient, over half of which was on counseling and  coordination of care which includes importance of recovery activities,which may include  attendance at NA/AA meetings, sober support network, and the importance of not isolating, being open, honest, and willing to change, possible triggers and how to avoid them, and managing cravings.    Medications Discontinued During This Encounter   Medication Reason     dicloxacillin (DYNAPEN) 500 MG capsule Therapy completed     lidocaine VISCOUS (XYLOCAINE) 2 % solution Medication Reconciliation Clean Up       Options for treatment and follow-up care were reviewed with the patient. Jorge A engaged in the decision making process and verbalized understanding of the options discussed and agreed with the final plan.    Regina Hobbs MD      Phone call duration:  9 minutes    Regina Hobbs MD

## 2020-05-20 NOTE — PROGRESS NOTES
"Jorge A Mendosa is a 64 year old male who is being evaluated via a billable telephone visit.      The patient has been notified of following:     \"This telephone visit will be conducted via a call between you and your physician/provider. We have found that certain health care needs can be provided without the need for a physical exam.  This service lets us provide the care you need with a short phone conversation.  If a prescription is necessary we can send it directly to your pharmacy.  If lab work is needed we can place an order for that and you can then stop by our lab to have the test done at a later time.    Telephone visits are billed at different rates depending on your insurance coverage. During this emergency period, for some insurers they may be billed the same as an in-person visit.  Please reach out to your insurance provider with any questions.    If during the course of the call the physician/provider feels a telephone visit is not appropriate, you will not be charged for this service.\"    Patient has given verbal consent for Telephone visit?  Yes    What phone number would you like to be contacted at? [phone]    How would you like to obtain your AVS? Ольга    Subjective     Follow-up Buprenorphine Visit           HPI       Jorge A Mendosa is here for f/u on buprenophine/naloxone (Suboxone) therapy for opioid use disorder.      Jorge A is currently taking 4/1 mg of Buprenorphine/Naloxone 2 times daily.     Status since last visit:    Since last visit patient has been: stable.     Intensity:     There has been: mild craving.  \"thoughts pop into his head\" at times, but able to dismiss them    Suboxone Dose: adequate.    Progression of Symptoms:     Cues to use and relapse minimal; thoughts as above; also has had 1 person approach him and offer him opiates, but he declined    Recovery program has been: solid.   Accompanying Signs & Symptoms:    Side Effects: none.  Nausea has resolved  Sobriety: "     Status: no use since last visit.  Denies use of any illicit substances.  Denies use of methamphetamines specifically as well.    Drug Screen:   Unable to obtain due to Covid 19  Precipitating factors:    Triggers have been: mild. As above  Alleviating factors:    Contact with sponsor has been: no sponsor.     Family and support system has been: helpful. daughter  Other Therapies Tried :     Patient has been going to recovery meetings:not at all.        Other medical concerns: No    Any ED visits? No       History   Smoking Status     Former Smoker     Packs/day: 0.50     Years: 30.00     Types: Cigarettes     Start date: 1/1/1976   Smokeless Tobacco     Never Used     Comment: only smokes a little bit-noted 2-       Problem, Medication and Allergy Lists were reviewed and updated if needed.  reviewed and are current..    Pharmacy Database reviewed? Yes, 5.21.20, as expected.    Patient is an established patient of this clinic..      Assessment and Plan     Was naloxone nasal spray prescribed? Yes.  Prior.  1. Opioid use disorder (H)  Doing well overall.  Stable.  Continue current dosing.  - buprenorphine HCl-naloxone HCl (SUBOXONE) 4-1 MG per film; Place 1 Film under the tongue 2 times daily  Dispense: 56 each; Refill: 0    Dosage will not need adjustment today.  Continue at 4 mg  2 time(s) a day of  buprenorphine/naloxone (Suboxone).     Follow up Plan  Stage 3 (4 weeks): Please make a clinic appointment for follow up with me (ABHILASH MAGAÑA) or another Suboxone provider in 1 month for suboxone follow up.    Greater than 5 minutes was spent with this patient, over half of which was on counseling and coordination of care which includes importance of recovery activities,which may include  attendance at NA/AA meetings, sober support network, and the importance of not isolating, being open, honest, and willing to change, possible triggers and how to avoid them, and managing cravings.    Medications  Discontinued During This Encounter   Medication Reason     magic mouthwash (ENTER INGREDIENTS IN COMMENTS) suspension Therapy completed     buprenorphine HCl-naloxone HCl (SUBOXONE) 4-1 MG per film Reorder       Options for treatment and follow-up care were reviewed with the patient. Jorge A engaged in the decision making process and verbalized understanding of the options discussed and agreed with the final plan.    Phone call duration:  6:05 minutes    Regina Hobbs MD

## 2020-05-21 ENCOUNTER — VIRTUAL VISIT (OUTPATIENT)
Dept: FAMILY MEDICINE | Facility: OTHER | Age: 65
End: 2020-05-21
Attending: FAMILY MEDICINE
Payer: MEDICARE

## 2020-05-21 DIAGNOSIS — F11.90 OPIOID USE DISORDER: ICD-10-CM

## 2020-05-21 PROCEDURE — 99441 ZZC PHYSICIAN TELEPHONE EVALUATION 5-10 MIN: CPT | Mod: 95 | Performed by: FAMILY MEDICINE

## 2020-05-21 RX ORDER — BUPRENORPHINE AND NALOXONE 4; 1 MG/1; MG/1
1 FILM, SOLUBLE BUCCAL; SUBLINGUAL 2 TIMES DAILY
Qty: 56 EACH | Refills: 0 | Status: SHIPPED | OUTPATIENT
Start: 2020-05-21 | End: 2020-06-18

## 2020-05-21 NOTE — NURSING NOTE
"Chief Complaint   Patient presents with     Drug Problem       Initial There were no vitals taken for this visit. Estimated body mass index is 23.72 kg/m  as calculated from the following:    Height as of 4/23/20: 1.727 m (5' 8\").    Weight as of 4/23/20: 70.8 kg (156 lb).  Medication Reconciliation: complete  Veronica Cole MA  "

## 2020-06-18 ENCOUNTER — VIRTUAL VISIT (OUTPATIENT)
Dept: FAMILY MEDICINE | Facility: OTHER | Age: 65
End: 2020-06-18
Attending: FAMILY MEDICINE
Payer: MEDICARE

## 2020-06-18 VITALS — BODY MASS INDEX: 23.57 KG/M2 | WEIGHT: 155 LBS

## 2020-06-18 DIAGNOSIS — F11.90 OPIOID USE DISORDER: ICD-10-CM

## 2020-06-18 PROCEDURE — 99441 ZZC PHYSICIAN TELEPHONE EVALUATION 5-10 MIN: CPT | Mod: 95 | Performed by: FAMILY MEDICINE

## 2020-06-18 RX ORDER — BUPRENORPHINE AND NALOXONE 4; 1 MG/1; MG/1
1 FILM, SOLUBLE BUCCAL; SUBLINGUAL 2 TIMES DAILY
Qty: 56 EACH | Refills: 0 | Status: SHIPPED | OUTPATIENT
Start: 2020-06-18 | End: 2020-07-16

## 2020-06-18 ASSESSMENT — PAIN SCALES - GENERAL: PAINLEVEL: MODERATE PAIN (5)

## 2020-06-18 NOTE — NURSING NOTE
"No chief complaint on file.      Initial Wt 70.3 kg (155 lb)   BMI 23.57 kg/m   Estimated body mass index is 23.57 kg/m  as calculated from the following:    Height as of 4/23/20: 1.727 m (5' 8\").    Weight as of this encounter: 70.3 kg (155 lb).  Medication Reconciliation: complete  Dawna Lopez LPN      "

## 2020-06-18 NOTE — PROGRESS NOTES
"Jorge A Mendosa is a 64 year old male who is being evaluated via a billable telephone visit.      The patient has been notified of following:     \"This telephone visit will be conducted via a call between you and your physician/provider. We have found that certain health care needs can be provided without the need for a physical exam.  This service lets us provide the care you need with a short phone conversation.  If a prescription is necessary we can send it directly to your pharmacy.  If lab work is needed we can place an order for that and you can then stop by our lab to have the test done at a later time.    Telephone visits are billed at different rates depending on your insurance coverage. During this emergency period, for some insurers they may be billed the same as an in-person visit.  Please reach out to your insurance provider with any questions.    If during the course of the call the physician/provider feels a telephone visit is not appropriate, you will not be charged for this service.\"    Patient has given verbal consent for Telephone visit?  Yes    What phone number would you like to be contacted at? [phone]      How would you like to obtain your AVS? Mail a copy    Subjective     Follow-up Buprenorphine Visit           HPI       Jorge A Mendosa is here for f/u on buprenophine/naloxone (Suboxone) therapy for opioid use disorder.  Recheck Medication      Jorge A is currently taking 4 mg of Buprenorphine/Naloxone 2 daily.     Status since last visit:    Since last visit patient has been: doing well.     Intensity:     There has been: no craving.      Suboxone Dose: adequate.    Progression of Symptoms:     Cues to use and relapse people offering him opiates    Recovery program has been: active.   Accompanying Signs & Symptoms:    Side Effects: none.    Sobriety:     Status: no use since last visit.      Drug Screen: .  Unable to obtain  -virtual visit  Precipitating factors:    Triggers have " "been: mild. People offering opiates; he states \"I can't believe they don't get the message yet\"  Alleviating factors:    Contact with sponsor has been: no sponsor.     Family and support system has been: helpful. Especially daughter; has new grand daughter as well  Other Therapies Tried :     Patient has been going to recovery meetings:not at all.      Other medical concerns: No    Any ED visits? No       History   Smoking Status     Former Smoker     Packs/day: 0.50     Years: 30.00     Types: Cigarettes     Start date: 1/1/1976   Smokeless Tobacco     Never Used     Comment: only smokes a little bit-noted 2-       Problem, Medication and Allergy Lists were reviewed and are current..    Pharmacy Database reviewed? Prior.    Patient is an established patient of this clinic..         Review of Systems:   Review of Systems  CONSTITUTIONAL: NEGATIVE for fever, chills, change in weight  INTEGUMENTARY/SKIN: NEGATIVE for worrisome rashes, moles or lesions  EYES: NEGATIVE for vision changes or irritation  ENT/MOUTH: NEGATIVE for ear, mouth and throat problems  RESP: NEGATIVE for significant cough or SOB  BREAST: NEGATIVE for masses, tenderness or discharge  CV: NEGATIVE for chest pain, palpitations or peripheral edema  GI: NEGATIVE for nausea, abdominal pain, heartburn, or change in bowel habits  : NEGATIVE for frequency, dysuria, or hematuria  MUSCULOSKELETAL: NEGATIVE for significant arthralgias or myalgia  NEURO: NEGATIVE for weakness, dizziness or paresthesias  ENDOCRINE: NEGATIVE for temperature intolerance, skin/hair changes  HEME/ALLERGY: NEGATIVE for bleeding problems  PSYCHIATRIC: NEGATIVE for changes in mood or affect          Physical Exam:     Vitals:    06/18/20 0940   Weight: 70.3 kg (155 lb)     Body mass index is 23.57 kg/m .     Physical Exam  MENTAL STATUS EXAM:  Appearance/Behavior:No apparent distress  Speech: Normal  Mood/Affect: normal affect  Insight: Fair      Results:   none    Assessment " and Plan     Was naloxone nasal spray prescribed? Yes. Prior.  1. Opioid use disorder (H)  Doing well overall.  Supportive family.  Declines offers to use.  Proud of that.  - buprenorphine HCl-naloxone HCl (SUBOXONE) 4-1 MG per film; Place 1 Film under the tongue 2 times daily  Dispense: 56 each; Refill: 0    Dosage will not need adjustment today.  Continue at 4 mg  2 time(s) a day of  buprenorphine/naloxone (Suboxone).     Follow up Plan  Stage 3 (4 weeks): Please make a clinic appointment for follow up with me (REGINA MAGAÑA) or another Suboxone provider in 1 month for suboxone follow up.    Greater than 5 minutes was spent with this patient, over half of which was on counseling and coordination of care which includes importance of recovery activities,which may include  attendance at NA/AA meetings, sober support network, and the importance of not isolating, being open, honest, and willing to change, possible triggers and how to avoid them, and managing cravings.    There are no discontinued medications.    Options for treatment and follow-up care were reviewed with the patient. Jorge A engaged in the decision making process and verbalized understanding of the options discussed and agreed with the final plan.      No follow-ups on file.      Phone call duration:  5 minutes    Regina Magaña MD

## 2020-07-13 ENCOUNTER — OFFICE VISIT (OUTPATIENT)
Dept: OTOLARYNGOLOGY | Facility: OTHER | Age: 65
End: 2020-07-13
Attending: OTOLARYNGOLOGY
Payer: MEDICARE

## 2020-07-13 VITALS
OXYGEN SATURATION: 99 % | WEIGHT: 155 LBS | BODY MASS INDEX: 23.49 KG/M2 | HEIGHT: 68 IN | TEMPERATURE: 96.3 F | SYSTOLIC BLOOD PRESSURE: 100 MMHG | HEART RATE: 70 BPM | DIASTOLIC BLOOD PRESSURE: 78 MMHG

## 2020-07-13 DIAGNOSIS — R49.0 DYSPHONIA: ICD-10-CM

## 2020-07-13 DIAGNOSIS — Z92.3 HISTORY OF RADIATION THERAPY: ICD-10-CM

## 2020-07-13 DIAGNOSIS — J38.3 GLOTTIC INSUFFICIENCY: ICD-10-CM

## 2020-07-13 DIAGNOSIS — C32.9 LARYNGEAL CANCER (H): Primary | ICD-10-CM

## 2020-07-13 PROCEDURE — 31575 DIAGNOSTIC LARYNGOSCOPY: CPT | Performed by: OTOLARYNGOLOGY

## 2020-07-13 PROCEDURE — G0463 HOSPITAL OUTPT CLINIC VISIT: HCPCS | Mod: 25

## 2020-07-13 PROCEDURE — 99214 OFFICE O/P EST MOD 30 MIN: CPT | Mod: 25 | Performed by: OTOLARYNGOLOGY

## 2020-07-13 ASSESSMENT — PAIN SCALES - GENERAL: PAINLEVEL: MODERATE PAIN (5)

## 2020-07-13 ASSESSMENT — MIFFLIN-ST. JEOR: SCORE: 1462.58

## 2020-07-13 NOTE — LETTER
"    7/13/2020         RE: Jorge A Mendosa  [address]   [address]  [facility] [address]        Dear Colleague,    Thank you for referring your patient, Jorge A Mendosa, to the [facility]. Please see a copy of my visit note below.    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  Completed radiation therapy 2/14/2019.  He did have a prolonged course of treatment due to continued odynophagia.    I last saw him 210 and refused laryngoscopy due to a scratchy throat fever and myalgias associated with right elbow swelling.  Referred him to his primary Dr. Hamlin for concerns for septic arthritis of the elbow and he was treated accordingly.    He continues to smoke.  He last saw Kailyn for endoscopy in October 29 and at that point there was no evidence of disease CT neck dated 11/27/2018 was negative aside from slight asymmetry at the left piriform sinus.    Due to COVID she has not been seen since February    IN the interim noted worsening hoarseness  No weight loss  No otalgia  No dysphagia  Still smoking  States he is drinking enough water  No GERD    He states the radiation destroyed him and feels that 'they went too far with radiation'      Physical Exam  /78 (BP Location: Left arm, Patient Position: Sitting, Cuff Size: Adult Regular)   Pulse 70   Temp 96.3  F (35.7  C) (Tympanic)   Ht 1.727 m (5' 8\")   Wt 70.3 kg (155 lb)   SpO2 99%   BMI 23.57 kg/m    General - The patient is well nourished and well developed, and appears to have good nutritional status.  Alert and oriented to person and place, interactive.  Voice raspy and breathy  Head and Face - Normocephalic and atraumatic, with no gross asymmetry noted of the contour of the facial features.  The facial nerve is intact, with strong symmetric movements.  Neck-no palpable lymphadenopathy or thyroid mass.  Trachea is " midline.  Eyes - Extraocular movements intact.   Ears- External auditory canals are patent, tympanic membranes are intact without effusion or worrisome retractions   Nose - Nasal mucosa is pink and moist with no abnormal mucus.  The septum was grossly midline and non-obstructive, turbinates of normal size and position.  No polyps, masses, or purulence noted on examination.  Mouth - Examination of the oral cavity shows pink, healthy, moist mucosa.  No lesions or ulceration noted.  Edentulous.  The tongue is mobile and midline.  Throat - The walls of the oropharynx were smooth, pink, moist, symmetric, and had no lesions or ulcerations.  The tonsillar pillars and soft palate were symmetric.  The uvula was midline on elevation.        Attempts at mirror laryngoscopy were not possible due to gag reflex.  Therefore I proceeded with a fiberoptic examination after informed consent.  First I sprayed both sides of the nose with a mixture of lidocaine and neosynephrine.  I then passed the scope through the nasal cavity.     The nasopharynx was mucosally covered and symmetric.  The eustachian tube openings were unobstructed.  Going further down I had a clear view of the base of tongue which had normal appearing lingual tonsillar tissue.  The base of tongue was free of lesions, and the vallecula was open.  The epiglottis was smooth and mucosally covered.  The supraglottic larynx was then clearly visualized.  The vocal cords were symmetric with phonation and without mass.  There is a moderate glottic gap with hyperfunctioning of the false cords.  He either has a prominent tracheal ring or slight subglottic stenosis but there is no subglottic mass.   the pyriform sinuses were open and without sabrina mass or pooling of secretions upon valsalva, and the limited view of the postcricoid region did not show any lesions.  The patient tolerated the procedure well.      Impression/Plan  Jorge A Mendosa is a 64 year old male    ICD-10-CM     1. Laryngeal cancer (H)  C32.9    2. 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  Completed radiation therapy 2/14/2019.  He did have a prolonged course of treatment due to continued odynophagia.    Reassured GADIEL    No evidence of cancer  Quit smoking  Increase Water  Recommend voice therapy, he declines    Follow up with Kailyn Campbell in 6 months  Should be followed q 6 months, present sooner with any new voice symptoms or dysphagia, d/w Jorge A today  He is thankful    Taisha Mcnally D.O.  Otolaryngology/Head and Neck Surgery  Allergy            Again, thank you for allowing me to participate in the care of your patient.        Sincerely,        Taisha Mcnally MD

## 2020-07-13 NOTE — PROGRESS NOTES
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  Completed radiation therapy 2/14/2019.  He did have a prolonged course of treatment due to continued odynophagia.    I last saw him 210 and refused laryngoscopy due to a scratchy throat fever and myalgias associated with right elbow swelling.  Referred him to his primary Dr. Hamlin for concerns for septic arthritis of the elbow and he was treated accordingly.    He continues to smoke.  He last saw Kailyn for endoscopy in October 29 and at that point there was no evidence of disease CT neck dated 11/27/2018 was negative aside from slight asymmetry at the left piriform sinus.    Due to COVID she has not been seen since February    IN the interim noted worsening hoarseness  No weight loss  No otalgia  No dysphagia  Still smoking  States he is drinking enough water  No GERD    He states the radiation destroyed him and feels that 'they went too far with radiation'      Physical Exam  /78 (BP Location: Left arm, Patient Position: Sitting, Cuff Size: Adult Regular)   Pulse 70   Temp 96.3  F (35.7  C) (Tympanic)   Ht 1.727 m (5' 8\")   Wt 70.3 kg (155 lb)   SpO2 99%   BMI 23.57 kg/m    General - The patient is well nourished and well developed, and appears to have good nutritional status.  Alert and oriented to person and place, interactive.  Voice raspy and breathy  Head and Face - Normocephalic and atraumatic, with no gross asymmetry noted of the contour of the facial features.  The facial nerve is intact, with strong symmetric movements.  Neck-no palpable lymphadenopathy or thyroid mass.  Trachea is midline.  Eyes - Extraocular movements intact.   Ears- External auditory canals are patent, tympanic membranes are intact without effusion or worrisome retractions   Nose - Nasal mucosa is pink and moist with no abnormal mucus.  The septum was grossly midline and " non-obstructive, turbinates of normal size and position.  No polyps, masses, or purulence noted on examination.  Mouth - Examination of the oral cavity shows pink, healthy, moist mucosa.  No lesions or ulceration noted.  Edentulous.  The tongue is mobile and midline.  Throat - The walls of the oropharynx were smooth, pink, moist, symmetric, and had no lesions or ulcerations.  The tonsillar pillars and soft palate were symmetric.  The uvula was midline on elevation.        Attempts at mirror laryngoscopy were not possible due to gag reflex.  Therefore I proceeded with a fiberoptic examination after informed consent.  First I sprayed both sides of the nose with a mixture of lidocaine and neosynephrine.  I then passed the scope through the nasal cavity.     The nasopharynx was mucosally covered and symmetric.  The eustachian tube openings were unobstructed.  Going further down I had a clear view of the base of tongue which had normal appearing lingual tonsillar tissue.  The base of tongue was free of lesions, and the vallecula was open.  The epiglottis was smooth and mucosally covered.  The supraglottic larynx was then clearly visualized.  The vocal cords were symmetric with phonation and without mass.  There is a moderate glottic gap with hyperfunctioning of the false cords.  He either has a prominent tracheal ring or slight subglottic stenosis but there is no subglottic mass.   the pyriform sinuses were open and without sabrina mass or pooling of secretions upon valsalva, and the limited view of the postcricoid region did not show any lesions.  The patient tolerated the procedure well.      Impression/Plan  Jorge A Mendosa is a 64 year old male    ICD-10-CM    1. Laryngeal cancer (H)  C32.9    2. 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  Completed radiation therapy 2/14/2019.  He did have a prolonged course of  treatment due to continued odynophagia.    Reassured GADIEL    No evidence of cancer  Quit smoking  Increase Water  Recommend voice therapy, he declines    Follow up with Kailyn Campbell in 6 months  Should be followed q 6 months, present sooner with any new voice symptoms or dysphagia, d/w Jorge A today  He is thankful    Taisha Mcnally D.O.  Otolaryngology/Head and Neck Surgery  Allergy

## 2020-07-13 NOTE — PATIENT INSTRUCTIONS
Thank you for allowing Dr. Mcnally and our ENT team to participate in your care.  If your medications are too expensive, please give the nurse a call.  We can possibly change this medication.  If you have a scheduling or an appointment question please contact our Health Unit Coordinator at their direct line [phone].   ALL nursing questions or concerns can be directed to your ENT nurse at: [phone] - [place]    No evidence of cancer  Work on quitting smoking  Increase Water  Recommend voice therapy  Follow up with Kailyn Campbell in 6 months

## 2020-07-13 NOTE — NURSING NOTE
"Chief Complaint   Patient presents with     RECHECK     Follow Up Glottic Squamous Cell Carcinoma---[id number]       Initial /78 (BP Location: Left arm, Patient Position: Sitting, Cuff Size: Adult Regular)   Pulse 70   Temp 96.3  F (35.7  C) (Tympanic)   Ht 1.727 m (5' 8\")   Wt 70.3 kg (155 lb)   SpO2 99%   BMI 23.57 kg/m   Estimated body mass index is 23.57 kg/m  as calculated from the following:    Height as of this encounter: 1.727 m (5' 8\").    Weight as of this encounter: 70.3 kg (155 lb).  Medication Reconciliation: complete  Yelena Angeles MA    "

## 2020-07-16 ENCOUNTER — VIRTUAL VISIT (OUTPATIENT)
Dept: FAMILY MEDICINE | Facility: OTHER | Age: 65
End: 2020-07-16
Attending: FAMILY MEDICINE
Payer: MEDICARE

## 2020-07-16 ENCOUNTER — PATIENT OUTREACH (OUTPATIENT)
Dept: CARE COORDINATION | Facility: OTHER | Age: 65
End: 2020-07-16

## 2020-07-16 VITALS — BODY MASS INDEX: 23.04 KG/M2 | HEIGHT: 68 IN | WEIGHT: 152 LBS

## 2020-07-16 DIAGNOSIS — F11.90 OPIOID USE DISORDER: ICD-10-CM

## 2020-07-16 PROCEDURE — 99441 ZZC PHYSICIAN TELEPHONE EVALUATION 5-10 MIN: CPT | Mod: 95 | Performed by: FAMILY MEDICINE

## 2020-07-16 RX ORDER — BUPRENORPHINE AND NALOXONE 4; 1 MG/1; MG/1
1 FILM, SOLUBLE BUCCAL; SUBLINGUAL 2 TIMES DAILY
Qty: 44 EACH | Refills: 0 | Status: SHIPPED | OUTPATIENT
Start: 2020-07-16 | End: 2020-08-07

## 2020-07-16 ASSESSMENT — MIFFLIN-ST. JEOR: SCORE: 1448.97

## 2020-07-16 ASSESSMENT — PAIN SCALES - GENERAL: PAINLEVEL: NO PAIN (0)

## 2020-07-16 ASSESSMENT — ACTIVITIES OF DAILY LIVING (ADL): DEPENDENT_IADLS:: INDEPENDENT

## 2020-07-16 NOTE — PROGRESS NOTES
"Clinic Care Coordination Contact  Care Team Conversations    CC \"attended\" telephone visit with pt and Dr. Hicks this date.  Please refer to Dr. Hicks's note for plan of care.  All questions answered.  Encouraged him to call CC with any questions/concerns/problems.  Verbalized understanding.    Margot Joel RN-BSN  Clinic Care Coordinator  [phone] opt. #3            "

## 2020-07-16 NOTE — NURSING NOTE
"Chief Complaint   Patient presents with     Recheck Medication       Initial Ht 1.727 m (5' 8\")   Wt 68.9 kg (152 lb)   BMI 23.11 kg/m   Estimated body mass index is 23.11 kg/m  as calculated from the following:    Height as of this encounter: 1.727 m (5' 8\").    Weight as of this encounter: 68.9 kg (152 lb).  Medication Reconciliation: complete  Quita Grewal LPN    "

## 2020-08-07 DIAGNOSIS — F11.90 OPIOID USE DISORDER: ICD-10-CM

## 2020-08-07 RX ORDER — BUPRENORPHINE AND NALOXONE 4; 1 MG/1; MG/1
1 FILM, SOLUBLE BUCCAL; SUBLINGUAL 2 TIMES DAILY
Qty: 44 EACH | Refills: 0 | Status: SHIPPED | OUTPATIENT
Start: 2020-08-07 | End: 2020-08-28

## 2020-08-07 NOTE — TELEPHONE ENCOUNTER
Suboxone    - pt went to [place] this AM and bought a car from his cousin.  The car broke down in between [place] and [place].  Had to cancel his appt today.  CC will call him next week to schedule a follow up visit.  Last Written Prescription Date:  7.16.20  Last Fill Quantity: #44,   # refills: 0  Last Office Visit: 7.16.20  Future Office visit:       Routing refill request to provider for review/approval because:  Drug not on the [facility], [facility] or [facility] refill protocol or controlled substance

## 2020-08-27 NOTE — PROGRESS NOTES
Follow-up Buprenorphine Visit           HPI       Jorge A Mendosa is here for f/u on buprenophine/naloxone (Suboxone) therapy for opioid use disorder.  Recheck Medication      Jorge A is currently taking 4/1 mg of Buprenorphine/Naloxone 2 times daily.     Status since last visit:    Since last visit patient has been: stable.     Intensity:     There has been: no craving.      Suboxone Dose: adequate.    Progression of Symptoms:     Cues to use and relapse none    Recovery program has been: active.   Accompanying Signs & Symptoms:    Side Effects: none.    Sobriety:     Status: no use since last visit.      Drug Screen: obtained.    Precipitating factors:    Triggers have been: non-existent.   Alleviating factors:    Contact with sponsor has been: no sponsor.     Family and support system has been: helpful.   Other Therapies Tried :     Patient has been going to recovery meetings:not at all.      Other medical concerns: No    Any ED visits? No     2 days ago headache - took pills.  States they were in a Tylenol bottle, but isn't sure what they are. Shows me a small round cut out piece of paper that demonstrates size.  States they didn't do anything and that he hopes they weren't narcotics.  It was his Tylenol bottle.  Writer is unclear how or why other substances would be there?    Grand daughter  Piper with at visit - staying with him for 4 days.  2nd grader.  Sister is a  - gives mom a break.    Declines pneumonia vaccine.    History   Smoking Status     Current Every Day Smoker     Packs/day: 0.25     Years: 30.00     Types: Cigarettes     Start date: 1976   Smokeless Tobacco     Never Used     Comment: only smokes a little bit-noted 2020       Problem, Medication and Allergy Lists were reviewed and updated if needed.  reviewed and are current..    Pharmacy Database reviewed? Yes, 828.20, as expected.    Patient is an established patient of this clinic..         Review of Systems:   Review of  "Systems  CONSTITUTIONAL: NEGATIVE for fever, chills, change in weight  INTEGUMENTARY/SKIN: NEGATIVE for worrisome rashes, moles or lesions  EYES: NEGATIVE for vision changes or irritation  ENT/MOUTH: NEGATIVE for ear, mouth and throat problems  RESP: NEGATIVE for significant cough or SOB  CV: NEGATIVE for chest pain, palpitations or peripheral edema  GI: NEGATIVE for nausea, abdominal pain, heartburn, or change in bowel habits  : NEGATIVE for frequency, dysuria, or hematuria  MUSCULOSKELETAL: NEGATIVE for significant arthralgias or myalgia  NEURO: NEGATIVE for weakness, dizziness or paresthesias  ENDOCRINE: NEGATIVE for temperature intolerance, skin/hair changes  PSYCHIATRIC: NEGATIVE for changes in mood or affect          Physical Exam:     Vitals:    08/28/20 1249   BP: 122/80   Pulse: 68   Temp: 99  F (37.2  C)   SpO2: 98%   Weight: 66.2 kg (146 lb)   Height: 1.727 m (5' 8\")     Body mass index is 22.2 kg/m .  Vitals were reviewed   Physical Exam  GENERAL APPEARANCE: alert, comfortable appearing  EYES: Eyes grossly normal to inspection  HENT:  nose no rhinorrhea  RESP: lungs clear to auscultation - no rales, rhonchi or wheezes and no resp distress  CV: regular rates and rhythm, normal S1 S2,no murmur   ABDOMEN:soft, non-tender, non-distended, no hepatosplenomegaly or masses  SKIN: no rashes, no jaundice, no obvious masses.   NEURO:  no tremor  MENTAL STATUS EXAM:  Appearance/Behavior:No apparent distress  Speech: Normal  Mood/Affect: normal affect  Insight: Limited      Results:    Results from the last 24 hours  Results for orders placed or performed in visit on 08/28/20 (from the past 24 hour(s))   Urine Drugs of Abuse Screen (Tox13)   Result Value Ref Range    Cannabinoids (50-skr-1-carboxy-9-THC) Not Detected NDET^Not Detected ng/mL    Phencyclidine (Phencyclidine) Not Detected NDET^Not Detected ng/mL    Cocaine (Benzoylecgonine) Not Detected NDET^Not Detected ng/mL    Methamphetamine (d-Methamphetamine) " Not Detected NDET^Not Detected ng/mL    Opiates (Morphine) Not Detected NDET^Not Detected ng/mL    Amphetamine (d-Amphetamine) Not Detected NDET^Not Detected ng/mL    Benzodiazepines (Nordiazepam) Not Detected NDET^Not Detected ng/mL    Tricyclic Antidepressants (Desipramine) Not Detected NDET^Not Detected ng/mL    Methadone (Methadone) Not Detected NDET^Not Detected ng/mL    Barbiturates (Butalbital) Not Detected NDET^Not Detected ng/mL    Oxycodone (Oxycodone) Not Detected NDET^Not Detected ng/mL    Propoxyphene (Norpropoxyphene) Not Detected NDET^Not Detected ng/mL    Buprenorphine (Buprenorphine) Detected, Abnormal Result (A) NDET^Not Detected ng/mL       Assessment and Plan     Was naloxone nasal spray prescribed? Yes.  Prior.  1. Opioid use disorder (H)  Doing fine overall.  A bit confusing as to what he was saying about taking a pill for his headache.  Discussed importance of not taking anything that he doesn't know what it is!  Will do virtual visit next month.  In person with lab month after.  - Urine Drugs of Abuse Screen (Tox13)  - buprenorphine HCl-naloxone HCl (SUBOXONE) 4-1 MG per film; Place 1 Film under the tongue 2 times daily  Dispense: 60 each; Refill: 0    2. Other long term (current) drug therapy     - Urine Drugs of Abuse Screen (Tox13)    3. Elevated liver enzymes  Need recheck - will do with next lab.  - Hepatic panel (Albumin, ALT, AST, Bili, Alk Phos, TP); Future    Dosage will not need adjustment today.  Continue at 4 mg  2 time(s) a day of  buprenorphine/naloxone (Suboxone).     Follow up Plan  Stage 3 (4 weeks): Please make a clinic appointment for follow up with me (ABHILASH MAGAÑA) or another Suboxone provider in 1 month for suboxone follow up.    Greater than 10 minutes was spent with this patient, over half of which was on counseling and coordination of care which includes importance of recovery activities,which may include  attendance at NA/AA meetings, sober support network, and  the importance of not isolating, being open, honest, and willing to change, possible triggers and how to avoid them, and managing cravings.    Medications Discontinued During This Encounter   Medication Reason     buprenorphine HCl-naloxone HCl (SUBOXONE) 4-1 MG per film Reorder       Options for treatment and follow-up care were reviewed with the patient. Jorge A engaged in the decision making process and verbalized understanding of the options discussed and agreed with the final plan.    I saw and examined this patient.  I have read through the note and made appropriate changes and agree with the RN Care Coordinator's assessment and plan.     Regina Hobbs MD

## 2020-08-28 ENCOUNTER — OFFICE VISIT (OUTPATIENT)
Dept: FAMILY MEDICINE | Facility: OTHER | Age: 65
End: 2020-08-28
Attending: FAMILY MEDICINE
Payer: MEDICARE

## 2020-08-28 ENCOUNTER — APPOINTMENT (OUTPATIENT)
Dept: LAB | Facility: OTHER | Age: 65
End: 2020-08-28
Attending: FAMILY MEDICINE
Payer: MEDICARE

## 2020-08-28 VITALS
BODY MASS INDEX: 22.13 KG/M2 | DIASTOLIC BLOOD PRESSURE: 80 MMHG | HEIGHT: 68 IN | HEART RATE: 68 BPM | WEIGHT: 146 LBS | TEMPERATURE: 99 F | OXYGEN SATURATION: 98 % | SYSTOLIC BLOOD PRESSURE: 122 MMHG

## 2020-08-28 DIAGNOSIS — F11.90 OPIOID USE DISORDER: Primary | ICD-10-CM

## 2020-08-28 DIAGNOSIS — R74.8 ELEVATED LIVER ENZYMES: ICD-10-CM

## 2020-08-28 DIAGNOSIS — Z79.899 OTHER LONG TERM (CURRENT) DRUG THERAPY: ICD-10-CM

## 2020-08-28 LAB
AMPHETAMINES UR QL: NOT DETECTED NG/ML
BARBITURATES UR QL SCN: NOT DETECTED NG/ML
BENZODIAZ UR QL SCN: NOT DETECTED NG/ML
BUPRENORPHINE UR QL: ABNORMAL NG/ML
CANNABINOIDS UR QL: NOT DETECTED NG/ML
COCAINE UR QL SCN: NOT DETECTED NG/ML
D-METHAMPHET UR QL: NOT DETECTED NG/ML
METHADONE UR QL SCN: NOT DETECTED NG/ML
OPIATES UR QL SCN: NOT DETECTED NG/ML
OXYCODONE UR QL SCN: NOT DETECTED NG/ML
PCP UR QL SCN: NOT DETECTED NG/ML
PROPOXYPH UR QL: NOT DETECTED NG/ML
TRICYCLICS UR QL SCN: NOT DETECTED NG/ML

## 2020-08-28 PROCEDURE — 80306 DRUG TEST PRSMV INSTRMNT: CPT | Mod: ZL | Performed by: FAMILY MEDICINE

## 2020-08-28 PROCEDURE — G0463 HOSPITAL OUTPT CLINIC VISIT: HCPCS

## 2020-08-28 PROCEDURE — 99213 OFFICE O/P EST LOW 20 MIN: CPT | Performed by: FAMILY MEDICINE

## 2020-08-28 RX ORDER — BUPRENORPHINE AND NALOXONE 4; 1 MG/1; MG/1
1 FILM, SOLUBLE BUCCAL; SUBLINGUAL 2 TIMES DAILY
Qty: 60 EACH | Refills: 0 | Status: SHIPPED | OUTPATIENT
Start: 2020-08-28 | End: 2020-09-28

## 2020-08-28 ASSESSMENT — MIFFLIN-ST. JEOR: SCORE: 1416.75

## 2020-08-28 ASSESSMENT — PAIN SCALES - GENERAL: PAINLEVEL: NO PAIN (0)

## 2020-08-28 NOTE — NURSING NOTE
"Chief Complaint   Patient presents with     Drug Problem       Initial /80   Pulse 68   Temp 99  F (37.2  C)   Ht 1.727 m (5' 8\")   Wt 66.2 kg (146 lb)   SpO2 98%   BMI 22.20 kg/m   Estimated body mass index is 22.2 kg/m  as calculated from the following:    Height as of this encounter: 1.727 m (5' 8\").    Weight as of this encounter: 66.2 kg (146 lb).  Medication Reconciliation: complete  Jaya Penny LPN  "

## 2020-09-23 NOTE — PROGRESS NOTES
"Jorge A Mendosa is a 65 year old male who is being evaluated via a billable telephone visit.      The patient has been notified of following:     \"This telephone visit will be conducted via a call between you and your physician/provider. We have found that certain health care needs can be provided without the need for a physical exam.  This service lets us provide the care you need with a short phone conversation.  If a prescription is necessary we can send it directly to your pharmacy.  If lab work is needed we can place an order for that and you can then stop by our lab to have the test done at a later time.    Telephone visits are billed at different rates depending on your insurance coverage. During this emergency period, for some insurers they may be billed the same as an in-person visit.  Please reach out to your insurance provider with any questions.    If during the course of the call the physician/provider feels a telephone visit is not appropriate, you will not be charged for this service.\"    Patient has given verbal consent for Telephone visit?  Yes    What phone number would you like to be contacted at? [phone]    How would you like to obtain your AVS? MyChart    Follow-up Buprenorphine Visit           HPI       Jorge A Mendosa is here for f/u on buprenophine/naloxone (Suboxone) therapy for opioid use disorder.  Recheck Medication      Jorge A is currently taking 4/1 mg of Buprenorphine/Naloxone 2 times daily.     Status since last visit:    Since last visit patient has been: doing well.     Intensity:     There has been: minimal craving; able to deal with them    Suboxone Dose: adequate.    Progression of Symptoms:     Cues to use and relapse     Recovery program has been: solid.   Accompanying Signs & Symptoms:    Side Effects: none.    Sobriety:     Status: no use since last visit.      Drug Screen:   Unable to obtain due to Covid 19  Precipitating factors:    Triggers have been: " non-existent.   Alleviating factors:    Contact with sponsor has been: no sponsor.     Family and support system has been: helpful. Daughter, grandkids  Other Therapies Tried :     Patient has been going to recovery meetings:not at all.      Other medical concerns: No    Any ED visits? No       History   Smoking Status     Current Every Day Smoker     Packs/day: 0.25     Years: 30.00     Types: Cigarettes     Start date: 1/1/1976   Smokeless Tobacco     Never Used     Comment: only smokes a little bit-noted 2-       Problem, Medication and Allergy Lists were reviewed and updated if needed.  reviewed and are current..    Pharmacy Database reviewed? Yes, 9.28.20, as expected.    Patient is an established patient of this clinic..      Assessment and Plan     Was naloxone nasal spray prescribed? Yes.  Prior.  1. Opioid use disorder (H)  Stable.  - buprenorphine HCl-naloxone HCl (SUBOXONE) 4-1 MG per film; Place 1 Film under the tongue 2 times daily  Dispense: 56 each; Refill: 0    2. Abdominal pain, epigastric  Refills done with Prilosec.  - omeprazole (PRILOSEC) 40 MG DR capsule; Take 1 capsule (40 mg) by mouth daily Take 30-60 minutes before a meal.  Dispense: 90 capsule; Refill: 3    Dosage will not need adjustment today.  Continue at 4 mg  2 time(s) a day of  buprenorphine/naloxone (Suboxone).     Follow up Plan  Stage 3 (4 weeks): Please make a clinic appointment for follow up with me (ABHILASH MAGAÑA) or another Suboxone provider in 1 month for suboxone follow up.    Greater than 5 minutes was spent with this patient, over half of which was on counseling and coordination of care which includes importance of recovery activities,which may include  attendance at NA/AA meetings, sober support network, and the importance of not isolating, being open, honest, and willing to change, possible triggers and how to avoid them, and managing cravings.    Medications Discontinued During This Encounter   Medication  Reason     buprenorphine HCl-naloxone HCl (SUBOXONE) 4-1 MG per film Reorder     omeprazole (PRILOSEC) 40 MG DR capsule Reorder       Options for treatment and follow-up care were reviewed with the patient. Jorge A engaged in the decision making process and verbalized understanding of the options discussed and agreed with the final plan.    I saw and examined this patient.  I have read through the note and made appropriate changes and agree with the RN Care Coordinator's assessment and plan.     Regina Hobbs MD      Phone call duration:  5 minutes

## 2020-09-28 ENCOUNTER — PATIENT OUTREACH (OUTPATIENT)
Dept: CARE COORDINATION | Facility: OTHER | Age: 65
End: 2020-09-28

## 2020-09-28 ENCOUNTER — VIRTUAL VISIT (OUTPATIENT)
Dept: FAMILY MEDICINE | Facility: OTHER | Age: 65
End: 2020-09-28
Attending: FAMILY MEDICINE
Payer: MEDICARE

## 2020-09-28 DIAGNOSIS — F11.90 OPIOID USE DISORDER: ICD-10-CM

## 2020-09-28 DIAGNOSIS — R10.13 ABDOMINAL PAIN, EPIGASTRIC: ICD-10-CM

## 2020-09-28 PROCEDURE — 99441 ZZC PHYSICIAN TELEPHONE EVALUATION 5-10 MIN: CPT | Mod: 95 | Performed by: FAMILY MEDICINE

## 2020-09-28 RX ORDER — OMEPRAZOLE 40 MG/1
40 CAPSULE, DELAYED RELEASE ORAL DAILY
Qty: 90 CAPSULE | Refills: 3 | Status: SHIPPED | OUTPATIENT
Start: 2020-09-28 | End: 2021-01-20

## 2020-09-28 RX ORDER — BUPRENORPHINE AND NALOXONE 4; 1 MG/1; MG/1
1 FILM, SOLUBLE BUCCAL; SUBLINGUAL 2 TIMES DAILY
Qty: 56 EACH | Refills: 0 | Status: SHIPPED | OUTPATIENT
Start: 2020-09-28 | End: 2020-10-26

## 2020-09-28 ASSESSMENT — ANXIETY QUESTIONNAIRES
3. WORRYING TOO MUCH ABOUT DIFFERENT THINGS: NOT AT ALL
4. TROUBLE RELAXING: NOT AT ALL
7. FEELING AFRAID AS IF SOMETHING AWFUL MIGHT HAPPEN: NOT AT ALL
5. BEING SO RESTLESS THAT IT IS HARD TO SIT STILL: NOT AT ALL
6. BECOMING EASILY ANNOYED OR IRRITABLE: NOT AT ALL
2. NOT BEING ABLE TO STOP OR CONTROL WORRYING: NOT AT ALL
1. FEELING NERVOUS, ANXIOUS, OR ON EDGE: NOT AT ALL
GAD7 TOTAL SCORE: 0

## 2020-09-28 ASSESSMENT — PATIENT HEALTH QUESTIONNAIRE - PHQ9: SUM OF ALL RESPONSES TO PHQ QUESTIONS 1-9: 0

## 2020-09-28 ASSESSMENT — PAIN SCALES - GENERAL: PAINLEVEL: SEVERE PAIN (7)

## 2020-09-28 NOTE — PROGRESS NOTES
"Clinic Care Coordination Contact  Care Team Conversations    CC \"attended\" telephone visit with pt and Dr. Hicks this date.  Please refer to Dr. Hicks's note for plan of care.  All questions answered.  Encouraged him to call CC with any questions/concerns/problems.  Verbalized understanding.    Margot Joel RN-BSN, [facility] Care Coordinator  [phone] opt. #3            "

## 2020-09-29 ASSESSMENT — ANXIETY QUESTIONNAIRES: GAD7 TOTAL SCORE: 0

## 2020-10-23 NOTE — PROGRESS NOTES
Follow-up Buprenorphine Visit           HPI       Jorge A Mendosa is here for f/u on buprenophine/naloxone (Suboxone) therapy for opioid use disorder.  Recheck Medication      Jorge A is currently taking 4/1 mg of Buprenorphine/Naloxone 2 times daily.     Status since last visit:    Since last visit patient has been: doing well.     Intensity:     There has been: no craving.      Suboxone Dose: adequate.    Progression of Symptoms:     Cues to use and relapse none    Recovery program has been: solid.   Accompanying Signs & Symptoms:    Side Effects: none.    Sobriety:     Status: no use since last visit.      Drug Screen: obtained.    Precipitating factors:    Triggers have been: non-existent.   Alleviating factors:    Contact with sponsor has been: no sponsor.     Family and support system has been: helpful.   Other Therapies Tried :     Patient has been going to recovery meetings:not at all.      Other medical concerns: No    Any ED visits? No     Excess coffee today. BP high.  Uses OTC sleep aid recently.      History   Smoking Status     Current Every Day Smoker     Packs/day: 0.25     Years: 30.00     Types: Cigarettes     Start date: 1/1/1976   Smokeless Tobacco     Never Used     Comment: only smokes a little bit-noted 2-       Problem, Medication and Allergy Lists were reviewed and updated if needed.  reviewed and are current..    Pharmacy Database reviewed? Yes, 10.26.20, as expected.    Patient is an established patient of this clinic..         Review of Systems:   Review of Systems  CONSTITUTIONAL: NEGATIVE for fever, chills, change in weight  INTEGUMENTARY/SKIN: NEGATIVE for worrisome rashes, moles or lesions  EYES: NEGATIVE for vision changes or irritation  ENT/MOUTH: NEGATIVE for ear, mouth and throat problems  RESP: NEGATIVE for significant cough or SOB  CV: NEGATIVE for chest pain, palpitations or peripheral edema  GI: NEGATIVE for nausea, abdominal pain, heartburn, or change in bowel  habits  : NEGATIVE for frequency, dysuria, or hematuria  MUSCULOSKELETAL: NEGATIVE for significant arthralgias or myalgia  NEURO: NEGATIVE for weakness, dizziness or paresthesias  ENDOCRINE: NEGATIVE for temperature intolerance, skin/hair changes  PSYCHIATRIC: NEGATIVE for changes in mood or affect          Physical Exam:     Vitals:    10/26/20 1440   BP: (!) 160/88   BP Location: Right arm   Patient Position: Chair   Cuff Size: Adult Regular   Pulse: 69   Temp: 98.5  F (36.9  C)   TempSrc: Tympanic   SpO2: 98%   Weight: 66.7 kg (147 lb)     Body mass index is 22.35 kg/m .  Vitals were reviewed   Physical Exam  GENERAL APPEARANCE: alert, comfortable appearing  EYES: Eyes grossly normal to inspection  HENT:  nose no rhinorrhea  RESP: lungs clear to auscultation - no rales, rhonchi or wheezes and no resp distress  CV: regular rates and rhythm, normal S1 S2,no murmur   ABDOMEN:soft, non-tender, non-distended, no hepatosplenomegaly or masses  SKIN: no rashes, no jaundice, no obvious masses.   NEURO:  no tremor  MENTAL STATUS EXAM:  Appearance/Behavior:No apparent distress  Speech: Normal  Mood/Affect: normal affect  Insight: Adequate      Results:    Results from the last 24 hours  Results for orders placed or performed in visit on 10/26/20 (from the past 24 hour(s))   Urine Drugs of Abuse Screen (Tox13)   Result Value Ref Range    Cannabinoids (47-dzo-0-carboxy-9-THC) Not Detected NDET^Not Detected ng/mL    Phencyclidine (Phencyclidine) Not Detected NDET^Not Detected ng/mL    Cocaine (Benzoylecgonine) Not Detected NDET^Not Detected ng/mL    Methamphetamine (d-Methamphetamine) Not Detected NDET^Not Detected ng/mL    Opiates (Morphine) Not Detected NDET^Not Detected ng/mL    Amphetamine (d-Amphetamine) Not Detected NDET^Not Detected ng/mL    Benzodiazepines (Nordiazepam) Not Detected NDET^Not Detected ng/mL    Tricyclic Antidepressants (Desipramine) Not Detected NDET^Not Detected ng/mL    Methadone (Methadone) Not  Detected NDET^Not Detected ng/mL    Barbiturates (Butalbital) Not Detected NDET^Not Detected ng/mL    Oxycodone (Oxycodone) Not Detected NDET^Not Detected ng/mL    Propoxyphene (Norpropoxyphene) Not Detected NDET^Not Detected ng/mL    Buprenorphine (Buprenorphine) Detected, Abnormal Result (A) NDET^Not Detected ng/mL       Assessment and Plan     Was naloxone nasal spray prescribed? Yes.  1. Opioid use disorder (H)  Doing well overall.  - Urine Drugs of Abuse Screen (Tox13)  - buprenorphine HCl-naloxone HCl (SUBOXONE) 4-1 MG per film; Place 1 Film under the tongue 2 times daily  Dispense: 60 each; Refill: 0    2. Opioid abuse, in remission (H)   As above.  - Urine Drugs of Abuse Screen (Tox13)    3. Elevated blood pressure reading without diagnosis of hypertension  Prior readings have been normal.  Tried OTC sleep aid - had antihistamine in it - sometimes he has opposite effects on him.  Will discontinue.  Recheck at next in office visit.    4. Elevated liver enzymes  Repeat labs today.   No alcohol use.  Declines hepatitis A and B vaccines.  - Hepatic panel (Albumin, ALT, AST, Bili, Alk Phos, TP)    Dosage will not need adjustment today.  Continue at 4 mg  2 time(s) a day of  buprenorphine/naloxone (Suboxone).     Follow up Plan  Stage 3 (4 weeks): Please make a clinic appointment for follow up with me (ABHILASH MAGAÑA) or another Suboxone provider in 1 month for suboxone follow up.    Greater than 5 minutes was spent with this patient, over half of which was on counseling and coordination of care which includes importance of recovery activities,which may include  attendance at NA/AA meetings, sober support network, and the importance of not isolating, being open, honest, and willing to change, possible triggers and how to avoid them, and managing cravings.    There are no discontinued medications.    Options for treatment and follow-up care were reviewed with the patient. Jorge A engaged in the decision making  process and verbalized understanding of the options discussed and agreed with the final plan.    I saw and examined this patient.  I have read through the note and made appropriate changes and agree with the RN Care Coordinator's assessment and plan.     Regina Hobbs MD

## 2020-10-26 ENCOUNTER — OFFICE VISIT (OUTPATIENT)
Dept: FAMILY MEDICINE | Facility: OTHER | Age: 65
End: 2020-10-26
Attending: FAMILY MEDICINE
Payer: MEDICARE

## 2020-10-26 ENCOUNTER — PATIENT OUTREACH (OUTPATIENT)
Dept: CARE COORDINATION | Facility: OTHER | Age: 65
End: 2020-10-26

## 2020-10-26 ENCOUNTER — APPOINTMENT (OUTPATIENT)
Dept: LAB | Facility: OTHER | Age: 65
End: 2020-10-26
Attending: FAMILY MEDICINE
Payer: MEDICARE

## 2020-10-26 VITALS
SYSTOLIC BLOOD PRESSURE: 152 MMHG | OXYGEN SATURATION: 98 % | HEART RATE: 69 BPM | DIASTOLIC BLOOD PRESSURE: 86 MMHG | TEMPERATURE: 98.5 F | BODY MASS INDEX: 22.35 KG/M2 | WEIGHT: 147 LBS

## 2020-10-26 DIAGNOSIS — R03.0 ELEVATED BLOOD PRESSURE READING WITHOUT DIAGNOSIS OF HYPERTENSION: ICD-10-CM

## 2020-10-26 DIAGNOSIS — F11.90 OPIOID USE DISORDER: Primary | ICD-10-CM

## 2020-10-26 DIAGNOSIS — R74.8 ELEVATED LIVER ENZYMES: ICD-10-CM

## 2020-10-26 DIAGNOSIS — F11.11 OPIOID ABUSE, IN REMISSION (H): ICD-10-CM

## 2020-10-26 LAB
ALBUMIN SERPL-MCNC: 3.7 G/DL (ref 3.4–5)
ALP SERPL-CCNC: 138 U/L (ref 40–150)
ALT SERPL W P-5'-P-CCNC: 25 U/L (ref 0–70)
AMPHETAMINES UR QL: NOT DETECTED NG/ML
AST SERPL W P-5'-P-CCNC: 20 U/L (ref 0–45)
BARBITURATES UR QL SCN: NOT DETECTED NG/ML
BENZODIAZ UR QL SCN: NOT DETECTED NG/ML
BILIRUB DIRECT SERPL-MCNC: 0.1 MG/DL (ref 0–0.2)
BILIRUB SERPL-MCNC: 0.4 MG/DL (ref 0.2–1.3)
BUPRENORPHINE UR QL: ABNORMAL NG/ML
CANNABINOIDS UR QL: NOT DETECTED NG/ML
COCAINE UR QL SCN: NOT DETECTED NG/ML
D-METHAMPHET UR QL: NOT DETECTED NG/ML
METHADONE UR QL SCN: NOT DETECTED NG/ML
OPIATES UR QL SCN: NOT DETECTED NG/ML
OXYCODONE UR QL SCN: NOT DETECTED NG/ML
PCP UR QL SCN: NOT DETECTED NG/ML
PROPOXYPH UR QL: NOT DETECTED NG/ML
PROT SERPL-MCNC: 7.2 G/DL (ref 6.8–8.8)
TRICYCLICS UR QL SCN: NOT DETECTED NG/ML

## 2020-10-26 PROCEDURE — 80306 DRUG TEST PRSMV INSTRMNT: CPT | Mod: ZL | Performed by: FAMILY MEDICINE

## 2020-10-26 PROCEDURE — G0463 HOSPITAL OUTPT CLINIC VISIT: HCPCS

## 2020-10-26 PROCEDURE — 99214 OFFICE O/P EST MOD 30 MIN: CPT | Performed by: FAMILY MEDICINE

## 2020-10-26 PROCEDURE — 36415 COLL VENOUS BLD VENIPUNCTURE: CPT | Mod: ZL | Performed by: FAMILY MEDICINE

## 2020-10-26 PROCEDURE — 80076 HEPATIC FUNCTION PANEL: CPT | Mod: ZL | Performed by: FAMILY MEDICINE

## 2020-10-26 RX ORDER — BUPRENORPHINE AND NALOXONE 4; 1 MG/1; MG/1
1 FILM, SOLUBLE BUCCAL; SUBLINGUAL 2 TIMES DAILY
Qty: 60 EACH | Refills: 0 | Status: SHIPPED | OUTPATIENT
Start: 2020-10-26 | End: 2020-11-25

## 2020-10-26 ASSESSMENT — PAIN SCALES - GENERAL: PAINLEVEL: SEVERE PAIN (6)

## 2020-10-26 NOTE — PROGRESS NOTES
Clinic Care Coordination Contact  Care Team Conversations    CC attended office visit with pt and Dr. Hicks this date.  Please refer to Dr. Hicks's note for plan of care.  All questions answered.  Encouraged him to call CC with any questions/concerns/problems.  Verbalized understanding.    Margot Joel RN-BSN, [facility] Care Coordinator  [phone] opt. #3

## 2020-10-26 NOTE — NURSING NOTE
"Chief Complaint   Patient presents with     Recheck Medication       Initial BP (!) 160/88 (BP Location: Right arm, Patient Position: Chair, Cuff Size: Adult Regular)   Pulse 69   Temp 98.5  F (36.9  C) (Tympanic)   Wt 66.7 kg (147 lb)   SpO2 98%   BMI 22.35 kg/m   Estimated body mass index is 22.35 kg/m  as calculated from the following:    Height as of 8/28/20: 1.727 m (5' 8\").    Weight as of this encounter: 66.7 kg (147 lb).  Medication Reconciliation: complete  Mayo Campo LPN  "

## 2020-10-26 NOTE — PATIENT INSTRUCTIONS
Refill done.  Next month virtual visit.  Lab today to recheck liver function/enzymes.  Declines vaccines - hepatitis.  Recheck BP in office next time.

## 2020-11-24 NOTE — PROGRESS NOTES
"The author of this note documented a reason for not sharing it with the patient.  Jorge A Mendosa is a 65 year old male who is being evaluated via a billable telephone visit.      The patient has been notified of following:     \"This telephone visit will be conducted via a call between you and your physician/provider. We have found that certain health care needs can be provided without the need for a physical exam.  This service lets us provide the care you need with a short phone conversation.  If a prescription is necessary we can send it directly to your pharmacy.  If lab work is needed we can place an order for that and you can then stop by our lab to have the test done at a later time.    Telephone visits are billed at different rates depending on your insurance coverage. During this emergency period, for some insurers they may be billed the same as an in-person visit.  Please reach out to your insurance provider with any questions.    If during the course of the call the physician/provider feels a telephone visit is not appropriate, you will not be charged for this service.\"    Patient has given verbal consent for Telephone visit?  Yes    What phone number would you like to be contacted at? [phone]    How would you like to obtain your AVS? Mail a copy    Follow-up Buprenorphine Visit           HPI       Jorge A Mendosa is here for f/u on buprenophine/naloxone (Suboxone) therapy for opioid use disorder.  Recheck Medication    Jorge A is currently taking 4/1 mg of Buprenorphine/Naloxone 2 times daily.     Status since last visit:    Since last visit patient has been: doing well.     Intensity:     There has been: no craving.      Suboxone Dose: adequate.    Progression of Symptoms:     Cues to use and relapse none    Recovery program has been: active.   Accompanying Signs & Symptoms:    Side Effects: none.    Sobriety:     Status: no use since last visit.      Drug Screen: Unable to obtain - telephone " visit  Precipitating factors:    Triggers have been: non-existent.   Alleviating factors:    Contact with sponsor has been: no sponsor.     Family and support system has been: helpful. Spending Thanksgiving  Other Therapies Tried :     Patient has been going to recovery meetings:not at all.      Other medical concerns: No    Any ED visits? No     Repeat liver enzymes last time were normal.  Still needs Hep A and B vaccines.  Needs recheck BP.      History   Smoking Status     Current Every Day Smoker     Packs/day: 0.25     Years: 30.00     Types: Cigarettes     Start date: 1/1/1976   Smokeless Tobacco     Never Used     Comment: only smokes a little bit-noted 2-       Problem, Medication and Allergy Lists were reviewed and updated if needed.  reviewed and are current..    Pharmacy Database reviewed? Yes, 11.25.20, as expected.    Patient is an established patient of this clinic..    Assessment and Plan     Was naloxone nasal spray prescribed? Yes.  Prior..  (F11.99) Opioid use disorder (H)  Comment: doing great  Plan: buprenorphine HCl-naloxone HCl (SUBOXONE) 4-1         MG per film              Dosage will not need adjustment today.  Continue at 4 mg  2 time(s) a day of  buprenorphine/naloxone (Suboxone).     Follow up Plan  Stage 3 (4 weeks): Please make a clinic appointment for follow up with me (ABHILASH MAGAÑA) or another Suboxone provider in 1 month for suboxone follow up.    Greater than 5 minutes was spent with this patient, over half of which was on counseling and coordination of care which includes importance of recovery activities,which may include  attendance at NA/AA meetings, sober support network, and the importance of not isolating, being open, honest, and willing to change, possible triggers and how to avoid them, and managing cravings.    There are no discontinued medications.    Options for treatment and follow-up care were reviewed with the patient. Jorge A engaged in the decision  making process and verbalized understanding of the options discussed and agreed with the final plan.    I saw and examined this patient.  I have read through the note and made appropriate changes and agree with the RN Care Coordinator's assessment and plan.     Regina Hobbs MD      Phone call duration:  5 minutes

## 2020-11-25 ENCOUNTER — PATIENT OUTREACH (OUTPATIENT)
Dept: CARE COORDINATION | Facility: OTHER | Age: 65
End: 2020-11-25

## 2020-11-25 ENCOUNTER — VIRTUAL VISIT (OUTPATIENT)
Dept: FAMILY MEDICINE | Facility: OTHER | Age: 65
End: 2020-11-25
Attending: FAMILY MEDICINE
Payer: MEDICARE

## 2020-11-25 VITALS — HEIGHT: 68 IN | BODY MASS INDEX: 23.04 KG/M2 | WEIGHT: 152 LBS

## 2020-11-25 DIAGNOSIS — F11.90 OPIOID USE DISORDER: ICD-10-CM

## 2020-11-25 PROCEDURE — 99441 PR PHYSICIAN TELEPHONE EVALUATION 5-10 MIN: CPT | Mod: 95 | Performed by: FAMILY MEDICINE

## 2020-11-25 PROCEDURE — G0463 HOSPITAL OUTPT CLINIC VISIT: HCPCS | Mod: 25,TEL

## 2020-11-25 RX ORDER — BUPRENORPHINE AND NALOXONE 4; 1 MG/1; MG/1
1 FILM, SOLUBLE BUCCAL; SUBLINGUAL 2 TIMES DAILY
Qty: 52 EACH | Refills: 0 | Status: SHIPPED | OUTPATIENT
Start: 2020-11-25 | End: 2020-12-21

## 2020-11-25 ASSESSMENT — MIFFLIN-ST. JEOR: SCORE: 1443.97

## 2020-11-25 ASSESSMENT — PAIN SCALES - GENERAL: PAINLEVEL: SEVERE PAIN (6)

## 2020-11-25 NOTE — PROGRESS NOTES
"Clinic Care Coordination Contact  Care Team Conversations    CC \"attended\" telephone visit with pt and Dr. Hicks this date.  Please refer to Dr. Hicks's note for plan of care.  All questions answered.  Encouraged him to call CC with any questions/concerns/problems.  Verbalized understanding.    Margot Joel RN-BSN, [facility] Care Coordinator  [phone] opt. #3            "

## 2020-12-21 ENCOUNTER — TELEPHONE (OUTPATIENT)
Dept: FAMILY MEDICINE | Facility: OTHER | Age: 65
End: 2020-12-21

## 2020-12-21 ENCOUNTER — VIRTUAL VISIT (OUTPATIENT)
Dept: FAMILY MEDICINE | Facility: OTHER | Age: 65
End: 2020-12-21
Attending: FAMILY MEDICINE
Payer: MEDICARE

## 2020-12-21 ENCOUNTER — PATIENT OUTREACH (OUTPATIENT)
Dept: CARE COORDINATION | Facility: OTHER | Age: 65
End: 2020-12-21

## 2020-12-21 DIAGNOSIS — F11.90 OPIOID USE DISORDER: Primary | ICD-10-CM

## 2020-12-21 PROCEDURE — 99441 PR PHYSICIAN TELEPHONE EVALUATION 5-10 MIN: CPT | Mod: 95 | Performed by: FAMILY MEDICINE

## 2020-12-21 RX ORDER — BUPRENORPHINE AND NALOXONE 4; 1 MG/1; MG/1
1 FILM, SOLUBLE BUCCAL; SUBLINGUAL 2 TIMES DAILY
Qty: 60 EACH | Refills: 0 | Status: SHIPPED | OUTPATIENT
Start: 2020-12-21 | End: 2021-01-20

## 2020-12-21 NOTE — PROGRESS NOTES
"The author of this note documented a reason for not sharing it with the patient.  Jorge A Mendosa is a 65 year old male who is being evaluated via a billable telephone visit.      The patient has been notified of following:     \"This telephone visit will be conducted via a call between you and your physician/provider. We have found that certain health care needs can be provided without the need for a physical exam.  This service lets us provide the care you need with a short phone conversation.  If a prescription is necessary we can send it directly to your pharmacy.  If lab work is needed we can place an order for that and you can then stop by our lab to have the test done at a later time.    Telephone visits are billed at different rates depending on your insurance coverage. During this emergency period, for some insurers they may be billed the same as an in-person visit.  Please reach out to your insurance provider with any questions.    If during the course of the call the physician/provider feels a telephone visit is not appropriate, you will not be charged for this service.\"    Patient has given verbal consent for Telephone visit?  Yes    What phone number would you like to be contacted at? [phone]    How would you like to obtain your [facility]S? [facility]    Follow-up Buprenorphine Visit           HPI       Jorge A Mendosa is here for f/u on buprenophine/naloxone (Suboxone) therapy for opioid use disorder.  Recheck Medication      Jorge A is currently taking 4/1 mg of Buprenorphine/Naloxone 2 times  daily.     Status since last visit:    Since last visit patient has been: doing well.     Intensity:     There has been: no craving.      Suboxone Dose: adequate.    Progression of Symptoms:     Cues to use and relapse none    Recovery program has been: active.   Accompanying Signs & Symptoms:    Side Effects: none.    Sobriety:     Status: no use since last visit.      Drug Screen: not obtained - changed to phone " visit due to ride.  Car in the shop.  Ride no showed.  Precipitating factors:    Triggers have been: non-existent.   Alleviating factors:    Contact with sponsor has been: no sponsor.     Family and support system has been: helpful.   Other Therapies Tried :     Patient has been going to recovery meetings:not at all.      Other medical concerns: No    Any ED visits? No     Will be spending holidays with his family - his daughter.    History   Smoking Status     Current Every Day Smoker     Packs/day: 0.25     Years: 30.00     Types: Cigarettes     Start date: 1/1/1976   Smokeless Tobacco     Never Used     Comment: only smokes a little bit-noted 2-       Problem, Medication and Allergy Lists were reviewed and updated if needed.  reviewed and are current..    Pharmacy Database reviewed? yes    Patient is an established patient of this clinic..      Assessment and Plan     Was naloxone nasal spray prescribed? Yes.  Prior.  1. Opioid use disorder (H)  Doing well.    Will have his car back for next month (had radiator fixed).    - buprenorphine HCl-naloxone HCl (SUBOXONE) 4-1 MG per film; Place 1 Film under the tongue 2 times daily  Dispense: 60 each; Refill: 0    Dosage will not need adjustment today.  Continue at 4 mg  2 time(s) a day of  buprenorphine/naloxone (Suboxone).     Follow up Plan  Stage 3 (4 weeks): Please make a clinic appointment for follow up with me (ABHILASH MAGAÑA) or another Suboxone provider in 1 month for suboxone follow up.    Greater than 5 minutes was spent with this patient, over half of which was on counseling and coordination of care which includes importance of recovery activities,which may include  attendance at NA/AA meetings, sober support network, and the importance of not isolating, being open, honest, and willing to change, possible triggers and how to avoid them, and managing cravings.    Medications Discontinued During This Encounter   Medication Reason     buprenorphine  HCl-naloxone HCl (SUBOXONE) 4-1 MG per film Reorder       Options for treatment and follow-up care were reviewed with the patient. Jorge A engaged in the decision making process and verbalized understanding of the options discussed and agreed with the final plan.    I saw and examined this patient.  I have read through the note and made appropriate changes and agree with the RN Care Coordinator's assessment and plan.     Regina Hobbs MD    Phone call duration:  5 minutes

## 2020-12-21 NOTE — TELEPHONE ENCOUNTER
Patient reported his ride never showed for his appointment with Dr. Hicks. Pt requesting to reschedule.   Call back [phone]   Pt is requesting Dr. Hicks refill his suboxone

## 2020-12-21 NOTE — PROGRESS NOTES
"Clinic Care Coordination Contact  Care Team Conversations    CC \"attended\" telephone visit with pt and Dr. Hicks this date.  Please refer to Dr. Hicks's note for plan of care.   Doing well this date.  No questions or concerns.  Encouraged him to call CC with any questions/concerns/problems.  Verbalized understanding.    Margot Joel RN-BSN, [facility] Care Coordinator  [phone] opt. #3            "

## 2021-01-19 NOTE — PROGRESS NOTES
Follow-up Buprenorphine Visit           HPI       Jorge A Mendosa is here for f/u on buprenophine/naloxone (Suboxone) therapy for opioid use disorder.  Recheck Medication    Declines vaccines.    Jorge A is currently taking 4/1 mg of Buprenorphine/Naloxone 2 times daily.     Status since last visit:    Since last visit patient has been: doing well.     Intensity:     There has been: no craving.      Suboxone Dose: adequate.    Progression of Symptoms:     Cues to use and relapse occasional triggers/people    Recovery program has been: active.   Accompanying Signs & Symptoms:    Side Effects: none.    Sobriety:     Status: no use since last visit.      Drug Screen: obtained.    Precipitating factors:    Triggers have been: mild. People - friend stopped by and asked him if he had anything.  Alleviating factors:    Contact with sponsor has been: no sponsor.     Family and support system has been: helpful.   Other Therapies Tried :     Patient has been going to recovery meetings:not at all.      Other medical concerns: No    Any ED visits? No       History   Smoking Status     Current Every Day Smoker     Packs/day: 0.25     Years: 30.00     Types: Cigarettes     Start date: 1/1/1976   Smokeless Tobacco     Never Used     Comment: only smokes a little bit-noted 2-       Problem, Medication and Allergy Lists were reviewed and updated if needed.  reviewed and are current..    Pharmacy Database reviewed? Yes, 1.20.21, as expected.    Patient is an established patient of this clinic..         Review of Systems:   Review of Systems  CONSTITUTIONAL: NEGATIVE for fever, chills, change in weight  INTEGUMENTARY/SKIN: NEGATIVE for worrisome rashes, moles or lesions  EYES: NEGATIVE for vision changes or irritation  ENT/MOUTH: NEGATIVE for ear, mouth and throat problems  RESP: NEGATIVE for significant cough or SOB  CV: NEGATIVE for chest pain, palpitations or peripheral edema  GI: NEGATIVE for nausea, abdominal pain,  heartburn, or change in bowel habits  : NEGATIVE for frequency, dysuria, or hematuria  MUSCULOSKELETAL: NEGATIVE for significant arthralgias or myalgia  NEURO: NEGATIVE for weakness, dizziness or paresthesias  ENDOCRINE: NEGATIVE for temperature intolerance, skin/hair changes  PSYCHIATRIC: NEGATIVE for changes in mood or affect          Physical Exam:     Vitals:    01/20/21 1318   BP: 138/78   BP Location: Left arm   Patient Position: Chair   Cuff Size: Adult Regular   Pulse: 78   Temp: 98.3  F (36.8  C)   TempSrc: Tympanic   SpO2: 96%   Weight: 72.7 kg (160 lb 3.2 oz)     Body mass index is 24.36 kg/m .  Vitals were reviewed   Physical Exam  GENERAL APPEARANCE: alert, comfortable appearing, raspy voice  EYES: Eyes grossly normal to inspection  HENT:  nose no rhinorrhea  RESP: lungs clear to auscultation - no rales, rhonchi or wheezes and no resp distress  CV: regular rates and rhythm, normal S1 S2,no murmur   ABDOMEN:soft, non-tender, non-distended, no hepatosplenomegaly or masses  SKIN: no rashes, no jaundice, no obvious masses.   NEURO:  no tremor  MENTAL STATUS EXAM:  Appearance/Behavior:No apparent distress  Speech: Normal  Mood/Affect: normal affect  Insight: Fair      Results:    Results from the last 24 hours  Results for orders placed or performed in visit on 01/20/21 (from the past 24 hour(s))   Urine Drugs of Abuse Screen (Tox13)   Result Value Ref Range    Cannabinoids (30-web-5-carboxy-9-THC) Not Detected NDET^Not Detected ng/mL    Phencyclidine (Phencyclidine) Not Detected NDET^Not Detected ng/mL    Cocaine (Benzoylecgonine) Not Detected NDET^Not Detected ng/mL    Methamphetamine (d-Methamphetamine) Not Detected NDET^Not Detected ng/mL    Opiates (Morphine) Not Detected NDET^Not Detected ng/mL    Amphetamine (d-Amphetamine) Not Detected NDET^Not Detected ng/mL    Benzodiazepines (Nordiazepam) Not Detected NDET^Not Detected ng/mL    Tricyclic Antidepressants (Desipramine) Not Detected NDET^Not  Detected ng/mL    Methadone (Methadone) Not Detected NDET^Not Detected ng/mL    Barbiturates (Butalbital) Not Detected NDET^Not Detected ng/mL    Oxycodone (Oxycodone) Not Detected NDET^Not Detected ng/mL    Propoxyphene (Norpropoxyphene) Not Detected NDET^Not Detected ng/mL    Buprenorphine (Buprenorphine) Detected, Abnormal Result (A) NDET^Not Detected ng/mL       Assessment and Plan     Was naloxone nasal spray prescribed? Yes.    1. Opioid abuse, in remission (H)   Doing well overall.  - Urine Drugs of Abuse Screen (Tox13)    Dosage will not need adjustment today.  Continue at 4/1 mg  1 time(s) a day of  buprenorphine/naloxone (Suboxone).     Follow up Plan  Stage 3 (4 weeks): Please make a clinic appointment for follow up with me (REGINA MAGAÑA) or another Suboxone provider in 1 month for suboxone follow up.    Greater than 5 minutes was spent with this patient, over half of which was on counseling and coordination of care which includes importance of recovery activities,which may include  attendance at NA/AA meetings, sober support network, and the importance of not isolating, being open, honest, and willing to change, possible triggers and how to avoid them, and managing cravings.    There are no discontinued medications.    Options for treatment and follow-up care were reviewed with the patient. Jorge A engaged in the decision making process and verbalized understanding of the options discussed and agreed with the final plan.    I saw and examined this patient.  I have read through the note and made appropriate changes and agree with the RN Care Coordinator's assessment and plan.     Regina Magaña MD

## 2021-01-20 ENCOUNTER — PATIENT OUTREACH (OUTPATIENT)
Dept: FAMILY MEDICINE | Facility: OTHER | Age: 66
End: 2021-01-20

## 2021-01-20 ENCOUNTER — OFFICE VISIT (OUTPATIENT)
Dept: FAMILY MEDICINE | Facility: OTHER | Age: 66
End: 2021-01-20
Attending: FAMILY MEDICINE
Payer: MEDICARE

## 2021-01-20 ENCOUNTER — APPOINTMENT (OUTPATIENT)
Dept: LAB | Facility: OTHER | Age: 66
End: 2021-01-20
Attending: FAMILY MEDICINE
Payer: MEDICARE

## 2021-01-20 ENCOUNTER — PATIENT OUTREACH (OUTPATIENT)
Dept: CARE COORDINATION | Facility: OTHER | Age: 66
End: 2021-01-20

## 2021-01-20 VITALS
SYSTOLIC BLOOD PRESSURE: 138 MMHG | TEMPERATURE: 98.3 F | BODY MASS INDEX: 24.36 KG/M2 | HEART RATE: 78 BPM | DIASTOLIC BLOOD PRESSURE: 78 MMHG | OXYGEN SATURATION: 96 % | WEIGHT: 160.2 LBS

## 2021-01-20 DIAGNOSIS — Z71.6 ENCOUNTER FOR TOBACCO USE CESSATION COUNSELING: ICD-10-CM

## 2021-01-20 DIAGNOSIS — R10.13 ABDOMINAL PAIN, EPIGASTRIC: ICD-10-CM

## 2021-01-20 DIAGNOSIS — F17.200 TOBACCO USE DISORDER: ICD-10-CM

## 2021-01-20 DIAGNOSIS — F11.90 OPIOID USE DISORDER: Primary | ICD-10-CM

## 2021-01-20 DIAGNOSIS — F11.11 OPIOID ABUSE, IN REMISSION (H): ICD-10-CM

## 2021-01-20 PROCEDURE — G0463 HOSPITAL OUTPT CLINIC VISIT: HCPCS

## 2021-01-20 PROCEDURE — 99213 OFFICE O/P EST LOW 20 MIN: CPT | Performed by: FAMILY MEDICINE

## 2021-01-20 PROCEDURE — 80306 DRUG TEST PRSMV INSTRMNT: CPT | Mod: ZL | Performed by: FAMILY MEDICINE

## 2021-01-20 RX ORDER — OMEPRAZOLE 40 MG/1
40 CAPSULE, DELAYED RELEASE ORAL DAILY
Qty: 90 CAPSULE | Refills: 3 | Status: SHIPPED | OUTPATIENT
Start: 2021-01-20 | End: 2021-11-04

## 2021-01-20 RX ORDER — BUPRENORPHINE AND NALOXONE 4; 1 MG/1; MG/1
1 FILM, SOLUBLE BUCCAL; SUBLINGUAL 2 TIMES DAILY
Qty: 56 EACH | Refills: 0 | Status: SHIPPED | OUTPATIENT
Start: 2021-01-20 | End: 2021-02-17

## 2021-01-20 ASSESSMENT — PAIN SCALES - GENERAL: PAINLEVEL: MODERATE PAIN (4)

## 2021-01-20 NOTE — NURSING NOTE
"Chief Complaint   Patient presents with     Recheck Medication       Initial /78 (BP Location: Left arm, Patient Position: Chair, Cuff Size: Adult Regular)   Pulse 78   Temp 98.3  F (36.8  C) (Tympanic)   Wt 72.7 kg (160 lb 3.2 oz)   SpO2 96%   BMI 24.36 kg/m   Estimated body mass index is 24.36 kg/m  as calculated from the following:    Height as of 11/25/20: 1.727 m (5' 8\").    Weight as of this encounter: 72.7 kg (160 lb 3.2 oz).  Medication Reconciliation: complete  Mayo Campo LPN  "

## 2021-01-20 NOTE — PATIENT INSTRUCTIONS
Mucinex OTC - can help with phlegm.  Prilosec refilled for reflux.    Continue Suboxone.  Follow up 1 month virtual.    Smoking cessation advised.

## 2021-01-20 NOTE — PROGRESS NOTES
Clinic Care Coordination Contact  Care Team Conversations    CC attended office visit with pt and Dr. Hicks this date.  Please refer to Dr. Hicks's note for plan of care.  All questions answered.  Encouraged him to call/text CC with any questions/concerns/problems.  Verbalized understanding.    Margot Joel RN-BSN, [facility] Care Coordinator  [phone] opt. #3

## 2021-02-16 NOTE — PROGRESS NOTES
Jorge A is a 65 year old who is being evaluated via a billable telephone visit.      What phone number would you like to be contacted at? [phone]  How would you [facility] to [facility]? [facility]    Follow-up Buprenorphine Visit           HPI       Jorge A Mendosa is here for f/u on buprenophine/naloxone (Suboxone) therapy for opioid use disorder.  opioid use disorder    Jorge A is currently taking 4/1 mg of Buprenorphine/Naloxone 2 times daily.     Status since last visit:    Since last visit patient has been: doing well.     Intensity:     There has been: no craving.      Suboxone Dose: adequate.    Progression of Symptoms:     Cues to use and relapse none    Recovery program has been: active.   Accompanying Signs & Symptoms:    Side Effects: none.    Sobriety:     Status: no use since last visit.      Drug Screen:   Unable to obtain - telephone visit   Precipitating factors:    Triggers have been: non-existent.   Alleviating factors:    Contact with sponsor has been: no sponsor.     Family and support system has been: helpful.   Other Therapies Tried :     Patient has been going to recovery meetings:not at all.      Other medical concerns: No    Any ED visits? No     Furnace went.  At daughter's currently.  Spends time with family.  Limited walking - due to cold weather.  Back sore with cold weather.      History   Smoking Status     Current Every Day Smoker     Packs/day: 0.25     Years: 30.00     Types: Cigarettes     Start date: 1/1/1976   Smokeless Tobacco     Never Used     Comment: only smokes a little bit-noted 2-       Problem, Medication and Allergy Lists were reviewed and updated if needed.  reviewed and are current..    Pharmacy Database reviewed? Yes, 2.17.21, as expected.    Patient is an established patient of this clinic..      Assessment and Plan     Was naloxone nasal spray prescribed? Yes.  Prior.  1. Opioid use disorder (H)  Doing well.  No new concerns.  Has been stable for a while.   Will be 1 year in April.  At next visit, can go to every 2 months.  - buprenorphine HCl-naloxone HCl (SUBOXONE) 4-1 MG per film; Place 1 Film under the tongue 2 times daily  Dispense: 56 each; Refill: 0    Dosage will not need adjustment today.  Continue at 4 mg  2 time(s) a day of  buprenorphine/naloxone (Suboxone).     Follow up Plan  Stage 3 (4 weeks): Please make a clinic appointment for follow up with me (REGINA MAGAÑA) or another Suboxone provider in 1 month for suboxone follow up.    Greater than 5 minutes was spent with this patient, over half of which was on counseling and coordination of care which includes importance of recovery activities,which may include  attendance at NA/AA meetings, sober support network, and the importance of not isolating, being open, honest, and willing to change, possible triggers and how to avoid them, and managing cravings.    I saw and examined this patient.  I have read through the note and made appropriate changes and agree with the RN Care Coordinator's assessment and plan.     There are no discontinued medications.    Options for treatment and follow-up care were reviewed with the patient. Jorge A engaged in the decision making process and verbalized understanding of the options discussed and agreed with the final plan.    Regina Magaña MD      Phone call duration: 5 minutes

## 2021-02-17 ENCOUNTER — VIRTUAL VISIT (OUTPATIENT)
Dept: FAMILY MEDICINE | Facility: OTHER | Age: 66
End: 2021-02-17
Attending: FAMILY MEDICINE
Payer: MEDICARE

## 2021-02-17 ENCOUNTER — PATIENT OUTREACH (OUTPATIENT)
Dept: CARE COORDINATION | Facility: OTHER | Age: 66
End: 2021-02-17

## 2021-02-17 DIAGNOSIS — F11.90 OPIOID USE DISORDER: ICD-10-CM

## 2021-02-17 PROCEDURE — 99213 OFFICE O/P EST LOW 20 MIN: CPT | Mod: 95 | Performed by: FAMILY MEDICINE

## 2021-02-17 RX ORDER — BUPRENORPHINE AND NALOXONE 4; 1 MG/1; MG/1
1 FILM, SOLUBLE BUCCAL; SUBLINGUAL 2 TIMES DAILY
Qty: 56 EACH | Refills: 0 | Status: SHIPPED | OUTPATIENT
Start: 2021-02-17 | End: 2021-03-17

## 2021-02-17 NOTE — PROGRESS NOTES
"Clinic Care Coordination Contact  Care Team Conversations    CC \"attended\" telephone visit with pt and Dr. Hicks this date.  Please refer to Dr. Hicks's note for plan of care.  Doing well.  No questions or concerns.  Encouraged him to call CC with any questions/concerns/problems.  Verbalized understanding.    Margot Joel RN-BSN, [facility] Care Coordinator  [phone] opt. #3            "

## 2021-02-17 NOTE — NURSING NOTE
"Chief Complaint   Patient presents with     opioid use disorder       Initial There were no vitals taken for this visit. Estimated body mass index is 24.36 kg/m  as calculated from the following:    Height as of 11/25/20: 1.727 m (5' 8\").    Weight as of 1/20/21: 72.7 kg (160 lb 3.2 oz).  Medication Reconciliation: complete  Sherley Griffith LPN    "

## 2021-03-16 NOTE — PROGRESS NOTES
Follow-up Buprenorphine Visit           HPI       Jorge A Mendosa is here for f/u on buprenophine/naloxone (Suboxone) therapy for opioid use disorder.  Recheck Medication      Jorge A is currently taking 4/1mg of Buprenorphine/Naloxone 2 times daily.     Status since last visit:    Since last visit patient has been: doing well.     Intensity:     There has been: no craving.      Suboxone Dose: adequate.    Progression of Symptoms:     Cues to use and relapse None    Recovery program has been: solid.   Accompanying Signs & Symptoms:    Side Effects: none.    Sobriety:     Status: no use since last visit.      Drug Screen: obtained.    Precipitating factors:    Triggers have been: non-existent.   Alleviating factors:    Contact with sponsor has been: no sponsor.     Family and support system has been: helpful.   Other Therapies Tried :     Patient has been going to recovery meetings:not at all.      Other medical concerns: No    Any ED visits? No       History   Smoking Status     Current Every Day Smoker     Packs/day: 0.25     Years: 30.00     Types: Cigarettes     Start date: 1/1/1976   Smokeless Tobacco     Never Used     Comment: only smokes a little bit-noted 2-       Problem, Medication and Allergy Lists were reviewed and updated if needed.  reviewed and are current..    Pharmacy Database reviewed? Yes, 3.17.21, as expected.    Patient is an established patient of this clinic..         Review of Systems:   Review of Systems  CONSTITUTIONAL: NEGATIVE for fever, chills, change in weight  INTEGUMENTARY/SKIN: NEGATIVE for worrisome rashes, moles or lesions  EYES: NEGATIVE for vision changes or irritation  ENT/MOUTH: NEGATIVE for ear, mouth and throat problems  RESP: NEGATIVE for significant cough or SOB  CV: NEGATIVE for chest pain, palpitations or peripheral edema  GI: NEGATIVE for nausea, abdominal pain, heartburn, or change in bowel habits  : NEGATIVE for frequency, dysuria, or  "hematuria  MUSCULOSKELETAL: NEGATIVE for significant arthralgias or myalgia  NEURO: NEGATIVE for weakness, dizziness or paresthesias  ENDOCRINE: NEGATIVE for temperature intolerance, skin/hair changes  PSYCHIATRIC: NEGATIVE for changes in mood or affect          Physical Exam:     Vitals:    03/17/21 0949   BP: 128/68   Pulse: 93   Resp: 19   Temp: 98.6  F (37  C)   TempSrc: Tympanic   SpO2: 98%   Weight: 76.2 kg (168 lb)   Height: 1.727 m (5' 8\")     Body mass index is 25.54 kg/m .  Vitals were reviewed   Physical Exam  GENERAL APPEARANCE: alert, comfortable appearing  EYES: Eyes grossly normal to inspection  HENT:  nose no rhinorrhea  RESP: lungs clear to auscultation - no rales, rhonchi or wheezes and no resp distress  CV: regular rates and rhythm, normal S1 S2,no murmur   ABDOMEN:soft, non-tender, non-distended, no hepatosplenomegaly or masses  SKIN: no rashes, no jaundice, no obvious masses.   NEURO:  no tremor  MENTAL STATUS EXAM:  Appearance/Behavior:No apparent distress  Speech: Normal  Mood/Affect: normal affect  Insight: Fair      Results:    Results from the last 24 hours  Results for orders placed or performed in visit on 03/17/21 (from the past 24 hour(s))   Urine Drugs of Abuse Screen (Tox13)   Result Value Ref Range    Cannabinoids (10-xjq-8-carboxy-9-THC) Not Detected NDET^Not Detected ng/mL    Phencyclidine (Phencyclidine) Not Detected NDET^Not Detected ng/mL    Cocaine (Benzoylecgonine) Not Detected NDET^Not Detected ng/mL    Methamphetamine (d-Methamphetamine) Not Detected NDET^Not Detected ng/mL    Opiates (Morphine) Not Detected NDET^Not Detected ng/mL    Amphetamine (d-Amphetamine) Not Detected NDET^Not Detected ng/mL    Benzodiazepines (Nordiazepam) Not Detected NDET^Not Detected ng/mL    Tricyclic Antidepressants (Desipramine) Not Detected NDET^Not Detected ng/mL    Methadone (Methadone) Not Detected NDET^Not Detected ng/mL    Barbiturates (Butalbital) Not Detected NDET^Not Detected ng/mL    " Oxycodone (Oxycodone) Not Detected NDET^Not Detected ng/mL    Propoxyphene (Norpropoxyphene) Not Detected NDET^Not Detected ng/mL    Buprenorphine (Buprenorphine) Detected, Abnormal Result (A) NDET^Not Detected ng/mL       Assessment and Plan     Was naloxone nasal spray prescribed? Yes - previously  1. Opioid use disorder (H)  Doing well. PDMP reviewed.  Push visits to 8 weeks.    - Urine Drugs of Abuse Screen (Tox13)  - buprenorphine HCl-naloxone HCl (SUBOXONE) 4-1 MG per film; Place 1 Film under the tongue 2 times daily  Dispense: 56 each; Refill: 0    Dosage will not need adjustment today.  Continue at 4/1 mg  2 time(s) a day of  buprenorphine/naloxone (Suboxone).     Follow up Plan  2 months    Greater than 5 minutes was spent with this patient, over half of which was on counseling and coordination of care which includes importance of recovery activities,which may include  attendance at NA/AA meetings, sober support network, and the importance of not isolating, being open, honest, and willing to change, possible triggers and how to avoid them, and managing cravings.    Medications Discontinued During This Encounter   Medication Reason     buprenorphine HCl-naloxone HCl (SUBOXONE) 4-1 MG per film Reorder       Options for treatment and follow-up care were reviewed with the patient. Jorge A engaged in the decision making process and verbalized understanding of the options discussed and agreed with the final plan.    I saw and examined this patient.  I have read through the note and made appropriate changes and agree with the RN Care Coordinator's assessment and plan.       Declines vaccines.  Including COVID.    Regina Hobbs MD

## 2021-03-17 ENCOUNTER — PATIENT OUTREACH (OUTPATIENT)
Dept: CARE COORDINATION | Facility: OTHER | Age: 66
End: 2021-03-17

## 2021-03-17 ENCOUNTER — OFFICE VISIT (OUTPATIENT)
Dept: FAMILY MEDICINE | Facility: OTHER | Age: 66
End: 2021-03-17
Attending: FAMILY MEDICINE
Payer: MEDICARE

## 2021-03-17 ENCOUNTER — APPOINTMENT (OUTPATIENT)
Dept: LAB | Facility: OTHER | Age: 66
End: 2021-03-17
Attending: FAMILY MEDICINE
Payer: MEDICARE

## 2021-03-17 VITALS
HEART RATE: 93 BPM | BODY MASS INDEX: 25.46 KG/M2 | SYSTOLIC BLOOD PRESSURE: 128 MMHG | HEIGHT: 68 IN | TEMPERATURE: 98.6 F | WEIGHT: 168 LBS | OXYGEN SATURATION: 98 % | RESPIRATION RATE: 19 BRPM | DIASTOLIC BLOOD PRESSURE: 68 MMHG

## 2021-03-17 DIAGNOSIS — F11.90 OPIOID USE DISORDER: Primary | ICD-10-CM

## 2021-03-17 DIAGNOSIS — Z13.220 LIPID SCREENING: ICD-10-CM

## 2021-03-17 DIAGNOSIS — F11.11 OPIOID ABUSE, IN REMISSION (H): ICD-10-CM

## 2021-03-17 PROCEDURE — G0463 HOSPITAL OUTPT CLINIC VISIT: HCPCS

## 2021-03-17 PROCEDURE — 80306 DRUG TEST PRSMV INSTRMNT: CPT | Mod: ZL | Performed by: FAMILY MEDICINE

## 2021-03-17 PROCEDURE — 99213 OFFICE O/P EST LOW 20 MIN: CPT | Performed by: FAMILY MEDICINE

## 2021-03-17 RX ORDER — BUPRENORPHINE AND NALOXONE 4; 1 MG/1; MG/1
1 FILM, SOLUBLE BUCCAL; SUBLINGUAL 2 TIMES DAILY
Qty: 56 EACH | Refills: 1 | Status: SHIPPED | OUTPATIENT
Start: 2021-03-17 | End: 2021-05-12

## 2021-03-17 ASSESSMENT — PAIN SCALES - GENERAL: PAINLEVEL: MODERATE PAIN (5)

## 2021-03-17 ASSESSMENT — MIFFLIN-ST. JEOR: SCORE: 1516.54

## 2021-03-17 NOTE — NURSING NOTE
"Chief Complaint   Patient presents with     Recheck Medication       Initial /68   Pulse 93   Temp 98.6  F (37  C) (Tympanic)   Resp 19   Ht 1.727 m (5' 8\")   Wt 76.2 kg (168 lb)   SpO2 98%   BMI 25.54 kg/m   Estimated body mass index is 25.54 kg/m  as calculated from the following:    Height as of this encounter: 1.727 m (5' 8\").    Weight as of this encounter: 76.2 kg (168 lb).  Medication Reconciliation: complete  Quita Grewal LPN    "

## 2021-03-17 NOTE — PROGRESS NOTES
Clinic Care Coordination Contact  Care Team Conversations    CC attended office visit with pt and Dr. Hicks this date.   Please refer to Dr. Hicks's note for plan of care.  Doing well.  All questions answered.  Encouraged him to call CC with any questions/concerns/problems.  Verbalized understanding.    Margot Joel RN-BSN, [facility] Care Coordinator  [phone] opt. #3

## 2021-03-17 NOTE — PATIENT INSTRUCTIONS
Continue current dose.  1 month plus 1 refill.  Follow up 8 weeks.  Reminder - come fasting for labs - water and black coffee and medications fine.

## 2021-04-12 DIAGNOSIS — F11.90 OPIOID USE DISORDER: ICD-10-CM

## 2021-04-12 RX ORDER — BUPRENORPHINE AND NALOXONE 4; 1 MG/1; MG/1
1 FILM, SOLUBLE BUCCAL; SUBLINGUAL 2 TIMES DAILY
Qty: 56 EACH | Refills: 1 | Status: CANCELLED | OUTPATIENT
Start: 2021-04-12

## 2021-04-12 NOTE — TELEPHONE ENCOUNTER
buprenorphine HCl-naloxone HCl (SUBOXONE) 4-1 MG per film        Last Written Prescription Date:  3/17/21  Last Fill Quantity: 56,   # refills: 1  Last Office Visit: 3/17/21  Future Office visit:    Next 5 appointments (look out 90 days)    May 12, 2021 10:15 AM  (Arrive by 10:00 AM)  SHORT with Regina Hicks MD  [facility] ([facility] [place] ) [address]  [place] [address]  [phone]           Routing refill request to provider for review/approval because:    Drug not on the [facility], P or [facility] refill protocol or controlled substance

## 2021-04-18 ENCOUNTER — HEALTH MAINTENANCE LETTER (OUTPATIENT)
Age: 66
End: 2021-04-18

## 2021-05-06 NOTE — PROGRESS NOTES
"Follow-up Buprenorphine Visit           HPI       Jorge A Mendosa is here for f/u on buprenophine/naloxone (Suboxone) therapy for opioid use disorder.  Recheck Medication      Jorge A is currently taking 4/1 mg of Buprenorphine/Naloxone 2 times daily.     Status since last visit:    Since last visit patient has been: stable.     Intensity:     There has been: mild craving.  Pain related.  Back - Advil helps.    Suboxone Dose: adequate.    Progression of Symptoms:     Cues to use and relapse none    Recovery program has been: solid.   Accompanying Signs & Symptoms:    Side Effects: none.    Sobriety:     Status: no use since last visit.      Drug Screen: obtained.    Precipitating factors:    Triggers have been: few phone calls - able to turn them down  Alleviating factors:    Contact with sponsor has been: no sponsor. Declines.    Family and support system has been: helpful.  Babysitting today.    Other Therapies Tried :     Patient has been going to recovery meetings:not at all.      Other medical concerns: No    Any ED visits? No     Does report back pain bothers him at times.  States \"I wonder if we should add another 4 mg?\"        History   Smoking Status     Current Every Day Smoker     Packs/day: 0.25     Years: 30.00     Types: Cigarettes     Start date: 1/1/1976   Smokeless Tobacco     Never Used     Comment: only smokes a little bit-noted 2-       Problem, Medication and Allergy Lists were reviewed and updated if needed.  reviewed and are current..    Pharmacy Database reviewed? yes    Patient is an established patient of this clinic..         Review of Systems:   Review of Systems  CONSTITUTIONAL: NEGATIVE for fever, chills, change in weight  INTEGUMENTARY/SKIN: NEGATIVE for worrisome rashes, moles or lesions  EYES: NEGATIVE for vision changes or irritation  ENT/MOUTH: NEGATIVE for ear, mouth and throat problems  RESP: NEGATIVE for significant cough or SOB  CV: NEGATIVE for chest pain, palpitations " or peripheral edema  GI: NEGATIVE for nausea, abdominal pain, heartburn, or change in bowel habits  : NEGATIVE for frequency, dysuria, or hematuria  MUSCULOSKELETAL: NEGATIVE for significant arthralgias or myalgia  NEURO: NEGATIVE for weakness, dizziness or paresthesias  ENDOCRINE: NEGATIVE for temperature intolerance, skin/hair changes  PSYCHIATRIC: NEGATIVE for changes in mood or affect          Physical Exam:     Vitals:    05/12/21 0955   BP: 132/80   BP Location: Left arm   Patient Position: Chair   Cuff Size: Adult Regular   Pulse: 68   Temp: 99.8  F (37.7  C)   TempSrc: Tympanic   SpO2: 97%   Weight: 71.2 kg (157 lb)     Body mass index is 23.87 kg/m .  Vitals were reviewed   Physical Exam  GENERAL APPEARANCE: alert, comfortable appearing  EYES: Eyes grossly normal to inspection  HENT:  nose no rhinorrhea  RESP: lungs clear to auscultation - no rales, rhonchi or wheezes and no resp distress  CV: regular rates and rhythm, normal S1 S2,no murmur   ABDOMEN:soft, non-tender, non-distended, no hepatosplenomegaly or masses  SKIN: no rashes, no jaundice, no obvious masses.   NEURO:  no tremor  MENTAL STATUS EXAM:  Appearance/Behavior:No apparent distress  Speech: Normal  Mood/Affect: normal affect  Insight: Fair      Results:    Results from the last 24 hours  Results for orders placed or performed in visit on 05/12/21 (from the past 24 hour(s))   Urine Drugs of Abuse Screen (Tox13)   Result Value Ref Range    Cannabinoids (49-zhb-0-carboxy-9-THC) Not Detected NDET^Not Detected ng/mL    Phencyclidine (Phencyclidine) Not Detected NDET^Not Detected ng/mL    Cocaine (Benzoylecgonine) Not Detected NDET^Not Detected ng/mL    Methamphetamine (d-Methamphetamine) Not Detected NDET^Not Detected ng/mL    Opiates (Morphine) Not Detected NDET^Not Detected ng/mL    Amphetamine (d-Amphetamine) Not Detected NDET^Not Detected ng/mL    Benzodiazepines (Nordiazepam) Not Detected NDET^Not Detected ng/mL    Tricyclic Antidepressants  (Desipramine) Not Detected NDET^Not Detected ng/mL    Methadone (Methadone) Not Detected NDET^Not Detected ng/mL    Barbiturates (Butalbital) Not Detected NDET^Not Detected ng/mL    Oxycodone (Oxycodone) Not Detected NDET^Not Detected ng/mL    Propoxyphene (Norpropoxyphene) Not Detected NDET^Not Detected ng/mL    Buprenorphine (Buprenorphine) Detected, Abnormal Result (A) NDET^Not Detected ng/mL       Assessment and Plan     Was naloxone nasal spray prescribed? Yes.  Prior.  1. Opioid use disorder (H)  Did mention his back pain as trigger.  I suggested we address his pain - see if needing evaluation with PCP, PT, other treatment options.  He then stated its not that bad and he just wants to continue the Suboxone.    Do not want to simply increase Suboxone for pain treatment.  Would prefer to treat the triggers.  I did encourage him to have a physical with his PCP - Dr Hamlin - as he is overdue for years.    He declines setting this up today.  States he will schedule on his own.  Will plan to check with him next month if he follows through.    - Urine Drugs of Abuse Screen (Tox13)  - buprenorphine HCl-naloxone HCl (SUBOXONE) 4-1 MG per film; Place 1 Film under the tongue 2 times daily  Dispense: 58 each; Refill: 1    2. Long term (current) use of opiate analgesic   As above.  - Urine Drugs of Abuse Screen (Tox13)    Dosage will not need adjustment today.  Continue at 4 mg  2 time(s) a day of  buprenorphine/naloxone (Suboxone).     Follow up Plan  2 months    Greater than 10 minutes was spent with this patient, over half of which was on counseling and coordination of care which includes importance of recovery activities,which may include  attendance at NA/AA meetings, sober support network, and the importance of not isolating, being open, honest, and willing to change, possible triggers and how to avoid them, and managing cravings.    Medications Discontinued During This Encounter   Medication Reason      buprenorphine HCl-naloxone HCl (SUBOXONE) 4-1 MG per film Reorder       Options for treatment and follow-up care were reviewed with the patient. Jorge A engaged in the decision making process and verbalized understanding of the options discussed and agreed with the final plan.    Regina Hobbs MD

## 2021-05-12 ENCOUNTER — APPOINTMENT (OUTPATIENT)
Dept: LAB | Facility: OTHER | Age: 66
End: 2021-05-12
Attending: FAMILY MEDICINE
Payer: MEDICARE

## 2021-05-12 ENCOUNTER — OFFICE VISIT (OUTPATIENT)
Dept: FAMILY MEDICINE | Facility: OTHER | Age: 66
End: 2021-05-12
Attending: FAMILY MEDICINE
Payer: MEDICARE

## 2021-05-12 VITALS
DIASTOLIC BLOOD PRESSURE: 80 MMHG | BODY MASS INDEX: 23.87 KG/M2 | HEART RATE: 68 BPM | OXYGEN SATURATION: 97 % | WEIGHT: 157 LBS | TEMPERATURE: 99.8 F | SYSTOLIC BLOOD PRESSURE: 132 MMHG

## 2021-05-12 DIAGNOSIS — Z79.891 LONG TERM (CURRENT) USE OF OPIATE ANALGESIC: ICD-10-CM

## 2021-05-12 DIAGNOSIS — F11.90 OPIOID USE DISORDER: Primary | ICD-10-CM

## 2021-05-12 PROCEDURE — 80306 DRUG TEST PRSMV INSTRMNT: CPT | Mod: ZL | Performed by: FAMILY MEDICINE

## 2021-05-12 PROCEDURE — 99214 OFFICE O/P EST MOD 30 MIN: CPT | Performed by: FAMILY MEDICINE

## 2021-05-12 PROCEDURE — G0463 HOSPITAL OUTPT CLINIC VISIT: HCPCS

## 2021-05-12 RX ORDER — BUPRENORPHINE AND NALOXONE 4; 1 MG/1; MG/1
1 FILM, SOLUBLE BUCCAL; SUBLINGUAL 2 TIMES DAILY
Qty: 58 EACH | Refills: 1 | Status: SHIPPED | OUTPATIENT
Start: 2021-05-12 | End: 2021-07-07

## 2021-05-12 ASSESSMENT — PAIN SCALES - GENERAL: PAINLEVEL: MODERATE PAIN (5)

## 2021-05-12 NOTE — PATIENT INSTRUCTIONS
Follow up 8 weeks.    Reminder to schedule complete physical with Dr Hamlin - fasting labs, prostate/colon screen, etc.

## 2021-05-12 NOTE — NURSING NOTE
"Chief Complaint   Patient presents with     Recheck Medication       Initial /80 (BP Location: Left arm, Patient Position: Chair, Cuff Size: Adult Regular)   Pulse 68   Temp 99.8  F (37.7  C) (Tympanic)   Wt 71.2 kg (157 lb)   SpO2 97%   BMI 23.87 kg/m   Estimated body mass index is 23.87 kg/m  as calculated from the following:    Height as of 3/17/21: 1.727 m (5' 8\").    Weight as of this encounter: 71.2 kg (157 lb).  Medication Reconciliation: complete  Mayo Campo LPN  "

## 2021-07-07 ENCOUNTER — TELEPHONE (OUTPATIENT)
Dept: FAMILY MEDICINE | Facility: OTHER | Age: 66
End: 2021-07-07

## 2021-07-07 DIAGNOSIS — F11.90 OPIOID USE DISORDER: ICD-10-CM

## 2021-07-07 RX ORDER — BUPRENORPHINE AND NALOXONE 4; 1 MG/1; MG/1
1 FILM, SOLUBLE BUCCAL; SUBLINGUAL 2 TIMES DAILY
Qty: 16 EACH | Refills: 0 | Status: SHIPPED | OUTPATIENT
Start: 2021-07-07 | End: 2021-07-15

## 2021-07-07 RX ORDER — BUPRENORPHINE AND NALOXONE 4; 1 MG/1; MG/1
1 FILM, SOLUBLE BUCCAL; SUBLINGUAL 2 TIMES DAILY
Qty: 58 EACH | Refills: 0 | Status: CANCELLED | OUTPATIENT
Start: 2021-07-07

## 2021-07-07 NOTE — TELEPHONE ENCOUNTER
CC returned phone call to [facility].      Reports he has to be in [place] tomorrow for a Rule 25 at 12:00PM.  This is for probation and states that this is an old charge, but will be in trouble if he does not make it.  Offered for him to come in earlier tomorrow - he stated that he already cancelled his ride for the [place] appt and has a ride to [place] tomorrow.      Last fill 6.8.21 #58 (29 day supply).  Reports he took his last one yesterday.      Informed him that Dr. Hicks is out today, but will see if an appt can be made next week and if enough Suboxone can be sent in until appt.  He was happy and agreeable with this.  Could possibly schedule him on July 15, arrive at 10:30AM.      Margot Joel, RN-BSN, [facility] Care Coordinator  [phone] opt. #3

## 2021-07-07 NOTE — TELEPHONE ENCOUNTER
8 days pended in this encounter and appt switched to July 15, arrive at 10:30AM.  Ok per Dr. Hicks.

## 2021-07-07 NOTE — TELEPHONE ENCOUNTER
8:12 AM    Reason for Call: Appointment Reschedule and Refill for Suboxone    Description: Call from patient stating he has 2 appt's tomorrow.     Dr. Hicks appt at 1030 am and appt in [place] at 1230 pm, which patient is stating he is not able to miss the appt in [place].     Patient requesting refill for suboxone and states he can come in today for appt instead of tomorrow.     Please advise.     Patient can be reached at [phone]    Was an appointment offered for this call? No  If yes : Appointment type              Date    Preferred method for responding to this message: Telephone Call  What is your phone number ?    If we cannot reach you directly, may we leave a detailed response at the number you provided? Yes    Can this message wait until your PCP/provider returns, if available today?    Not applicable        Sharon Goodwin RN

## 2021-07-07 NOTE — TELEPHONE ENCOUNTER
Jorge A notified.    Margot Joel, RN-BSN, [facility] Care Coordinator  [phone] opt. #3

## 2021-07-14 NOTE — PROGRESS NOTES
"Follow-up Buprenorphine Visit           HPI       Jorge A Mendosa is here for f/u on buprenophine/naloxone (Suboxone) therapy for opioid use disorder.  opioid use disorder    Jorge A is currently taking 4/1 mg of Buprenorphine/Naloxone 2 times daily.     Status since last visit:    Since last visit patient has been: doing well.     Intensity:     There has been: moderate craving.  Once and a while.  4th of July \"was a rough one\"; out and about, running into people, etc    Suboxone Dose: adequate.      When did you take your last dose? This morning  Progression of Symptoms:     Cues to use and relapse no    Recovery program has been: active.   Accompanying Signs & Symptoms:    Side Effects: none.    Sobriety:     Status: no use since last visit.      Drug Screen: obtained.    Precipitating factors:    Triggers have been: mild. As above  Alleviating factors:    Contact with sponsor has been: no sponsor.     Family and support system has been: helpful.   Other Therapies Tried :     Patient has been going to recovery meetings:not at all.      Other medical concerns: No    Any ED visits? No     Rescheduled last week.  Had Rule 25.  Prior probation requirement.    Babysitting grandkids.  Staying home.    History   Smoking Status     Current Every Day Smoker     Packs/day: 0.25     Years: 30.00     Types: Cigarettes     Start date: 1/1/1976   Smokeless Tobacco     Never Used     Comment: only smokes a little bit-noted 2-       Problem, Medication and Allergy Lists were reviewed and updated if needed.  reviewed and are current..    Pharmacy Database reviewed? Yes, 7.15.21, as expected.  Last fill 7.7.21 #16.    Patient is an established patient of this clinic..         Review of Systems:   Review of Systems  CONSTITUTIONAL: NEGATIVE for fever, chills, change in weight  INTEGUMENTARY/SKIN: NEGATIVE for worrisome rashes, moles or lesions  EYES: NEGATIVE for vision changes or irritation  ENT/MOUTH: NEGATIVE for ear, " "mouth and throat problems  RESP: NEGATIVE for significant cough or SOB  CV: NEGATIVE for chest pain, palpitations or peripheral edema  GI: NEGATIVE for nausea, abdominal pain, heartburn, or change in bowel habits  : NEGATIVE for frequency, dysuria, or hematuria  MUSCULOSKELETAL: NEGATIVE for significant arthralgias or myalgia  NEURO: NEGATIVE for weakness, dizziness or paresthesias  ENDOCRINE: NEGATIVE for temperature intolerance, skin/hair changes  PSYCHIATRIC: NEGATIVE for changes in mood or affect          Physical Exam:     Vitals:    07/15/21 1037   BP: 134/73   BP Location: Right arm   Patient Position: Sitting   Cuff Size: Adult Regular   Pulse: 67   Resp: 16   Temp: 97.9  F (36.6  C)   TempSrc: Tympanic   SpO2: 98%   Weight: 73.9 kg (163 lb)   Height: 1.727 m (5' 8\")     Body mass index is 24.78 kg/m .  Vitals were reviewed   Physical Exam  GENERAL APPEARANCE: alert, comfortable appearing  EYES: Eyes grossly normal to inspection  HENT:  nose no rhinorrhea  RESP: lungs clear to auscultation - no rales, rhonchi or wheezes and no resp distress  CV: regular rates and rhythm, normal S1 S2,no murmur   ABDOMEN:soft, non-tender, non-distended, no hepatosplenomegaly or masses  SKIN: no rashes, no jaundice, no obvious masses.   NEURO:  no tremor  MENTAL STATUS EXAM:  Appearance/Behavior:No apparent distress  Speech: Normal  Mood/Affect: normal affect  Insight: Fair      Results:    Results from the last 24 hours  Results for orders placed or performed in visit on 07/15/21 (from the past 24 hour(s))   Urine Drugs of Abuse Screen (Tox13)   Result Value Ref Range    Cannabinoids (95-gvd-4-carboxy-9-THC) Not Detected Not Detected, Indeterminate    Phencyclidine Not Detected Not Detected, Indeterminate    Cocaine (Benzoylecgonine) Not Detected Not Detected, Indeterminate    Methamphetamine (d-Methamphetamine) Not Detected Not Detected, Indeterminate    Opiates (Morphine) Not Detected Not Detected, Indeterminate    " Amphetamine (d-Amphetamine) Not Detected Not Detected, Indeterminate    Benzodiazepines (Nordiazepam) Not Detected Not Detected, Indeterminate    Tricyclic Antidepressants (Desipramine) Not Detected Not Detected, Indeterminate    Methadone Not Detected Not Detected, Indeterminate    Barbiturates (Butalbital) Not Detected Not Detected, Indeterminate    Oxycodone Not Detected Not Detected, Indeterminate    Propoxyphene (Norpropoxyphene) Not Detected Not Detected, Indeterminate    Buprenorphine Detected (A) Not Detected, Indeterminate       Assessment and Plan     Was naloxone nasal spray prescribed? Yes.  1. Opioid use disorder (H)  Doing well overall.  No significant cravings, but has had some triggers -mostly the 4th of July.  This has passed.  He was able to resist.  Congratulated.   Discussed that no dose will eliminate triggers.  - Urine Drugs of Abuse Screen (Tox13)  - buprenorphine HCl-naloxone HCl (SUBOXONE) 4-1 MG per film; Place 1 Film under the tongue 2 times daily  Dispense: 56 each; Refill: 1    2. Opioid abuse, in remission (H)   - Urine Drugs of Abuse Screen (Tox13)    Dosage will not need adjustment today.  Continue at 4 mg  2 time(s) a day of  buprenorphine/naloxone (Suboxone).     Follow up Plan  2 months    Greater than 20 minutes was spent with this patient, over half of which was on counseling and coordination of care which includes importance of recovery activities,which may include  attendance at NA/AA meetings, sober support network, and the importance of not isolating, being open, honest, and willing to change, possible triggers and how to avoid them, and managing cravings.    Medications Discontinued During This Encounter   Medication Reason     buprenorphine HCl-naloxone HCl (SUBOXONE) 4-1 MG per film Reorder       Options for treatment and follow-up care were reviewed with the patient. Jorge A engaged in the decision making process and verbalized understanding of the options discussed and  agreed with the final plan.    Regina Hobbs MD

## 2021-07-15 ENCOUNTER — OFFICE VISIT (OUTPATIENT)
Dept: FAMILY MEDICINE | Facility: OTHER | Age: 66
End: 2021-07-15
Attending: FAMILY MEDICINE
Payer: MEDICARE

## 2021-07-15 ENCOUNTER — PATIENT OUTREACH (OUTPATIENT)
Dept: CARE COORDINATION | Facility: OTHER | Age: 66
End: 2021-07-15

## 2021-07-15 ENCOUNTER — LAB (OUTPATIENT)
Dept: LAB | Facility: OTHER | Age: 66
End: 2021-07-15
Attending: FAMILY MEDICINE
Payer: MEDICARE

## 2021-07-15 VITALS
RESPIRATION RATE: 16 BRPM | DIASTOLIC BLOOD PRESSURE: 73 MMHG | WEIGHT: 163 LBS | OXYGEN SATURATION: 98 % | HEART RATE: 67 BPM | SYSTOLIC BLOOD PRESSURE: 134 MMHG | TEMPERATURE: 97.9 F | BODY MASS INDEX: 24.71 KG/M2 | HEIGHT: 68 IN

## 2021-07-15 DIAGNOSIS — F11.11 OPIOID ABUSE, IN REMISSION (H): ICD-10-CM

## 2021-07-15 DIAGNOSIS — Z13.220 LIPID SCREENING: ICD-10-CM

## 2021-07-15 DIAGNOSIS — F11.90 OPIOID USE DISORDER: ICD-10-CM

## 2021-07-15 DIAGNOSIS — F11.90 OPIOID USE DISORDER: Primary | ICD-10-CM

## 2021-07-15 LAB
ALBUMIN SERPL-MCNC: 4.1 G/DL (ref 3.4–5)
ALP SERPL-CCNC: 110 U/L (ref 40–150)
ALT SERPL W P-5'-P-CCNC: 26 U/L (ref 0–70)
AMPHETAMINES UR QL: NOT DETECTED
ANION GAP SERPL CALCULATED.3IONS-SCNC: 5 MMOL/L (ref 3–14)
AST SERPL W P-5'-P-CCNC: 12 U/L (ref 0–45)
BARBITURATES UR QL SCN: NOT DETECTED
BASOPHILS # BLD AUTO: 0 10E3/UL (ref 0–0.2)
BASOPHILS NFR BLD AUTO: 0 %
BENZODIAZ UR QL SCN: NOT DETECTED
BILIRUB SERPL-MCNC: 0.5 MG/DL (ref 0.2–1.3)
BUN SERPL-MCNC: 12 MG/DL (ref 7–30)
BUPRENORPHINE UR QL: DETECTED
CALCIUM SERPL-MCNC: 8.9 MG/DL (ref 8.5–10.1)
CANNABINOIDS UR QL: NOT DETECTED
CHLORIDE BLD-SCNC: 107 MMOL/L (ref 94–109)
CHOLEST SERPL-MCNC: 178 MG/DL
CO2 SERPL-SCNC: 27 MMOL/L (ref 20–32)
COCAINE UR QL SCN: NOT DETECTED
CREAT SERPL-MCNC: 0.74 MG/DL (ref 0.66–1.25)
D-METHAMPHET UR QL: NOT DETECTED
EOSINOPHIL # BLD AUTO: 0 10E3/UL (ref 0–0.7)
EOSINOPHIL NFR BLD AUTO: 0 %
ERYTHROCYTE [DISTWIDTH] IN BLOOD BY AUTOMATED COUNT: 13.5 % (ref 10–15)
FASTING STATUS PATIENT QL REPORTED: NO
GFR SERPL CREATININE-BSD FRML MDRD: >90 ML/MIN/1.73M2
GLUCOSE BLD-MCNC: 80 MG/DL (ref 70–99)
HCT VFR BLD AUTO: 44.2 % (ref 40–53)
HDLC SERPL-MCNC: 68 MG/DL
HGB BLD-MCNC: 14.3 G/DL (ref 13.3–17.7)
IMM GRANULOCYTES # BLD: 0.1 10E3/UL
IMM GRANULOCYTES NFR BLD: 1 %
LDLC SERPL CALC-MCNC: 98 MG/DL
LYMPHOCYTES # BLD AUTO: 1.6 10E3/UL (ref 0.8–5.3)
LYMPHOCYTES NFR BLD AUTO: 18 %
MCH RBC QN AUTO: 29.6 PG (ref 26.5–33)
MCHC RBC AUTO-ENTMCNC: 32.4 G/DL (ref 31.5–36.5)
MCV RBC AUTO: 92 FL (ref 78–100)
METHADONE UR QL SCN: NOT DETECTED
MONOCYTES # BLD AUTO: 0.7 10E3/UL (ref 0–1.3)
MONOCYTES NFR BLD AUTO: 8 %
NEUTROPHILS # BLD AUTO: 6.4 10E3/UL (ref 1.6–8.3)
NEUTROPHILS NFR BLD AUTO: 73 %
NONHDLC SERPL-MCNC: 110 MG/DL
NRBC # BLD AUTO: 0 10E3/UL
NRBC BLD AUTO-RTO: 0 /100
OPIATES UR QL SCN: NOT DETECTED
OXYCODONE UR QL SCN: NOT DETECTED
PCP UR QL SCN: NOT DETECTED
PLATELET # BLD AUTO: 263 10E3/UL (ref 150–450)
POTASSIUM BLD-SCNC: 3.5 MMOL/L (ref 3.4–5.3)
PROPOXYPH UR QL: NOT DETECTED
PROT SERPL-MCNC: 7.8 G/DL (ref 6.8–8.8)
RBC # BLD AUTO: 4.83 10E6/UL (ref 4.4–5.9)
SODIUM SERPL-SCNC: 139 MMOL/L (ref 133–144)
TRICYCLICS UR QL SCN: NOT DETECTED
TRIGL SERPL-MCNC: 58 MG/DL
WBC # BLD AUTO: 8.8 10E3/UL (ref 4–11)

## 2021-07-15 PROCEDURE — 36415 COLL VENOUS BLD VENIPUNCTURE: CPT | Mod: ZL

## 2021-07-15 PROCEDURE — 82040 ASSAY OF SERUM ALBUMIN: CPT | Mod: ZL

## 2021-07-15 PROCEDURE — G0463 HOSPITAL OUTPT CLINIC VISIT: HCPCS

## 2021-07-15 PROCEDURE — 85025 COMPLETE CBC W/AUTO DIFF WBC: CPT | Mod: ZL

## 2021-07-15 PROCEDURE — 99213 OFFICE O/P EST LOW 20 MIN: CPT | Performed by: FAMILY MEDICINE

## 2021-07-15 PROCEDURE — 80306 DRUG TEST PRSMV INSTRMNT: CPT | Mod: ZL | Performed by: FAMILY MEDICINE

## 2021-07-15 PROCEDURE — 82465 ASSAY BLD/SERUM CHOLESTEROL: CPT | Mod: ZL

## 2021-07-15 RX ORDER — BUPRENORPHINE AND NALOXONE 4; 1 MG/1; MG/1
1 FILM, SOLUBLE BUCCAL; SUBLINGUAL 2 TIMES DAILY
Qty: 56 EACH | Refills: 1 | Status: SHIPPED | OUTPATIENT
Start: 2021-07-15 | End: 2021-09-09

## 2021-07-15 ASSESSMENT — MIFFLIN-ST. JEOR: SCORE: 1493.86

## 2021-07-15 ASSESSMENT — PAIN SCALES - GENERAL: PAINLEVEL: MODERATE PAIN (4)

## 2021-07-15 NOTE — PROGRESS NOTES
Clinic Care Coordination Contact  Care Team Conversations    CC attended office visit with pt and Dr. Hicks this date.  Please refer to Dr. Hicks's note for plan of care.   Doing well.  Finishing up requirements of probation.  4th of July was a trigger for him - ran into some people that offered him pain pills.  He didn't give in.  Commended on this.  No other questions or concerns this date.  Encouraged him to call CC with any questions/concerns/problems.  Verbalized understanding.      Margot Joel RN-BSN, [facility] Care Coordinator  [phone] opt. #3

## 2021-07-15 NOTE — NURSING NOTE
"Chief Complaint   Patient presents with     opioid use disorder       Initial /73 (BP Location: Right arm, Patient Position: Sitting, Cuff Size: Adult Regular)   Pulse 67   Temp 97.9  F (36.6  C) (Tympanic)   Resp 16   Ht 1.727 m (5' 8\")   Wt 73.9 kg (163 lb)   SpO2 98%   BMI 24.78 kg/m   Estimated body mass index is 24.78 kg/m  as calculated from the following:    Height as of this encounter: 1.727 m (5' 8\").    Weight as of this encounter: 73.9 kg (163 lb).  Medication Reconciliation: complete  Sherley Griffith LPN  "

## 2021-09-08 NOTE — PROGRESS NOTES
Follow-up Buprenorphine Visit           HPI       Jorge A Mendosa is here for f/u on buprenophine/naloxone (Suboxone) therapy for opioid use disorder.  Recheck Medication    Jorge A is currently taking 4/1 mg of Buprenorphine/Naloxone 2 times daily.     Status since last visit:    Since last visit patient has been: doing well.     Intensity:     There has been: mild craving.  manageable    Suboxone Dose: adequate.      When did you take your last dose? today  Progression of Symptoms:     Cues to use and relapse None    Recovery program has been: solid.   Accompanying Signs & Symptoms:    Side Effects: none.    Sobriety:     Status: no use since last visit.      Drug Screen: obtained.    Precipitating factors:    Triggers have been: non-existent.   Alleviating factors:    Contact with sponsor has been: no sponsor.     Family and support system has been: helpful.   Other Therapies Tried :     Patient has been going to recovery meetings:not at all.      Other medical concerns: No    Any ED visits? No     6, 4, 1 and piper, ang, and rosy.  Babysitting grandkids often.    Been fishing.  Hunting = goose, duck, deer.    History   Smoking Status     Current Every Day Smoker     Packs/day: 0.25     Years: 30.00     Types: Cigarettes     Start date: 1/1/1976   Smokeless Tobacco     Never Used     Comment: only smokes a little bit-noted 2-       Problem, Medication and Allergy Lists were reviewed and updated if needed.  reviewed and are current..    Pharmacy Database reviewed? Yes, 9.9.21, as expected.  Last fill 8.6.21 #56    Patient is an established patient of this clinic..         Review of Systems:   Review of Systems  CONSTITUTIONAL: NEGATIVE for fever, chills, change in weight  INTEGUMENTARY/SKIN: NEGATIVE for worrisome rashes, moles or lesions  EYES: NEGATIVE for vision changes or irritation  ENT/MOUTH: NEGATIVE for ear, mouth and throat problems  RESP: NEGATIVE for significant cough or SOB  CV: NEGATIVE  "for chest pain, palpitations or peripheral edema  GI: NEGATIVE for nausea, abdominal pain, heartburn, or change in bowel habits  : NEGATIVE for frequency, dysuria, or hematuria  MUSCULOSKELETAL: NEGATIVE for significant arthralgias or myalgia  NEURO: NEGATIVE for weakness, dizziness or paresthesias  ENDOCRINE: NEGATIVE for temperature intolerance, skin/hair changes  PSYCHIATRIC: NEGATIVE for changes in mood or affect          Physical Exam:     Vitals:    09/09/21 1105   BP: 124/78   BP Location: Right arm   Patient Position: Sitting   Cuff Size: Adult Regular   Pulse: 80   Resp: 20   Temp: 97.6  F (36.4  C)   TempSrc: Tympanic   SpO2: 99%   Weight: 71.2 kg (157 lb)   Height: 1.727 m (5' 8\")     Body mass index is 23.87 kg/m .  Vitals were reviewed   Physical Exam  GENERAL APPEARANCE: alert, comfortable appearing  EYES: Eyes grossly normal to inspection  HENT:  nose no rhinorrhea  RESP: lungs clear to auscultation - no rales, rhonchi or wheezes and no resp distress  CV: regular rates and rhythm, normal S1 S2,no murmur   ABDOMEN:soft, non-tender, non-distended, no hepatosplenomegaly or masses  SKIN: no rashes, no jaundice, no obvious masses.   NEURO:  no tremor  MENTAL STATUS EXAM:  Appearance/Behavior:No apparent distress  Speech: Normal  Mood/Affect: normal affect  Insight: Fair      Results:    Results from the last 24 hours  Results for orders placed or performed in visit on 09/09/21 (from the past 24 hour(s))   Urine Drugs of Abuse Screen (Tox13)   Result Value Ref Range    Cannabinoids (74-sfe-8-carboxy-9-THC) Not Detected Not Detected, Indeterminate    Phencyclidine Not Detected Not Detected, Indeterminate    Cocaine (Benzoylecgonine) Not Detected Not Detected, Indeterminate    Methamphetamine (d-Methamphetamine) Not Detected Not Detected, Indeterminate    Opiates (Morphine) Not Detected Not Detected, Indeterminate    Amphetamine (d-Amphetamine) Not Detected Not Detected, Indeterminate    Benzodiazepines " (Nordiazepam) Not Detected Not Detected, Indeterminate    Tricyclic Antidepressants (Desipramine) Not Detected Not Detected, Indeterminate    Methadone Not Detected Not Detected, Indeterminate    Barbiturates (Butalbital) Not Detected Not Detected, Indeterminate    Oxycodone Not Detected Not Detected, Indeterminate    Propoxyphene (Norpropoxyphene) Not Detected Not Detected, Indeterminate    Buprenorphine Detected (A) Not Detected, Indeterminate       Assessment and Plan     Was naloxone nasal spray prescribed? Yes.  Prior.  (F11.99) Opioid use disorder (H)  (primary encounter diagnosis)  Comment: stable.  Plan: buprenorphine HCl-naloxone HCl (SUBOXONE) 4-1         MG per film           (F11.11) Opioid abuse, in remission (H)  Plan: Urine Drugs of Abuse Screen (Tox13)              Dosage will not need adjustment today.  Continue at 4/1 mg  2 time(s) a day of  buprenorphine/naloxone (Suboxone).     Follow up Plan  Stage 4 (2 months): Please make a clinic appointment for follow up with me (ABHILASH MAGAÑA) or another Suboxone provider in 2 months for suboxone follow up.    Greater than 10 minutes was spent with this patient, over half of which was on counseling and coordination of care which includes importance of recovery activities,which may include  attendance at NA/AA meetings, sober support network, and the importance of not isolating, being open, honest, and willing to change, possible triggers and how to avoid them, and managing cravings.    Medications Discontinued During This Encounter   Medication Reason     NONFORMULARY Stopped by Patient       Options for treatment and follow-up care were reviewed with the patient. Jorge A engaged in the decision making process and verbalized understanding of the options discussed and agreed with the final plan.    I saw and examined this patient.  I have read through the note and made appropriate changes and agree with the RN Care Coordinator's assessment and plan.      Regina Hobbs MD

## 2021-09-09 ENCOUNTER — APPOINTMENT (OUTPATIENT)
Dept: LAB | Facility: OTHER | Age: 66
End: 2021-09-09
Attending: FAMILY MEDICINE
Payer: MEDICARE

## 2021-09-09 ENCOUNTER — PATIENT OUTREACH (OUTPATIENT)
Dept: CARE COORDINATION | Facility: OTHER | Age: 66
End: 2021-09-09

## 2021-09-09 ENCOUNTER — OFFICE VISIT (OUTPATIENT)
Dept: FAMILY MEDICINE | Facility: OTHER | Age: 66
End: 2021-09-09
Attending: FAMILY MEDICINE
Payer: MEDICARE

## 2021-09-09 VITALS
DIASTOLIC BLOOD PRESSURE: 78 MMHG | HEIGHT: 68 IN | TEMPERATURE: 97.6 F | SYSTOLIC BLOOD PRESSURE: 124 MMHG | OXYGEN SATURATION: 99 % | WEIGHT: 157 LBS | HEART RATE: 80 BPM | RESPIRATION RATE: 20 BRPM | BODY MASS INDEX: 23.79 KG/M2

## 2021-09-09 DIAGNOSIS — F11.11 OPIOID ABUSE, IN REMISSION (H): ICD-10-CM

## 2021-09-09 DIAGNOSIS — F11.90 OPIOID USE DISORDER: Primary | ICD-10-CM

## 2021-09-09 LAB
AMPHETAMINES UR QL: NOT DETECTED
BARBITURATES UR QL SCN: NOT DETECTED
BENZODIAZ UR QL SCN: NOT DETECTED
BUPRENORPHINE UR QL: DETECTED
CANNABINOIDS UR QL: NOT DETECTED
COCAINE UR QL SCN: NOT DETECTED
D-METHAMPHET UR QL: NOT DETECTED
METHADONE UR QL SCN: NOT DETECTED
OPIATES UR QL SCN: NOT DETECTED
OXYCODONE UR QL SCN: NOT DETECTED
PCP UR QL SCN: NOT DETECTED
PROPOXYPH UR QL: NOT DETECTED
TRICYCLICS UR QL SCN: NOT DETECTED

## 2021-09-09 PROCEDURE — 80306 DRUG TEST PRSMV INSTRMNT: CPT | Mod: ZL | Performed by: FAMILY MEDICINE

## 2021-09-09 PROCEDURE — 99213 OFFICE O/P EST LOW 20 MIN: CPT | Performed by: FAMILY MEDICINE

## 2021-09-09 PROCEDURE — G0463 HOSPITAL OUTPT CLINIC VISIT: HCPCS

## 2021-09-09 RX ORDER — BUPRENORPHINE AND NALOXONE 4; 1 MG/1; MG/1
1 FILM, SOLUBLE BUCCAL; SUBLINGUAL 2 TIMES DAILY
Qty: 56 EACH | Refills: 1 | Status: SHIPPED | OUTPATIENT
Start: 2021-09-09 | End: 2021-11-04

## 2021-09-09 ASSESSMENT — MIFFLIN-ST. JEOR: SCORE: 1461.65

## 2021-09-09 ASSESSMENT — PAIN SCALES - GENERAL: PAINLEVEL: MODERATE PAIN (4)

## 2021-09-09 NOTE — NURSING NOTE
"Chief Complaint   Patient presents with     Other     opiod use disorder       Initial /78 (BP Location: Right arm, Patient Position: Sitting, Cuff Size: Adult Regular)   Pulse 80   Temp 97.6  F (36.4  C) (Tympanic)   Resp 20   Ht 1.727 m (5' 8\")   Wt 71.2 kg (157 lb)   SpO2 99%   BMI 23.87 kg/m   Estimated body mass index is 23.87 kg/m  as calculated from the following:    Height as of this encounter: 1.727 m (5' 8\").    Weight as of this encounter: 71.2 kg (157 lb).  Medication Reconciliation: complete  Raina Saldaña LPN    "

## 2021-09-09 NOTE — PROGRESS NOTES
Clinic Care Coordination Contact  Care Team Conversations    CC attended office visit with pt and Dr. Hicks this date.  Please refer to Dr. Hicks's note for plan of care.  Doing well in recovery.  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  Has been going fishing.  Hunting season soon.  Able to get out and do things he enjoys.   No questions or concerns.  Encouraged him to call CC with any questions/concerns/problems.  Verbalized understanding.    Margot Joel RN-BSN, [facility] Care Coordinator  [phone] opt. #3

## 2021-10-03 ENCOUNTER — HEALTH MAINTENANCE LETTER (OUTPATIENT)
Age: 66
End: 2021-10-03

## 2021-11-03 NOTE — PROGRESS NOTES
Follow-up Buprenorphine Visit           HPI       Jorge A Mendosa is here for f/u on buprenophine/naloxone (Suboxone) therapy for opioid use disorder.  Recheck Medication      Jorge A is currently taking 4/1 mg of Buprenorphine/Naloxone 2 times daily.     Status since last visit:    Since last visit patient has been: doing well.     Intensity:     There has been: mild craving    Suboxone Dose: adequate.      When did you take your last dose? today  Progression of Symptoms:     Cues to use and relapse none    Recovery program has been: solid.   Accompanying Signs & Symptoms:    Side Effects: none.    Sobriety:     Status: no use since last visit.      Drug Screen: obtained.    Precipitating factors:    Triggers have been: non-existent.   Alleviating factors:    Contact with sponsor has been: no sponsor.     Family and support system has been: helpful.   Other Therapies Tried :     Patient has been going to recovery meetings:not at all.      Other medical concerns: No    Any ED visits? No     Spending time with family.  Opener hunting this weekend.  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    Needs refills of Prilosec.    Declines flu shot.      History   Smoking Status     Current Every Day Smoker     Packs/day: 0.25     Years: 30.00     Types: Cigarettes     Start date: 1/1/1976   Smokeless Tobacco     Never Used     Comment: only smokes a little bit-noted 2-       Problem, Medication and Allergy Lists were reviewed and updated if needed.  reviewed and are current..    Pharmacy Database reviewed? Yes, 11.4.21, as expected.  Last fill 10.5.21 #56.    Patient is an established patient of this clinic..         Review of Systems:   Review of Systems  CONSTITUTIONAL: NEGATIVE for fever, chills, change in weight  INTEGUMENTARY/SKIN: NEGATIVE for worrisome rashes, moles or lesions  EYES: NEGATIVE for vision changes or irritation  ENT/MOUTH: NEGATIVE for ear, mouth and throat problems  RESP: NEGATIVE for significant  "cough or SOB  CV: NEGATIVE for chest pain, palpitations or peripheral edema  GI: NEGATIVE for nausea, abdominal pain, heartburn, or change in bowel habits  : NEGATIVE for frequency, dysuria, or hematuria  MUSCULOSKELETAL: NEGATIVE for significant arthralgias or myalgia  NEURO: NEGATIVE for weakness, dizziness or paresthesias  ENDOCRINE: NEGATIVE for temperature intolerance, skin/hair changes  PSYCHIATRIC: NEGATIVE for changes in mood or affect          Physical Exam:     Vitals:    11/04/21 1056   BP: 126/72   Pulse: 74   Resp: 20   Temp: 98.1  F (36.7  C)   TempSrc: Tympanic   SpO2: 98%   Weight: 73.9 kg (163 lb)   Height: 1.727 m (5' 8\")     Body mass index is 24.78 kg/m .  Vitals were reviewed   Physical Exam  GENERAL APPEARANCE: alert, comfortable appearing  EYES: Eyes grossly normal to inspection  HENT:  nose no rhinorrhea  RESP: lungs clear to auscultation - no rales, rhonchi or wheezes and no resp distress  CV: regular rates and rhythm, normal S1 S2,no murmur   ABDOMEN:soft, non-tender, non-distended, no hepatosplenomegaly or masses  SKIN: no rashes, no jaundice, no obvious masses.   NEURO:  no tremor  MENTAL STATUS EXAM:  Appearance/Behavior:No apparent distress  Speech: Normal  Mood/Affect: normal affect  Insight: Fair      Results:    Results from the last 24 hours  Results for orders placed or performed in visit on 11/04/21 (from the past 24 hour(s))   Urine Drugs of Abuse Screen (Tox13)   Result Value Ref Range    Cannabinoids (04-swp-0-carboxy-9-THC) Not Detected Not Detected, Indeterminate    Phencyclidine Not Detected Not Detected, Indeterminate    Cocaine (Benzoylecgonine) Not Detected Not Detected, Indeterminate    Methamphetamine (d-Methamphetamine) Not Detected Not Detected, Indeterminate    Opiates (Morphine) Not Detected Not Detected, Indeterminate    Amphetamine (d-Amphetamine) Not Detected Not Detected, Indeterminate    Benzodiazepines (Nordiazepam) Not Detected Not Detected, Indeterminate "    Tricyclic Antidepressants (Desipramine) Not Detected Not Detected, Indeterminate    Methadone Not Detected Not Detected, Indeterminate    Barbiturates (Butalbital) Not Detected Not Detected, Indeterminate    Oxycodone Not Detected Not Detected, Indeterminate    Propoxyphene (Norpropoxyphene) Not Detected Not Detected, Indeterminate    Buprenorphine Detected (A) Not Detected, Indeterminate       Assessment and Plan     Was naloxone nasal spray prescribed? Yes - previously.  (F11.90) Opioid use disorder  (primary encounter diagnosis)  Comment: doing well.  Plan: buprenorphine HCl-naloxone HCl (SUBOXONE) 4-1         MG per film            (F11.11) Opioid abuse, in remission (H)  Comment: stable  Plan: Urine Drugs of Abuse Screen (Tox13)            (R10.13) Abdominal pain, epigastric  Comment: reflux -   Plan: omeprazole (PRILOSEC) 40 MG DR capsule              Dosage will not need adjustment today.  Continue at 4 mg  2 time(s) a day of  buprenorphine/naloxone (Suboxone).     Follow up Plan  8 weeks    Greater than 10 minutes was spent with this patient, over half of which was on counseling and coordination of care which includes importance of recovery activities,which may include  attendance at NA/AA meetings, sober support network, and the importance of not isolating, being open, honest, and willing to change, possible triggers and how to avoid them, and managing cravings.    Medications Discontinued During This Encounter   Medication Reason     omeprazole (PRILOSEC) 40 MG DR capsule Reorder     buprenorphine HCl-naloxone HCl (SUBOXONE) 4-1 MG per film Reorder       Options for treatment and follow-up care were reviewed with the patient. Jorge A engaged in the decision making process and verbalized understanding of the options discussed and agreed with the final plan.    I saw and examined this patient.  I have read through the note and made appropriate changes and agree with the RN Care Coordinator's assessment and  plan.       Regina Hobbs MD

## 2021-11-04 ENCOUNTER — OFFICE VISIT (OUTPATIENT)
Dept: FAMILY MEDICINE | Facility: OTHER | Age: 66
End: 2021-11-04
Attending: FAMILY MEDICINE
Payer: MEDICARE

## 2021-11-04 ENCOUNTER — APPOINTMENT (OUTPATIENT)
Dept: LAB | Facility: OTHER | Age: 66
End: 2021-11-04
Payer: MEDICARE

## 2021-11-04 ENCOUNTER — PATIENT OUTREACH (OUTPATIENT)
Dept: CARE COORDINATION | Facility: OTHER | Age: 66
End: 2021-11-04

## 2021-11-04 VITALS
BODY MASS INDEX: 24.71 KG/M2 | HEIGHT: 68 IN | HEART RATE: 74 BPM | DIASTOLIC BLOOD PRESSURE: 72 MMHG | SYSTOLIC BLOOD PRESSURE: 126 MMHG | OXYGEN SATURATION: 98 % | TEMPERATURE: 98.1 F | RESPIRATION RATE: 20 BRPM | WEIGHT: 163 LBS

## 2021-11-04 DIAGNOSIS — R10.13 ABDOMINAL PAIN, EPIGASTRIC: ICD-10-CM

## 2021-11-04 DIAGNOSIS — F11.11 OPIOID ABUSE, IN REMISSION (H): ICD-10-CM

## 2021-11-04 DIAGNOSIS — F11.90 OPIOID USE DISORDER: Primary | ICD-10-CM

## 2021-11-04 PROCEDURE — 99213 OFFICE O/P EST LOW 20 MIN: CPT | Performed by: FAMILY MEDICINE

## 2021-11-04 PROCEDURE — G0463 HOSPITAL OUTPT CLINIC VISIT: HCPCS

## 2021-11-04 PROCEDURE — G0463 HOSPITAL OUTPT CLINIC VISIT: HCPCS | Mod: 25

## 2021-11-04 PROCEDURE — 80306 DRUG TEST PRSMV INSTRMNT: CPT | Mod: ZL | Performed by: FAMILY MEDICINE

## 2021-11-04 RX ORDER — BUPRENORPHINE AND NALOXONE 4; 1 MG/1; MG/1
1 FILM, SOLUBLE BUCCAL; SUBLINGUAL 2 TIMES DAILY
Qty: 56 EACH | Refills: 1 | Status: SHIPPED | OUTPATIENT
Start: 2021-11-04 | End: 2021-12-30

## 2021-11-04 RX ORDER — OMEPRAZOLE 40 MG/1
40 CAPSULE, DELAYED RELEASE ORAL DAILY
Qty: 90 CAPSULE | Refills: 3 | Status: SHIPPED | OUTPATIENT
Start: 2021-11-04 | End: 2022-07-14

## 2021-11-04 ASSESSMENT — MIFFLIN-ST. JEOR: SCORE: 1488.86

## 2021-11-04 ASSESSMENT — PAIN SCALES - GENERAL: PAINLEVEL: SEVERE PAIN (6)

## 2021-11-04 NOTE — PROGRESS NOTES
Clinic Care Coordination Contact  Care Team Conversations    CC attended office visit with pt and Dr. Hicks this date.  Please refer to Dr. Hicks's note for plan of care.   Doing well in recovery.  Current dose of Suboxone is adequate and wants to continue on this dose.   Does have minor triggers at times - people, places.  No desire to use.  All questions answered.  Encouraged him to call CC with any questions/concerns/problems.  Verbalized understanding.    Margot Joel RN-BSN, [facility] Care Coordinator  [phone] opt. #3

## 2021-11-04 NOTE — NURSING NOTE
"Chief Complaint   Patient presents with     Recheck Medication       Initial /72   Pulse 74   Temp 98.1  F (36.7  C) (Tympanic)   Resp 20   Ht 1.727 m (5' 8\")   Wt 73.9 kg (163 lb)   SpO2 98%   BMI 24.78 kg/m   Estimated body mass index is 24.78 kg/m  as calculated from the following:    Height as of this encounter: 1.727 m (5' 8\").    Weight as of this encounter: 73.9 kg (163 lb).  Medication Reconciliation: complete  Quita Grewal LPN    "

## 2021-12-22 NOTE — PROCEDURES
Jorge A Mendosa received radiation therapy treatment today 12/24/18.    Florecita Skelton  December 24, 2018  1:43 PM     No

## 2021-12-29 NOTE — PROGRESS NOTES
Follow-up Buprenorphine Visit           HPI       Jorge A Mendosa is here for f/u on buprenophine/naloxone (Suboxone) therapy for opioid use disorder.  Recheck Medication      Jorge A is currently taking 4/1 mg of Buprenorphine/Naloxone 2 times daily.     Status since last visit:    Since last visit patient has been: doing well.     Intensity:     There has been: no craving.      Suboxone Dose: adequate.      When did you take your last dose? today  Progression of Symptoms:     Cues to use and relapse none    Recovery program has been: solid.   Accompanying Signs & Symptoms:    Side Effects: none.    Sobriety:     Status: no use since last visit.      Drug Screen: obtained.    Precipitating factors:    Triggers have been: non-existent.   Alleviating factors:    Contact with sponsor has been: no sponsor.     Family and support system has been: helpful.   Other Therapies Tried :     Patient has been going to recovery meetings:not at all.      Other medical concerns: No    Any ED visits? No       History   Smoking Status     Current Every Day Smoker     Packs/day: 0.25     Years: 30.00     Types: Cigarettes     Start date: 1/1/1976   Smokeless Tobacco     Never Used     Comment: only smokes a little bit-noted 2-       Problem, Medication and Allergy Lists were reviewed and updated if needed.  reviewed and are current..    Pharmacy Database reviewed? Yes, 12.30.21, as expected.  Last fill 11.30.21 #56.    Patient is an established patient of this clinic..         Review of Systems:   Review of Systems  CONSTITUTIONAL: NEGATIVE for fever, chills, change in weight  INTEGUMENTARY/SKIN: NEGATIVE for worrisome rashes, moles or lesions  EYES: NEGATIVE for vision changes or irritation  ENT/MOUTH: NEGATIVE for ear, mouth and throat problems  RESP: NEGATIVE for significant cough or SOB  CV: NEGATIVE for chest pain, palpitations or peripheral edema  GI: NEGATIVE for nausea, abdominal pain, heartburn, or change in bowel  habits  : NEGATIVE for frequency, dysuria, or hematuria  MUSCULOSKELETAL: NEGATIVE for significant arthralgias or myalgia  NEURO: NEGATIVE for weakness, dizziness or paresthesias  ENDOCRINE: NEGATIVE for temperature intolerance, skin/hair changes  PSYCHIATRIC: NEGATIVE for changes in mood or affect          Physical Exam:     Vitals:    12/30/21 1101   BP: 130/70   Pulse: 69   Temp: 98.2  F (36.8  C)   SpO2: 97%   Weight: 76.2 kg (168 lb)     Body mass index is 25.54 kg/m .  Vitals were reviewed   Physical Exam  GENERAL APPEARANCE: alert, comfortable appearing  EYES: Eyes grossly normal to inspection  HENT:  nose no rhinorrhea  RESP: lungs clear to auscultation - no rales, rhonchi or wheezes and no resp distress  CV: regular rates and rhythm, normal S1 S2,no murmur   ABDOMEN:soft, non-tender, non-distended, no hepatosplenomegaly or masses  SKIN: no rashes, no jaundice, no obvious masses.   NEURO:  no tremor  MENTAL STATUS EXAM:  Appearance/Behavior:No apparent distress  Speech: Normal  Mood/Affect: normal affect  Insight: Adequate      Results:    Results from the last 24 hours  Results for orders placed or performed in visit on 12/30/21 (from the past 24 hour(s))   Urine Drugs of Abuse Screen (Tox13)   Result Value Ref Range    Cannabinoids (26-hgr-3-carboxy-9-THC) Not Detected Not Detected, Indeterminate    Phencyclidine Not Detected Not Detected, Indeterminate    Cocaine (Benzoylecgonine) Not Detected Not Detected, Indeterminate    Methamphetamine (d-Methamphetamine) Not Detected Not Detected, Indeterminate    Opiates (Morphine) Not Detected Not Detected, Indeterminate    Amphetamine (d-Amphetamine) Not Detected Not Detected, Indeterminate    Benzodiazepines (Nordiazepam) Not Detected Not Detected, Indeterminate    Tricyclic Antidepressants (Desipramine) Not Detected Not Detected, Indeterminate    Methadone Not Detected Not Detected, Indeterminate    Barbiturates (Butalbital) Not Detected Not Detected,  Indeterminate    Oxycodone Not Detected Not Detected, Indeterminate    Propoxyphene (Norpropoxyphene) Not Detected Not Detected, Indeterminate    Buprenorphine Detected (A) Not Detected, Indeterminate       Assessment and Plan     Was naloxone nasal spray prescribed? Yes.  Prior.  (F11.90) Opioid use disorder  (primary encounter diagnosis)  Comment: doing well  Plan: buprenorphine HCl-naloxone HCl (SUBOXONE) 4-1         MG per film          (F11.11) Opioid abuse, in remission (H)  Comment: as above  Plan: Urine Drugs of Abuse Screen (Tox13)          (F17.200) Tobacco use disorder  Comment: cessation counseling performed; CT screening offered    (Z71.6) Encounter for tobacco use cessation counseling  Comment: as above      Dosage will not need adjustment today.  Continue at 4 mg  2 time(s) a day of  buprenorphine/naloxone (Suboxone).     Will continue every 2 months until 4/2022 - 2 year fish.  Then can go to quarterly visits.  Labs due 7/2022.    Follow up Plan  Stage 4 (2 months): Please make a clinic appointment for follow up with me (REGINA MAGAÑA) or another Suboxone provider in 2 months for suboxone follow up.    Greater than 10 minutes was spent with this patient, over half of which was on counseling and coordination of care which includes importance of recovery activities,which may include  attendance at NA/AA meetings, sober support network, and the importance of not isolating, being open, honest, and willing to change, possible triggers and how to avoid them, and managing cravings.    There are no discontinued medications.    Options for treatment and follow-up care were reviewed with the patient. Jorge A engaged in the decision making process and verbalized understanding of the options discussed and agreed with the final plan.    I saw and examined this patient.  I have read through the note and made appropriate changes and agree with the RN Care Coordinator's assessment and plan.       Regina GENTILE  MD Faby

## 2021-12-30 ENCOUNTER — APPOINTMENT (OUTPATIENT)
Dept: LAB | Facility: OTHER | Age: 66
End: 2021-12-30
Attending: FAMILY MEDICINE
Payer: MEDICARE

## 2021-12-30 ENCOUNTER — PATIENT OUTREACH (OUTPATIENT)
Dept: CARE COORDINATION | Facility: OTHER | Age: 66
End: 2021-12-30

## 2021-12-30 ENCOUNTER — OFFICE VISIT (OUTPATIENT)
Dept: FAMILY MEDICINE | Facility: OTHER | Age: 66
End: 2021-12-30
Attending: FAMILY MEDICINE
Payer: MEDICARE

## 2021-12-30 VITALS
TEMPERATURE: 98.2 F | DIASTOLIC BLOOD PRESSURE: 70 MMHG | BODY MASS INDEX: 25.54 KG/M2 | SYSTOLIC BLOOD PRESSURE: 130 MMHG | HEART RATE: 69 BPM | OXYGEN SATURATION: 97 % | WEIGHT: 168 LBS

## 2021-12-30 DIAGNOSIS — F11.90 OPIOID USE DISORDER: Primary | ICD-10-CM

## 2021-12-30 DIAGNOSIS — F17.200 TOBACCO USE DISORDER: ICD-10-CM

## 2021-12-30 DIAGNOSIS — Z71.6 ENCOUNTER FOR TOBACCO USE CESSATION COUNSELING: ICD-10-CM

## 2021-12-30 DIAGNOSIS — F11.11 OPIOID ABUSE, IN REMISSION (H): ICD-10-CM

## 2021-12-30 PROCEDURE — 80306 DRUG TEST PRSMV INSTRMNT: CPT | Mod: ZL | Performed by: FAMILY MEDICINE

## 2021-12-30 PROCEDURE — G0463 HOSPITAL OUTPT CLINIC VISIT: HCPCS

## 2021-12-30 PROCEDURE — 99213 OFFICE O/P EST LOW 20 MIN: CPT | Performed by: FAMILY MEDICINE

## 2021-12-30 RX ORDER — BUPRENORPHINE AND NALOXONE 4; 1 MG/1; MG/1
1 FILM, SOLUBLE BUCCAL; SUBLINGUAL 2 TIMES DAILY
Qty: 56 EACH | Refills: 1 | Status: SHIPPED | OUTPATIENT
Start: 2021-12-30 | End: 2022-02-24

## 2021-12-30 ASSESSMENT — PAIN SCALES - GENERAL: PAINLEVEL: MODERATE PAIN (4)

## 2021-12-30 NOTE — PROGRESS NOTES
Clinic Care Coordination Contact  Care Team Conversations    CC attended office visit with pt and Dr. Hicks this date.  Please refer to Dr. Hicks's note for plan of care.   Doing well in recovery.  No thoughts or desire to use.  Suboxone dose is adequate and wants to continue.  Spending lots of time with family.  No questions or concerns this date.  Will follow up in 2 months.  Encouraged him to call CC with any questions/concerns/problems.  Verbalized understanding.    Margot Joel RN-BSN, [facility] Care Coordinator  [phone] opt. #3

## 2021-12-30 NOTE — PATIENT INSTRUCTIONS
Physical with Dr Hamlin advised.    Vaccines declined.    Smoking cessation advised.  Consider lung CT screening.

## 2021-12-30 NOTE — NURSING NOTE
"Chief Complaint   Patient presents with     Suboxone       Initial /70   Pulse 69   Temp 98.2  F (36.8  C)   Wt 76.2 kg (168 lb)   SpO2 97%   BMI 25.54 kg/m   Estimated body mass index is 25.54 kg/m  as calculated from the following:    Height as of 11/4/21: 1.727 m (5' 8\").    Weight as of this encounter: 76.2 kg (168 lb).  Medication Reconciliation: complete  Shimon Figueroa LPN  "

## 2022-02-23 NOTE — PROGRESS NOTES
Follow-up Buprenorphine Visit           HPI       Jorge A Mendosa is here for f/u on buprenophine/naloxone (Suboxone) therapy for opioid use disorder.  Recheck Medication      Jorge A is currently taking 4/1 mg of Buprenorphine/Naloxone 2 times daily.     Status since last visit:    Since last visit patient has been: doing well.     Intensity:     There has been: no craving.      Suboxone Dose: adequate.      When did you take your last dose? today  Progression of Symptoms:     Cues to use and relapse none    Recovery program has been: solid.   Accompanying Signs & Symptoms:    Side Effects: none.    Sobriety:     Status: no use since last visit.      Drug Screen: obtained.    Precipitating factors:    Triggers have been: non-existent.   Alleviating factors:    Contact with sponsor has been: no sponsor.     Family and support system has been: helpful.   Other Therapies Tried :     Patient has been going to recovery meetings:not at all.      Other medical concerns: No    Any ED visits? No     Looking forward to [place] [employer].    Some people still calling or running into him at the store - asking if he wants anything -he declines.        History   Smoking Status     Current Every Day Smoker     Packs/day: 0.25     Years: 30.00     Types: Cigarettes     Start date: 1/1/1976   Smokeless Tobacco     Never Used     Comment: only smokes a little bit-noted 2-       Problem, Medication and Allergy Lists were reviewed and updated if needed.  reviewed and are current..    Pharmacy Database reviewed? Yes, 2.24.22, as expected.  Last fill 1.24.22 #56.    Patient is an established patient of this clinic..         Review of Systems:   Review of Systems  CONSTITUTIONAL: NEGATIVE for fever, chills, change in weight  INTEGUMENTARY/SKIN: NEGATIVE for worrisome rashes, moles or lesions  EYES: NEGATIVE for vision changes or irritation  ENT/MOUTH: NEGATIVE for ear, mouth and throat problems  RESP: NEGATIVE for  "significant cough or SOB  CV: NEGATIVE for chest pain, palpitations or peripheral edema  GI: NEGATIVE for nausea, abdominal pain, heartburn, or change in bowel habits  : NEGATIVE for frequency, dysuria, or hematuria  MUSCULOSKELETAL: NEGATIVE for significant arthralgias or myalgia  NEURO: NEGATIVE for weakness, dizziness or paresthesias  ENDOCRINE: NEGATIVE for temperature intolerance, skin/hair changes  PSYCHIATRIC: NEGATIVE for changes in mood or affect          Physical Exam:     Vitals:    02/24/22 1120 02/24/22 1124   BP: (!) 152/72 (!) 140/80   BP Location: Left arm Left arm   Patient Position: Chair Chair   Cuff Size: Adult Regular Adult Regular   Pulse: 67    Temp: 98.7  F (37.1  C)    TempSrc: Tympanic    SpO2: 97%    Weight: 75.8 kg (167 lb)    Height: 1.727 m (5' 8\")      Body mass index is 25.39 kg/m .  Vitals were reviewed   Physical Exam  GENERAL APPEARANCE: alert, comfortable appearing  EYES: Eyes grossly normal to inspection  HENT:  nose no rhinorrhea  RESP: lungs clear to auscultation - no rales, rhonchi or wheezes and no resp distress  CV: regular rates and rhythm, normal S1 S2,no murmur   ABDOMEN:soft, non-tender, non-distended, no hepatosplenomegaly or masses  SKIN: no rashes, no jaundice, no obvious masses.   NEURO:  no tremor  MENTAL STATUS EXAM:  Appearance/Behavior:No apparent distress  Speech: Normal  Mood/Affect: normal affect  Insight: Fair      Results:    Results from the last 24 hours  Results for orders placed or performed in visit on 02/24/22 (from the past 24 hour(s))   Urine Drugs of Abuse Screen (Tox13)   Result Value Ref Range    Cannabinoids (45-cyg-2-carboxy-9-THC) Not Detected Not Detected, Indeterminate    Phencyclidine Not Detected Not Detected, Indeterminate    Cocaine (Benzoylecgonine) Not Detected Not Detected, Indeterminate    Methamphetamine (d-Methamphetamine) Not Detected Not Detected, Indeterminate    Opiates (Morphine) Not Detected Not Detected, Indeterminate    " Amphetamine (d-Amphetamine) Not Detected Not Detected, Indeterminate    Benzodiazepines (Nordiazepam) Not Detected Not Detected, Indeterminate    Tricyclic Antidepressants (Desipramine) Not Detected Not Detected, Indeterminate    Methadone Not Detected Not Detected, Indeterminate    Barbiturates (Butalbital) Not Detected Not Detected, Indeterminate    Oxycodone Not Detected Not Detected, Indeterminate    Propoxyphene (Norpropoxyphene) Not Detected Not Detected, Indeterminate    Buprenorphine Detected (A) Not Detected, Indeterminate       Assessment and Plan     Was naloxone nasal spray prescribed? Yes.  (F11.11) Opioid abuse, in remission (H)  (primary encounter diagnosis)  Comment: doing well overall; discussed lengthening to every 3 months - but patient prefers to keep as is; helps to keep him accountable; on track/routine  Plan: Urine Drugs of Abuse Screen (Tox13)            (F11.90) Opioid use disorder  Comment: as abov  Plan: buprenorphine HCl-naloxone HCl (SUBOXONE) 4-1         MG per film              Dosage will not need adjustment today.  Continue at 4/1 mg  2 time(s) a day of  buprenorphine/naloxone (Suboxone).     Follow up Plan  Stage 4 (2 months): Please make a clinic appointment for follow up with me (ABHILASH MAGAÑA) or another Suboxone provider in 2 months for suboxone follow up.    Greater than 10 minutes was spent with this patient, over half of which was on counseling and coordination of care which includes importance of recovery activities,which may include  attendance at NA/AA meetings, sober support network, and the importance of not isolating, being open, honest, and willing to change, possible triggers and how to avoid them, and managing cravings.    Medications Discontinued During This Encounter   Medication Reason     buprenorphine HCl-naloxone HCl (SUBOXONE) 4-1 MG per film Reorder       Options for treatment and follow-up care were reviewed with the patient. Jorge A engaged in the  decision making process and verbalized understanding of the options discussed and agreed with the final plan.    I saw and examined this patient.  I have read through the note and made appropriate changes and agree with the RN Care Coordinator's assessment and plan.       Regina Hobbs MD

## 2022-02-24 ENCOUNTER — PATIENT OUTREACH (OUTPATIENT)
Dept: CARE COORDINATION | Facility: OTHER | Age: 67
End: 2022-02-24
Payer: MEDICARE

## 2022-02-24 ENCOUNTER — OFFICE VISIT (OUTPATIENT)
Dept: FAMILY MEDICINE | Facility: OTHER | Age: 67
End: 2022-02-24
Attending: FAMILY MEDICINE
Payer: MEDICARE

## 2022-02-24 ENCOUNTER — APPOINTMENT (OUTPATIENT)
Dept: LAB | Facility: OTHER | Age: 67
End: 2022-02-24
Attending: FAMILY MEDICINE
Payer: MEDICARE

## 2022-02-24 VITALS
DIASTOLIC BLOOD PRESSURE: 80 MMHG | OXYGEN SATURATION: 97 % | SYSTOLIC BLOOD PRESSURE: 140 MMHG | HEART RATE: 67 BPM | TEMPERATURE: 98.7 F | BODY MASS INDEX: 25.31 KG/M2 | HEIGHT: 68 IN | WEIGHT: 167 LBS

## 2022-02-24 DIAGNOSIS — F11.90 OPIOID USE DISORDER: ICD-10-CM

## 2022-02-24 DIAGNOSIS — F11.11 OPIOID ABUSE, IN REMISSION (H): Primary | ICD-10-CM

## 2022-02-24 PROCEDURE — 80306 DRUG TEST PRSMV INSTRMNT: CPT | Mod: ZL | Performed by: FAMILY MEDICINE

## 2022-02-24 PROCEDURE — 99213 OFFICE O/P EST LOW 20 MIN: CPT | Performed by: FAMILY MEDICINE

## 2022-02-24 PROCEDURE — G0463 HOSPITAL OUTPT CLINIC VISIT: HCPCS | Performed by: FAMILY MEDICINE

## 2022-02-24 RX ORDER — BUPRENORPHINE AND NALOXONE 4; 1 MG/1; MG/1
1 FILM, SOLUBLE BUCCAL; SUBLINGUAL 2 TIMES DAILY
Qty: 56 EACH | Refills: 1 | Status: SHIPPED | OUTPATIENT
Start: 2022-02-24 | End: 2022-04-20

## 2022-02-24 ASSESSMENT — PAIN SCALES - GENERAL: PAINLEVEL: MODERATE PAIN (5)

## 2022-02-24 NOTE — NURSING NOTE
"Chief Complaint   Patient presents with     suboxone       Initial There were no vitals taken for this visit. Estimated body mass index is 25.54 kg/m  as calculated from the following:    Height as of 11/4/21: 1.727 m (5' 8\").    Weight as of 12/30/21: 76.2 kg (168 lb).  Medication Reconciliation: complete  Radha Arriola LPN  "

## 2022-02-24 NOTE — PROGRESS NOTES
Clinic Care Coordination Contact  Care Team Conversations    CC attended office visit with pt and Dr. Hicks this date.  Please refer to Dr. Hicks's note for plan of care.  Doing well in recovery.  Suboxone dose is adequate and wants to continue on current dose.   No thoughts or desire to use.  Looking forward to summer fishing.  No questions or concerns this date.  Encouraged him to call CC with any questions/concerns/problems.  Verbalized understanding.     Margot Joel RN-BSN, [facility] Care Coordinator  [phone] opt. #3

## 2022-04-20 ENCOUNTER — PATIENT OUTREACH (OUTPATIENT)
Dept: CARE COORDINATION | Facility: OTHER | Age: 67
End: 2022-04-20
Payer: MEDICARE

## 2022-04-20 DIAGNOSIS — F11.90 OPIOID USE DISORDER: ICD-10-CM

## 2022-04-20 RX ORDER — BUPRENORPHINE AND NALOXONE 4; 1 MG/1; MG/1
1 FILM, SOLUBLE BUCCAL; SUBLINGUAL 2 TIMES DAILY
Qty: 56 EACH | Refills: 0 | Status: SHIPPED | OUTPATIENT
Start: 2022-04-20 | End: 2022-05-19

## 2022-04-20 NOTE — PROGRESS NOTES
"Clinic Care Coordination Contact  Care Team Conversations    Received a phone call from Jorge A this date.  He does not have a ride tomorrow to his appointment.  His regular  cancelled.  He called his \"back up\"  and he is unable to give him ride either.  Is open to making another appointment in the next 1 -2 weeks, but will need his prescription.  States his daughter works until 5PM tonight and couldn't  his RX this date.  RX pended in this encounter.  Please advise on when you would like to see him next - has been stable for 2 years now.   Did inform Jorge A to call [facility] after 5PM to see if the script was sent.  Also informed him that CC would call him tomorrow to reschedule his appointment.  He was happy and agreeable with this.    Last visit 2.24.22.  Last fill 3.21.22 #56.    Margot Joel RN-BSN, [facility] Care Coordinator  [phone] opt. #3             "

## 2022-04-21 NOTE — PROGRESS NOTES
Jorge A notified of new appt date and time - May 19, arrive at 11:00AM.  He was also able to get his prescription yesterday.  He was very grateful.    Margot Joel, RN-BSN, [facility] Care Coordinator  [phone] opt. #3

## 2022-05-14 ENCOUNTER — HEALTH MAINTENANCE LETTER (OUTPATIENT)
Age: 67
End: 2022-05-14

## 2022-05-18 NOTE — PROGRESS NOTES
Follow-up Buprenorphine Visit           HPI       Jorge A Mendosa is here for f/u on buprenophine/naloxone (Suboxone) therapy for opioid use disorder.  RECHECK      Jorge A is currently taking 4/1 mg of Buprenorphine/Naloxone 2 times daily.     Will be due for annual labs in July.  Made follow up appt July 14, arrive at 11:00AM    Status since last visit:    Since last visit patient has been: doing well.     Intensity:     There has been: occasional craving.      Suboxone Dose: adequate.      When did you take your last dose? today  Progression of Symptoms:     Cues to use and relapse none    Recovery program has been: solid.   Accompanying Signs & Symptoms:    Side Effects: none.    Sobriety:     Status: no use since last visit.      Drug Screen: obtained.    Precipitating factors:    Triggers have been: non-existent.   Alleviating factors:    Contact with sponsor has been: no sponsor.     Family and support system has been: helpful.   Other Therapies Tried :     Patient has been going to recovery meetings:not at all.      Other medical concerns: No    Any ED visits? No     [place] - caught limit.  With friends.  Going to [employer] this weekend.    History   Smoking Status     Current Every Day Smoker     Packs/day: 0.25     Years: 30.00     Types: Cigarettes     Start date: 1/1/1976   Smokeless Tobacco     Never Used     Comment: only smokes a little bit-noted 2-       Problem, Medication and Allergy Lists were reviewed and updated if needed.  reviewed and are current..    Pharmacy Database reviewed? Yes, 5.19.22, as expected.  Last fill 4.20.22 #56.    Patient is an established patient of this clinic..         Review of Systems:   Review of Systems  CONSTITUTIONAL: NEGATIVE for fever, chills, change in weight  INTEGUMENTARY/SKIN: NEGATIVE for worrisome rashes, moles or lesions  EYES: NEGATIVE for vision changes or irritation  ENT/MOUTH: NEGATIVE for ear, mouth and throat problems  RESP:  NEGATIVE for significant cough or SOB  CV: NEGATIVE for chest pain, palpitations or peripheral edema  GI: NEGATIVE for nausea, abdominal pain, heartburn, or change in bowel habits  : NEGATIVE for frequency, dysuria, or hematuria  MUSCULOSKELETAL: NEGATIVE for significant arthralgias or myalgia  NEURO: NEGATIVE for weakness, dizziness or paresthesias  ENDOCRINE: NEGATIVE for temperature intolerance, skin/hair changes  PSYCHIATRIC: NEGATIVE for changes in mood or affect          Physical Exam:     Vitals:    05/19/22 1057   BP: 138/84   BP Location: Right arm   Patient Position: Chair   Cuff Size: Adult Regular   Pulse: 88   Resp: 18   Temp: 98.8  F (37.1  C)   TempSrc: Tympanic   SpO2: 97%   Weight: 76.7 kg (169 lb)     Body mass index is 25.7 kg/m .  Vitals were reviewed   Physical Exam  GENERAL APPEARANCE: alert, comfortable appearing  EYES: Eyes grossly normal to inspection  HENT:  nose no rhinorrhea  RESP: lungs clear to auscultation - no rales, rhonchi or wheezes and no resp distress  CV: regular rates and rhythm, normal S1 S2,no murmur   ABDOMEN:soft, non-tender, non-distended, no hepatosplenomegaly or masses  SKIN: no rashes, no jaundice, no obvious masses.   NEURO:  no tremor  MENTAL STATUS EXAM:  Appearance/Behavior:No apparent distress  Speech: Normal  Mood/Affect: normal affect  Insight: Adequate      Results:    Results from the last 24 hours  Results for orders placed or performed in visit on 05/19/22 (from the past 24 hour(s))   Urine Drugs of Abuse Screen (Tox13)   Result Value Ref Range    Cannabinoids (35-zbt-3-carboxy-9-THC) Not Detected Not Detected, Indeterminate    Phencyclidine Not Detected Not Detected, Indeterminate    Cocaine (Benzoylecgonine) Not Detected Not Detected, Indeterminate    Methamphetamine (d-Methamphetamine) Not Detected Not Detected, Indeterminate    Opiates (Morphine) Not Detected Not Detected, Indeterminate    Amphetamine (d-Amphetamine) Not Detected Not Detected,  Indeterminate    Benzodiazepines (Nordiazepam) Not Detected Not Detected, Indeterminate    Tricyclic Antidepressants (Desipramine) Not Detected Not Detected, Indeterminate    Methadone Not Detected Not Detected, Indeterminate    Barbiturates (Butalbital) Not Detected Not Detected, Indeterminate    Oxycodone Not Detected Not Detected, Indeterminate    Propoxyphene (Norpropoxyphene) Not Detected Not Detected, Indeterminate    Buprenorphine Detected (A) Not Detected, Indeterminate       Assessment and Plan     Was naloxone nasal spray prescribed? Yes - previously.  (F11.11) Opioid abuse, in remission (H)  (primary encounter diagnosis)  Comment: doing well; no concerns; desires to continue  Plan: Urine Drugs of Abuse Screen (Tox13), CBC with         platelets and differential, Comprehensive         metabolic panel (BMP + Alb, Alk Phos, ALT, AST,        Total. Bili, TP)        Annual labs due next visit.  Will come fasting and update lipids, psa, at same time.    (F11.90) Opioid use disorder  Plan: buprenorphine HCl-naloxone HCl (SUBOXONE) 4-1         MG per film    (Z12.5) Screening for prostate cancer  Plan: PSA, screen    (Z13.220) Lipid screening  Plan: Lipid Profile (Chol, Trig, HDL, LDL calc)        Dosage will not need adjustment today.  Continue at 4 mg  2 time(s) a day of  buprenorphine/naloxone (Suboxone).     Follow up Plan  Stage 4 (2 months): Please make a clinic appointment for follow up with me (ABHILASH MAGAÑA) or another Suboxone provider in 2 months for suboxone follow up.    Greater than 15 minutes was spent with this patient, over half of which was on counseling and coordination of care which includes importance of recovery activities,which may include  attendance at NA/AA meetings, sober support network, and the importance of not isolating, being open, honest, and willing to change, possible triggers and how to avoid them, and managing cravings.    Medications Discontinued During This Encounter    Medication Reason     buprenorphine HCl-naloxone HCl (SUBOXONE) 4-1 MG per film Reorder       Options for treatment and follow-up care were reviewed with the patient. Jorge A engaged in the decision making process and verbalized understanding of the options discussed and agreed with the final plan.    I saw and examined this patient.  I have read through the note and made appropriate changes and agree with the RN Care Coordinator's assessment and plan.     Regina Hobbs MD

## 2022-05-19 ENCOUNTER — OFFICE VISIT (OUTPATIENT)
Dept: FAMILY MEDICINE | Facility: OTHER | Age: 67
End: 2022-05-19
Attending: FAMILY MEDICINE
Payer: MEDICARE

## 2022-05-19 ENCOUNTER — APPOINTMENT (OUTPATIENT)
Dept: LAB | Facility: OTHER | Age: 67
End: 2022-05-19
Attending: FAMILY MEDICINE
Payer: MEDICARE

## 2022-05-19 VITALS
RESPIRATION RATE: 18 BRPM | BODY MASS INDEX: 25.7 KG/M2 | SYSTOLIC BLOOD PRESSURE: 138 MMHG | WEIGHT: 169 LBS | OXYGEN SATURATION: 97 % | HEART RATE: 88 BPM | DIASTOLIC BLOOD PRESSURE: 84 MMHG | TEMPERATURE: 98.8 F

## 2022-05-19 DIAGNOSIS — Z12.5 SCREENING FOR PROSTATE CANCER: ICD-10-CM

## 2022-05-19 DIAGNOSIS — F11.11 OPIOID ABUSE, IN REMISSION (H): Primary | ICD-10-CM

## 2022-05-19 DIAGNOSIS — Z13.220 LIPID SCREENING: ICD-10-CM

## 2022-05-19 DIAGNOSIS — F11.90 OPIOID USE DISORDER: ICD-10-CM

## 2022-05-19 PROCEDURE — 80306 DRUG TEST PRSMV INSTRMNT: CPT | Mod: ZL | Performed by: FAMILY MEDICINE

## 2022-05-19 PROCEDURE — G0463 HOSPITAL OUTPT CLINIC VISIT: HCPCS

## 2022-05-19 PROCEDURE — 99213 OFFICE O/P EST LOW 20 MIN: CPT | Performed by: FAMILY MEDICINE

## 2022-05-19 RX ORDER — BUPRENORPHINE AND NALOXONE 4; 1 MG/1; MG/1
1 FILM, SOLUBLE BUCCAL; SUBLINGUAL 2 TIMES DAILY
Qty: 56 EACH | Refills: 1 | Status: SHIPPED | OUTPATIENT
Start: 2022-05-19 | End: 2022-07-14

## 2022-05-19 ASSESSMENT — PAIN SCALES - GENERAL: PAINLEVEL: MODERATE PAIN (5)

## 2022-05-19 NOTE — PATIENT INSTRUCTIONS
Follow up as scheduled.  Plan for annual labs at that time.  Fasting - water and black coffee and medications only.

## 2022-05-19 NOTE — NURSING NOTE
"Chief Complaint   Patient presents with     RECHECK       Initial /84 (BP Location: Right arm, Patient Position: Chair, Cuff Size: Adult Regular)   Pulse 88   Temp 98.8  F (37.1  C) (Tympanic)   Resp 18   Wt 76.7 kg (169 lb)   SpO2 97%   BMI 25.70 kg/m   Estimated body mass index is 25.7 kg/m  as calculated from the following:    Height as of 2/24/22: 1.727 m (5' 8\").    Weight as of this encounter: 76.7 kg (169 lb).  Medication Reconciliation: complete  Delisa Lepe LPN    "

## 2022-07-13 NOTE — PROGRESS NOTES
Follow-up Buprenorphine Visit           HPI       Jorge A Mendosa is here for f/u on buprenophine/naloxone (Suboxone) therapy for opioid use disorder.  Recheck Medication      Jorge A is currently taking 4/1mg of Buprenorphine/Naloxone 2 times daily.     Status since last visit:    Since last visit patient has been: doing well.     Intensity:     There has been: no craving.      Suboxone Dose: adequate.      When did you take your last dose? today  Progression of Symptoms:     Cues to use and relapse -none    Recovery program has been: solid.   Accompanying Signs & Symptoms:    Side Effects: none.    Sobriety:     Status: no use since last visit.      Drug Screen: obtained.    Precipitating factors:    Triggers have been: non-existent.   Alleviating factors:    Contact with sponsor has been: no sponsor.     Family and support system has been: helpful.   Other Therapies Tried :     Patient has been going to recovery meetings:not at all.      Other medical concerns: No    Any ED visits? No     Updated annual labs this date.   Has been in our program for over 2 years now     3 grandkids - Chelsi 2 ang 5 piper 8.  Daughter passed NP boards.  Working in [employer].      History   Smoking Status     Current Every Day Smoker     Packs/day: 0.25     Years: 30.00     Types: Cigarettes     Start date: 1/1/1976   Smokeless Tobacco     Never Used     Comment: only smokes a little bit-noted 2-       Problem, Medication and Allergy Lists were reviewed and updated if needed.  reviewed and are current..    Pharmacy Database reviewed? Yes, 7.14.22, as expected.  Last fill 6.14.22 #56.    Patient is an established patient of this clinic..         Review of Systems:   Review of Systems  CONSTITUTIONAL: NEGATIVE for fever, chills, change in weight  INTEGUMENTARY/SKIN: NEGATIVE for worrisome rashes, moles or lesions  EYES: NEGATIVE for vision changes or irritation  ENT/MOUTH: NEGATIVE for ear, mouth and throat problems  RESP:  "NEGATIVE for significant cough or SOB  CV: NEGATIVE for chest pain, palpitations or peripheral edema  GI: NEGATIVE for nausea, abdominal pain, heartburn, or change in bowel habits  : NEGATIVE for frequency, dysuria, or hematuria  MUSCULOSKELETAL: NEGATIVE for significant arthralgias or myalgia  NEURO: NEGATIVE for weakness, dizziness or paresthesias  ENDOCRINE: NEGATIVE for temperature intolerance, skin/hair changes  PSYCHIATRIC: NEGATIVE for changes in mood or affect          Physical Exam:     Vitals:    07/14/22 1102   BP: 138/80   BP Location: Right arm   Patient Position: Sitting   Cuff Size: Adult Regular   Pulse: 77   Temp: 98.6  F (37  C)   TempSrc: Tympanic   SpO2: 97%   Weight: 75.3 kg (166 lb)   Height: 1.727 m (5' 8\")     Body mass index is 25.24 kg/m .  Vitals were reviewed   Physical Exam  GENERAL APPEARANCE: alert, comfortable appearing  EYES: Eyes grossly normal to inspection  HENT:  nose no rhinorrhea  RESP: lungs clear to auscultation - no rales, rhonchi or wheezes and no resp distress  CV: regular rates and rhythm, normal S1 S2,no murmur   ABDOMEN:soft, non-tender, non-distended, no hepatosplenomegaly or masses  SKIN: no rashes, no jaundice, no obvious masses.   NEURO:  no tremor  MENTAL STATUS EXAM:  Appearance/Behavior:No apparent distress  Speech: Normal  Mood/Affect: normal affect  Insight: Fair      Results:    Results from the last 24 hours  Results for orders placed or performed in visit on 07/14/22 (from the past 24 hour(s))   Urine Drugs of Abuse Screen (Tox13)   Result Value Ref Range    Cannabinoids (81-dki-5-carboxy-9-THC) Not Detected Not Detected, Indeterminate    Phencyclidine Not Detected Not Detected, Indeterminate    Cocaine (Benzoylecgonine) Not Detected Not Detected, Indeterminate    Methamphetamine (d-Methamphetamine) Not Detected Not Detected, Indeterminate    Opiates (Morphine) Not Detected Not Detected, Indeterminate    Amphetamine (d-Amphetamine) Not Detected Not " Detected, Indeterminate    Benzodiazepines (Nordiazepam) Not Detected Not Detected, Indeterminate    Tricyclic Antidepressants (Desipramine) Not Detected Not Detected, Indeterminate    Methadone Not Detected Not Detected, Indeterminate    Barbiturates (Butalbital) Not Detected Not Detected, Indeterminate    Oxycodone Not Detected Not Detected, Indeterminate    Propoxyphene (Norpropoxyphene) Not Detected Not Detected, Indeterminate    Buprenorphine Detected (A) Not Detected, Indeterminate       Assessment and Plan     Was naloxone nasal spray prescribed? Yes - previously.  (F11.11) Opioid abuse, in remission (H)  (primary encounter diagnosis)  Comment: doing well  Plan: Urine Drugs of Abuse Screen (Tox13)        Continue MAT    (R10.13) Abdominal pain, epigastric  Comment: chronic - had been out of PPI  Plan: omeprazole (PRILOSEC) 40 MG DR capsule            (F11.90) Opioid use disorder  Comment: as above  Plan: buprenorphine HCl-naloxone HCl (SUBOXONE) 4-1         MG per film              Dosage will not need adjustment today.  Continue at 4/1 mg  2 time(s) a day of  buprenorphine/naloxone (Suboxone).     Follow up Plan  Stage 4 (3 months): Please make a clinic appointment for follow up with me (ABHILASH MAGAÑA) or another Suboxone provider in 3 months for suboxone follow up.    Greater than 10 minutes was spent with this patient, over half of which was on counseling and coordination of care which includes importance of recovery activities,which may include  attendance at NA/AA meetings, sober support network, and the importance of not isolating, being open, honest, and willing to change, possible triggers and how to avoid them, and managing cravings.    Medications Discontinued During This Encounter   Medication Reason     omeprazole (PRILOSEC) 40 MG DR capsule Reorder     buprenorphine HCl-naloxone HCl (SUBOXONE) 4-1 MG per film Reorder       Options for treatment and follow-up care were reviewed with the  patient. Jorge A engaged in the decision making process and verbalized understanding of the options discussed and agreed with the final plan.    I saw and examined this patient.  I have read through the note and made appropriate changes and agree with the RN Care Coordinator's assessment and plan.     Regina Hobbs MD

## 2022-07-14 ENCOUNTER — LAB (OUTPATIENT)
Dept: LAB | Facility: OTHER | Age: 67
End: 2022-07-14
Payer: MEDICARE

## 2022-07-14 ENCOUNTER — PATIENT OUTREACH (OUTPATIENT)
Dept: CARE COORDINATION | Facility: OTHER | Age: 67
End: 2022-07-14

## 2022-07-14 ENCOUNTER — OFFICE VISIT (OUTPATIENT)
Dept: FAMILY MEDICINE | Facility: OTHER | Age: 67
End: 2022-07-14
Attending: FAMILY MEDICINE
Payer: MEDICARE

## 2022-07-14 VITALS
WEIGHT: 166 LBS | TEMPERATURE: 98.6 F | OXYGEN SATURATION: 97 % | HEIGHT: 68 IN | BODY MASS INDEX: 25.16 KG/M2 | HEART RATE: 77 BPM | SYSTOLIC BLOOD PRESSURE: 138 MMHG | DIASTOLIC BLOOD PRESSURE: 80 MMHG

## 2022-07-14 DIAGNOSIS — Z13.220 LIPID SCREENING: ICD-10-CM

## 2022-07-14 DIAGNOSIS — R10.13 ABDOMINAL PAIN, EPIGASTRIC: ICD-10-CM

## 2022-07-14 DIAGNOSIS — Z12.5 SCREENING FOR PROSTATE CANCER: ICD-10-CM

## 2022-07-14 DIAGNOSIS — F11.11 OPIOID ABUSE, IN REMISSION (H): Primary | ICD-10-CM

## 2022-07-14 DIAGNOSIS — F11.11 OPIOID ABUSE, IN REMISSION (H): ICD-10-CM

## 2022-07-14 DIAGNOSIS — F11.90 OPIOID USE DISORDER: ICD-10-CM

## 2022-07-14 LAB
ALBUMIN SERPL-MCNC: 3.8 G/DL (ref 3.4–5)
ALP SERPL-CCNC: 105 U/L (ref 40–150)
ALT SERPL W P-5'-P-CCNC: 24 U/L (ref 0–70)
AMPHETAMINES UR QL: NOT DETECTED
ANION GAP SERPL CALCULATED.3IONS-SCNC: 3 MMOL/L (ref 3–14)
AST SERPL W P-5'-P-CCNC: 15 U/L (ref 0–45)
BARBITURATES UR QL SCN: NOT DETECTED
BASOPHILS # BLD AUTO: 0 10E3/UL (ref 0–0.2)
BASOPHILS NFR BLD AUTO: 0 %
BENZODIAZ UR QL SCN: NOT DETECTED
BILIRUB SERPL-MCNC: 0.5 MG/DL (ref 0.2–1.3)
BUN SERPL-MCNC: 11 MG/DL (ref 7–30)
BUPRENORPHINE UR QL: DETECTED
CALCIUM SERPL-MCNC: 8.6 MG/DL (ref 8.5–10.1)
CANNABINOIDS UR QL: NOT DETECTED
CHLORIDE BLD-SCNC: 106 MMOL/L (ref 94–109)
CHOLEST SERPL-MCNC: 151 MG/DL
CO2 SERPL-SCNC: 29 MMOL/L (ref 20–32)
COCAINE UR QL SCN: NOT DETECTED
CREAT SERPL-MCNC: 0.71 MG/DL (ref 0.66–1.25)
D-METHAMPHET UR QL: NOT DETECTED
EOSINOPHIL # BLD AUTO: 0 10E3/UL (ref 0–0.7)
EOSINOPHIL NFR BLD AUTO: 0 %
ERYTHROCYTE [DISTWIDTH] IN BLOOD BY AUTOMATED COUNT: 13.5 % (ref 10–15)
FASTING STATUS PATIENT QL REPORTED: NO
GFR SERPL CREATININE-BSD FRML MDRD: >90 ML/MIN/1.73M2
GLUCOSE BLD-MCNC: 102 MG/DL (ref 70–99)
HCT VFR BLD AUTO: 43.5 % (ref 40–53)
HDLC SERPL-MCNC: 71 MG/DL
HGB BLD-MCNC: 14.2 G/DL (ref 13.3–17.7)
IMM GRANULOCYTES # BLD: 0 10E3/UL
IMM GRANULOCYTES NFR BLD: 1 %
LDLC SERPL CALC-MCNC: 71 MG/DL
LYMPHOCYTES # BLD AUTO: 1.4 10E3/UL (ref 0.8–5.3)
LYMPHOCYTES NFR BLD AUTO: 19 %
MCH RBC QN AUTO: 29.3 PG (ref 26.5–33)
MCHC RBC AUTO-ENTMCNC: 32.6 G/DL (ref 31.5–36.5)
MCV RBC AUTO: 90 FL (ref 78–100)
METHADONE UR QL SCN: NOT DETECTED
MONOCYTES # BLD AUTO: 0.8 10E3/UL (ref 0–1.3)
MONOCYTES NFR BLD AUTO: 10 %
NEUTROPHILS # BLD AUTO: 5.2 10E3/UL (ref 1.6–8.3)
NEUTROPHILS NFR BLD AUTO: 70 %
NONHDLC SERPL-MCNC: 80 MG/DL
NRBC # BLD AUTO: 0 10E3/UL
NRBC BLD AUTO-RTO: 0 /100
OPIATES UR QL SCN: NOT DETECTED
OXYCODONE UR QL SCN: NOT DETECTED
PCP UR QL SCN: NOT DETECTED
PLATELET # BLD AUTO: 214 10E3/UL (ref 150–450)
POTASSIUM BLD-SCNC: 3.7 MMOL/L (ref 3.4–5.3)
PROPOXYPH UR QL: NOT DETECTED
PROT SERPL-MCNC: 7.7 G/DL (ref 6.8–8.8)
PSA SERPL-MCNC: 0.28 UG/L (ref 0–4)
RBC # BLD AUTO: 4.84 10E6/UL (ref 4.4–5.9)
SODIUM SERPL-SCNC: 138 MMOL/L (ref 133–144)
TRICYCLICS UR QL SCN: NOT DETECTED
TRIGL SERPL-MCNC: 47 MG/DL
WBC # BLD AUTO: 7.5 10E3/UL (ref 4–11)

## 2022-07-14 PROCEDURE — 80061 LIPID PANEL: CPT | Mod: ZL

## 2022-07-14 PROCEDURE — 85025 COMPLETE CBC W/AUTO DIFF WBC: CPT | Mod: ZL

## 2022-07-14 PROCEDURE — G0463 HOSPITAL OUTPT CLINIC VISIT: HCPCS

## 2022-07-14 PROCEDURE — 80306 DRUG TEST PRSMV INSTRMNT: CPT | Mod: ZL | Performed by: FAMILY MEDICINE

## 2022-07-14 PROCEDURE — 80053 COMPREHEN METABOLIC PANEL: CPT | Mod: ZL

## 2022-07-14 PROCEDURE — 36415 COLL VENOUS BLD VENIPUNCTURE: CPT | Mod: ZL

## 2022-07-14 PROCEDURE — 99213 OFFICE O/P EST LOW 20 MIN: CPT | Performed by: FAMILY MEDICINE

## 2022-07-14 PROCEDURE — G0103 PSA SCREENING: HCPCS | Mod: ZL

## 2022-07-14 RX ORDER — BUPRENORPHINE AND NALOXONE 4; 1 MG/1; MG/1
1 FILM, SOLUBLE BUCCAL; SUBLINGUAL 2 TIMES DAILY
Qty: 56 EACH | Refills: 2 | Status: SHIPPED | OUTPATIENT
Start: 2022-07-14 | End: 2022-10-06

## 2022-07-14 RX ORDER — OMEPRAZOLE 40 MG/1
40 CAPSULE, DELAYED RELEASE ORAL DAILY
Qty: 90 CAPSULE | Refills: 3 | Status: SHIPPED | OUTPATIENT
Start: 2022-07-14 | End: 2023-06-14

## 2022-07-14 ASSESSMENT — PAIN SCALES - GENERAL: PAINLEVEL: SEVERE PAIN (6)

## 2022-07-14 ASSESSMENT — ANXIETY QUESTIONNAIRES
3. WORRYING TOO MUCH ABOUT DIFFERENT THINGS: NOT AT ALL
5. BEING SO RESTLESS THAT IT IS HARD TO SIT STILL: NOT AT ALL
7. FEELING AFRAID AS IF SOMETHING AWFUL MIGHT HAPPEN: NOT AT ALL
2. NOT BEING ABLE TO STOP OR CONTROL WORRYING: NOT AT ALL
GAD7 TOTAL SCORE: 0
4. TROUBLE RELAXING: NOT AT ALL
1. FEELING NERVOUS, ANXIOUS, OR ON EDGE: NOT AT ALL
GAD7 TOTAL SCORE: 0
6. BECOMING EASILY ANNOYED OR IRRITABLE: NOT AT ALL

## 2022-07-14 ASSESSMENT — PATIENT HEALTH QUESTIONNAIRE - PHQ9: SUM OF ALL RESPONSES TO PHQ QUESTIONS 1-9: 0

## 2022-07-14 NOTE — PROGRESS NOTES
Clinic Care Coordination Contact  Care Team Conversations    CC attended office visit with pt and Dr. Hicks this date.  Please refer to Dr. Hicks's note for plan of care.  Doing well in recovery.   Suboxone dose is adequate and wants to continue on current dose.  Able to get out and do things that he enjoys.  Spending time with family and grandchildren.  No revision in treatment plan at this time.  Has been in our program for over 2 years.  Will go to every 3 month visits at this time, sooner if he has concerns.  Encouraged him to call/text CC with any questions/concerns/problems.  Verbalized understanding.    Margot Joel RN-BSN, [facility] Care Coordinator  [phone] opt. #3

## 2022-07-14 NOTE — NURSING NOTE
"Chief Complaint   Patient presents with     Recheck Medication       Initial /80 (BP Location: Right arm, Patient Position: Sitting, Cuff Size: Adult Regular)   Pulse 77   Temp 98.6  F (37  C) (Tympanic)   Ht 1.727 m (5' 8\")   Wt 75.3 kg (166 lb)   SpO2 97%   BMI 25.24 kg/m   Estimated body mass index is 25.24 kg/m  as calculated from the following:    Height as of this encounter: 1.727 m (5' 8\").    Weight as of this encounter: 75.3 kg (166 lb).  Medication Reconciliation: complete  Sandra Faith LPN  "

## 2022-08-23 ENCOUNTER — PATIENT OUTREACH (OUTPATIENT)
Dept: CARE COORDINATION | Facility: OTHER | Age: 67
End: 2022-08-23

## 2022-09-04 ENCOUNTER — HEALTH MAINTENANCE LETTER (OUTPATIENT)
Age: 67
End: 2022-09-04

## 2022-10-05 NOTE — PROGRESS NOTES
Follow-up Buprenorphine Visit           HPI       Jorge A Mendosa is here for f/u on buprenophine/naloxone (Suboxone) therapy for opioid use disorder.  Recheck Medication      Jorge A is currently taking 4/1 mg of Buprenorphine/Naloxone 2 times daily.     Status since last visit:    Since last visit patient has been: doing well.     Intensity:     There has been: no craving.      Suboxone Dose: adequate.      When did you take your last dose? today  Progression of Symptoms:     Cues to use and relapse none    Recovery program has been: solid.   Accompanying Signs & Symptoms:    Side Effects: none.    Sobriety:     Status: no use since last visit.      Drug Screen: obtained.    Precipitating factors:    Triggers have been: non-existent.   Alleviating factors:    Contact with sponsor has been: no sponsor.     Family and support system has been: helpful.   Other Therapies Tried :     Patient has been going to recovery meetings:not at all.      Going to [place] for goose/duck hunting.  Yearly trip with friend.    Other medical concerns: No    Any ED visits? No       History   Smoking Status     Current Every Day Smoker     Packs/day: 0.25     Years: 30.00     Types: Cigarettes     Start date: 1/1/1976   Smokeless Tobacco     Never Used     Comment: only smokes a little bit-noted 2-       Problem, Medication and Allergy Lists were reviewed and updated if needed.  reviewed and are current..    Pharmacy Database reviewed? Yes, 10.6.22, as expected.  Last fill 9.9.22 #56.    Patient is an established patient of this clinic..         Review of Systems:   Review of Systems  CONSTITUTIONAL: NEGATIVE for fever, chills, change in weight  INTEGUMENTARY/SKIN: NEGATIVE for worrisome rashes, moles or lesions  EYES: NEGATIVE for vision changes or irritation  ENT/MOUTH: NEGATIVE for ear, mouth and throat problems  RESP: NEGATIVE for significant cough or SOB  CV: NEGATIVE for chest pain, palpitations or peripheral edema  GI:  "NEGATIVE for nausea, abdominal pain, heartburn, or change in bowel habits  : NEGATIVE for frequency, dysuria, or hematuria  MUSCULOSKELETAL: NEGATIVE for significant arthralgias or myalgia  NEURO: NEGATIVE for weakness, dizziness or paresthesias  ENDOCRINE: NEGATIVE for temperature intolerance, skin/hair changes  PSYCHIATRIC: NEGATIVE for changes in mood or affect          Physical Exam:     Vitals:    10/06/22 1119   BP: 130/80   BP Location: Right arm   Patient Position: Sitting   Cuff Size: Adult Regular   Pulse: 62   Temp: 98.2  F (36.8  C)   TempSrc: Tympanic   SpO2: 96%   Weight: 76.2 kg (168 lb)   Height: 1.727 m (5' 8\")     Body mass index is 25.54 kg/m .  Vitals were reviewed   Physical Exam  GENERAL APPEARANCE: alert, comfortable appearing  EYES: Eyes grossly normal to inspection  HENT:  nose no rhinorrhea  RESP: lungs clear to auscultation - no rales, rhonchi or wheezes and no resp distress  CV: regular rates and rhythm, normal S1 S2,no murmur   ABDOMEN:soft, non-tender, non-distended, no hepatosplenomegaly or masses  SKIN: no rashes, no jaundice, no obvious masses.   NEURO:  no tremor  MENTAL STATUS EXAM:  Appearance/Behavior:No apparent distress  Speech: Normal  Mood/Affect: normal affect  Insight: Fair      Results:    Results from the last 24 hours  Results for orders placed or performed in visit on 10/06/22 (from the past 24 hour(s))   Urine Drugs of Abuse Screen (Tox13)   Result Value Ref Range    Cannabinoids (56-xln-4-carboxy-9-THC) Not Detected Not Detected, Indeterminate    Phencyclidine Not Detected Not Detected, Indeterminate    Cocaine (Benzoylecgonine) Not Detected Not Detected, Indeterminate    Methamphetamine (d-Methamphetamine) Not Detected Not Detected, Indeterminate    Opiates (Morphine) Not Detected Not Detected, Indeterminate    Amphetamine (d-Amphetamine) Not Detected Not Detected, Indeterminate    Benzodiazepines (Nordiazepam) Not Detected Not Detected, Indeterminate    Tricyclic " Antidepressants (Desipramine) Not Detected Not Detected, Indeterminate    Methadone Not Detected Not Detected, Indeterminate    Barbiturates (Butalbital) Not Detected Not Detected, Indeterminate    Oxycodone Not Detected Not Detected, Indeterminate    Propoxyphene (Norpropoxyphene) Not Detected Not Detected, Indeterminate    Buprenorphine Detected (A) Not Detected, Indeterminate       Assessment and Plan     Was naloxone nasal spray prescribed? Yes - previously.  Jorge A was seen today for recheck medication.    Diagnoses and all orders for this visit:    Opioid abuse, in remission (H)  -     Urine Drugs of Abuse Screen (Tox13)    Opioid use disorder  -     buprenorphine HCl-naloxone HCl (SUBOXONE) 4-1 MG per film; Place 1 Film under the tongue 2 times daily        Dosage will not need adjustment today.  Continue at 4/1 mg  2 time(s) a day of  buprenorphine/naloxone (Suboxone).     Follow up Plan  Stage 4 (3 months): Please make a clinic appointment for follow up with me (REGINA MAGAÑA) or another Suboxone provider in 3 months for suboxone follow up.    Greater than 10 minutes was spent with this patient, over half of which was on counseling and coordination of care which includes importance of recovery activities,which may include  attendance at NA/AA meetings, sober support network, and the importance of not isolating, being open, honest, and willing to change, possible triggers and how to avoid them, and managing cravings.    There are no discontinued medications.    Options for treatment and follow-up care were reviewed with the patient. Jorge A engaged in the decision making process and verbalized understanding of the options discussed and agreed with the final plan.    I saw and examined this patient.  I have read through the note and made appropriate changes and agree with the RN Care Coordinator's assessment and plan.     Regina Magaña MD

## 2022-10-06 ENCOUNTER — OFFICE VISIT (OUTPATIENT)
Dept: FAMILY MEDICINE | Facility: OTHER | Age: 67
End: 2022-10-06
Attending: FAMILY MEDICINE
Payer: MEDICARE

## 2022-10-06 ENCOUNTER — PATIENT OUTREACH (OUTPATIENT)
Dept: CARE COORDINATION | Facility: OTHER | Age: 67
End: 2022-10-06

## 2022-10-06 ENCOUNTER — APPOINTMENT (OUTPATIENT)
Dept: LAB | Facility: OTHER | Age: 67
End: 2022-10-06
Attending: FAMILY MEDICINE
Payer: MEDICARE

## 2022-10-06 VITALS
BODY MASS INDEX: 25.46 KG/M2 | WEIGHT: 168 LBS | HEIGHT: 68 IN | DIASTOLIC BLOOD PRESSURE: 80 MMHG | OXYGEN SATURATION: 96 % | TEMPERATURE: 98.2 F | HEART RATE: 62 BPM | SYSTOLIC BLOOD PRESSURE: 130 MMHG

## 2022-10-06 DIAGNOSIS — F11.11 OPIOID ABUSE, IN REMISSION (H): Primary | ICD-10-CM

## 2022-10-06 DIAGNOSIS — F11.90 OPIOID USE DISORDER: ICD-10-CM

## 2022-10-06 PROCEDURE — 99213 OFFICE O/P EST LOW 20 MIN: CPT | Performed by: FAMILY MEDICINE

## 2022-10-06 PROCEDURE — 80306 DRUG TEST PRSMV INSTRMNT: CPT | Mod: ZL | Performed by: FAMILY MEDICINE

## 2022-10-06 PROCEDURE — G0463 HOSPITAL OUTPT CLINIC VISIT: HCPCS

## 2022-10-06 RX ORDER — BUPRENORPHINE AND NALOXONE 4; 1 MG/1; MG/1
1 FILM, SOLUBLE BUCCAL; SUBLINGUAL 2 TIMES DAILY
Qty: 56 EACH | Refills: 2 | Status: SHIPPED | OUTPATIENT
Start: 2022-10-06 | End: 2022-12-29

## 2022-10-06 ASSESSMENT — ANXIETY QUESTIONNAIRES
7. FEELING AFRAID AS IF SOMETHING AWFUL MIGHT HAPPEN: NOT AT ALL
GAD7 TOTAL SCORE: 0
5. BEING SO RESTLESS THAT IT IS HARD TO SIT STILL: NOT AT ALL
6. BECOMING EASILY ANNOYED OR IRRITABLE: NOT AT ALL
1. FEELING NERVOUS, ANXIOUS, OR ON EDGE: NOT AT ALL
4. TROUBLE RELAXING: NOT AT ALL
2. NOT BEING ABLE TO STOP OR CONTROL WORRYING: NOT AT ALL
3. WORRYING TOO MUCH ABOUT DIFFERENT THINGS: NOT AT ALL
GAD7 TOTAL SCORE: 0

## 2022-10-06 ASSESSMENT — PATIENT HEALTH QUESTIONNAIRE - PHQ9: SUM OF ALL RESPONSES TO PHQ QUESTIONS 1-9: 0

## 2022-10-06 ASSESSMENT — ACTIVITIES OF DAILY LIVING (ADL): DEPENDENT_IADLS:: INDEPENDENT

## 2022-10-06 NOTE — NURSING NOTE
"Chief Complaint   Patient presents with     Recheck Medication       Initial /80 (BP Location: Right arm, Patient Position: Sitting, Cuff Size: Adult Regular)   Pulse 62   Temp 98.2  F (36.8  C) (Tympanic)   Ht 1.727 m (5' 8\")   Wt 76.2 kg (168 lb)   SpO2 96%   BMI 25.54 kg/m   Estimated body mass index is 25.54 kg/m  as calculated from the following:    Height as of this encounter: 1.727 m (5' 8\").    Weight as of this encounter: 76.2 kg (168 lb).  Medication Reconciliation: complete  Sandra Faith LPN  "

## 2022-12-28 NOTE — PROGRESS NOTES
Follow-up Buprenorphine Visit           HPI       Jorge A Mendosa is here for f/u on buprenophine/naloxone (Suboxone) therapy for opioid use disorder.  Recheck Medication       Jorge A is currently taking 4/1 mg of Buprenorphine/Naloxone 2 times daily.     Status since last visit:    Since last visit patient has been: doing well.     Intensity:     There has been: no craving.      Suboxone Dose: adequate.      When did you take your last dose? This AM  Progression of Symptoms:     Cues to use and relapse None    Recovery program has been: solid.   Accompanying Signs & Symptoms:    Side Effects: none.    Sobriety:     Status: no use since last visit.      Drug Screen: obtained.    Precipitating factors:    Triggers have been: mild. Pain, but he works through it.    Alleviating factors:    Contact with sponsor has been: no sponsor.     Family and support system has been: helpful.   Other Therapies Tried :     Patient has been going to recovery meetings:not at all.      Other medical concerns: No    Any ED visits? No     Spending lots of time with family  Had a wonderful [place] with family   Will be 2 years in April 2023   Refuses all vaccines - will think about it  Smoking 1/4 ppd.  Thinking about quitting - highly encouraged this   Hasn't had a drink in about 11-12 years         History   Smoking Status     Every Day     Packs/day: 0.25     Years: 30.00     Types: Cigarettes     Start date: 1/1/1976   Smokeless Tobacco     Never     Comment: only smokes a little bit-noted 2-       Problem, Medication and Allergy Lists were reviewed and updated if needed.  reviewed and are current..    Pharmacy Database reviewed? Yes, 12.29.22, as expected.  Last fill 11.30.22 #56.    Patient is an established patient of this clinic..         Review of Systems:   Review of Systems  CONSTITUTIONAL: NEGATIVE for fever, chills, change in weight  INTEGUMENTARY/SKIN: NEGATIVE for worrisome rashes, moles or lesions  EYES:  NEGATIVE for vision changes or irritation  ENT/MOUTH: NEGATIVE for ear, mouth and throat problems  RESP: NEGATIVE for significant cough or SOB  CV: NEGATIVE for chest pain, palpitations or peripheral edema  GI: NEGATIVE for nausea, abdominal pain, heartburn, or change in bowel habits  : NEGATIVE for frequency, dysuria, or hematuria  MUSCULOSKELETAL: NEGATIVE for significant arthralgias or myalgia  NEURO: NEGATIVE for weakness, dizziness or paresthesias  ENDOCRINE: NEGATIVE for temperature intolerance, skin/hair changes  PSYCHIATRIC: NEGATIVE for changes in mood or affect          Physical Exam:     Vitals:    12/29/22 1044   BP: 138/80   Pulse: 90   Temp: 98.5  F (36.9  C)   TempSrc: Tympanic   SpO2: 98%   Weight: 72.6 kg (160 lb)     Body mass index is 24.33 kg/m .  Vitals were reviewed   Physical Exam  GENERAL APPEARANCE: alert, comfortable appearing  EYES: Eyes grossly normal to inspection  HENT:  nose no rhinorrhea  RESP: lungs clear to auscultation - no rales, rhonchi or wheezes and no resp distress  CV: regular rates and rhythm, normal S1 S2,no murmur   ABDOMEN:soft, non-tender, non-distended, no hepatosplenomegaly or masses  SKIN: no rashes, no jaundice, no obvious masses.   NEURO:  no tremor  MENTAL STATUS EXAM:  Appearance/Behavior:No apparent distress  Speech: Normal  Mood/Affect: normal affect  Insight: Fair      Results:    Results from the last 24 hours  Results for orders placed or performed in visit on 12/29/22 (from the past 24 hour(s))   Urine Drugs of Abuse Screen (Tox13)   Result Value Ref Range    Cannabinoids (15-zdm-3-carboxy-9-THC) Not Detected Not Detected, Indeterminate    Phencyclidine Not Detected Not Detected, Indeterminate    Cocaine (Benzoylecgonine) Not Detected Not Detected, Indeterminate    Methamphetamine (d-Methamphetamine) Not Detected Not Detected, Indeterminate    Opiates (Morphine) Not Detected Not Detected, Indeterminate    Amphetamine (d-Amphetamine) Not Detected Not  Detected, Indeterminate    Benzodiazepines (Nordiazepam) Not Detected Not Detected, Indeterminate    Tricyclic Antidepressants (Desipramine) Not Detected Not Detected, Indeterminate    Methadone Not Detected Not Detected, Indeterminate    Barbiturates (Butalbital) Not Detected Not Detected, Indeterminate    Oxycodone Not Detected Not Detected, Indeterminate    Propoxyphene (Norpropoxyphene) Not Detected Not Detected, Indeterminate    Buprenorphine Detected (A) Not Detected, Indeterminate       Assessment and Plan     Was naloxone nasal spray prescribed? Yes - previously.  (F11.11) Opioid abuse, in remission (H)  (primary encounter diagnosis)  Comment: stable; desires to continue MAT  Plan: Urine Drugs of Abuse Screen (Tox13)                Dosage will not need adjustment today.  Continue at 4/1 mg  2 time(s) a day of  buprenorphine/naloxone (Suboxone).     Follow up Plan  Stage 4 (3 months): Please make a clinic appointment for follow up with me (REGINA MAGAÑA) or another Suboxone provider in 3 months for suboxone follow up.    Greater than 10 minutes was spent with this patient, over half of which was on counseling and coordination of care which includes importance of recovery activities,which may include  attendance at NA/AA meetings, sober support network, and the importance of not isolating, being open, honest, and willing to change, possible triggers and how to avoid them, and managing cravings.    Medications Discontinued During This Encounter   Medication Reason     buprenorphine HCl-naloxone HCl (SUBOXONE) 4-1 MG per film Reorder       Options for treatment and follow-up care were reviewed with the patient. Jorge A engaged in the decision making process and verbalized understanding of the options discussed and agreed with the final plan.    I saw and examined this patient.  I have read through the note and made appropriate changes and agree with the RN Care Coordinator's assessment and plan.     Regina  KRUPA Hobbs MD

## 2022-12-29 ENCOUNTER — APPOINTMENT (OUTPATIENT)
Dept: LAB | Facility: OTHER | Age: 67
End: 2022-12-29
Attending: FAMILY MEDICINE
Payer: MEDICARE

## 2022-12-29 ENCOUNTER — PATIENT OUTREACH (OUTPATIENT)
Dept: CARE COORDINATION | Facility: OTHER | Age: 67
End: 2022-12-29

## 2022-12-29 ENCOUNTER — OFFICE VISIT (OUTPATIENT)
Dept: FAMILY MEDICINE | Facility: OTHER | Age: 67
End: 2022-12-29
Attending: FAMILY MEDICINE
Payer: MEDICARE

## 2022-12-29 VITALS
HEART RATE: 90 BPM | SYSTOLIC BLOOD PRESSURE: 138 MMHG | DIASTOLIC BLOOD PRESSURE: 80 MMHG | WEIGHT: 160 LBS | OXYGEN SATURATION: 98 % | BODY MASS INDEX: 24.33 KG/M2 | TEMPERATURE: 98.5 F

## 2022-12-29 DIAGNOSIS — F11.90 OPIOID USE DISORDER: ICD-10-CM

## 2022-12-29 DIAGNOSIS — F11.11 OPIOID ABUSE, IN REMISSION (H): Primary | ICD-10-CM

## 2022-12-29 PROCEDURE — 99213 OFFICE O/P EST LOW 20 MIN: CPT | Performed by: FAMILY MEDICINE

## 2022-12-29 PROCEDURE — G0463 HOSPITAL OUTPT CLINIC VISIT: HCPCS | Performed by: FAMILY MEDICINE

## 2022-12-29 PROCEDURE — 80306 DRUG TEST PRSMV INSTRMNT: CPT | Mod: ZL | Performed by: FAMILY MEDICINE

## 2022-12-29 RX ORDER — BUPRENORPHINE AND NALOXONE 4; 1 MG/1; MG/1
1 FILM, SOLUBLE BUCCAL; SUBLINGUAL 2 TIMES DAILY
Qty: 56 EACH | Refills: 2 | Status: SHIPPED | OUTPATIENT
Start: 2022-12-29 | End: 2023-03-22

## 2022-12-29 SDOH — ECONOMIC STABILITY: INCOME INSECURITY: IN THE LAST 12 MONTHS, WAS THERE A TIME WHEN YOU WERE NOT ABLE TO PAY THE MORTGAGE OR RENT ON TIME?: NO

## 2022-12-29 SDOH — ECONOMIC STABILITY: TRANSPORTATION INSECURITY
IN THE PAST 12 MONTHS, HAS THE LACK OF TRANSPORTATION KEPT YOU FROM MEDICAL APPOINTMENTS OR FROM GETTING MEDICATIONS?: YES

## 2022-12-29 SDOH — ECONOMIC STABILITY: TRANSPORTATION INSECURITY
IN THE PAST 12 MONTHS, HAS LACK OF TRANSPORTATION KEPT YOU FROM MEETINGS, WORK, OR FROM GETTING THINGS NEEDED FOR DAILY LIVING?: YES

## 2022-12-29 ASSESSMENT — LIFESTYLE VARIABLES
HOW OFTEN DO YOU HAVE A DRINK CONTAINING ALCOHOL: NEVER
HOW OFTEN DO YOU HAVE SIX OR MORE DRINKS ON ONE OCCASION: NEVER
SKIP TO QUESTIONS 9-10: 1
AUDIT-C TOTAL SCORE: 0
HOW MANY STANDARD DRINKS CONTAINING ALCOHOL DO YOU HAVE ON A TYPICAL DAY: PATIENT DOES NOT DRINK

## 2022-12-29 ASSESSMENT — PAIN SCALES - GENERAL: PAINLEVEL: MODERATE PAIN (5)

## 2022-12-29 ASSESSMENT — SOCIAL DETERMINANTS OF HEALTH (SDOH): HOW HARD IS IT FOR YOU TO PAY FOR THE VERY BASICS LIKE FOOD, HOUSING, MEDICAL CARE, AND HEATING?: NOT VERY HARD

## 2022-12-29 NOTE — PROGRESS NOTES
Clinic Care Coordination Contact    Follow Up Progress Note      Assessment: CC attended office visit with pt and Dr. Hicks this date.  Please refer to Dr. Hicks's note for plan of care.  Doing very well in recovery.  Suboxone dose is adequate and wants to continue on current dose.  Pain is bothersome at times.  No thoughts or desire to use opioids.  Spending lots of time with family, which he enjoys.       Care Gaps:    Health Maintenance Due   Topic Date Due     NICOTINE/TOBACCO CESSATION COUNSELING Q 1 YR  Never done     COVID-19 Vaccine (1) Never done     COLORECTAL CANCER SCREENING  Never done     ZOSTER IMMUNIZATION (1 of 2) Never done     Pneumococcal Vaccine: 65+ Years (2 - PCV) 11/02/2012     MEDICARE ANNUAL WELLNESS VISIT  08/07/2019     AORTIC ANEURYSM SCREENING (SYSTEM ASSIGNED)  Never done     LUNG CANCER SCREENING  11/27/2019     DTAP/TDAP/TD IMMUNIZATION (2 - Td or Tdap) 10/30/2021     ADVANCE CARE PLANNING  01/18/2022     INFLUENZA VACCINE (1) 09/01/2022       Due for annual exam.  Refuses vaccines.    Care Plans      Intervention/Education provided during outreach: Consider updating imaging for his back pain.      Outreach Frequency: 3 months      Plan:   No change in treatment plan.  Care Coordinator will follow up in 12 weeks, sooner if he has concerns.    Margot Joel RN-BSN, [facility] Care Coordinator  [phone] opt. #3

## 2023-01-18 NOTE — MR AVS SNAPSHOT
After Visit Summary   9/28/2017    Jorge A Mendosa    MRN: [id number]           Patient Information     Date Of Birth          1955        Visit Information        Provider Department      9/28/2017 8:30 AM Mukund Hamlin MD [facility]        Today's Diagnoses     Other chronic pain    -  1    Abdominal pain, epigastric        Tobacco abuse        Tobacco abuse counseling          Care Instructions      HOW TO QUIT SMOKING  Smoking is one of the hardest habits to break. About half of all those who have ever smoked have been able to quit, and most of those (about 70%) who still smoke want to quit. Here are some of the best ways to stop smoking.     KEEP TRYING:  It takes most smokers about 8 tries before they are finally able to fully quit. So, the more often you try and fail, the better your chance of quitting the next time! So, don't give up!    GO COLD TURKEY:  Most ex-smokers quit cold turkey. Trying to cut back gradually doesn't seem to work as well, perhaps because it continues the smoking habit. Also, it is possible to fool yourself by inhaling more while smoking fewer cigarettes. This results in the same amount of nicotine in your body!    GET SUPPORT:  Support programs can make an important difference, especially for the heavy smoker. These groups offer lectures, methods to change your behavior and peer support. Call the free national Quitline for more information. 800-QUIT-NOW (787-748-5263). Low-cost or free programs are offered by many hospitals, local chapters of the American Lung Association (864-013-1794) and the American Cancer Society (341-469-4025). Support at home is important too. Non-smokers can help by offering praise and encouragement. If the smoker fails to quit, encourage them to try again!    OVER-THE-COUNTER MEDICINES:  For those who can't quit on their own, Nicotine Replacement Therapy (NRT) may make quitting much easier. Certain aids such as the  nicotine patch, gum and lozenge are available without a prescription. However, it is best to use these under the guidance of your doctor. The skin patch provides a steady supply of nicotine to the body. Nicotine gum and lozenge gives temporary bursts of low levels of nicotine. Both methods take the edge off the craving for cigarettes. WARNING: If you feel symptoms of nicotine overdose, such as nausea, vomiting, dizziness, weakness, or fast heartbeat, stop using these and see your doctor.    PRESCRIPTION MEDICINES:  After evaluating your smoking patterns and prior attempts at quitting, your doctor may offer a prescription medicine such as bupropion (Zyban, Wellbutrin), varenicline (Chantix, Champix), a niocotine inhaler or nasal spray. Each has its unique advantage and side effects which your doctor can review with you.    HEALTH BENEFITS OF QUITTING:  The benefits of quitting start right away and keep improving the longer you go without smokin minutes: blood pressure and pulse return to normal  8 hours: oxygen levels return to normal  2 days: ability to smell and taste begins to improve as damaged nerves start to regrow  2-3 weeks: circulation and lung function improves  1-9 months: decreased cough, congestion and shortness of breath; less tired  1 year: risk of heart attack decreases by half  5 years: risk of lung cancer decreases by half; risk of stroke becomes the same as a non-smoker  For information about how to quit smoking, visit the following links:  National Cancer Novinger ,   Clearing the Air, Quit Smoking Today   - an online booklet. http://www.smokefree.gov/pubs/clearing_the_air.pdf  Smokefree.gov http://smokefree.gov/  QuitNet http://www.quitnet.com/    4237-4654 Raine Gomez, [address] [place] [address], [place] [address]. All rights reserved. This information is not intended as a substitute for professional medical care. Always follow your healthcare professional's instructions.    The Benefits  of Living Smoke Free  What do you want to gain from quitting? Check off some reasons to quit.  Health Benefits  ___ Reduce my risk of lung cancer, heart disease, chronic lung disease  ___ Have fewer wrinkles and softer skin  ___ Improve my sense of taste and smell  ___ For pregnant women--reduce the risk of having a miscarriage, stillbirth, premature birth, or low-birth-weight baby  Personal Benefits  ___ Feel more in control of my life  ___ Have better-smelling hair, breath, clothes, home, and car  ___ Save time by not having to take smoke breaks, buy cigarettes, or hunt for a light  ___ Have whiter teeth  Family Benefits  ___ Reduce my children s respiratory tract infections  ___ Set a good example for my children  ___ Reduce my family s cancer risk  Financial Benefits  ___ Save hundreds of dollars each year that would be spent on cigarettes  ___ Save money on medical bills  ___ Save on life, health, and car insurance premiums    Those Dollars Add Up!  Cigarettes are expensive, and getting more expensive all the time. Do you realize how much money you are spending on cigarettes per year? What is the average amount you spend on a pack of cigarettes? What is the average number of packs that you smoke per day? Using your answers to these questions, fill in this formula to help you find out:  ($ _____ per pack) ×  ( _____ number of packs per day) × (365 days) =  $ _____ yearly cost of smoking  Besides tobacco, there are other costs, including extra cleaning bills and replacement costs for clothing and furniture; medical expenses for smoking-related illnesses; and higher health, life, and car insurance premiums.    Cigars and Pipes Count Too!  Cigars and pipes are also dangerous. So are smokeless (chewing) tobacco and snuff. All of these products contain nicotine, a highly addictive substance that has harmful effects on your body. Quitting smoking means giving up all tobacco products.      2504-9910 Raine Gomez,  "[address] [place] [address]. All rights reserved. This information is not intended as a substitute for professional medical care. Always follow your healthcare professional's instructions.          Follow-ups after your visit        Your next 10 appointments already scheduled     Sep 28, 2017  2:45 PM CDT   (Arrive by 2:30 PM)   CONSULT with Washington Frazier DO   [facility] ([facility] )    [address]  [facility] [address]   [phone]            Dec 18, 2017  8:45 AM CST   (Arrive by 8:30 AM)   PHYSICAL with Mukund Hamlin MD   [facility] ([facility] )    [address]  [facility] [address]   [phone]              Who to contact     If you have questions or need follow up information about today's clinic visit or your schedule please contact [facility] directly at [phone].  Normal or non-critical lab and imaging results will be communicated to you by MyChart, letter or phone within 4 business days after the clinic has received the results. If you do not hear from us within 7 days, please contact the clinic through MyChart or phone. If you have a critical or abnormal lab result, we will notify you by phone as soon as possible.  Submit refill requests through [employer] or call your pharmacy and they will forward the refill request to us. Please allow 3 business days for your refill to be completed.          Additional Information About Your Visit        Care EveryWhere ID     This is your Care EveryWhere ID. This could be used by other organizations to access your [place] medical records  [id number]        Your Vitals Were     Pulse Temperature Respirations Height Pulse Oximetry BMI (Body Mass Index)    104 99.3  F (37.4  C) (Tympanic) 20 5' 8\" (1.727 m) 99% 22.05 kg/m2       Blood Pressure from Last 3 Encounters:   09/28/17 128/76   09/18/17 115/68   06/13/17 110/70    Weight from Last 3 " Encounters:   09/28/17 145 lb (65.8 kg)   09/18/17 152 lb (68.9 kg)   06/13/17 147 lb (66.7 kg)              We Performed the Following     Tobacco Cessation - for Health Maintenance          Where to get your medicines      These medications were sent to [id number] - [place], [place] - [address]   [address] , [place] [address]     Phone:  [phone]     omeprazole 40 MG capsule          Primary Care Provider Office Phone # Fax #    Mukund Hamlin [phone]       [facility] [address]  [facility] [address]        Equal Access to Services     [facility] : Hadii aad ku hadasho Soomaali, waaxda luqadaha, qaybta kaalmada adeegyada, waxay caliin haytedn sue velasco . So [facility] [phone].    ATENCIÓN: Si habla español, tiene a escalante disposición servicios gratuitos de asistencia lingüística. Llame al [phone].    We comply with applicable federal civil rights laws and [place] laws. We do not discriminate on the basis of race, color, national origin, age, disability sex, sexual orientation or gender identity.            Thank you!     Thank you for choosing [facility]  for your care. Our goal is always to provide you with excellent care. Hearing back from our patients is one way we can continue to improve our services. Please take a few minutes to complete the written survey that you may receive in the mail after your visit with us. Thank you!             Your Updated Medication List - Protect others around you: Learn how to safely use, store and throw away your medicines at www.disposemymeds.org.          This list is accurate as of: 9/28/17  8:51 AM.  Always use your most recent med list.                   Brand Name Dispense Instructions for use Diagnosis    ADVIL 200 MG capsule   Generic drug:  ibuprofen      Take 200 mg by mouth every 4 hours as needed.        ALEVE PO      Take 1 tablet by mouth. As directed when needed        EXCEDRIN PO       PRN if don't have aleve or advil        omeprazole 40 MG capsule    priLOSEC    90 capsule    Take 1 capsule (40 mg) by mouth daily Take 30-60 minutes before a meal.    Abdominal pain, epigastric       oxyCODONE 30 MG 12 hr tablet    OXYCONTIN    60 tablet    Take 1 tablet (30 mg) by mouth every 12 hours    Chronic pain syndrome       oxyCODONE-acetaminophen  MG per tablet    PERCOCET    120 tablet    Take 1 tablet by mouth every 6 hours as needed for pain    Chronic pain syndrome       VITAMIN A PO      Take by mouth daily        VITAMIN C PO      1 daily           fall

## 2023-03-21 PROBLEM — F11.90 CHRONIC, CONTINUOUS USE OF OPIOIDS: Chronic | Status: RESOLVED | Noted: 2019-09-23 | Resolved: 2023-03-21

## 2023-03-21 NOTE — PROGRESS NOTES
Assessment & Plan     Opioid abuse, in remission (H)  Doing well.  Lab as expected.  Continue current dosing MAT.  - [id number] - Urine Drugs of Abuse Screen Panel 13; Future    Opioid use disorder  As above.  - buprenorphine HCl-naloxone HCl (SUBOXONE) 4-1 MG per film; Place 1 Film under the tongue 2 times daily    Special screening for malignant neoplasms, colon  Declines colonoscopy.  Agreeable to Cologuard.  - COLOGUARD([employer]); Future     Nicotine/Tobacco Cessation:  He reports that he has been smoking cigarettes. He started smoking about 47 years ago. He has a 7.50 pack-year smoking history. He has never used smokeless tobacco.  Nicotine/Tobacco Cessation Plan:   Information offered: Patient not interested at this time      See Patient Instructions    Return in about 3 months (around 6/22/2023) for physical; fasting labs; MAT.    Regina Hobbs MD  [facility] [place] Jules is a 67 year old, presenting for the following health issues:  Recheck Medication    HPI     Due for vaccines - declined.  Colon screen- declines colonoscopy; agreeable to Cologuard; no personal history or family.  Tobacco- down to 4-5 cigarettes/day.    Lung CT - defers for now.    He is currently taking 4/1 mg of buprenorphine 2 times daily.   Last filled 2.21.23 #56.    3 years 4/2023.    Status Since Last Visit:    Have you used any opioids since your last visit?: no use since last visit    Do you feel that your dose of suboxone is too high or too low? Adequate    Have there been cravings for opioids? at times; but not serious; manageable     Any withdrawal symptoms? no      Any side effects from the medication? None    Any alcohol use? None - in over 10 years    Any other recreational drug use? no  Precipitating Factors:    Triggers have been: mild pain  Other Supports:    Do you attend counseling or meet with a therapist? No    Do you attend NA or AA meetings? No    Do you have/meet with a  "sponsor? No    Family and support systems have been: Helpful    What other goals have you been working on (job, family, relationships, etc)? Spending a lot of time with family and grandchildren    PDMP Review       Value Time User    State PDMP site checked  Yes 3/22/2023 11:04 Regina Leal MD          Review of Systems   Constitutional, HEENT, cardiovascular, pulmonary, gi and gu systems are negative, except as otherwise noted.      Objective    /80 (BP Location: Right arm, Patient Position: Sitting, Cuff Size: Adult Regular)   Pulse 82   Temp 98.3  F (36.8  C) (Tympanic)   Ht 1.727 m (5' 8\")   Wt 71.2 kg (156 lb 14.4 oz)   SpO2 97%   BMI 23.86 kg/m    Body mass index is 23.86 kg/m .  Physical Exam   GENERAL: healthy, alert and no distress; raspy voice  NECK: no adenopathy, no asymmetry, masses, or scars and thyroid normal to palpation  RESP: lungs clear to auscultation - no rales, rhonchi or wheezes  CV: regular rate and rhythm, normal S1 S2, no S3 or S4, no murmur, click or rub, no peripheral edema and peripheral pulses strong  ABDOMEN: soft, nontender, no hepatosplenomegaly, no masses and bowel sounds normal  MS: no gross musculoskeletal defects noted, no edema  PSYCH: mentation appears normal and affect normal/bright    Results for orders placed or performed in visit on 03/22/23 (from the past 24 hour(s))   [id number] - Urine Drugs of Abuse Screen Panel 13   Result Value Ref Range    Cannabinoids (53-tif-1-carboxy-9-THC) Not Detected Not Detected, Indeterminate    Phencyclidine Not Detected Not Detected, Indeterminate    Cocaine (Benzoylecgonine) Not Detected Not Detected, Indeterminate    Methamphetamine (d-Methamphetamine) Not Detected Not Detected, Indeterminate    Opiates (Morphine) Not Detected Not Detected, Indeterminate    Amphetamine (d-Amphetamine) Not Detected Not Detected, Indeterminate    Benzodiazepines (Nordiazepam) Not Detected Not Detected, Indeterminate    Tricyclic " Antidepressants (Desipramine) Not Detected Not Detected, Indeterminate    Methadone Not Detected Not Detected, Indeterminate    Barbiturates (Butalbital) Not Detected Not Detected, Indeterminate    Oxycodone Not Detected Not Detected, Indeterminate    Propoxyphene (Norpropoxyphene) Not Detected Not Detected, Indeterminate    Buprenorphine Detected (A) Not Detected, Indeterminate

## 2023-03-22 ENCOUNTER — OFFICE VISIT (OUTPATIENT)
Dept: FAMILY MEDICINE | Facility: OTHER | Age: 68
End: 2023-03-22
Attending: FAMILY MEDICINE
Payer: MEDICARE

## 2023-03-22 ENCOUNTER — LAB (OUTPATIENT)
Dept: LAB | Facility: OTHER | Age: 68
End: 2023-03-22
Attending: FAMILY MEDICINE
Payer: MEDICARE

## 2023-03-22 ENCOUNTER — PATIENT OUTREACH (OUTPATIENT)
Dept: CARE COORDINATION | Facility: OTHER | Age: 68
End: 2023-03-22

## 2023-03-22 VITALS
DIASTOLIC BLOOD PRESSURE: 80 MMHG | SYSTOLIC BLOOD PRESSURE: 138 MMHG | HEIGHT: 68 IN | TEMPERATURE: 98.3 F | BODY MASS INDEX: 23.78 KG/M2 | WEIGHT: 156.9 LBS | OXYGEN SATURATION: 97 % | HEART RATE: 82 BPM

## 2023-03-22 DIAGNOSIS — F11.11 OPIOID ABUSE, IN REMISSION (H): ICD-10-CM

## 2023-03-22 DIAGNOSIS — F11.90 OPIOID USE DISORDER: ICD-10-CM

## 2023-03-22 DIAGNOSIS — F11.11 OPIOID ABUSE, IN REMISSION (H): Primary | ICD-10-CM

## 2023-03-22 DIAGNOSIS — Z12.11 SPECIAL SCREENING FOR MALIGNANT NEOPLASMS, COLON: ICD-10-CM

## 2023-03-22 PROCEDURE — 99213 OFFICE O/P EST LOW 20 MIN: CPT | Performed by: FAMILY MEDICINE

## 2023-03-22 PROCEDURE — G0463 HOSPITAL OUTPT CLINIC VISIT: HCPCS

## 2023-03-22 PROCEDURE — 80306 DRUG TEST PRSMV INSTRMNT: CPT | Mod: ZL

## 2023-03-22 RX ORDER — BUPRENORPHINE AND NALOXONE 4; 1 MG/1; MG/1
1 FILM, SOLUBLE BUCCAL; SUBLINGUAL 2 TIMES DAILY
Qty: 56 EACH | Refills: 2 | Status: SHIPPED | OUTPATIENT
Start: 2023-03-22 | End: 2023-06-14

## 2023-03-22 ASSESSMENT — PAIN SCALES - GENERAL: PAINLEVEL: SEVERE PAIN (6)

## 2023-03-22 ASSESSMENT — ANXIETY QUESTIONNAIRES
1. FEELING NERVOUS, ANXIOUS, OR ON EDGE: NOT AT ALL
6. BECOMING EASILY ANNOYED OR IRRITABLE: NOT AT ALL
3. WORRYING TOO MUCH ABOUT DIFFERENT THINGS: NOT AT ALL
5. BEING SO RESTLESS THAT IT IS HARD TO SIT STILL: NOT AT ALL
GAD7 TOTAL SCORE: 0
7. FEELING AFRAID AS IF SOMETHING AWFUL MIGHT HAPPEN: NOT AT ALL
2. NOT BEING ABLE TO STOP OR CONTROL WORRYING: NOT AT ALL
GAD7 TOTAL SCORE: 0
4. TROUBLE RELAXING: NOT AT ALL

## 2023-03-22 NOTE — PROGRESS NOTES
Clinic Care Coordination Contact    Follow Up Progress Note      Assessment: CC attended office visit with pt and Dr. Hicks this date.  Please refer to Dr. Hciks's note for plan of care.  Doing well in recovery.  Suboxone dose is adequate and would like to continue on the 4/1mg BID.  Has been in our program 3 years next month!  Commended him on this.  Spending lots of time with family and grandchildren.  No big questions or concerns today.    Care Gaps:    Health Maintenance Due   Topic Date Due     NICOTINE/TOBACCO CESSATION COUNSELING Q 1 YR  Never done     COVID-19 Vaccine (1) Never done     COLORECTAL CANCER SCREENING  Never done     ZOSTER IMMUNIZATION (1 of 2) Never done     Pneumococcal Vaccine: 65+ Years (2 - PCV) 11/02/2012     MEDICARE ANNUAL WELLNESS VISIT  08/07/2019     AORTIC ANEURYSM SCREENING (SYSTEM ASSIGNED)  Never done     LUNG CANCER SCREENING  11/27/2019     DTAP/TDAP/TD IMMUNIZATION (2 - Td or Tdap) 10/30/2021     ADVANCE CARE PLANNING  01/18/2022     INFLUENZA VACCINE (1) 09/01/2022     PHQ-2 (once per calendar year)  01/01/2023       Discussed with him that he is due for a complete annual exam.      Care Plans      Intervention/Education provided during outreach: Continue current medications as prescribed.  Encouraged him to schedule a complete physical.  He is agreeable to cologuard today.  He refuses all vaccines.       Outreach Frequency: 3 months      Plan:   No change in treatment plan.  Highly suggested complete physical.  Care Coordinator will follow up in 12 weeks, sooner if he has concerns.

## 2023-03-22 NOTE — PATIENT INSTRUCTIONS
Come fasting to next visit for labs/physical.    Cologuard kit will come in mail for colon screening- complete and return.    Smoking cessation advised.  Lung CT screening when ready.

## 2023-04-10 DIAGNOSIS — Z12.11 SPECIAL SCREENING FOR MALIGNANT NEOPLASMS, COLON: ICD-10-CM

## 2023-05-19 LAB — NONINV COLON CA DNA+OCC BLD SCRN STL QL: NEGATIVE

## 2023-06-03 ENCOUNTER — HEALTH MAINTENANCE LETTER (OUTPATIENT)
Age: 68
End: 2023-06-03

## 2023-06-08 NOTE — PATIENT INSTRUCTIONS
Patient Education   Personalized Prevention Plan  You are due for the preventive services outlined below.  Your care team is available to assist you in scheduling these services.  If you have already completed any of these items, please share that information with your care team to update in your medical record.  Health Maintenance Due   Topic Date Due     COVID-19 Vaccine (1) Never done     Zoster (Shingles) Vaccine (1 of 2) Never done     Pneumococcal Vaccine (2 - PCV) 11/02/2012     Annual Wellness Visit  08/07/2019     AORTIC ANEURYSM SCREENING (SYSTEM ASSIGNED)  Never done     LUNG CANCER SCREENING  11/27/2019     Diptheria Tetanus Pertussis (DTAP/TDAP/TD) Vaccine (2 - Td or Tdap) 10/30/2021     Discuss Advance Care Planning  01/18/2022

## 2023-06-08 NOTE — PROGRESS NOTES
"SUBJECTIVE:   Jorge A is a 67 year old who presents for Preventive Visit.    Are you in the first 12 months of your Medicare coverage?  No    Healthy Habits:    In general, how would you rate your overall health?  Good    Frequency of exercise:  2-3 days/week    Duration of exercise:  15-30 minutes    Do you usually eat at least 4 servings of fruit and vegetables a day, include whole grains    & fiber and avoid regularly eating high fat or \"junk\" foods?  Yes    Taking medications regularly:  Yes    Medication side effects:  None    Ability to successfully perform activities of daily living:  No assistance needed    Home Safety:  No safety concerns identified    Hearing Impairment:  No hearing concerns    In the past 6 months, have you been bothered by leaking of urine?  No    In general, how would you rate your overall mental or emotional health?  Good      PHQ-2 Total Score:    Additional concerns today:  No    Cologuard negative - 5/2023.      Tobacco - 1/8 of pack daily; prior 1/4 ppd  Lung CT - does not qualify    Vaccines - decline    AAA ultrasound screen - agreeable    Have you ever done Advance Care Planning? (For example, a Health Directive, POLST, or a discussion with a medical provider or your loved ones about your wishes): No, advance care planning information given to patient to review.  Patient declined advance care planning discussion at this time.       Fall risk  Fallen 2 or more times in the past year?: No  Any fall with injury in the past year?: No    Cognitive Screening   1) Repeat 3 items (Leader, Season, Table)    2) Clock draw: NORMAL  3) 3 item recall: Recalls 2 objects   Results: NORMAL clock, 1-2 items recalled: COGNITIVE IMPAIRMENT LESS LIKELY    Mini-CogTM Copyright NEERAJ Gregory. Licensed by the author for use in [employer]; reprinted with permission (nery@.[email or web address]). All rights reserved.      Do you have sleep apnea, excessive snoring or daytime drowsiness?: no    Reviewed and " updated as needed this visit by clinical staff   Tobacco  Allergies               Reviewed and updated as needed this visit by Provider                 Social History     Tobacco Use     Smoking status: Every Day     Packs/day: 0.25     Years: 30.00     Pack years: 7.50     Types: Cigarettes     Start date: 1/1/1976     Smokeless tobacco: Never     Tobacco comments:     only smokes a little bit-noted 2-   Vaping Use     Vaping status: Not on file   Substance Use Topics     Alcohol use: No             6/10/2023     5:51 PM   Alcohol Use   Prescreen: >3 drinks/day or >7 drinks/week? Not Applicable     Do you have a current opioid prescription? Yes - MAT - Suboxon    Do you use any other controlled substances or medications that are not prescribed by a provider?     Chronic/Recurring Back Pain Follow Up    Where is your back pain located? (Select all that apply) low back bilateral    How would you describe your back pain?  At times it keeps him awake,depends on what he is doing    Where does your back pain spread? the left buttock, the left  thigh, the left  knee and the left foot    Since your last clinic visit for back pain, how has your pain changed? always present, but gets better and worse    Does your back pain interfere with your job? Not applicable    Since your last visit, have you tried any new treatment? No    Opioid Use Disorder   He is currently taking 4/1 mg of buprenorphine 2 times daily.   Last fill 5.16.23 #56.    Status Since Last Visit:    Have you used any opioids since your last visit?: no use since last visit    Do you feel that your dose of suboxone is too high or too low? Adequate    Have there been cravings for opioids? No     Any withdrawal symptoms? None      Any side effects from the medication? None    Any alcohol use? None    Any other recreational drug use? None       Precipitating Factors:    Triggers have been: non-existent   Other Supports:    Do you attend counseling or meet  with a therapist? No    Do you attend NA or AA meetings? No    Do you have/meet with a sponsor? No    Family and support systems have been: Helpful     What other goals have you been working on (job, family, relationships, etc)? -  Current providers sharing in care for this patient include:   Patient Care Team:  Mukund Hamlin MD as PCP - Margot Crook, RN as Lead Care Coordinator (Primary Care - CC)  Regina Hicks MD as Assigned PCP  Regina Hicks MD as Assigned Pain Medication Provider    The following health maintenance items are reviewed in Epic and correct as of today:  Health Maintenance   Topic Date Due     COVID-19 Vaccine (1) Never done     ZOSTER IMMUNIZATION (1 of 2) Never done     Pneumococcal Vaccine: 65+ Years (2 - PCV) 11/02/2012     MEDICARE ANNUAL WELLNESS VISIT  08/07/2019     AORTIC ANEURYSM SCREENING (SYSTEM ASSIGNED)  Never done     LUNG CANCER SCREENING  11/27/2019     DTAP/TDAP/TD IMMUNIZATION (2 - Td or Tdap) 10/30/2021     INFLUENZA VACCINE (Season Ended) 09/01/2023     NICOTINE/TOBACCO CESSATION COUNSELING Q 1 YR  06/14/2024     FALL RISK ASSESSMENT  06/14/2024     COLORECTAL CANCER SCREENING  05/08/2026     LIPID  07/14/2027     ADVANCE CARE PLANNING  06/14/2028     HEPATITIS C SCREENING  Completed     PHQ-2 (once per calendar year)  Completed     IPV IMMUNIZATION  Aged Out     MENINGITIS IMMUNIZATION  Aged Out     Current Outpatient Medications   Medication     Ascorbic Acid (VITAMIN C PO)     buprenorphine HCl-naloxone HCl (SUBOXONE) 4-1 MG per film     ibuprofen (ADVIL/MOTRIN) 200 MG capsule     Multiple Vitamins-Minerals (MULTIVITAMIN ADULT PO)     naloxone (NARCAN) 4 MG/0.1ML nasal spray     Naproxen Sodium (ALEVE PO)     omeprazole (PRILOSEC) 40 MG DR capsule     No current facility-administered medications for this visit.       Lab work is in process  Labs reviewed in EPIC    Review of Systems   Constitutional: Negative for chills and fever.   HENT:  "Negative for congestion, ear pain, hearing loss and sore throat.    Eyes: Negative for pain and visual disturbance.   Respiratory: Negative for cough and shortness of breath.    Cardiovascular: Negative for chest pain, palpitations and peripheral edema.   Gastrointestinal: Negative for abdominal pain, constipation, diarrhea, heartburn, hematochezia and nausea.   Genitourinary: Negative for dysuria, frequency, genital sores, hematuria, impotence, penile discharge and urgency.   Musculoskeletal: Negative for arthralgias, joint swelling and myalgias.   Skin: Negative for rash.   Neurological: Negative for dizziness, weakness, headaches and paresthesias.   Psychiatric/Behavioral: Negative for mood changes. The patient is not nervous/anxious.        OBJECTIVE:   /80 (BP Location: Right arm, Patient Position: Sitting, Cuff Size: Adult Regular)   Pulse 69   Temp 98.3  F (36.8  C) (Tympanic)   Ht 1.727 m (5' 8\")   Wt 68.5 kg (151 lb)   SpO2 96%   BMI 22.96 kg/m   Estimated body mass index is 22.96 kg/m  as calculated from the following:    Height as of this encounter: 1.727 m (5' 8\").    Weight as of this encounter: 68.5 kg (151 lb).  Physical Exam  GENERAL: healthy, alert and no distress  GENERAL: hoarse voice  EYES: Eyes grossly normal to inspection, PERRL and conjunctivae and sclerae normal  HENT: ear canals and TM's normal, nose and mouth without ulcers or lesions  NECK: no adenopathy, no asymmetry, masses, or scars and thyroid normal to palpation  RESP: lungs clear to auscultation - no rales, rhonchi or wheezes  CV: regular rate and rhythm, normal S1 S2, no S3 or S4, no murmur, click or rub, no peripheral edema and peripheral pulses strong  ABDOMEN: soft, nontender, no hepatosplenomegaly, no masses and bowel sounds normal  MS: no gross musculoskeletal defects noted, no edema  SKIN: no suspicious lesions or rashes  NEURO: Normal strength and tone, mentation intact and speech normal  PSYCH: mentation appears " normal, affect normal/bright    Diagnostic Test Results:  Labs reviewed in Epic  Results for orders placed or performed in visit on 06/14/23 (from the past 24 hour(s))   Urine Drugs of Abuse Screen (Tox13)   Result Value Ref Range    Cannabinoids (84-uuz-4-carboxy-9-THC) Not Detected Not Detected, Indeterminate    Phencyclidine Not Detected Not Detected, Indeterminate    Cocaine (Benzoylecgonine) Not Detected Not Detected, Indeterminate    Methamphetamine (d-Methamphetamine) Not Detected Not Detected, Indeterminate    Opiates (Morphine) Not Detected Not Detected, Indeterminate    Amphetamine (d-Amphetamine) Not Detected Not Detected, Indeterminate    Benzodiazepines (Nordiazepam) Not Detected Not Detected, Indeterminate    Tricyclic Antidepressants (Desipramine) Not Detected Not Detected, Indeterminate    Methadone Not Detected Not Detected, Indeterminate    Barbiturates (Butalbital) Not Detected Not Detected, Indeterminate    Oxycodone Not Detected Not Detected, Indeterminate    Propoxyphene (Norpropoxyphene) Not Detected Not Detected, Indeterminate    Buprenorphine Detected (A) Not Detected, Indeterminate     Results for orders placed or performed in visit on 06/14/23   Urine Drugs of Abuse Screen (Tox13)     Status: Abnormal   Result Value Ref Range    Cannabinoids (12-sno-6-carboxy-9-THC) Not Detected Not Detected, Indeterminate    Phencyclidine Not Detected Not Detected, Indeterminate    Cocaine (Benzoylecgonine) Not Detected Not Detected, Indeterminate    Methamphetamine (d-Methamphetamine) Not Detected Not Detected, Indeterminate    Opiates (Morphine) Not Detected Not Detected, Indeterminate    Amphetamine (d-Amphetamine) Not Detected Not Detected, Indeterminate    Benzodiazepines (Nordiazepam) Not Detected Not Detected, Indeterminate    Tricyclic Antidepressants (Desipramine) Not Detected Not Detected, Indeterminate    Methadone Not Detected Not Detected, Indeterminate    Barbiturates (Butalbital) Not  Detected Not Detected, Indeterminate    Oxycodone Not Detected Not Detected, Indeterminate    Propoxyphene (Norpropoxyphene) Not Detected Not Detected, Indeterminate    Buprenorphine Detected (A) Not Detected, Indeterminate   Results for orders placed or performed in visit on 06/14/23   Comprehensive metabolic panel (BMP + Alb, Alk Phos, ALT, AST, Total. Bili, TP)     Status: Abnormal   Result Value Ref Range    Sodium 140 136 - 145 mmol/L    Potassium 4.1 3.4 - 5.3 mmol/L    Chloride 104 98 - 107 mmol/L    Carbon Dioxide (CO2) 28 22 - 29 mmol/L    Anion Gap 8 7 - 15 mmol/L    Urea Nitrogen 13.3 8.0 - 23.0 mg/dL    Creatinine 0.67 0.67 - 1.17 mg/dL    Calcium 8.4 (L) 8.8 - 10.2 mg/dL    Glucose 129 (H) 70 - 99 mg/dL    Alkaline Phosphatase 109 40 - 129 U/L    AST 23 0 - 45 U/L    ALT 21 0 - 70 U/L    Protein Total 6.9 6.4 - 8.3 g/dL    Albumin 4.1 3.5 - 5.2 g/dL    Bilirubin Total 0.3 <=1.2 mg/dL    GFR Estimate >90 >60 mL/min/1.73m2   PSA, screen     Status: Normal   Result Value Ref Range    Prostate Specific Antigen Screen 0.33 0.00 - 4.50 ng/mL    Narrative    This result is obtained using the Roche Elecsys total PSA method on the solomon e601 immunoassay analyzer. Results obtained with different assay methods or kits cannot be used interchangeably.   CBC with platelets and differential     Status: Abnormal   Result Value Ref Range    WBC Count 7.2 4.0 - 11.0 10e3/uL    RBC Count 4.51 4.40 - 5.90 10e6/uL    Hemoglobin 13.2 (L) 13.3 - 17.7 g/dL    Hematocrit 41.6 40.0 - 53.0 %    MCV 92 78 - 100 fL    MCH 29.3 26.5 - 33.0 pg    MCHC 31.7 31.5 - 36.5 g/dL    RDW 13.9 10.0 - 15.0 %    Platelet Count 233 150 - 450 10e3/uL    % Neutrophils 69 %    % Lymphocytes 21 %    % Monocytes 9 %    % Eosinophils 1 %    % Basophils 0 %    % Immature Granulocytes 0 %    NRBCs per 100 WBC 0 <1 /100    Absolute Neutrophils 4.9 1.6 - 8.3 10e3/uL    Absolute Lymphocytes 1.5 0.8 - 5.3 10e3/uL    Absolute Monocytes 0.6 0.0 - 1.3 10e3/uL     Absolute Eosinophils 0.1 0.0 - 0.7 10e3/uL    Absolute Basophils 0.0 0.0 - 0.2 10e3/uL    Absolute Immature Granulocytes 0.0 <=0.4 10e3/uL    Absolute NRBCs 0.0 10e3/uL   CBC with platelets and differential     Status: Abnormal    Narrative    The following orders were created for panel order CBC with platelets and differential.  Procedure                               Abnormality         Status                     ---------                               -----------         ------                     CBC with platelets and d...[[id number]]  Abnormal            Final result                 Please view results for these tests on the individual orders.         ASSESSMENT / PLAN:       ICD-10-CM    1. Encounter for Medicare annual wellness exam  Z00.00 CBC with platelets and differential     Comprehensive metabolic panel (BMP + Alb, Alk Phos, ALT, AST, Total. Bili, TP)     PSA, screen      2. Opioid abuse, in remission (H)  F11.11 Urine Drugs of Abuse Screen (Tox13)      3. Opioid use disorder  F11.90 buprenorphine HCl-naloxone HCl (SUBOXONE) 4-1 MG per film      4. Encounter for screening for malignant neoplasm of prostate  Z12.5 PSA, screen      5. Gastroesophageal reflux disease, unspecified whether esophagitis present  K21.9 omeprazole (PRILOSEC) 40 MG DR capsule      6. Tobacco use disorder  F17.200       7. Encounter for tobacco use cessation counseling  Z71.6  [place] Medicare AAA Screening          OUD - stable with Suboxone dose.  Continue.  Follow up 3 months.  GERd - stable with PPI - continue.  Tobacco - complete cessation advised; counseled; does not qualify for CT screen.    Declines vaccines.    AAA screen for  ordered.    COUNSELING:  Reviewed preventive health counseling, as reflected in patient instructions       Consider AAA screening for ages 65-75 and smoking history       Regular exercise       Healthy diet/nutrition       Vision screening       Hearing screening       Dental care       Alcohol  Use        Hepatitis C screening       HIV screening for high risk patient       Colon cancer screening       Prostate cancer screening        He reports that he has been smoking cigarettes. He started smoking about 47 years ago. He has a 7.50 pack-year smoking history. He has never used smokeless tobacco.  Nicotine/Tobacco Cessation Plan:   Information offered: Patient not interested at this time      Appropriate preventive services were discussed with this patient, including applicable screening as appropriate for cardiovascular disease, diabetes, osteopenia/osteoporosis, and glaucoma.  As appropriate for age/gender, discussed screening for colorectal cancer, prostate cancer, breast cancer, and cervical cancer. Checklist reviewing preventive services available has been given to the patient.    Reviewed patients plan of care and provided an AVS. The Basic Care Plan (routine screening as documented in Health Maintenance) for Jorge A meets the Care Plan requirement. This Care Plan has been established and reviewed with the Patient.      Regina Hobbs MD  [facility] [place]    Identified Health Risks:     Answers for HPI/ROS submitted by the patient on 6/10/2023  MICHAEL 7 TOTAL SCORE: 0

## 2023-06-10 ASSESSMENT — ENCOUNTER SYMPTOMS
SHORTNESS OF BREATH: 0
MYALGIAS: 0
HEMATURIA: 0
COUGH: 0
FEVER: 0
DIZZINESS: 0
PARESTHESIAS: 0
WEAKNESS: 0
DIARRHEA: 0
FREQUENCY: 0
NAUSEA: 0
JOINT SWELLING: 0
ABDOMINAL PAIN: 0
DYSURIA: 0
EYE PAIN: 0
HEADACHES: 0
PALPITATIONS: 0
CONSTIPATION: 0
HEARTBURN: 0
SORE THROAT: 0
HEMATOCHEZIA: 0
ARTHRALGIAS: 0
CHILLS: 0
NERVOUS/ANXIOUS: 0

## 2023-06-10 ASSESSMENT — ACTIVITIES OF DAILY LIVING (ADL): CURRENT_FUNCTION: NO ASSISTANCE NEEDED

## 2023-06-13 ASSESSMENT — ENCOUNTER SYMPTOMS
NERVOUS/ANXIOUS: 0
CONSTIPATION: 0
CHILLS: 0
HEMATURIA: 0
FREQUENCY: 0
WEAKNESS: 0
SHORTNESS OF BREATH: 0
NAUSEA: 0
EYE PAIN: 0
HEMATOCHEZIA: 0
SORE THROAT: 0
PALPITATIONS: 0
ABDOMINAL PAIN: 0
DIZZINESS: 0
ARTHRALGIAS: 0
HEARTBURN: 0
PARESTHESIAS: 0
FEVER: 0
HEADACHES: 0
DYSURIA: 0
COUGH: 0
JOINT SWELLING: 0
MYALGIAS: 0
DIARRHEA: 0

## 2023-06-13 ASSESSMENT — ACTIVITIES OF DAILY LIVING (ADL): CURRENT_FUNCTION: NO ASSISTANCE NEEDED

## 2023-06-14 ENCOUNTER — LAB (OUTPATIENT)
Dept: LAB | Facility: OTHER | Age: 68
End: 2023-06-14
Attending: FAMILY MEDICINE
Payer: MEDICARE

## 2023-06-14 ENCOUNTER — OFFICE VISIT (OUTPATIENT)
Dept: FAMILY MEDICINE | Facility: OTHER | Age: 68
End: 2023-06-14
Attending: FAMILY MEDICINE
Payer: MEDICARE

## 2023-06-14 ENCOUNTER — PATIENT OUTREACH (OUTPATIENT)
Dept: CARE COORDINATION | Facility: OTHER | Age: 68
End: 2023-06-14

## 2023-06-14 VITALS
BODY MASS INDEX: 22.88 KG/M2 | WEIGHT: 151 LBS | HEIGHT: 68 IN | HEART RATE: 69 BPM | OXYGEN SATURATION: 96 % | TEMPERATURE: 98.3 F | DIASTOLIC BLOOD PRESSURE: 80 MMHG | SYSTOLIC BLOOD PRESSURE: 138 MMHG

## 2023-06-14 DIAGNOSIS — F11.90 OPIOID USE DISORDER: ICD-10-CM

## 2023-06-14 DIAGNOSIS — F17.200 TOBACCO USE DISORDER: ICD-10-CM

## 2023-06-14 DIAGNOSIS — F11.11 OPIOID ABUSE, IN REMISSION (H): ICD-10-CM

## 2023-06-14 DIAGNOSIS — K21.9 GASTROESOPHAGEAL REFLUX DISEASE, UNSPECIFIED WHETHER ESOPHAGITIS PRESENT: ICD-10-CM

## 2023-06-14 DIAGNOSIS — Z12.5 ENCOUNTER FOR SCREENING FOR MALIGNANT NEOPLASM OF PROSTATE: ICD-10-CM

## 2023-06-14 DIAGNOSIS — Z71.6 ENCOUNTER FOR TOBACCO USE CESSATION COUNSELING: ICD-10-CM

## 2023-06-14 DIAGNOSIS — Z00.00 ENCOUNTER FOR MEDICARE ANNUAL WELLNESS EXAM: ICD-10-CM

## 2023-06-14 DIAGNOSIS — Z00.00 ENCOUNTER FOR MEDICARE ANNUAL WELLNESS EXAM: Primary | ICD-10-CM

## 2023-06-14 LAB
ALBUMIN SERPL BCG-MCNC: 4.1 G/DL (ref 3.5–5.2)
ALP SERPL-CCNC: 109 U/L (ref 40–129)
ALT SERPL W P-5'-P-CCNC: 21 U/L (ref 0–70)
AMPHETAMINES UR QL: NOT DETECTED
ANION GAP SERPL CALCULATED.3IONS-SCNC: 8 MMOL/L (ref 7–15)
AST SERPL W P-5'-P-CCNC: 23 U/L (ref 0–45)
BARBITURATES UR QL SCN: NOT DETECTED
BASOPHILS # BLD AUTO: 0 10E3/UL (ref 0–0.2)
BASOPHILS NFR BLD AUTO: 0 %
BENZODIAZ UR QL SCN: NOT DETECTED
BILIRUB SERPL-MCNC: 0.3 MG/DL
BUN SERPL-MCNC: 13.3 MG/DL (ref 8–23)
BUPRENORPHINE UR QL: DETECTED
CALCIUM SERPL-MCNC: 8.4 MG/DL (ref 8.8–10.2)
CANNABINOIDS UR QL: NOT DETECTED
CHLORIDE SERPL-SCNC: 104 MMOL/L (ref 98–107)
COCAINE UR QL SCN: NOT DETECTED
CREAT SERPL-MCNC: 0.67 MG/DL (ref 0.67–1.17)
D-METHAMPHET UR QL: NOT DETECTED
DEPRECATED HCO3 PLAS-SCNC: 28 MMOL/L (ref 22–29)
EOSINOPHIL # BLD AUTO: 0.1 10E3/UL (ref 0–0.7)
EOSINOPHIL NFR BLD AUTO: 1 %
ERYTHROCYTE [DISTWIDTH] IN BLOOD BY AUTOMATED COUNT: 13.9 % (ref 10–15)
GFR SERPL CREATININE-BSD FRML MDRD: >90 ML/MIN/1.73M2
GLUCOSE SERPL-MCNC: 129 MG/DL (ref 70–99)
HCT VFR BLD AUTO: 41.6 % (ref 40–53)
HGB BLD-MCNC: 13.2 G/DL (ref 13.3–17.7)
IMM GRANULOCYTES # BLD: 0 10E3/UL
IMM GRANULOCYTES NFR BLD: 0 %
LYMPHOCYTES # BLD AUTO: 1.5 10E3/UL (ref 0.8–5.3)
LYMPHOCYTES NFR BLD AUTO: 21 %
MCH RBC QN AUTO: 29.3 PG (ref 26.5–33)
MCHC RBC AUTO-ENTMCNC: 31.7 G/DL (ref 31.5–36.5)
MCV RBC AUTO: 92 FL (ref 78–100)
METHADONE UR QL SCN: NOT DETECTED
MONOCYTES # BLD AUTO: 0.6 10E3/UL (ref 0–1.3)
MONOCYTES NFR BLD AUTO: 9 %
NEUTROPHILS # BLD AUTO: 4.9 10E3/UL (ref 1.6–8.3)
NEUTROPHILS NFR BLD AUTO: 69 %
NRBC # BLD AUTO: 0 10E3/UL
NRBC BLD AUTO-RTO: 0 /100
OPIATES UR QL SCN: NOT DETECTED
OXYCODONE UR QL SCN: NOT DETECTED
PCP UR QL SCN: NOT DETECTED
PLATELET # BLD AUTO: 233 10E3/UL (ref 150–450)
POTASSIUM SERPL-SCNC: 4.1 MMOL/L (ref 3.4–5.3)
PROPOXYPH UR QL: NOT DETECTED
PROT SERPL-MCNC: 6.9 G/DL (ref 6.4–8.3)
PSA SERPL DL<=0.01 NG/ML-MCNC: 0.33 NG/ML (ref 0–4.5)
RBC # BLD AUTO: 4.51 10E6/UL (ref 4.4–5.9)
SODIUM SERPL-SCNC: 140 MMOL/L (ref 136–145)
TRICYCLICS UR QL SCN: NOT DETECTED
WBC # BLD AUTO: 7.2 10E3/UL (ref 4–11)

## 2023-06-14 PROCEDURE — G0103 PSA SCREENING: HCPCS | Mod: ZL

## 2023-06-14 PROCEDURE — G0438 PPPS, INITIAL VISIT: HCPCS | Performed by: FAMILY MEDICINE

## 2023-06-14 PROCEDURE — 80053 COMPREHEN METABOLIC PANEL: CPT | Mod: ZL

## 2023-06-14 PROCEDURE — 36415 COLL VENOUS BLD VENIPUNCTURE: CPT | Mod: ZL

## 2023-06-14 PROCEDURE — 80306 DRUG TEST PRSMV INSTRMNT: CPT | Mod: ZL | Performed by: FAMILY MEDICINE

## 2023-06-14 PROCEDURE — 85004 AUTOMATED DIFF WBC COUNT: CPT | Mod: ZL

## 2023-06-14 RX ORDER — OMEPRAZOLE 40 MG/1
40 CAPSULE, DELAYED RELEASE ORAL DAILY
Qty: 90 CAPSULE | Refills: 3 | Status: ON HOLD | OUTPATIENT
Start: 2023-06-14 | End: 2023-09-12

## 2023-06-14 RX ORDER — BUPRENORPHINE AND NALOXONE 4; 1 MG/1; MG/1
1 FILM, SOLUBLE BUCCAL; SUBLINGUAL 2 TIMES DAILY
Qty: 56 EACH | Refills: 2 | Status: SHIPPED | OUTPATIENT
Start: 2023-06-14 | End: 2023-09-19

## 2023-06-14 ASSESSMENT — ANXIETY QUESTIONNAIRES
IF YOU CHECKED OFF ANY PROBLEMS ON THIS QUESTIONNAIRE, HOW DIFFICULT HAVE THESE PROBLEMS MADE IT FOR YOU TO DO YOUR WORK, TAKE CARE OF THINGS AT HOME, OR GET ALONG WITH OTHER PEOPLE: SOMEWHAT DIFFICULT
3. WORRYING TOO MUCH ABOUT DIFFERENT THINGS: NOT AT ALL
6. BECOMING EASILY ANNOYED OR IRRITABLE: NOT AT ALL
2. NOT BEING ABLE TO STOP OR CONTROL WORRYING: NOT AT ALL
5. BEING SO RESTLESS THAT IT IS HARD TO SIT STILL: NOT AT ALL
2. NOT BEING ABLE TO STOP OR CONTROL WORRYING: NOT AT ALL
GAD7 TOTAL SCORE: 0
IF YOU CHECKED OFF ANY PROBLEMS ON THIS QUESTIONNAIRE, HOW DIFFICULT HAVE THESE PROBLEMS MADE IT FOR YOU TO DO YOUR WORK, TAKE CARE OF THINGS AT HOME, OR GET ALONG WITH OTHER PEOPLE: NOT DIFFICULT AT ALL
8. IF YOU CHECKED OFF ANY PROBLEMS, HOW DIFFICULT HAVE THESE MADE IT FOR YOU TO DO YOUR WORK, TAKE CARE OF THINGS AT HOME, OR GET ALONG WITH OTHER PEOPLE?: NOT DIFFICULT AT ALL
7. FEELING AFRAID AS IF SOMETHING AWFUL MIGHT HAPPEN: NOT AT ALL
5. BEING SO RESTLESS THAT IT IS HARD TO SIT STILL: NOT AT ALL
6. BECOMING EASILY ANNOYED OR IRRITABLE: NOT AT ALL
7. FEELING AFRAID AS IF SOMETHING AWFUL MIGHT HAPPEN: NOT AT ALL
GAD7 TOTAL SCORE: 0
3. WORRYING TOO MUCH ABOUT DIFFERENT THINGS: NOT AT ALL
4. TROUBLE RELAXING: NOT AT ALL
1. FEELING NERVOUS, ANXIOUS, OR ON EDGE: NOT AT ALL
4. TROUBLE RELAXING: NOT AT ALL
1. FEELING NERVOUS, ANXIOUS, OR ON EDGE: NOT AT ALL
GAD7 TOTAL SCORE: 0
7. FEELING AFRAID AS IF SOMETHING AWFUL MIGHT HAPPEN: NOT AT ALL

## 2023-06-14 ASSESSMENT — PAIN SCALES - GENERAL: PAINLEVEL: SEVERE PAIN (7)

## 2023-06-14 NOTE — PROGRESS NOTES
Clinic Care Coordination Contact  Care Team Conversations    CC was unable to attend office visit with pt and Dr. Hicks this date.  This visit was an annual wellness visit.  Jorge A is aware to reach out to CC with any questions/concerns/problems.  Has been on stable dose of Suboxone for over 3 years now.  No change in treatment plan at this time.      Margot Joel RN-BSN, [facility] Care Coordinator  [phone] opt. #[phone]

## 2023-08-22 ENCOUNTER — HOSPITAL ENCOUNTER (EMERGENCY)
Facility: HOSPITAL | Age: 68
Discharge: HOME | End: 2023-08-23
Attending: STUDENT IN AN ORGANIZED HEALTH CARE EDUCATION/TRAINING PROGRAM | Admitting: STUDENT IN AN ORGANIZED HEALTH CARE EDUCATION/TRAINING PROGRAM
Payer: MEDICARE

## 2023-08-22 ENCOUNTER — APPOINTMENT (OUTPATIENT)
Dept: GENERAL RADIOLOGY | Facility: HOSPITAL | Age: 68
End: 2023-08-22
Attending: STUDENT IN AN ORGANIZED HEALTH CARE EDUCATION/TRAINING PROGRAM
Payer: MEDICARE

## 2023-08-22 VITALS
SYSTOLIC BLOOD PRESSURE: 168 MMHG | OXYGEN SATURATION: 95 % | DIASTOLIC BLOOD PRESSURE: 73 MMHG | RESPIRATION RATE: 18 BRPM | TEMPERATURE: 97.6 F | HEART RATE: 88 BPM

## 2023-08-22 DIAGNOSIS — Z72.0 TOBACCO USE: ICD-10-CM

## 2023-08-22 DIAGNOSIS — E87.6 HYPOKALEMIA: ICD-10-CM

## 2023-08-22 DIAGNOSIS — R05.1 ACUTE COUGH: ICD-10-CM

## 2023-08-22 DIAGNOSIS — J40 BRONCHITIS: ICD-10-CM

## 2023-08-22 DIAGNOSIS — R07.81 PLEURITIC CHEST PAIN: ICD-10-CM

## 2023-08-22 LAB
ERYTHROCYTE [DISTWIDTH] IN BLOOD BY AUTOMATED COUNT: 13.2 % (ref 10–15)
HCT VFR BLD AUTO: 38.7 % (ref 40–53)
HGB BLD-MCNC: 12.4 G/DL (ref 13.3–17.7)
MCH RBC QN AUTO: 29.2 PG (ref 26.5–33)
MCHC RBC AUTO-ENTMCNC: 32 G/DL (ref 31.5–36.5)
MCV RBC AUTO: 91 FL (ref 78–100)
PLATELET # BLD AUTO: 254 10E3/UL (ref 150–450)
RBC # BLD AUTO: 4.25 10E6/UL (ref 4.4–5.9)
WBC # BLD AUTO: 13.3 10E3/UL (ref 4–11)

## 2023-08-22 PROCEDURE — 71046 X-RAY EXAM CHEST 2 VIEWS: CPT

## 2023-08-22 PROCEDURE — 250N000009 HC RX 250: Performed by: STUDENT IN AN ORGANIZED HEALTH CARE EDUCATION/TRAINING PROGRAM

## 2023-08-22 PROCEDURE — 85027 COMPLETE CBC AUTOMATED: CPT | Performed by: STUDENT IN AN ORGANIZED HEALTH CARE EDUCATION/TRAINING PROGRAM

## 2023-08-22 PROCEDURE — 99285 EMERGENCY DEPT VISIT HI MDM: CPT | Mod: 25

## 2023-08-22 PROCEDURE — 99284 EMERGENCY DEPT VISIT MOD MDM: CPT | Performed by: STUDENT IN AN ORGANIZED HEALTH CARE EDUCATION/TRAINING PROGRAM

## 2023-08-22 PROCEDURE — 94640 AIRWAY INHALATION TREATMENT: CPT

## 2023-08-22 PROCEDURE — 86140 C-REACTIVE PROTEIN: CPT | Performed by: STUDENT IN AN ORGANIZED HEALTH CARE EDUCATION/TRAINING PROGRAM

## 2023-08-22 PROCEDURE — 93005 ELECTROCARDIOGRAM TRACING: CPT

## 2023-08-22 PROCEDURE — 999N000157 HC STATISTIC RCP TIME EA 10 MIN

## 2023-08-22 PROCEDURE — 36415 COLL VENOUS BLD VENIPUNCTURE: CPT | Performed by: STUDENT IN AN ORGANIZED HEALTH CARE EDUCATION/TRAINING PROGRAM

## 2023-08-22 PROCEDURE — 80048 BASIC METABOLIC PNL TOTAL CA: CPT | Performed by: STUDENT IN AN ORGANIZED HEALTH CARE EDUCATION/TRAINING PROGRAM

## 2023-08-22 RX ORDER — IPRATROPIUM BROMIDE AND ALBUTEROL SULFATE 2.5; .5 MG/3ML; MG/3ML
3 SOLUTION RESPIRATORY (INHALATION) ONCE
Status: COMPLETED | OUTPATIENT
Start: 2023-08-22 | End: 2023-08-22

## 2023-08-22 RX ORDER — ACETAMINOPHEN 500 MG
500 TABLET ORAL EVERY 6 HOURS PRN
Status: ON HOLD | COMMUNITY
End: 2023-11-21

## 2023-08-22 RX ADMIN — IPRATROPIUM BROMIDE AND ALBUTEROL SULFATE 3 ML: .5; 3 SOLUTION RESPIRATORY (INHALATION) at 23:29

## 2023-08-22 ASSESSMENT — ACTIVITIES OF DAILY LIVING (ADL): ADLS_ACUITY_SCORE: 33

## 2023-08-23 LAB
ANION GAP SERPL CALCULATED.3IONS-SCNC: 12 MMOL/L (ref 7–15)
BUN SERPL-MCNC: 15.1 MG/DL (ref 8–23)
CALCIUM SERPL-MCNC: 8.8 MG/DL (ref 8.8–10.2)
CHLORIDE SERPL-SCNC: 94 MMOL/L (ref 98–107)
CREAT SERPL-MCNC: 0.54 MG/DL (ref 0.67–1.17)
CRP SERPL-MCNC: 8.5 MG/L
DEPRECATED HCO3 PLAS-SCNC: 29 MMOL/L (ref 22–29)
GFR SERPL CREATININE-BSD FRML MDRD: >90 ML/MIN/1.73M2
GLUCOSE SERPL-MCNC: 127 MG/DL (ref 70–99)
POTASSIUM SERPL-SCNC: 3.1 MMOL/L (ref 3.4–5.3)
SODIUM SERPL-SCNC: 135 MMOL/L (ref 136–145)

## 2023-08-23 PROCEDURE — 94640 AIRWAY INHALATION TREATMENT: CPT

## 2023-08-23 PROCEDURE — 250N000013 HC RX MED GY IP 250 OP 250 PS 637: Performed by: STUDENT IN AN ORGANIZED HEALTH CARE EDUCATION/TRAINING PROGRAM

## 2023-08-23 PROCEDURE — 999N000157 HC STATISTIC RCP TIME EA 10 MIN

## 2023-08-23 RX ORDER — AZITHROMYCIN 250 MG/1
500 TABLET, FILM COATED ORAL DAILY
Qty: 10 TABLET | Refills: 0 | Status: SHIPPED | OUTPATIENT
Start: 2023-08-23 | End: 2023-08-28

## 2023-08-23 RX ORDER — ALBUTEROL SULFATE 90 UG/1
2 AEROSOL, METERED RESPIRATORY (INHALATION) ONCE
Status: COMPLETED | OUTPATIENT
Start: 2023-08-23 | End: 2023-08-23

## 2023-08-23 RX ADMIN — ALBUTEROL SULFATE 2 PUFF: 90 AEROSOL, METERED RESPIRATORY (INHALATION) at 00:52

## 2023-08-23 RX ADMIN — POTASSIUM BICARBONATE 50 MEQ: 978 TABLET, EFFERVESCENT ORAL at 00:44

## 2023-08-23 ASSESSMENT — ACTIVITIES OF DAILY LIVING (ADL): ADLS_ACUITY_SCORE: 35

## 2023-08-23 NOTE — ED PROVIDER NOTES
"[facility]  ED Provider Note    Chief Complaint   Patient presents with    Shortness of Breath     \"Can't cough up flem\"     Dizziness     History:  Jorge A Mendosa is a 68 year old male with laryngeal cancer status postradiation therapy, opioid use disorder, chronic pain, previous broken controlled substance agreement, and longstanding tobacco use presenting with concerns regarding dizziness and shortness of breath.  The patient reports that he has chest discomfort, and feels like there is a large ball of phlegm in his throat that he cannot cough up.  He denies any fevers or chills.  He denies any chest pain, but reports that it is uncomfortable in the middle of his chest, but attributes this to an inability to cough up phlegm.    He denies any episodes of nausea or vomiting.  He reports he has not coughed up blood.  He denies any pain in his legs, or swelling in his legs.  He also reports that his ribs hurt from coughing.  When asked what he means by dizzy, he reports that he feels lightheaded during episodes of coughing, or episodes of trying to get his phlegm up.    Review of Systems   Performed; see HPI for pertinent positives and negatives.     Medical history, surgical history, and social history was reviewed.  Nursing documentation, triage note, and vitals were reviewed.    Vitals:  BP: 168/73  Pulse: 88  Temp: 97.6  F (36.4  C)  Resp: 18  SpO2: 97 %    Physical Exam:  General: Hoarse voice.  Thin, appears chronically ill.  Head: No trauma.  ENT: Edentulous.  Normal posterior oropharynx without erythema or exudates.  Neck: No palpable masses.  Shotty adenopathy noted across the anterior neck.  Cardiovascular: Regular rate and rhythm.  No appreciated murmur.  Pulmonary: Unlabored respirations  Diminished inspiratory phase.  Scattered wheezing with prolongation of the expiratory phase throughout.  Abdomen: Soft, nontender.  Extremities: Symmetric and nontender.  No pretibial edema " appreciated.  Skin: Warm, dry, without diaphoresis or rash.  Neuro: Alert and appropriate.  Answers questions appropriately.  Hard of hearing.  Moves all 4 extremities spontaneously.    MDM:  In summary, this is a 68-year-old male who is presenting for concerns regarding productive cough, and shortness of breath.  His vital signs are reassuring at the time of initial evaluation.  He is notably without tachycardia or hypoxia.    His primary concern is his inability to cough up phlegm.  He does also report some chest discomfort.  He denies any symptoms that would be suggestive of acute coronary syndrome, but given his age and comorbidities we will plan for screening ECG.  He also reports episodes of lightheadedness.    We will plan for infection work-up otherwise, and further evaluate for any pneumonia, although the patient's symptoms are likely related to bronchitis, and associated pleurisy.  I do not suspect pulmonary embolism.  I do not suspect aortic dissection.    Discussed with the patient plan for symptomatic care with a DuoNeb to help him assist in coughing up his phlegm.  He was agreeable to this.  Disposition likely discharge home pending further evaluation.  All question concerns otherwise addressed and answered.    ED Course as of 08/23/23 0129   Tue Aug 22, 2023   2349 WBC(!): 13.3  7.2 x2 months ago.   Wed Aug 23, 2023   0011 Chest XR,  PA & LAT  Multiple areas of bronchial cuffing with diffuse airway thickening. No apparent lobar infiltrate. Still awaiting formal radiology read.   0029 EKG 12 lead  Sinus rhythm at a rate of 70.  No STEMI.  No ST depression.  J-point elevation noted in lateral precordial leads.  No STEMI.  Low voltage in inferior leads.  No electrocardiographic evidence of right heart strain.  Leftward axis.  Reassuring from a near syncope and chest pain standpoint.   0035 Reevaluated patient.  Discussed the patient the work-up consistent with a low-grade infection.  We will plan for  discharge with a short course of azithromycin.  QT normal on ECG.  Advised patient to follow-up with primary care in 1 to 2 weeks.  Return precautions reviewed. Will also plan for discharge with albuterol inhaler given  the patient's past medical history of tobacco use.  Patient understood and agreed with this plan.  All question concerns otherwise addressed and answered.   0128 Potassium(!): 3.1  Given effervescent potassium prior to discharge.     Impression:  Final diagnoses:   Acute cough   Bronchitis   Tobacco use   Pleuritic chest pain   Hypokalemia     Azael Whitley MD  August 22, 2023     Azael Whitley MD  08/23/23 0129

## 2023-08-23 NOTE — ED TRIAGE NOTES
"\"Having shortness of breath for 1 week, there is flem in my throat and I cannot cough it up.  I have no problems swallowing water or food.  I have also been dizzy for 1 week.\"         "

## 2023-08-23 NOTE — DISCHARGE INSTRUCTIONS
It was a pleasure taking care of you today. We saw you for a cough. We recommend that you take a short course of antibiotics for your symptoms.    Please follow up with your primary care provider in 1 week for a recheck. Please return to the emergency department if you develop symptoms like worsening chest pain, light headedness, fever, or if your symptoms persist or get worse.    Take care. Feel better.

## 2023-09-03 ENCOUNTER — HOSPITAL ENCOUNTER (EMERGENCY)
Facility: HOSPITAL | Age: 68
Discharge: HOME | DRG: 004 | End: 2023-09-03
Attending: EMERGENCY MEDICINE | Admitting: EMERGENCY MEDICINE
Payer: MEDICARE

## 2023-09-03 VITALS
TEMPERATURE: 98.5 F | DIASTOLIC BLOOD PRESSURE: 140 MMHG | SYSTOLIC BLOOD PRESSURE: 176 MMHG | OXYGEN SATURATION: 96 % | HEART RATE: 83 BPM | RESPIRATION RATE: 22 BRPM

## 2023-09-03 DIAGNOSIS — J98.01 BRONCHOSPASM: ICD-10-CM

## 2023-09-03 PROCEDURE — 250N000009 HC RX 250: Performed by: EMERGENCY MEDICINE

## 2023-09-03 PROCEDURE — 99283 EMERGENCY DEPT VISIT LOW MDM: CPT | Mod: 25

## 2023-09-03 PROCEDURE — 99284 EMERGENCY DEPT VISIT MOD MDM: CPT | Performed by: EMERGENCY MEDICINE

## 2023-09-03 PROCEDURE — 94640 AIRWAY INHALATION TREATMENT: CPT

## 2023-09-03 PROCEDURE — 999N000157 HC STATISTIC RCP TIME EA 10 MIN

## 2023-09-03 PROCEDURE — 93005 ELECTROCARDIOGRAM TRACING: CPT

## 2023-09-03 PROCEDURE — 93010 ELECTROCARDIOGRAM REPORT: CPT | Performed by: INTERNAL MEDICINE

## 2023-09-03 RX ORDER — IPRATROPIUM BROMIDE AND ALBUTEROL SULFATE 2.5; .5 MG/3ML; MG/3ML
3 SOLUTION RESPIRATORY (INHALATION) ONCE
Status: COMPLETED | OUTPATIENT
Start: 2023-09-03 | End: 2023-09-03

## 2023-09-03 RX ORDER — IPRATROPIUM BROMIDE AND ALBUTEROL SULFATE 2.5; .5 MG/3ML; MG/3ML
1 SOLUTION RESPIRATORY (INHALATION) EVERY 6 HOURS PRN
Qty: 90 ML | Refills: 0 | Status: SHIPPED | OUTPATIENT
Start: 2023-09-03

## 2023-09-03 RX ADMIN — IPRATROPIUM BROMIDE AND ALBUTEROL SULFATE 3 ML: .5; 3 SOLUTION RESPIRATORY (INHALATION) at 12:30

## 2023-09-03 ASSESSMENT — ENCOUNTER SYMPTOMS
MUSCULOSKELETAL NEGATIVE: 1
NEUROLOGICAL NEGATIVE: 1
CARDIOVASCULAR NEGATIVE: 1
SHORTNESS OF BREATH: 1
EYES NEGATIVE: 1
RHINORRHEA: 1
ENDOCRINE NEGATIVE: 1
ALLERGIC/IMMUNOLOGIC NEGATIVE: 1
CONSTITUTIONAL NEGATIVE: 1
GASTROINTESTINAL NEGATIVE: 1

## 2023-09-03 ASSESSMENT — ACTIVITIES OF DAILY LIVING (ADL): ADLS_ACUITY_SCORE: 35
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
## 2023-09-03 NOTE — ED NOTES
"Reports he feels his symptoms are improved after neb treatment. Reports he has had these symptoms x1.5 wks. 1 week ago was seen for this and work up was done. Also prior to being seen last time he had \"pulled a muscle in my ribs\", right lower, and makes it painful to cough. Denies chest pains other that the right lower rib pain.  "

## 2023-09-03 NOTE — ED TRIAGE NOTES
Patient presents with c/o shortness of breath and cough. Patient reports symptoms started 3 weeks ago. Patient reports that he is unable to get anything up. Reports that it he was seen last week for this. Patient's voice is hoarse. Patient pulse irregularly irregular, denies any HX of Afib.

## 2023-09-05 ENCOUNTER — APPOINTMENT (OUTPATIENT)
Dept: GENERAL RADIOLOGY | Facility: HOSPITAL | Age: 68
DRG: 004 | End: 2023-09-05
Attending: INTERNAL MEDICINE
Payer: MEDICARE

## 2023-09-05 ENCOUNTER — ANESTHESIA EVENT (OUTPATIENT)
Dept: SURGERY | Facility: HOSPITAL | Age: 68
DRG: 004 | End: 2023-09-05
Payer: MEDICARE

## 2023-09-05 ENCOUNTER — PREP FOR PROCEDURE (OUTPATIENT)
Dept: OTOLARYNGOLOGY | Facility: OTHER | Age: 68
End: 2023-09-05

## 2023-09-05 ENCOUNTER — ANESTHESIA (OUTPATIENT)
Dept: SURGERY | Facility: HOSPITAL | Age: 68
DRG: 004 | End: 2023-09-05
Payer: MEDICARE

## 2023-09-05 ENCOUNTER — HOSPITAL ENCOUNTER (INPATIENT)
Facility: HOSPITAL | Age: 68
LOS: 6 days | Discharge: HOME HEALTH | DRG: 004 | End: 2023-09-12
Attending: INTERNAL MEDICINE | Admitting: HOSPITALIST
Payer: MEDICARE

## 2023-09-05 DIAGNOSIS — E43 SEVERE PROTEIN-CALORIE MALNUTRITION (H): ICD-10-CM

## 2023-09-05 DIAGNOSIS — J96.21 ACUTE AND CHRONIC RESPIRATORY FAILURE WITH HYPOXIA (H): ICD-10-CM

## 2023-09-05 DIAGNOSIS — R49.0 VOICE HOARSENESS: Primary | ICD-10-CM

## 2023-09-05 DIAGNOSIS — J96.00 ACUTE RESPIRATORY FAILURE, UNSPECIFIED WHETHER WITH HYPOXIA OR HYPERCAPNIA (H): Primary | ICD-10-CM

## 2023-09-05 DIAGNOSIS — J38.7 SUPRAGLOTTIC MASS: ICD-10-CM

## 2023-09-05 DIAGNOSIS — C32.1 MALIGNANT NEOPLASM OF SUPRAGLOTTIS (H): ICD-10-CM

## 2023-09-05 DIAGNOSIS — J44.1 COPD EXACERBATION (H): ICD-10-CM

## 2023-09-05 DIAGNOSIS — Z72.0 TOBACCO ABUSE: ICD-10-CM

## 2023-09-05 DIAGNOSIS — C32.9 LARYNGEAL CANCER (H): ICD-10-CM

## 2023-09-05 DIAGNOSIS — Z71.6 ENCOUNTER FOR TOBACCO USE CESSATION COUNSELING: ICD-10-CM

## 2023-09-05 DIAGNOSIS — C32.9 LARYNGEAL CANCER (H): Primary | ICD-10-CM

## 2023-09-05 LAB
ALBUMIN SERPL BCG-MCNC: 4.3 G/DL (ref 3.5–5.2)
ALP SERPL-CCNC: 110 U/L (ref 40–129)
ALT SERPL W P-5'-P-CCNC: 30 U/L (ref 0–70)
ANION GAP SERPL CALCULATED.3IONS-SCNC: 15 MMOL/L (ref 7–15)
AST SERPL W P-5'-P-CCNC: 34 U/L (ref 0–45)
BASOPHILS # BLD AUTO: 0 10E3/UL (ref 0–0.2)
BASOPHILS NFR BLD AUTO: 0 %
BILIRUB SERPL-MCNC: 0.5 MG/DL
BUN SERPL-MCNC: 24.7 MG/DL (ref 8–23)
CALCIUM SERPL-MCNC: 9.6 MG/DL (ref 8.8–10.2)
CHLORIDE SERPL-SCNC: 98 MMOL/L (ref 98–107)
CREAT SERPL-MCNC: 0.68 MG/DL (ref 0.67–1.17)
DEPRECATED HCO3 PLAS-SCNC: 31 MMOL/L (ref 22–29)
EOSINOPHIL # BLD AUTO: 0 10E3/UL (ref 0–0.7)
EOSINOPHIL NFR BLD AUTO: 0 %
ERYTHROCYTE [DISTWIDTH] IN BLOOD BY AUTOMATED COUNT: 13.6 % (ref 10–15)
FLUAV RNA SPEC QL NAA+PROBE: NEGATIVE
FLUBV RNA RESP QL NAA+PROBE: NEGATIVE
GFR SERPL CREATININE-BSD FRML MDRD: >90 ML/MIN/1.73M2
GLUCOSE BLDC GLUCOMTR-MCNC: 151 MG/DL (ref 70–99)
GLUCOSE SERPL-MCNC: 117 MG/DL (ref 70–99)
HCT VFR BLD AUTO: 40.5 % (ref 40–53)
HGB BLD-MCNC: 12.5 G/DL (ref 13.3–17.7)
IMM GRANULOCYTES # BLD: 0 10E3/UL
IMM GRANULOCYTES NFR BLD: 0 %
LYMPHOCYTES # BLD AUTO: 0.9 10E3/UL (ref 0.8–5.3)
LYMPHOCYTES NFR BLD AUTO: 9 %
MCH RBC QN AUTO: 29 PG (ref 26.5–33)
MCHC RBC AUTO-ENTMCNC: 30.9 G/DL (ref 31.5–36.5)
MCV RBC AUTO: 94 FL (ref 78–100)
MONOCYTES # BLD AUTO: 0.5 10E3/UL (ref 0–1.3)
MONOCYTES NFR BLD AUTO: 5 %
NEUTROPHILS # BLD AUTO: 8.1 10E3/UL (ref 1.6–8.3)
NEUTROPHILS NFR BLD AUTO: 86 %
NRBC # BLD AUTO: 0 10E3/UL
NRBC BLD AUTO-RTO: 0 /100
PLATELET # BLD AUTO: 306 10E3/UL (ref 150–450)
POTASSIUM SERPL-SCNC: 3.9 MMOL/L (ref 3.4–5.3)
PROT SERPL-MCNC: 7.6 G/DL (ref 6.4–8.3)
RBC # BLD AUTO: 4.31 10E6/UL (ref 4.4–5.9)
RSV RNA SPEC NAA+PROBE: NEGATIVE
SARS-COV-2 RNA RESP QL NAA+PROBE: NEGATIVE
SODIUM SERPL-SCNC: 144 MMOL/L (ref 136–145)
WBC # BLD AUTO: 9.5 10E3/UL (ref 4–11)

## 2023-09-05 PROCEDURE — 999N000157 HC STATISTIC RCP TIME EA 10 MIN

## 2023-09-05 PROCEDURE — 250N000011 HC RX IP 250 OP 636: Performed by: INTERNAL MEDICINE

## 2023-09-05 PROCEDURE — 94640 AIRWAY INHALATION TREATMENT: CPT

## 2023-09-05 PROCEDURE — 250N000009 HC RX 250: Performed by: NURSE ANESTHETIST, CERTIFIED REGISTERED

## 2023-09-05 PROCEDURE — 258N000003 HC RX IP 258 OP 636: Performed by: INTERNAL MEDICINE

## 2023-09-05 PROCEDURE — 87637 SARSCOV2&INF A&B&RSV AMP PRB: CPT | Performed by: HOSPITALIST

## 2023-09-05 PROCEDURE — 88305 TISSUE EXAM BY PATHOLOGIST: CPT | Mod: 26 | Performed by: PATHOLOGY

## 2023-09-05 PROCEDURE — 250N000011 HC RX IP 250 OP 636: Mod: JZ | Performed by: HOSPITALIST

## 2023-09-05 PROCEDURE — 999N000065 XR CHEST PORT 1 VIEW

## 2023-09-05 PROCEDURE — 85025 COMPLETE CBC W/AUTO DIFF WBC: CPT | Performed by: INTERNAL MEDICINE

## 2023-09-05 PROCEDURE — 99285 EMERGENCY DEPT VISIT HI MDM: CPT | Performed by: INTERNAL MEDICINE

## 2023-09-05 PROCEDURE — 250N000011 HC RX IP 250 OP 636: Mod: JZ | Performed by: INTERNAL MEDICINE

## 2023-09-05 PROCEDURE — G0378 HOSPITAL OBSERVATION PER HR: HCPCS

## 2023-09-05 PROCEDURE — 250N000011 HC RX IP 250 OP 636: Mod: JZ | Performed by: OTOLARYNGOLOGY

## 2023-09-05 PROCEDURE — 94002 VENT MGMT INPAT INIT DAY: CPT

## 2023-09-05 PROCEDURE — 258N000003 HC RX IP 258 OP 636: Performed by: NURSE ANESTHETIST, CERTIFIED REGISTERED

## 2023-09-05 PROCEDURE — 31600 PLANNED TRACHEOSTOMY: CPT

## 2023-09-05 PROCEDURE — 370N000017 HC ANESTHESIA TECHNICAL FEE, PER MIN: Performed by: OTOLARYNGOLOGY

## 2023-09-05 PROCEDURE — 99285 EMERGENCY DEPT VISIT HI MDM: CPT | Mod: 25

## 2023-09-05 PROCEDURE — 71045 X-RAY EXAM CHEST 1 VIEW: CPT

## 2023-09-05 PROCEDURE — 360N000076 HC SURGERY LEVEL 3, PER MIN: Performed by: OTOLARYNGOLOGY

## 2023-09-05 PROCEDURE — 250N000025 HC SEVOFLURANE, PER MIN: Performed by: OTOLARYNGOLOGY

## 2023-09-05 PROCEDURE — 88331 PATH CONSLTJ SURG 1 BLK 1SPC: CPT | Mod: 26 | Performed by: PATHOLOGY

## 2023-09-05 PROCEDURE — 31535 LARYNGOSCOPY W/BIOPSY: CPT | Performed by: OTOLARYNGOLOGY

## 2023-09-05 PROCEDURE — 250N000009 HC RX 250: Performed by: INTERNAL MEDICINE

## 2023-09-05 PROCEDURE — 31600 PLANNED TRACHEOSTOMY: CPT | Performed by: OTOLARYNGOLOGY

## 2023-09-05 PROCEDURE — 88305 TISSUE EXAM BY PATHOLOGIST: CPT | Mod: TC | Performed by: OTOLARYNGOLOGY

## 2023-09-05 PROCEDURE — 250N000011 HC RX IP 250 OP 636

## 2023-09-05 PROCEDURE — 99222 1ST HOSP IP/OBS MODERATE 55: CPT | Mod: AI | Performed by: HOSPITALIST

## 2023-09-05 PROCEDURE — 250N000011 HC RX IP 250 OP 636: Performed by: NURSE ANESTHETIST, CERTIFIED REGISTERED

## 2023-09-05 PROCEDURE — 88331 PATH CONSLTJ SURG 1 BLK 1SPC: CPT | Mod: TC | Performed by: OTOLARYNGOLOGY

## 2023-09-05 PROCEDURE — 0CBS8ZX EXCISION OF LARYNX, VIA NATURAL OR ARTIFICIAL OPENING ENDOSCOPIC, DIAGNOSTIC: ICD-10-PCS | Performed by: OTOLARYNGOLOGY

## 2023-09-05 PROCEDURE — 96374 THER/PROPH/DIAG INJ IV PUSH: CPT

## 2023-09-05 PROCEDURE — 31231 NASAL ENDOSCOPY DX: CPT | Performed by: OTOLARYNGOLOGY

## 2023-09-05 PROCEDURE — 272N000001 HC OR GENERAL SUPPLY STERILE: Performed by: OTOLARYNGOLOGY

## 2023-09-05 PROCEDURE — 250N000013 HC RX MED GY IP 250 OP 250 PS 637: Performed by: HOSPITALIST

## 2023-09-05 PROCEDURE — 82962 GLUCOSE BLOOD TEST: CPT

## 2023-09-05 PROCEDURE — 250N000009 HC RX 250: Performed by: OTOLARYNGOLOGY

## 2023-09-05 PROCEDURE — 0B110F4 BYPASS TRACHEA TO CUTANEOUS WITH TRACHEOSTOMY DEVICE, OPEN APPROACH: ICD-10-PCS | Performed by: OTOLARYNGOLOGY

## 2023-09-05 PROCEDURE — 99140 ANES COMP EMERGENCY COND: CPT

## 2023-09-05 PROCEDURE — 250N000009 HC RX 250: Performed by: PHYSICIAN ASSISTANT

## 2023-09-05 PROCEDURE — 250N000011 HC RX IP 250 OP 636: Mod: JZ | Performed by: NURSE ANESTHETIST, CERTIFIED REGISTERED

## 2023-09-05 PROCEDURE — 250N000009 HC RX 250: Performed by: HOSPITALIST

## 2023-09-05 PROCEDURE — 36415 COLL VENOUS BLD VENIPUNCTURE: CPT | Performed by: INTERNAL MEDICINE

## 2023-09-05 PROCEDURE — C9803 HOPD COVID-19 SPEC COLLECT: HCPCS

## 2023-09-05 PROCEDURE — 80053 COMPREHEN METABOLIC PANEL: CPT | Performed by: INTERNAL MEDICINE

## 2023-09-05 RX ORDER — OXYMETAZOLINE HYDROCHLORIDE 0.05 G/100ML
2 SPRAY NASAL
Status: COMPLETED | OUTPATIENT
Start: 2023-09-05 | End: 2023-09-05

## 2023-09-05 RX ORDER — IPRATROPIUM BROMIDE AND ALBUTEROL SULFATE 2.5; .5 MG/3ML; MG/3ML
3 SOLUTION RESPIRATORY (INHALATION) ONCE
Status: COMPLETED | OUTPATIENT
Start: 2023-09-05 | End: 2023-09-05

## 2023-09-05 RX ORDER — METHYLPREDNISOLONE SODIUM SUCCINATE 125 MG/2ML
125 INJECTION, POWDER, LYOPHILIZED, FOR SOLUTION INTRAMUSCULAR; INTRAVENOUS ONCE
Status: COMPLETED | OUTPATIENT
Start: 2023-09-05 | End: 2023-09-05

## 2023-09-05 RX ORDER — IPRATROPIUM BROMIDE AND ALBUTEROL SULFATE 2.5; .5 MG/3ML; MG/3ML
3 SOLUTION RESPIRATORY (INHALATION) ONCE
Status: CANCELLED | OUTPATIENT
Start: 2023-09-05 | End: 2023-09-05

## 2023-09-05 RX ORDER — SODIUM CHLORIDE, SODIUM LACTATE, POTASSIUM CHLORIDE, CALCIUM CHLORIDE 600; 310; 30; 20 MG/100ML; MG/100ML; MG/100ML; MG/100ML
INJECTION, SOLUTION INTRAVENOUS CONTINUOUS
Status: CANCELLED | OUTPATIENT
Start: 2023-09-05

## 2023-09-05 RX ORDER — PROPOFOL 10 MG/ML
INJECTION, EMULSION INTRAVENOUS PRN
Status: DISCONTINUED | OUTPATIENT
Start: 2023-09-05 | End: 2023-09-05

## 2023-09-05 RX ORDER — MULTIPLE VITAMINS W/ MINERALS TAB 9MG-400MCG
TAB ORAL DAILY
Status: DISCONTINUED | OUTPATIENT
Start: 2023-09-05 | End: 2023-09-12 | Stop reason: HOSPADM

## 2023-09-05 RX ORDER — IPRATROPIUM BROMIDE AND ALBUTEROL SULFATE 2.5; .5 MG/3ML; MG/3ML
3 SOLUTION RESPIRATORY (INHALATION) EVERY 4 HOURS PRN
Status: DISCONTINUED | OUTPATIENT
Start: 2023-09-05 | End: 2023-09-05

## 2023-09-05 RX ORDER — LIDOCAINE HYDROCHLORIDE AND EPINEPHRINE 10; 10 MG/ML; UG/ML
INJECTION, SOLUTION INFILTRATION; PERINEURAL PRN
Status: DISCONTINUED | OUTPATIENT
Start: 2023-09-05 | End: 2023-09-05 | Stop reason: HOSPADM

## 2023-09-05 RX ORDER — BUPRENORPHINE AND NALOXONE 4; 1 MG/1; MG/1
1 FILM, SOLUBLE BUCCAL; SUBLINGUAL 2 TIMES DAILY
Status: DISCONTINUED | OUTPATIENT
Start: 2023-09-05 | End: 2023-09-12 | Stop reason: HOSPADM

## 2023-09-05 RX ORDER — LIDOCAINE HYDROCHLORIDE AND EPINEPHRINE 10; 10 MG/ML; UG/ML
INJECTION, SOLUTION INFILTRATION; PERINEURAL
Status: DISCONTINUED
Start: 2023-09-05 | End: 2023-09-05 | Stop reason: HOSPADM

## 2023-09-05 RX ORDER — EPHEDRINE SULFATE 50 MG/ML
INJECTION, SOLUTION INTRAMUSCULAR; INTRAVENOUS; SUBCUTANEOUS PRN
Status: DISCONTINUED | OUTPATIENT
Start: 2023-09-05 | End: 2023-09-05

## 2023-09-05 RX ORDER — FENTANYL CITRATE 50 UG/ML
50 INJECTION, SOLUTION INTRAMUSCULAR; INTRAVENOUS EVERY 5 MIN PRN
Status: CANCELLED | OUTPATIENT
Start: 2023-09-05

## 2023-09-05 RX ORDER — AMPICILLIN AND SULBACTAM 2; 1 G/1; G/1
3 INJECTION, POWDER, FOR SOLUTION INTRAMUSCULAR; INTRAVENOUS EVERY 6 HOURS
Status: DISCONTINUED | OUTPATIENT
Start: 2023-09-05 | End: 2023-09-09

## 2023-09-05 RX ORDER — DEXMEDETOMIDINE HYDROCHLORIDE 4 UG/ML
INJECTION, SOLUTION INTRAVENOUS PRN
Status: DISCONTINUED | OUTPATIENT
Start: 2023-09-05 | End: 2023-09-05

## 2023-09-05 RX ORDER — IPRATROPIUM BROMIDE AND ALBUTEROL SULFATE 2.5; .5 MG/3ML; MG/3ML
3 SOLUTION RESPIRATORY (INHALATION)
Status: DISCONTINUED | OUTPATIENT
Start: 2023-09-05 | End: 2023-09-12 | Stop reason: HOSPADM

## 2023-09-05 RX ORDER — ONDANSETRON 2 MG/ML
INJECTION INTRAMUSCULAR; INTRAVENOUS PRN
Status: DISCONTINUED | OUTPATIENT
Start: 2023-09-05 | End: 2023-09-05

## 2023-09-05 RX ORDER — NICOTINE 21 MG/24HR
1 PATCH, TRANSDERMAL 24 HOURS TRANSDERMAL DAILY
Status: DISCONTINUED | OUTPATIENT
Start: 2023-09-05 | End: 2023-09-12 | Stop reason: HOSPADM

## 2023-09-05 RX ORDER — NALOXONE HYDROCHLORIDE 0.4 MG/ML
0.2 INJECTION, SOLUTION INTRAMUSCULAR; INTRAVENOUS; SUBCUTANEOUS
Status: DISCONTINUED | OUTPATIENT
Start: 2023-09-05 | End: 2023-09-12 | Stop reason: HOSPADM

## 2023-09-05 RX ORDER — NALOXONE HYDROCHLORIDE 0.4 MG/ML
0.4 INJECTION, SOLUTION INTRAMUSCULAR; INTRAVENOUS; SUBCUTANEOUS
Status: DISCONTINUED | OUTPATIENT
Start: 2023-09-05 | End: 2023-09-12 | Stop reason: HOSPADM

## 2023-09-05 RX ORDER — PANTOPRAZOLE SODIUM 40 MG/1
40 TABLET, DELAYED RELEASE ORAL
Status: DISCONTINUED | OUTPATIENT
Start: 2023-09-05 | End: 2023-09-06

## 2023-09-05 RX ORDER — ACETAMINOPHEN 10 MG/ML
1000 INJECTION, SOLUTION INTRAVENOUS ONCE
Status: COMPLETED | OUTPATIENT
Start: 2023-09-05 | End: 2023-09-05

## 2023-09-05 RX ORDER — EPINEPHRINE 1 MG/ML
INJECTION, SOLUTION INTRAMUSCULAR; SUBCUTANEOUS
Status: DISCONTINUED
Start: 2023-09-05 | End: 2023-09-05 | Stop reason: HOSPADM

## 2023-09-05 RX ORDER — GLYCOPYRROLATE 0.2 MG/ML
INJECTION, SOLUTION INTRAMUSCULAR; INTRAVENOUS PRN
Status: DISCONTINUED | OUTPATIENT
Start: 2023-09-05 | End: 2023-09-05

## 2023-09-05 RX ORDER — EPINEPHRINE 1 MG/ML
INJECTION, SOLUTION INTRAMUSCULAR; SUBCUTANEOUS PRN
Status: DISCONTINUED | OUTPATIENT
Start: 2023-09-05 | End: 2023-09-05 | Stop reason: HOSPADM

## 2023-09-05 RX ORDER — LABETALOL 20 MG/4 ML (5 MG/ML) INTRAVENOUS SYRINGE
10
Status: CANCELLED | OUTPATIENT
Start: 2023-09-05

## 2023-09-05 RX ORDER — HYDROMORPHONE HYDROCHLORIDE 1 MG/ML
0.5 INJECTION, SOLUTION INTRAMUSCULAR; INTRAVENOUS; SUBCUTANEOUS EVERY 5 MIN PRN
Status: CANCELLED | OUTPATIENT
Start: 2023-09-05

## 2023-09-05 RX ORDER — HYDROMORPHONE HYDROCHLORIDE 1 MG/ML
0.5 INJECTION, SOLUTION INTRAMUSCULAR; INTRAVENOUS; SUBCUTANEOUS ONCE
Status: COMPLETED | OUTPATIENT
Start: 2023-09-05 | End: 2023-09-05

## 2023-09-05 RX ORDER — ONDANSETRON 4 MG/1
4 TABLET, ORALLY DISINTEGRATING ORAL EVERY 30 MIN PRN
Status: CANCELLED | OUTPATIENT
Start: 2023-09-05

## 2023-09-05 RX ORDER — ALBUTEROL SULFATE 0.83 MG/ML
2.5 SOLUTION RESPIRATORY (INHALATION) EVERY 4 HOURS PRN
Status: CANCELLED | OUTPATIENT
Start: 2023-09-05

## 2023-09-05 RX ORDER — ONDANSETRON 2 MG/ML
4 INJECTION INTRAMUSCULAR; INTRAVENOUS EVERY 30 MIN PRN
Status: CANCELLED | OUTPATIENT
Start: 2023-09-05

## 2023-09-05 RX ORDER — SODIUM CHLORIDE, SODIUM LACTATE, POTASSIUM CHLORIDE, CALCIUM CHLORIDE 600; 310; 30; 20 MG/100ML; MG/100ML; MG/100ML; MG/100ML
INJECTION, SOLUTION INTRAVENOUS CONTINUOUS
Status: DISCONTINUED | OUTPATIENT
Start: 2023-09-05 | End: 2023-09-07

## 2023-09-05 RX ORDER — BACITRACIN ZINC 500 [USP'U]/G
OINTMENT TOPICAL
Status: DISCONTINUED
Start: 2023-09-05 | End: 2023-09-05 | Stop reason: HOSPADM

## 2023-09-05 RX ORDER — SODIUM CHLORIDE, SODIUM LACTATE, POTASSIUM CHLORIDE, CALCIUM CHLORIDE 600; 310; 30; 20 MG/100ML; MG/100ML; MG/100ML; MG/100ML
INJECTION, SOLUTION INTRAVENOUS CONTINUOUS PRN
Status: DISCONTINUED | OUTPATIENT
Start: 2023-09-05 | End: 2023-09-05

## 2023-09-05 RX ORDER — POLYETHYLENE GLYCOL 3350 17 G
2 POWDER IN PACKET (EA) ORAL
Status: DISCONTINUED | OUTPATIENT
Start: 2023-09-05 | End: 2023-09-12 | Stop reason: HOSPADM

## 2023-09-05 RX ORDER — LIDOCAINE 40 MG/G
CREAM TOPICAL
Status: CANCELLED | OUTPATIENT
Start: 2023-09-05

## 2023-09-05 RX ORDER — ONDANSETRON 4 MG/1
4 TABLET, ORALLY DISINTEGRATING ORAL EVERY 6 HOURS PRN
Status: DISCONTINUED | OUTPATIENT
Start: 2023-09-05 | End: 2023-09-12 | Stop reason: HOSPADM

## 2023-09-05 RX ORDER — FENTANYL CITRATE 50 UG/ML
25 INJECTION, SOLUTION INTRAMUSCULAR; INTRAVENOUS
Status: COMPLETED | OUTPATIENT
Start: 2023-09-05 | End: 2023-09-05

## 2023-09-05 RX ORDER — METHYLPREDNISOLONE SODIUM SUCCINATE 40 MG/ML
40 INJECTION, POWDER, LYOPHILIZED, FOR SOLUTION INTRAMUSCULAR; INTRAVENOUS DAILY
Status: DISCONTINUED | OUTPATIENT
Start: 2023-09-05 | End: 2023-09-09

## 2023-09-05 RX ORDER — LIDOCAINE 40 MG/G
CREAM TOPICAL
Status: DISCONTINUED | OUTPATIENT
Start: 2023-09-05 | End: 2023-09-10

## 2023-09-05 RX ORDER — ACETAMINOPHEN 325 MG/1
650 TABLET ORAL EVERY 6 HOURS PRN
Status: DISCONTINUED | OUTPATIENT
Start: 2023-09-05 | End: 2023-09-08

## 2023-09-05 RX ORDER — OXYMETAZOLINE HYDROCHLORIDE 0.05 G/100ML
2 SPRAY NASAL
Status: DISCONTINUED | OUTPATIENT
Start: 2023-09-05 | End: 2023-09-05

## 2023-09-05 RX ORDER — HYDRALAZINE HYDROCHLORIDE 20 MG/ML
2.5-5 INJECTION INTRAMUSCULAR; INTRAVENOUS EVERY 10 MIN PRN
Status: CANCELLED | OUTPATIENT
Start: 2023-09-05

## 2023-09-05 RX ORDER — ONDANSETRON 2 MG/ML
4 INJECTION INTRAMUSCULAR; INTRAVENOUS EVERY 6 HOURS PRN
Status: DISCONTINUED | OUTPATIENT
Start: 2023-09-05 | End: 2023-09-12 | Stop reason: HOSPADM

## 2023-09-05 RX ADMIN — PHENYLEPHRINE HYDROCHLORIDE 100 MCG: 10 INJECTION INTRAVENOUS at 15:44

## 2023-09-05 RX ADMIN — SODIUM CHLORIDE, POTASSIUM CHLORIDE, SODIUM LACTATE AND CALCIUM CHLORIDE: 600; 310; 30; 20 INJECTION, SOLUTION INTRAVENOUS at 18:36

## 2023-09-05 RX ADMIN — PHENYLEPHRINE HYDROCHLORIDE 150 MCG: 10 INJECTION INTRAVENOUS at 14:51

## 2023-09-05 RX ADMIN — PHENYLEPHRINE HYDROCHLORIDE 50 MCG: 10 INJECTION INTRAVENOUS at 15:03

## 2023-09-05 RX ADMIN — MIDAZOLAM 2 MG: 1 INJECTION INTRAMUSCULAR; INTRAVENOUS at 18:26

## 2023-09-05 RX ADMIN — PHENYLEPHRINE HYDROCHLORIDE 100 MCG: 10 INJECTION INTRAVENOUS at 14:55

## 2023-09-05 RX ADMIN — FENTANYL CITRATE 25 MCG: 50 INJECTION, SOLUTION INTRAMUSCULAR; INTRAVENOUS at 19:50

## 2023-09-05 RX ADMIN — IPRATROPIUM BROMIDE AND ALBUTEROL SULFATE 3 ML: .5; 3 SOLUTION RESPIRATORY (INHALATION) at 08:39

## 2023-09-05 RX ADMIN — Medication 5 MG: at 15:06

## 2023-09-05 RX ADMIN — ACETAMINOPHEN 1000 MG: 10 INJECTION INTRAVENOUS at 18:48

## 2023-09-05 RX ADMIN — IPRATROPIUM BROMIDE AND ALBUTEROL SULFATE 3 ML: .5; 3 SOLUTION RESPIRATORY (INHALATION) at 12:02

## 2023-09-05 RX ADMIN — Medication 5 MG: at 15:33

## 2023-09-05 RX ADMIN — Medication 5 MG: at 14:53

## 2023-09-05 RX ADMIN — IPRATROPIUM BROMIDE AND ALBUTEROL SULFATE 3 ML: .5; 3 SOLUTION RESPIRATORY (INHALATION) at 01:43

## 2023-09-05 RX ADMIN — Medication 5 MG: at 14:57

## 2023-09-05 RX ADMIN — DEXMEDETOMIDINE 0.7 MCG/KG/HR: 100 INJECTION, SOLUTION, CONCENTRATE INTRAVENOUS at 14:15

## 2023-09-05 RX ADMIN — IPRATROPIUM BROMIDE AND ALBUTEROL SULFATE 3 ML: .5; 3 SOLUTION RESPIRATORY (INHALATION) at 20:05

## 2023-09-05 RX ADMIN — BUPRENORPHINE AND NALOXONE 1 FILM: 4; 1 FILM, SOLUBLE BUCCAL; SUBLINGUAL at 21:01

## 2023-09-05 RX ADMIN — PROPOFOL 200 MG: 10 INJECTION, EMULSION INTRAVENOUS at 14:48

## 2023-09-05 RX ADMIN — PHENYLEPHRINE HYDROCHLORIDE 0.4 MCG/KG/MIN: 10 INJECTION INTRAVENOUS at 15:21

## 2023-09-05 RX ADMIN — METHYLPREDNISOLONE SODIUM SUCCINATE 40 MG: 40 INJECTION, POWDER, FOR SOLUTION INTRAMUSCULAR; INTRAVENOUS at 19:29

## 2023-09-05 RX ADMIN — SODIUM CHLORIDE, POTASSIUM CHLORIDE, SODIUM LACTATE AND CALCIUM CHLORIDE: 600; 310; 30; 20 INJECTION, SOLUTION INTRAVENOUS at 15:35

## 2023-09-05 RX ADMIN — PHENYLEPHRINE HYDROCHLORIDE 100 MCG: 10 INJECTION INTRAVENOUS at 15:21

## 2023-09-05 RX ADMIN — IPRATROPIUM BROMIDE AND ALBUTEROL SULFATE 3 ML: .5; 3 SOLUTION RESPIRATORY (INHALATION) at 02:25

## 2023-09-05 RX ADMIN — FENTANYL CITRATE 25 MCG: 50 INJECTION, SOLUTION INTRAMUSCULAR; INTRAVENOUS at 17:16

## 2023-09-05 RX ADMIN — PHENYLEPHRINE HYDROCHLORIDE 100 MCG: 10 INJECTION INTRAVENOUS at 15:19

## 2023-09-05 RX ADMIN — PHENYLEPHRINE HYDROCHLORIDE 200 MCG: 10 INJECTION INTRAVENOUS at 15:06

## 2023-09-05 RX ADMIN — PHENYLEPHRINE HYDROCHLORIDE 100 MCG: 10 INJECTION INTRAVENOUS at 15:15

## 2023-09-05 RX ADMIN — AMPICILLIN SODIUM AND SULBACTAM SODIUM 3 G: 2; 1 INJECTION, POWDER, FOR SOLUTION INTRAMUSCULAR; INTRAVENOUS at 19:29

## 2023-09-05 RX ADMIN — DEXMEDETOMIDINE 52.12 MCG: 100 INJECTION, SOLUTION, CONCENTRATE INTRAVENOUS at 13:55

## 2023-09-05 RX ADMIN — OXYMETHAZOLINE HCL 2 SPRAY: 0.05 SPRAY NASAL at 10:35

## 2023-09-05 RX ADMIN — SODIUM CHLORIDE, POTASSIUM CHLORIDE, SODIUM LACTATE AND CALCIUM CHLORIDE: 600; 310; 30; 20 INJECTION, SOLUTION INTRAVENOUS at 13:47

## 2023-09-05 RX ADMIN — PHENYLEPHRINE HYDROCHLORIDE 100 MCG: 10 INJECTION INTRAVENOUS at 14:53

## 2023-09-05 RX ADMIN — GLYCOPYRROLATE 0.2 MG: 0.2 INJECTION, SOLUTION INTRAMUSCULAR; INTRAVENOUS at 13:55

## 2023-09-05 RX ADMIN — OXYMETHAZOLINE HCL 2 SPRAY: 0.05 SPRAY NASAL at 10:30

## 2023-09-05 RX ADMIN — HYDROMORPHONE HYDROCHLORIDE 0.5 MG: 1 INJECTION, SOLUTION INTRAMUSCULAR; INTRAVENOUS; SUBCUTANEOUS at 18:08

## 2023-09-05 RX ADMIN — ONDANSETRON 4 MG: 2 INJECTION INTRAMUSCULAR; INTRAVENOUS at 13:59

## 2023-09-05 RX ADMIN — PHENYLEPHRINE HYDROCHLORIDE 100 MCG: 10 INJECTION INTRAVENOUS at 14:57

## 2023-09-05 RX ADMIN — IPRATROPIUM BROMIDE AND ALBUTEROL SULFATE 3 ML: .5; 3 SOLUTION RESPIRATORY (INHALATION) at 05:34

## 2023-09-05 RX ADMIN — METHYLPREDNISOLONE SODIUM SUCCINATE 125 MG: 125 INJECTION, POWDER, FOR SOLUTION INTRAMUSCULAR; INTRAVENOUS at 04:29

## 2023-09-05 RX ADMIN — Medication 5 MG: at 14:55

## 2023-09-05 ASSESSMENT — ENCOUNTER SYMPTOMS
NAUSEA: 0
CHEST TIGHTNESS: 0
CHILLS: 0
VOMITING: 0
HEADACHES: 0
DIZZINESS: 0
PALPITATIONS: 0
SHORTNESS OF BREATH: 1
DYSURIA: 0
FEVER: 0
WHEEZING: 1
LIGHT-HEADEDNESS: 0
BACK PAIN: 0
WEAKNESS: 0
NUMBNESS: 0
BLOOD IN STOOL: 0
VOICE CHANGE: 0
FLANK PAIN: 0
SLEEP DISTURBANCE: 0
COLOR CHANGE: 0
ABDOMINAL DISTENTION: 0
ABDOMINAL PAIN: 0
CONFUSION: 0
COUGH: 0
MYALGIAS: 0
ANAL BLEEDING: 0
DIAPHORESIS: 0
FREQUENCY: 0
NERVOUS/ANXIOUS: 1
NECK PAIN: 0

## 2023-09-05 ASSESSMENT — ACTIVITIES OF DAILY LIVING (ADL)
ADLS_ACUITY_SCORE: 31
ADLS_ACUITY_SCORE: 35
ADLS_ACUITY_SCORE: 31
ADLS_ACUITY_SCORE: 34
ADLS_ACUITY_SCORE: 33
ADLS_ACUITY_SCORE: 38
ADLS_ACUITY_SCORE: 35
ADLS_ACUITY_SCORE: 31
ADLS_ACUITY_SCORE: 31
ADLS_ACUITY_SCORE: 33
ADLS_ACUITY_SCORE: 35
DIFFICULTY_EATING/SWALLOWING: NO

## 2023-09-05 ASSESSMENT — COPD QUESTIONNAIRES
COPD: 1
CAT_SEVERITY: SEVERE

## 2023-09-05 ASSESSMENT — LIFESTYLE VARIABLES: TOBACCO_USE: 1

## 2023-09-05 NOTE — ED TRIAGE NOTES
Patient arrived by ambulance with c/o SOB, panic attack.  Patient awoke from sleep, feeling panicked, and began to have difficulty breathing.  Patient has hx of throat cancer and radiation treatments.  Patient elected no surgical intervention.  Patient arrived on non-rebreather 6 liters sating 100%

## 2023-09-05 NOTE — PLAN OF CARE
[facility] Inpatient Admission Note:    Patient admitted to MED/SURG at approximately 0800 via wheel chair accompanied by daughter from home . Report received from Conner ISAAC RN in SBAR format at 0700 via telephone. Patient ambulated to bed via self.. Patient is alert and oriented X 3, denies pain; rates at 0 on 0-10 scale.  Patient oriented to room, unit, hourly rounding, and plan of care. Explained admission packet and patient handbook with patient bill of rights brochure. Will continue to monitor and document as needed.     Inpatient Nursing criteria listed below was met:    Health care directives status obtained and documented: Yes    Patient identifies a surrogate decision maker: Yes  If yes, who:Artis Willoughby Contact Information:[phone]     If initial lactic acid greater than 2.0, repeat lactic acid drawn within one hour of arrival to unit: NA. If no, state reason: N/A    Clergy visit ordered if patient requests: No    Skin issues/needs documented: No    Isolation Patient: no Education given, correct sign in place and documentation row added to PCS:  No    Fall Prevention Yes: Care plan updated, education given and documented, sticker and magnet in place: Yes    Care Plan initiated: Yes    Education Documented (including assessment): Yes

## 2023-09-05 NOTE — UTILIZATION REVIEW
Concurrent stay review; Secondary Review Determination    Under the authority of the Utilization Management Committee, the utilization review process indicated a secondary review on the above patient. The review outcome is based on review of the medical records, discussions with staff, and applying clinical experience noted on the date of the review.    (x) Observation Status Appropriate - Concurrent stay review    RATIONALE FOR DETERMINATION    Jorge A Mendosa is a 68 year old male admitted on 9/5/2023. He has history of laryngeal cancer that has been treated with surgery and radiation.  He has been seen in the ED several times recently with complaint of shortness of breath and wheezing. He has been treated for COPD exacerbation with relief. He presented to the ED again on 9/5 with similar complaints and was treated with DuoNebs and Solu-Medrol. His symptoms improved some. ENT was consulted for further recommendations as he has missed outpatient appointments.  Bedside laryngoscopy was done and showed narrowing of the airways. Tracheostomy was planned for later in the day. Patient is expected to discharge before 2 midnights in the hospital so observation admission is appropriate. If hospital stay becomes prolonged considering change to inpatient would be appropriate.        Patient is clinically improving and there is no clear indication to change patient's status to inpatient. The severity of illness, intensity of service provided, expected LOS and risk for adverse outcome make the care appropriate for observation.    This document was produced using voice recognition software    The information on this document is developed by the utilization review team in order for the business office to ensure compliance. This only denotes the appropriateness of proper admission status and does not reflect the quality of care rendered.    The definitions of Inpatient Status and Observation Status used in making the  determination above are those provided in the CMS Coverage Manual, Chapter 1 and Chapter 6, section 70.4.    Sincerely,    Azael Kohler MD     Utilization Review    Physician Advisor    [facility].

## 2023-09-05 NOTE — LETTER
[facility] MEDICAL SURGICAL  [address] [place] [place] [facility] [address] [phone]    9/11/2023  Jorge A Mendosa  [address] [facility]   [facility] [address]      To whom it may concern:    Jorge A Mendosa will need continued medical supplies for his tracheostomy. He is currently tracheostomy dependent.       Trach/Supplies Documentation:   Patient has an open surgical tracheostomy. The tracheostomy has been or is expected to remain open for at least three (3) months. The patient requires the ordered tracheostomy supplies to provide appropriate routine tracheostomy care.     I, the undersigned, certify that the above prescribed supplies are medically necessary for this patient and is both reasonable and necessary in reference to accepted standards of medical and necessary in reference to accepted standards of medical practice in the treatment of this patient's condition and is not prescribed as a convenience.         Sincerely,     Kailyn Campbell PA-C
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
## 2023-09-05 NOTE — PROGRESS NOTES
Attempted to suction pt with 8 Serbian suction catheter. Unable to pass suction past end of the trach. Changed inner cannula still unable to pass suction catheter. Return tidal volume on the ventilator 430-450 ml. Hospitalist  notified. 60 xlt trach placed by ENT. CXR pending placed back on previous vent settings.

## 2023-09-05 NOTE — ED NOTES
Patient c/o cramping in right lower extremity.  Patient requested to stand up and walk off the cramp, Patient was not on Oxygen at this point patient desaturated to 74% and was put back on oxygen 4l/min and patient immediately rebounded.  Patient O2 lowered to 2l/min.
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
## 2023-09-05 NOTE — ANESTHESIA CARE TRANSFER NOTE
Patient: Jorge A Mendosa    Procedure: Procedure(s):  CREATION, TRACHEOSTOMY  Direct Laryngoscopy with Biopsies and Frozens       Diagnosis: Acute respiratory failure, unspecified whether with hypoxia or hypercapnia (H) [J96.00]  Diagnosis Additional Information: No value filed.    Anesthesia Type:   General     Note:    Oropharynx: ventilated by trach.  Level of Consciousness: iatrogenic sedation  Patient oxygen source: BVM.  Level of Supplemental Oxygen (L/min / FiO2): 15    Dentition: dentition unchanged  Vital Signs Stable: post-procedure vital signs reviewed and stable  Report to RN Given: handoff report given  Patient transferred to: ICU    ICU Handoff: Call for PAUSE to initiate/utilize ICU HANDOFF, Identified Patient, Identified Responsible Provider, Reviewed the Pertinent Medical History, Discussed Surgical Course, Reviewed Intra-OP Anesthesia Management and Issues during Anesthesia, Set Expectations for Post Procedure Period and Allowed Opportunity for Questions and Acknowledgement of Understanding  Vitals:  Vitals Value Taken Time   BP     Temp     Pulse     Resp     SpO2 100 % 09/05/23 3422       Electronically Signed By: CHERISE Dunaway [facility]  September 5, 2023  4:09 PM

## 2023-09-05 NOTE — PROGRESS NOTES
"Most recent vitals: /79   Pulse 73   Temp 98.4  F (36.9  C) (Tympanic)   Resp 14   Ht 1.727 m (5' 8\")   Wt 52.1 kg (114 lb 13.8 oz)   SpO2 94%   BMI 17.46 kg/m      Pain Management:  Denies  LOC: A/O, calm, cooperative.  Voice is weak w/ whisper, and hoarse.    Cardiac: WDL  Respiratory:  4 L O2 (96-99% at rest), inps./exp. wheezes  GI:  WDL  : WDL  Skin Issues:  Scattered scabs/scrapes     IVF: SL     Nutrition: Poor  Ambulation: SBA/Assist x 1  Safety: WDL     OR nurse in room:  1345      OR nurse prepping patient for primary nurse.  Anesthesia updated prior to transfer down to surgery.       Face to face report given with opportunity to observe patient.    Report given to Sharon ISAAC RN & Jamil England RN   9/5/2023  1330 PM        "

## 2023-09-06 ENCOUNTER — PATIENT OUTREACH (OUTPATIENT)
Dept: CARE COORDINATION | Facility: OTHER | Age: 68
End: 2023-09-06

## 2023-09-06 ENCOUNTER — APPOINTMENT (OUTPATIENT)
Dept: CT IMAGING | Facility: HOSPITAL | Age: 68
DRG: 004 | End: 2023-09-06
Attending: PHYSICIAN ASSISTANT
Payer: MEDICARE

## 2023-09-06 ENCOUNTER — APPOINTMENT (OUTPATIENT)
Dept: GENERAL RADIOLOGY | Facility: HOSPITAL | Age: 68
DRG: 004 | End: 2023-09-06
Attending: HOSPITALIST
Payer: MEDICARE

## 2023-09-06 ENCOUNTER — MEDICAL CORRESPONDENCE (OUTPATIENT)
Dept: HEALTH INFORMATION MANAGEMENT | Facility: HOSPITAL | Age: 68
End: 2023-09-06

## 2023-09-06 DIAGNOSIS — C32.9 LARYNGEAL CANCER (H): ICD-10-CM

## 2023-09-06 DIAGNOSIS — Z93.0 TRACHEOSTOMY DEPENDENCE (H): Primary | ICD-10-CM

## 2023-09-06 PROBLEM — Z71.6 ENCOUNTER FOR TOBACCO USE CESSATION COUNSELING: Status: ACTIVE | Noted: 2023-09-06

## 2023-09-06 LAB
ALLEN'S TEST: YES
ANION GAP SERPL CALCULATED.3IONS-SCNC: 11 MMOL/L (ref 7–15)
BASE EXCESS BLDA CALC-SCNC: 7.4 MMOL/L (ref -9–1.8)
BASOPHILS # BLD AUTO: 0 10E3/UL (ref 0–0.2)
BASOPHILS NFR BLD AUTO: 0 %
BUN SERPL-MCNC: 31.8 MG/DL (ref 8–23)
CALCIUM SERPL-MCNC: 8.5 MG/DL (ref 8.8–10.2)
CHLORIDE SERPL-SCNC: 102 MMOL/L (ref 98–107)
CREAT SERPL-MCNC: 0.67 MG/DL (ref 0.67–1.17)
DEPRECATED HCO3 PLAS-SCNC: 30 MMOL/L (ref 22–29)
EOSINOPHIL # BLD AUTO: 0 10E3/UL (ref 0–0.7)
EOSINOPHIL NFR BLD AUTO: 0 %
ERYTHROCYTE [DISTWIDTH] IN BLOOD BY AUTOMATED COUNT: 14 % (ref 10–15)
GFR SERPL CREATININE-BSD FRML MDRD: >90 ML/MIN/1.73M2
GLUCOSE SERPL-MCNC: 103 MG/DL (ref 70–99)
HCO3 BLD-SCNC: 34 MMOL/L (ref 21–28)
HCT VFR BLD AUTO: 32.5 % (ref 40–53)
HGB BLD-MCNC: 10.2 G/DL (ref 13.3–17.7)
IMM GRANULOCYTES # BLD: 0.1 10E3/UL
IMM GRANULOCYTES NFR BLD: 0 %
LYMPHOCYTES # BLD AUTO: 0.9 10E3/UL (ref 0.8–5.3)
LYMPHOCYTES NFR BLD AUTO: 7 %
MCH RBC QN AUTO: 28.9 PG (ref 26.5–33)
MCHC RBC AUTO-ENTMCNC: 31.4 G/DL (ref 31.5–36.5)
MCV RBC AUTO: 92 FL (ref 78–100)
MONOCYTES # BLD AUTO: 0.9 10E3/UL (ref 0–1.3)
MONOCYTES NFR BLD AUTO: 8 %
NEUTROPHILS # BLD AUTO: 10.7 10E3/UL (ref 1.6–8.3)
NEUTROPHILS NFR BLD AUTO: 85 %
NRBC # BLD AUTO: 0 10E3/UL
NRBC BLD AUTO-RTO: 0 /100
O2/TOTAL GAS SETTING VFR VENT: 40 %
PCO2 BLD: 55 MM HG (ref 35–45)
PH BLD: 7.39 [PH] (ref 7.35–7.45)
PLATELET # BLD AUTO: 252 10E3/UL (ref 150–450)
PO2 BLD: 116 MM HG (ref 80–105)
POTASSIUM SERPL-SCNC: 3.7 MMOL/L (ref 3.4–5.3)
RBC # BLD AUTO: 3.53 10E6/UL (ref 4.4–5.9)
SODIUM SERPL-SCNC: 143 MMOL/L (ref 136–145)
WBC # BLD AUTO: 12.6 10E3/UL (ref 4–11)

## 2023-09-06 PROCEDURE — 94640 AIRWAY INHALATION TREATMENT: CPT | Mod: 76

## 2023-09-06 PROCEDURE — 250N000011 HC RX IP 250 OP 636: Performed by: RADIOLOGY

## 2023-09-06 PROCEDURE — 999N000253 HC STATISTIC WEANING TRIALS

## 2023-09-06 PROCEDURE — 94003 VENT MGMT INPAT SUBQ DAY: CPT

## 2023-09-06 PROCEDURE — 82374 ASSAY BLOOD CARBON DIOXIDE: CPT | Performed by: HOSPITALIST

## 2023-09-06 PROCEDURE — 71045 X-RAY EXAM CHEST 1 VIEW: CPT

## 2023-09-06 PROCEDURE — C9113 INJ PANTOPRAZOLE SODIUM, VIA: HCPCS | Mod: JZ | Performed by: HOSPITALIST

## 2023-09-06 PROCEDURE — 250N000011 HC RX IP 250 OP 636: Performed by: INTERNAL MEDICINE

## 2023-09-06 PROCEDURE — 999N000157 HC STATISTIC RCP TIME EA 10 MIN

## 2023-09-06 PROCEDURE — 36415 COLL VENOUS BLD VENIPUNCTURE: CPT | Performed by: HOSPITALIST

## 2023-09-06 PROCEDURE — G0378 HOSPITAL OBSERVATION PER HR: HCPCS

## 2023-09-06 PROCEDURE — 82803 BLOOD GASES ANY COMBINATION: CPT | Performed by: HOSPITALIST

## 2023-09-06 PROCEDURE — 200N000001 HC R&B ICU

## 2023-09-06 PROCEDURE — G1010 CDSM STANSON: HCPCS

## 2023-09-06 PROCEDURE — 258N000003 HC RX IP 258 OP 636: Performed by: HOSPITALIST

## 2023-09-06 PROCEDURE — 85025 COMPLETE CBC W/AUTO DIFF WBC: CPT | Performed by: HOSPITALIST

## 2023-09-06 PROCEDURE — 99232 SBSQ HOSP IP/OBS MODERATE 35: CPT | Performed by: HOSPITALIST

## 2023-09-06 PROCEDURE — 250N000009 HC RX 250: Performed by: HOSPITALIST

## 2023-09-06 PROCEDURE — 258N000003 HC RX IP 258 OP 636: Performed by: INTERNAL MEDICINE

## 2023-09-06 PROCEDURE — 31575 DIAGNOSTIC LARYNGOSCOPY: CPT | Performed by: OTOLARYNGOLOGY

## 2023-09-06 PROCEDURE — 82310 ASSAY OF CALCIUM: CPT | Performed by: HOSPITALIST

## 2023-09-06 PROCEDURE — 250N000011 HC RX IP 250 OP 636: Mod: JZ | Performed by: OTOLARYNGOLOGY

## 2023-09-06 PROCEDURE — 250N000013 HC RX MED GY IP 250 OP 250 PS 637: Performed by: HOSPITALIST

## 2023-09-06 PROCEDURE — 250N000011 HC RX IP 250 OP 636: Performed by: HOSPITALIST

## 2023-09-06 PROCEDURE — 36600 WITHDRAWAL OF ARTERIAL BLOOD: CPT

## 2023-09-06 PROCEDURE — 31502 CHANGE OF WINDPIPE AIRWAY: CPT | Mod: XU | Performed by: OTOLARYNGOLOGY

## 2023-09-06 RX ORDER — IOPAMIDOL 755 MG/ML
68 INJECTION, SOLUTION INTRAVASCULAR ONCE
Status: COMPLETED | OUTPATIENT
Start: 2023-09-06 | End: 2023-09-06

## 2023-09-06 RX ORDER — HYDROMORPHONE HYDROCHLORIDE 1 MG/ML
0.5 INJECTION, SOLUTION INTRAMUSCULAR; INTRAVENOUS; SUBCUTANEOUS EVERY 4 HOURS PRN
Status: DISCONTINUED | OUTPATIENT
Start: 2023-09-06 | End: 2023-09-06

## 2023-09-06 RX ORDER — DIPHENHYDRAMINE HCL 50 MG
50 CAPSULE ORAL EVERY 8 HOURS PRN
Status: DISCONTINUED | OUTPATIENT
Start: 2023-09-06 | End: 2023-09-08

## 2023-09-06 RX ORDER — ACETAMINOPHEN 10 MG/ML
1000 INJECTION, SOLUTION INTRAVENOUS EVERY 6 HOURS
Status: COMPLETED | OUTPATIENT
Start: 2023-09-06 | End: 2023-09-07

## 2023-09-06 RX ORDER — DIPHENHYDRAMINE HYDROCHLORIDE 50 MG/ML
50 INJECTION INTRAMUSCULAR; INTRAVENOUS EVERY 8 HOURS PRN
Status: DISCONTINUED | OUTPATIENT
Start: 2023-09-06 | End: 2023-09-08

## 2023-09-06 RX ORDER — FENTANYL CITRATE 50 UG/ML
25 INJECTION, SOLUTION INTRAMUSCULAR; INTRAVENOUS
Status: DISCONTINUED | OUTPATIENT
Start: 2023-09-06 | End: 2023-09-08

## 2023-09-06 RX ADMIN — HYDROMORPHONE HYDROCHLORIDE 0.5 MG: 1 INJECTION, SOLUTION INTRAMUSCULAR; INTRAVENOUS; SUBCUTANEOUS at 03:48

## 2023-09-06 RX ADMIN — SODIUM CHLORIDE, POTASSIUM CHLORIDE, SODIUM LACTATE AND CALCIUM CHLORIDE: 600; 310; 30; 20 INJECTION, SOLUTION INTRAVENOUS at 16:34

## 2023-09-06 RX ADMIN — AMPICILLIN SODIUM AND SULBACTAM SODIUM 3 G: 2; 1 INJECTION, POWDER, FOR SOLUTION INTRAMUSCULAR; INTRAVENOUS at 18:42

## 2023-09-06 RX ADMIN — AMPICILLIN SODIUM AND SULBACTAM SODIUM 3 G: 2; 1 INJECTION, POWDER, FOR SOLUTION INTRAMUSCULAR; INTRAVENOUS at 12:19

## 2023-09-06 RX ADMIN — FENTANYL CITRATE 25 MCG: 50 INJECTION, SOLUTION INTRAMUSCULAR; INTRAVENOUS at 20:11

## 2023-09-06 RX ADMIN — BUPRENORPHINE AND NALOXONE 1 FILM: 4; 1 FILM, SOLUBLE BUCCAL; SUBLINGUAL at 20:11

## 2023-09-06 RX ADMIN — AMPICILLIN SODIUM AND SULBACTAM SODIUM 3 G: 2; 1 INJECTION, POWDER, FOR SOLUTION INTRAMUSCULAR; INTRAVENOUS at 00:58

## 2023-09-06 RX ADMIN — IPRATROPIUM BROMIDE AND ALBUTEROL SULFATE 3 ML: .5; 3 SOLUTION RESPIRATORY (INHALATION) at 19:41

## 2023-09-06 RX ADMIN — PANTOPRAZOLE SODIUM 40 MG: 40 INJECTION, POWDER, FOR SOLUTION INTRAVENOUS at 08:20

## 2023-09-06 RX ADMIN — SODIUM CHLORIDE, POTASSIUM CHLORIDE, SODIUM LACTATE AND CALCIUM CHLORIDE: 600; 310; 30; 20 INJECTION, SOLUTION INTRAVENOUS at 00:58

## 2023-09-06 RX ADMIN — AMPICILLIN SODIUM AND SULBACTAM SODIUM 3 G: 2; 1 INJECTION, POWDER, FOR SOLUTION INTRAMUSCULAR; INTRAVENOUS at 06:16

## 2023-09-06 RX ADMIN — IPRATROPIUM BROMIDE AND ALBUTEROL SULFATE 3 ML: .5; 3 SOLUTION RESPIRATORY (INHALATION) at 02:02

## 2023-09-06 RX ADMIN — IPRATROPIUM BROMIDE AND ALBUTEROL SULFATE 3 ML: .5; 3 SOLUTION RESPIRATORY (INHALATION) at 07:21

## 2023-09-06 RX ADMIN — BUPRENORPHINE AND NALOXONE 1 FILM: 4; 1 FILM, SOLUBLE BUCCAL; SUBLINGUAL at 08:20

## 2023-09-06 RX ADMIN — DIPHENHYDRAMINE HYDROCHLORIDE 50 MG: 50 INJECTION, SOLUTION INTRAMUSCULAR; INTRAVENOUS at 12:30

## 2023-09-06 RX ADMIN — IPRATROPIUM BROMIDE AND ALBUTEROL SULFATE 3 ML: .5; 3 SOLUTION RESPIRATORY (INHALATION) at 13:59

## 2023-09-06 RX ADMIN — FENTANYL CITRATE 25 MCG: 50 INJECTION, SOLUTION INTRAMUSCULAR; INTRAVENOUS at 13:28

## 2023-09-06 RX ADMIN — IOPAMIDOL 68 ML: 755 INJECTION, SOLUTION INTRAVENOUS at 13:45

## 2023-09-06 RX ADMIN — ACETAMINOPHEN 1000 MG: 10 INJECTION INTRAVENOUS at 15:46

## 2023-09-06 RX ADMIN — ACETAMINOPHEN 1000 MG: 10 INJECTION INTRAVENOUS at 21:23

## 2023-09-06 RX ADMIN — METHYLPREDNISOLONE SODIUM SUCCINATE 40 MG: 40 INJECTION, POWDER, FOR SOLUTION INTRAMUSCULAR; INTRAVENOUS at 08:20

## 2023-09-06 ASSESSMENT — ACTIVITIES OF DAILY LIVING (ADL)
ADLS_ACUITY_SCORE: 38
ADLS_ACUITY_SCORE: 38
ADLS_ACUITY_SCORE: 40
ADLS_ACUITY_SCORE: 38
ADLS_ACUITY_SCORE: 39
ADLS_ACUITY_SCORE: 38
ADLS_ACUITY_SCORE: 38
ADLS_ACUITY_SCORE: 37

## 2023-09-06 NOTE — PROGRESS NOTES
Reviewed patient chart, discussed  patient and events with hospitalist Dr. Lobo. Agree with plan to wean patient off ventilator as tolerated.       Alexis Gomez MD

## 2023-09-06 NOTE — PLAN OF CARE
Goal Outcome Evaluation:         Pt oriented, lethargic at beginning of shift but now alert. Able to make needs known via mouthing or writing on paper. Trach in place on vent: 40% FiO2, , R 12, P 5. Site oozing, dressing changed prn. Suction and inner cannula change per RT this shift, secretions bloody. LS dim. HR SR 70-80s. C/o mild pain to throat/neck, on suboxone, and received prn fentanyl x1 and dilaudid x1. Crepitus present around trach and neck. LR at 75ml/hr to PIV, scheduled unasyn. Voiding marginal amounts per urinal. Sitter at bedside for safety with trach, discontinued this morning as pt has been calm/cooperative and not pulling at lines. Mouth swabs as needed.     Face to face report given with opportunity to observe patient.    Report given to Garry, Wilfred CARDENAS RN   9/6/2023  7:05 AM

## 2023-09-06 NOTE — PROGRESS NOTES
Care Transitions focused note:      Orders sent to [facility] for trach supplies. Confirmed with Arcadio at [facility] that information was received and he believed at this time they had everything they needed.

## 2023-09-06 NOTE — PLAN OF CARE
Patient drowsy but orientated and following commands, NSR and BP's remained stable. Patient had trach placed this afternoon and became dislodged. Providers were notified and came to bedside. Trach had to be replaced and a 6 xlt trach placed by ENT. Patient received dilaudid and versed during change of trach, see MAR. Patient drowsy following trach replacement. Daughter Artis at bedside to see patient. Left IV infusing LR at 75ml/hr, R IV infusing NS at 10 ml/hr. IV tylenol infused. Patient DTV, oncoming shift to bladder scan. No BM. Pt to be started on Unasyn as an ABX. NPO. Bedrest at this time. Remains free of injury all shift. Bed in low position, 1:1 at bedside to supervise pt.     Face to face report given with opportunity to observe patient.    Report given to Vangie Pierce RN   9/5/2023  7:36 PM

## 2023-09-06 NOTE — CARE PLAN
Patient transferred to CT with RT and bedside nurses. Patient on portable monitor and remained vitally stable throughout. Patient back in room with no complications.

## 2023-09-06 NOTE — UTILIZATION REVIEW
Admission Status; Secondary Review Determination       Under the authority of the Utilization Management Committee, the utilization review process indicated a secondary review on the above patient. The review outcome is based on review of the medical records, discussions with staff, and applying clinical experience noted on the date of the review.     (x) Inpatient Status Appropriate - This patient's medical care is consistent with medical management for inpatient care and reasonable inpatient medical practice.     RATIONALE FOR DETERMINATION     Patient requires inpatient admission versus short stay observation or outpatient treatment for the following reasons:  68 year old male with pmh of laryngeal cancer status post surgry and radiation who was admitted on 9/5 for recurrent e/o SOB. ENT consulted and his laryngoscopy was notable for tracheal narrowing with an obstructive supraglottic mass. He had an emergent tracheostomy placed and there was a need for mechanical ventilation after. Thus he meets CMS guidelines for inpatient admission.     The expected length of stay at the time of admission was more than 2 nights because of the severity of illness, intensity of service provided, and risk for adverse outcome. Inpatient admission is appropriate.         This document was produced using voice recognition software       The information on this document is developed by the utilization review team in order for the business office to ensure compliance. This only denotes the appropriateness of proper admission status and does not reflect the quality of care rendered.   The definitions of Inpatient Status and Observation Status used in making the determination above are those provided in the CMS Coverage Manual, Chapter 1 and Chapter 6, section 70.4.   Sincerely,   Odell Del Toro DO  Physician Advisor  [facility].

## 2023-09-06 NOTE — PROVIDER NOTIFICATION
Pt c/o of some pain around trach site, nothing prn ordered, MD notified.   (3) assistive equipment and person

## 2023-09-06 NOTE — PROGRESS NOTES
Pt removed from mechanical ventilator and placed on trach collar with humidity. FiO2 40%. Inner cannula changed. Pt tolerating well.

## 2023-09-06 NOTE — PROGRESS NOTES
09/06/23 0800   Appointment Info   Signing Clinician's Name / Credentials (SLP) Edelmira Hayes MS CCC-SLP   Appointment Canceled Reason (SLP) Hold (see Cancel Comments row)   Appointment Cancel Comments (SLP) Discussed care with hospitalist and nursing staff this AM. Pt is currently on the vent and they are working on weaning him off. Per hospitalist, hold evaluation until pt has been weaned off of the vent. Plan to attempt evaluation again tomorrow AM.

## 2023-09-06 NOTE — MEDICATION SCRIBE - ADMISSION MEDICATION HISTORY
Medication Scribe Admission Medication History    Admission medication history is complete. The information provided in this note is only as accurate as the sources available at the time of the update.    Medication reconciliation/reorder completed by provider prior to medication history? Yes    Information Source(s): [facility]A    Pertinent Information:   Nursing completed review with patient upon admission. Multiple unsuccessful attempts made to review medications with patient. No concerns about PTA medications, no further information needed from pharmacy. No conflicting data from any available outside sources. Will remain available if needs arise in regards to PTA medications.     Changes made to PTA medication list:  Added: None  Deleted: None  Changed: None    Medication Affordability: unable to assess     Allergies reviewed with patient and updates made in EHR: unable to assess    Medication History Completed By: Johanna Collins 9/6/2023 2:10 PM    Prior to Admission medications    Medication Sig Last Dose Taking? Auth Provider Long Term End Date   acetaminophen (TYLENOL) 500 MG tablet Take 500 mg by mouth every 6 hours as needed for mild pain 9/4/2023 Yes Reported, Patient     Ascorbic Acid (VITAMIN C PO) 1 daily 9/5/2023 at 0800 Yes Reported, Patient     buprenorphine HCl-naloxone HCl (SUBOXONE) 4-1 MG per film Place 1 Film under the tongue 2 times daily 9/4/2023 at 0800 Yes Regina Hicks MD     ipratropium - albuterol 0.5 mg/2.5 mg/3 mL (DUONEB) 0.5-2.5 (3) MG/3ML neb solution Take 1 vial (3 mLs) by nebulization every 6 hours as needed for shortness of breath, wheezing or cough 9/4/2023 at 0800 Yes Washington Shaver MD Yes    Multiple Vitamins-Minerals (MULTIVITAMIN ADULT PO) Take by mouth daily 9/4/2023 at 0800 Yes Reported, Patient     naproxen sodium (ANAPROX) 220 MG tablet Take 1 tablet by mouth As directed when needed 9/4/2023 at 0800 Yes Reported, Patient     omeprazole  (PRILOSEC) 40 MG DR capsule Take 1 capsule (40 mg) by mouth daily Take 30-60 minutes before a meal. 9/4/2023 at 0800 Yes Regina Hicks MD     ibuprofen (ADVIL/MOTRIN) 200 MG capsule Take 200 mg by mouth every 4 hours as needed   Reported, Patient     naloxone (NARCAN) 4 MG/0.1ML nasal spray Spray 1 spray (4 mg) into one nostril alternating nostrils as needed for opioid reversal every 2-3 minutes until assistance arrives   Regina Hicks MD Yes

## 2023-09-06 NOTE — ANESTHESIA POSTPROCEDURE EVALUATION
Patient: Jorge A Mendosa    Procedure: Procedure(s):  CREATION, TRACHEOSTOMY  Direct Laryngoscopy with Biopsies and Frozens       Anesthesia Type:  General    Note:  Disposition: Inpatient   Postop Pain Control: Uneventful            Sign Out: Well controlled pain   PONV: No   Neuro/Psych: Uneventful            Sign Out: Acceptable/Baseline neuro status   Airway/Respiratory:             Events: Accidental EXtubation            Sign Out: AIRWAY IN SITU/Resp. Support               Airway in Situ/Respiratory Support: trach.                 Reason: Planned Pre-op   CV/Hemodynamics: Uneventful            Sign Out: Acceptable CV status; No obvious hypovolemia; No obvious fluid overload   Other NRE:    DID A NON-ROUTINE EVENT OCCUR? YES    Event details/Postop Comments:  Trach dislodgement discovered in ICU, replaced by ENT at bedside.         Last vitals:  Vitals:    09/05/23 2000 09/05/23 2005 09/05/23 2030   BP: 94/64  98/66   Pulse: 74  77   Resp: 12  12   Temp:      SpO2: 98% 97% 98%       Electronically Signed By: CHERISE Dunaway [facility]  September 5, 2023  8:57 PM

## 2023-09-06 NOTE — PROGRESS NOTES
[facility]    Medicine Progress Note - Hospitalist Service    Date of Admission:  9/5/2023    Assessment & Plan      Jorge A Mendosa is a 68 year old male admitted on 9/5/2023. He established diagnosis of laryngeal cancer supposed surgery and radiation.  Patient has been coming to the emergency room with complaint of shortness of breath and wheezing he was treated for COPD exacerbation presented again on 9/5 he was given DuoNebs and Solu-Medrol.  We did consult ENT for further recommendations as he missed outpatient appointments.  Per Dr. Mcnally   did do bedside laryngoscopy and he had narrowing of the airways. ON 9/6 patient underwent Tracheostomy with Dr Mcnally and he did require a switch out tracheostomy later in day as the tube was dislodge. He continues to be on vent but we julia try to wean him off. In OR findings were  Large exophytic poorly defined mass obstructing the entire supraglottis with probable cartilage invasion. Pathology in OR showed Invasive keratinizing squamous cell carcinoma.   On 9/6 he had pneumoperitoneum and left apical pneumothorax finding on ct imaging of neck. General surgery was consulted.     Principal Problem:    Acute and chronic respiratory failure with hypoxia (H) (9/5/2023)    Voice hoarseness (9/5/2023)    COPD exacerbation (H) (9/5/2023)  S/P Tracheostomy and change out on 9/6/23  Acute hypoxic respiratory failure requiring ventilator   -nebs and steroids  for now for COPD exacerbation    Wean off oxygen   -ENT consulted, appreciate input  -prn fentanyl for pain   -once off vent speech therapy to consult  -iv fluids  -IV ppi  -abg was ordered    Addendum  Discussed with  CC tele will plan on getting him off vent today as he follows commands. ABG Pending  On CPAP    Pneumomediastinum  Left apical pneumothorax   CT neck done showing   Pneumomediastinum with a small left apical pneumothorax.  Extensive subcutaneous emphysema in the chest wall and throughout  "the  neck.  Spoke with Dr Mcfadden no recs for chest tube at this time. Recs for chest xray  for baseline  Will do daily chest X-rays  Consulted general surgery  Discussed with ENT     Active Problems:    Chronic midline low back pain with sciatica, sciatica laterality unspecified (8/17/2016)  -on home suboxone       Laryngeal cancer (H) (12/4/2018)    History of radiation therapy (7/29/2019)  central supraglottic mass:  -Invasive keratinizing squamous cell carcinoma.  -out patient follow up with oncology      Opioid use disorder (4/6/2020)  -on suboxone     Tobacco use  Prn nicotine patch        Diet: NPO for Medical/Clinical Reasons Except for: Meds    DVT Prophylaxis: Pneumatic Compression Devices  Abrams Catheter: Not present  Lines: None     Cardiac Monitoring: None  Code Status: Full Code      Clinically Significant Risk Factors Present on Admission                       # Cachexia: Estimated body mass index is 17.46 kg/m  as calculated from the following:    Height as of this encounter: 1.727 m (5' 8\").    Weight as of this encounter: 52.1 kg (114 lb 13.8 oz).              Disposition Plan     Expected Discharge Date: 09/06/2023                  Sonny Lobo, DO  Hospitalist Service  [facility]  Securely message with [employer] (more info)  Text page via [employer] Paging/Directory   ______________________________________________________________________    Interval History   Patient was seen this morning for medical rounds.Trach was changed out at bedside     Physical Exam   Vital Signs: Temp: 98.7  F (37.1  C) Temp src: Tympanic BP: 109/71 Pulse: 68   Resp: 12 SpO2: 98 % O2 Device: Mechanical Ventilator Oxygen Delivery: 4 LPM  Weight: 114 lbs 13.75 oz    Physical Exam  Constitutional:       Comments: Frail appearing stated age male    HENT:      Right Ear: External ear normal.      Left Ear: External ear normal.      Nose: Nose normal.      Mouth/Throat:      Pharynx: Oropharynx is clear.   Neck:      " Comments: Emphysematous changes neck   Tracheostomy midline   Cardiovascular:      Rate and Rhythm: Normal rate.   Pulmonary:      Effort: Pulmonary effort is normal.   Abdominal:      General: Abdomen is flat.   Musculoskeletal:         General: No swelling.   Skin:     Coloration: Skin is not jaundiced.   Neurological:      Mental Status: Mental status is at baseline.            Medical Decision Making       49 MINUTES SPENT BY ME on the date of service doing chart review, history, exam, documentation & further activities per the note.      Data         Imaging results reviewed over the past 24 hrs:   Recent Results (from the past 24 hour(s))   XR Chest Port 1 View    Narrative    PROCEDURE:  XR CHEST PORT 1 VIEW    HISTORY: trach placement. .    COMPARISON:  9/5/2023    FINDINGS:  A tracheostomy tube is in position. Lungs are well aerated. There is  pronounced subcutaneous emphysema. The cardiomediastinal contours are  stable.      Impression    IMPRESSION: Subcutaneous emphysema following tracheostomy placement.  Lungs appear well aerated.    EVAN SANTOYO MD         SYSTEM ID:  [id number]

## 2023-09-06 NOTE — PLAN OF CARE
Patient alert and orientated throughout shift, afebrile, PERRL. NSR-SD, HR and BP's remain stable. Patient weaned off vent and is now on trach dome 30% FiO2. Lung sounds diminished with some crackles/crepitus. Patient having moderate secretions and serosanguinous drainage around trach. Bowel sounds active, voiding in urinal, no BM. Ax2 up to the chair this afternoon with gait belt. Pain 5-7/10 throughout shift in neck/throat. PRN fentanyl given x1 and provider ordered one time dose of IV tylenol as well (see MAR).     Face to face report given with opportunity to observe patient.    Report given to Vangie Pierce RN   9/6/2023  7:15 PM

## 2023-09-06 NOTE — PROGRESS NOTES
"Otolaryngology Progress Note          Jorge A Mendosa is a 68 year old male  Who is postop day 0 status post emergent awake tracheotomy and direct laryngoscopy with biopsy for an obstructive supraglottic mass.  I was called to the ICU because they could not pass the suction through the tracheotomy although his saturations were in the high 90s.    Physical Exam  /80   Pulse 79   Temp 98.6  F (37  C) (Tympanic)   Resp 18   Ht 1.727 m (5' 8\")   Wt 52.1 kg (114 lb 13.8 oz)   SpO2 98%   BMI 17.46 kg/m    General - The patient is alert.  There is diffuse subcutaneous crepitus of the upper chest, surrounding the tracheotomy and slight neck crepitus.  The trachea is midline and there is no hemorrhage.    PROCEDURE    I passed a flexible laryngoscope through the trach and the trach is occluded by subcutaneous tissue.  I removed the stay sutures and the Triny flap suture.  The tracheotomy was deflated and removed.  I placed the flexible scope through the trach and tried to reinsert the tracheostomy but there was bleeding obstructing my scope view.  The scope and trach were removed.  The trach tray was opened.  His oxygen saturations were dropping into the 80s.  I were headlight throughout the procedure and used a Yankauer suction to clear blood and was able to  visualized the trachea.  A cry Cook was placed and I used a new 6 extended length cuffed Shiley which was inserted with the obturator.  Obturator was removed and the inner cannula was placed.  I used a flexible scope through the trach to ensure the trach was in good position.  The airway is clear down to the ana maría.  The scope was removed.  The tracheostomy tube was secured to the neck with a Velcro trach collar.  Oxygen saturations are in the high 90s.  There is minimal oozing.  A trach sponge was placed.  Postoperative chest x-ray shows clear lungs.  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        Taisha Mcnally D.O.  Otolaryngology/Head and Neck Surgery  Allergy

## 2023-09-06 NOTE — PROGRESS NOTES
Patient remains on ventilator with settings at:  Mode: CMV  RR: 12  VT: 450  FIO2: 40  PEEP: 5  PS:    Cuff checked: Yes    Breath sounds: Diminished and clear  SpO2: 98%  Suction: Moderate amounts thick bloody secretions  Weaning trial attempted: No    Ambu bag present: Yes      Trach care and Inner cannula changed at 0200\.

## 2023-09-06 NOTE — PROGRESS NOTES
Clinic Care Coordination Contact  Care Team Conversations    Jorge A is currently admitted to the hospital at this time.  CC will continue monitor inpatient status and remain available to Jorge A for when he is discharged.    Margot Joel RN-BSN, [facility] Care Coordinator  [phone] opt. #3

## 2023-09-06 NOTE — PHARMACY-MEDICATION REGIMEN REVIEW
Pharmacy Antimicrobial Stewardship Assessment     Current Antimicrobial Therapy:  Anti-infectives (From now, onward)      Start     Dose/Rate Route Frequency Ordered Stop    23 1830  ampicillin-sulbactam (UNASYN) 3 g vial to attach to  mL bag         3 g  over 15-30 Minutes Intravenous EVERY 6 HOURS 23 1826            Indication: post-procedure    Days of Therapy: 2     Pertinent Labs:  Recent Labs   Lab Test 23  0926 23  0425   WBC 12.6* 9.5     No lab results found.    Invalid input(s): RATE, ESR     Temperature:  Temp (24hrs), Av.8  F (37.1  C), Min:98  F (36.7  C), Max:99.7  F (37.6  C)    Culture Results:       Recommendations/Interventions:  None at this time    Gay Krishna, [facility]  2023

## 2023-09-07 ENCOUNTER — APPOINTMENT (OUTPATIENT)
Dept: GENERAL RADIOLOGY | Facility: HOSPITAL | Age: 68
DRG: 004 | End: 2023-09-07
Attending: HOSPITALIST
Payer: MEDICARE

## 2023-09-07 ENCOUNTER — APPOINTMENT (OUTPATIENT)
Dept: SPEECH THERAPY | Facility: HOSPITAL | Age: 68
DRG: 004 | End: 2023-09-07
Attending: PHYSICIAN ASSISTANT
Payer: MEDICARE

## 2023-09-07 ENCOUNTER — APPOINTMENT (OUTPATIENT)
Dept: GENERAL RADIOLOGY | Facility: HOSPITAL | Age: 68
DRG: 004 | End: 2023-09-07
Attending: PHYSICIAN ASSISTANT
Payer: MEDICARE

## 2023-09-07 LAB
ANION GAP SERPL CALCULATED.3IONS-SCNC: 9 MMOL/L (ref 7–15)
BASOPHILS # BLD AUTO: 0 10E3/UL (ref 0–0.2)
BASOPHILS NFR BLD AUTO: 0 %
BUN SERPL-MCNC: 26.2 MG/DL (ref 8–23)
CALCIUM SERPL-MCNC: 8.4 MG/DL (ref 8.8–10.2)
CHLORIDE SERPL-SCNC: 101 MMOL/L (ref 98–107)
CREAT SERPL-MCNC: 0.55 MG/DL (ref 0.67–1.17)
DEPRECATED HCO3 PLAS-SCNC: 30 MMOL/L (ref 22–29)
EGFRCR SERPLBLD CKD-EPI 2021: >90 ML/MIN/1.73M2
EOSINOPHIL # BLD AUTO: 0.1 10E3/UL (ref 0–0.7)
EOSINOPHIL NFR BLD AUTO: 1 %
ERYTHROCYTE [DISTWIDTH] IN BLOOD BY AUTOMATED COUNT: 14 % (ref 10–15)
GLUCOSE BLDC GLUCOMTR-MCNC: 94 MG/DL (ref 70–99)
GLUCOSE SERPL-MCNC: 79 MG/DL (ref 70–99)
HCT VFR BLD AUTO: 35.7 % (ref 40–53)
HGB BLD-MCNC: 10.9 G/DL (ref 13.3–17.7)
IMM GRANULOCYTES # BLD: 0 10E3/UL
IMM GRANULOCYTES NFR BLD: 0 %
LYMPHOCYTES # BLD AUTO: 1.8 10E3/UL (ref 0.8–5.3)
LYMPHOCYTES NFR BLD AUTO: 17 %
MCH RBC QN AUTO: 28.9 PG (ref 26.5–33)
MCHC RBC AUTO-ENTMCNC: 30.5 G/DL (ref 31.5–36.5)
MCV RBC AUTO: 95 FL (ref 78–100)
MONOCYTES # BLD AUTO: 1 10E3/UL (ref 0–1.3)
MONOCYTES NFR BLD AUTO: 9 %
NEUTROPHILS # BLD AUTO: 7.8 10E3/UL (ref 1.6–8.3)
NEUTROPHILS NFR BLD AUTO: 73 %
NRBC # BLD AUTO: 0 10E3/UL
NRBC BLD AUTO-RTO: 0 /100
PATH REPORT.COMMENTS IMP SPEC: ABNORMAL
PATH REPORT.COMMENTS IMP SPEC: YES
PATH REPORT.FINAL DX SPEC: ABNORMAL
PATH REPORT.GROSS SPEC: ABNORMAL
PATH REPORT.INTRAOP OBS SPEC DOC: ABNORMAL
PATH REPORT.MICROSCOPIC SPEC OTHER STN: ABNORMAL
PATH REPORT.RELEVANT HX SPEC: ABNORMAL
PHOTO IMAGE: ABNORMAL
PLATELET # BLD AUTO: 228 10E3/UL (ref 150–450)
POTASSIUM SERPL-SCNC: 4.2 MMOL/L (ref 3.4–5.3)
RBC # BLD AUTO: 3.77 10E6/UL (ref 4.4–5.9)
SODIUM SERPL-SCNC: 140 MMOL/L (ref 136–145)
WBC # BLD AUTO: 10.7 10E3/UL (ref 4–11)

## 2023-09-07 PROCEDURE — 250N000011 HC RX IP 250 OP 636: Performed by: NURSE PRACTITIONER

## 2023-09-07 PROCEDURE — 250N000011 HC RX IP 250 OP 636: Performed by: INTERNAL MEDICINE

## 2023-09-07 PROCEDURE — 258N000003 HC RX IP 258 OP 636: Performed by: NURSE PRACTITIONER

## 2023-09-07 PROCEDURE — 99232 SBSQ HOSP IP/OBS MODERATE 35: CPT | Performed by: OTOLARYNGOLOGY

## 2023-09-07 PROCEDURE — 94640 AIRWAY INHALATION TREATMENT: CPT

## 2023-09-07 PROCEDURE — 74230 X-RAY XM SWLNG FUNCJ C+: CPT

## 2023-09-07 PROCEDURE — 99232 SBSQ HOSP IP/OBS MODERATE 35: CPT | Performed by: NURSE PRACTITIONER

## 2023-09-07 PROCEDURE — C9113 INJ PANTOPRAZOLE SODIUM, VIA: HCPCS | Mod: JZ | Performed by: HOSPITALIST

## 2023-09-07 PROCEDURE — 999N000157 HC STATISTIC RCP TIME EA 10 MIN

## 2023-09-07 PROCEDURE — 85025 COMPLETE CBC W/AUTO DIFF WBC: CPT | Performed by: HOSPITALIST

## 2023-09-07 PROCEDURE — 250N000013 HC RX MED GY IP 250 OP 250 PS 637: Performed by: NURSE PRACTITIONER

## 2023-09-07 PROCEDURE — 71045 X-RAY EXAM CHEST 1 VIEW: CPT

## 2023-09-07 PROCEDURE — 94640 AIRWAY INHALATION TREATMENT: CPT | Mod: 76

## 2023-09-07 PROCEDURE — 250N000013 HC RX MED GY IP 250 OP 250 PS 637: Performed by: HOSPITALIST

## 2023-09-07 PROCEDURE — 250N000009 HC RX 250: Performed by: HOSPITALIST

## 2023-09-07 PROCEDURE — 250N000011 HC RX IP 250 OP 636: Mod: JZ | Performed by: HOSPITALIST

## 2023-09-07 PROCEDURE — 92611 MOTION FLUOROSCOPY/SWALLOW: CPT | Mod: GN

## 2023-09-07 PROCEDURE — 36415 COLL VENOUS BLD VENIPUNCTURE: CPT | Performed by: HOSPITALIST

## 2023-09-07 PROCEDURE — 80048 BASIC METABOLIC PNL TOTAL CA: CPT | Performed by: HOSPITALIST

## 2023-09-07 PROCEDURE — 250N000009 HC RX 250: Performed by: NURSE PRACTITIONER

## 2023-09-07 PROCEDURE — 120N000001 HC R&B MED SURG/OB

## 2023-09-07 RX ORDER — BARIUM SULFATE 400 MG/ML
SUSPENSION ORAL ONCE
Status: COMPLETED | OUTPATIENT
Start: 2023-09-07 | End: 2023-09-07

## 2023-09-07 RX ORDER — SODIUM CHLORIDE, SODIUM LACTATE, POTASSIUM CHLORIDE, CALCIUM CHLORIDE 600; 310; 30; 20 MG/100ML; MG/100ML; MG/100ML; MG/100ML
INJECTION, SOLUTION INTRAVENOUS CONTINUOUS
Status: DISCONTINUED | OUTPATIENT
Start: 2023-09-07 | End: 2023-09-10

## 2023-09-07 RX ADMIN — ACETAMINOPHEN 650 MG: 325 TABLET, FILM COATED ORAL at 18:13

## 2023-09-07 RX ADMIN — AMPICILLIN SODIUM AND SULBACTAM SODIUM 3 G: 2; 1 INJECTION, POWDER, FOR SOLUTION INTRAMUSCULAR; INTRAVENOUS at 18:16

## 2023-09-07 RX ADMIN — IPRATROPIUM BROMIDE AND ALBUTEROL SULFATE 3 ML: .5; 3 SOLUTION RESPIRATORY (INHALATION) at 02:03

## 2023-09-07 RX ADMIN — IPRATROPIUM BROMIDE AND ALBUTEROL SULFATE 3 ML: .5; 3 SOLUTION RESPIRATORY (INHALATION) at 14:29

## 2023-09-07 RX ADMIN — PANTOPRAZOLE SODIUM 40 MG: 40 INJECTION, POWDER, FOR SOLUTION INTRAVENOUS at 09:26

## 2023-09-07 RX ADMIN — ACETAMINOPHEN 1000 MG: 10 INJECTION INTRAVENOUS at 10:39

## 2023-09-07 RX ADMIN — BARIUM SULFATE 5 ML: 400 SUSPENSION ORAL at 13:59

## 2023-09-07 RX ADMIN — ACETAMINOPHEN 1000 MG: 10 INJECTION INTRAVENOUS at 03:43

## 2023-09-07 RX ADMIN — AMPICILLIN SODIUM AND SULBACTAM SODIUM 3 G: 2; 1 INJECTION, POWDER, FOR SOLUTION INTRAMUSCULAR; INTRAVENOUS at 12:09

## 2023-09-07 RX ADMIN — BUPRENORPHINE AND NALOXONE 1 FILM: 4; 1 FILM, SOLUBLE BUCCAL; SUBLINGUAL at 20:00

## 2023-09-07 RX ADMIN — AMPICILLIN SODIUM AND SULBACTAM SODIUM 3 G: 2; 1 INJECTION, POWDER, FOR SOLUTION INTRAMUSCULAR; INTRAVENOUS at 00:30

## 2023-09-07 RX ADMIN — METHYLPREDNISOLONE SODIUM SUCCINATE 40 MG: 40 INJECTION, POWDER, FOR SOLUTION INTRAMUSCULAR; INTRAVENOUS at 09:25

## 2023-09-07 RX ADMIN — SODIUM CHLORIDE, POTASSIUM CHLORIDE, SODIUM LACTATE AND CALCIUM CHLORIDE: 600; 310; 30; 20 INJECTION, SOLUTION INTRAVENOUS at 14:02

## 2023-09-07 RX ADMIN — IPRATROPIUM BROMIDE AND ALBUTEROL SULFATE 3 ML: .5; 3 SOLUTION RESPIRATORY (INHALATION) at 07:16

## 2023-09-07 RX ADMIN — IPRATROPIUM BROMIDE AND ALBUTEROL SULFATE 3 ML: .5; 3 SOLUTION RESPIRATORY (INHALATION) at 19:38

## 2023-09-07 RX ADMIN — BARIUM SULFATE 5 ML: 400 SUSPENSION ORAL at 14:00

## 2023-09-07 RX ADMIN — FENTANYL CITRATE 25 MCG: 50 INJECTION, SOLUTION INTRAMUSCULAR; INTRAVENOUS at 21:27

## 2023-09-07 RX ADMIN — FENTANYL CITRATE 25 MCG: 50 INJECTION, SOLUTION INTRAMUSCULAR; INTRAVENOUS at 03:47

## 2023-09-07 RX ADMIN — BUPRENORPHINE AND NALOXONE 1 FILM: 4; 1 FILM, SOLUBLE BUCCAL; SUBLINGUAL at 09:56

## 2023-09-07 RX ADMIN — FENTANYL CITRATE 25 MCG: 50 INJECTION, SOLUTION INTRAMUSCULAR; INTRAVENOUS at 16:07

## 2023-09-07 RX ADMIN — AMPICILLIN SODIUM AND SULBACTAM SODIUM 3 G: 2; 1 INJECTION, POWDER, FOR SOLUTION INTRAMUSCULAR; INTRAVENOUS at 06:33

## 2023-09-07 ASSESSMENT — ACTIVITIES OF DAILY LIVING (ADL)
ADLS_ACUITY_SCORE: 25
CONCENTRATING,_REMEMBERING_OR_MAKING_DECISIONS_DIFFICULTY: NO
ADLS_ACUITY_SCORE: 25
DIFFICULTY_COMMUNICATING: NO
ADLS_ACUITY_SCORE: 36
ADLS_ACUITY_SCORE: 25
ADLS_ACUITY_SCORE: 25
ADLS_ACUITY_SCORE: 22
WEAR_GLASSES_OR_BLIND: YES
ADLS_ACUITY_SCORE: 40
HEARING_DIFFICULTY_OR_DEAF: NO
VISION_MANAGEMENT: GLASSES FOR READING
ADLS_ACUITY_SCORE: 25
ADLS_ACUITY_SCORE: 40

## 2023-09-07 NOTE — PLAN OF CARE
Goal Outcome Evaluation:         Pt A&O. Remains on trach dome with 30%. LS dim, strong productive cough independently with thick blood tinged sputum, suctioning as pt request, also using younker and spit cup. Trach cares as charted. VS stable ex HR with more ectopy-see previous note, otherwise SR 50-70s. C/o mod to severe pain, scheduled IV tylenol and prn fentanyl which pt states is effective. On scheduled suboxone. LR at 50ml/hr, scheduled unasyn. NPO, using oral swabs. Up with Ax1-2 d/t equipment. Voiding per urinal. Able to make needs known.    Face to face report given with opportunity to observe patient.    Report given to Nemo and Krysten, Wilfred CARDENAS RN   9/7/2023  7:28 AM

## 2023-09-07 NOTE — PROGRESS NOTES
"Otolaryngology Progress Note          Jorge A Mendosa is a 68 year old male with locally advanced glottic squamous cell carcinoma status post emergency tracheostomy and DL on 9/5.    Cm is smiling and sitting at the bedside.  He denies pain or bleeding.      We are awaiting bedside swallow study    He has water at the bedside and swallowed without cough.          Physical Exam  /83   Pulse 63   Temp 98.9  F (37.2  C) (Tympanic)   Resp 14   Ht 1.727 m (5' 8\")   Wt 57 kg (125 lb 10.6 oz)   SpO2 92%   BMI 19.11 kg/m    General - The patient is well nourished and well developed, and appears to have good nutritional status.  Alert and oriented to person and place, interactive.  Head and Face - Normocephalic and atraumatic, with no gross asymmetry noted of the contour of the facial features.  The facial nerve is intact, with strong symmetric movements.  Neck-no palpable lymphadenopathy or thyroid mass.  Trachea is midline.  Trach in good position without bleeding or immediate peritracheal crepitus.  Chest neck and facial subcutaneous crepitus is improved.    CT neck was reviewed yesterday dated 9/6/2023.  There is no cervical lymphadenopathy.  Pneumomediastinum present as expected.  The small left apical pneumothorax.  The larynx itself is not well visualized and I did ask for an addendum from radiologist and spoke to Dr. Colvin today.  I cannot be certain there is not cartilage erosion present.  Diffuse mass present at the level of the glottis and supraglottis      Impression/Plan  Jorge A Mendosa is a 68 year old male   Locally advanced glottic SCC s/p emergent trach and DL    VFSS (Video Fluoroscopic Swallow Study) and ST pending  PET pending    Repeat CT neck prior to visit [facility] if possible, wait at least one week to allow subuctaneous emphysema to resolve.      Do not delay PET    Referral for laryngectomy has been arranged    Ok to discharge with trach cares per ENT      Taisha MITTAL. " MICHAEL Mcnally.  Otolaryngology/Head and Neck Surgery  Allergy

## 2023-09-07 NOTE — PROGRESS NOTES
Checked in with Jorge A.  He is eager to be able to eat.  He indicates that he will be staying with daughter, Artis at discharge.  Spoke with Artis and obtained address for [facility].  Called [facility], provide address for delivery and anticipated discharge.  They advised that they are working on the order at the time of call.

## 2023-09-07 NOTE — PROVIDER NOTIFICATION
Pt has been having more ectopy. Has had 3 short runs of vtach, 4-6 long. Last K+ 3.7 and BMP and CBC order for am draw. Updated MD.

## 2023-09-07 NOTE — PHARMACY-MEDICATION REGIMEN REVIEW
Pharmacy Antimicrobial Stewardship Assessment     Current Antimicrobial Therapy:  Anti-infectives (From now, onward)      Start     Dose/Rate Route Frequency Ordered Stop    23 1830  ampicillin-sulbactam (UNASYN) 3 g vial to attach to  mL bag         3 g  over 15-30 Minutes Intravenous EVERY 6 HOURS 23 1826          Indication: post-procedure (tracheostomy)    Days of Therapy: 3     Pertinent Labs:  Recent Labs   Lab Test 23  0444 23  0926 23  0425   WBC 10.7 12.6* 9.5     No lab results found.    Invalid input(s): RATE, ESR     Temperature:  Temp (24hrs), Av.8  F (36.6  C), Min:97.3  F (36.3  C), Max:98.3  F (36.8  C)    Culture Results:       Recommendations/Interventions:  Per UpToDate, post-op tracheostomy antibiotic prophylaxis is generally not recommended unless indicated for another reason (e.g. endocarditis prophylaxis).     Gay Krishna, [facility]  2023

## 2023-09-07 NOTE — PROGRESS NOTES
[facility]    Hospitalist Progress Note    Date of Service (when I saw the patient): 09/07/2023    Assessment & Plan       Acute and chronic respiratory failure with hypoxia (H) (9/5/2023)    S/P Tracheostomy and change out on 9/6/23-Tolerating room air-now on humidity collar only. Decreasing secretions from trach. Please see ENT notes.     Pneumomediastinum  Left apical pneumothorax   CT neck done showing pneumomediastinum with a small left apical pneumothorax.  Extensive subcutaneous emphysema in the chest wall and throughout the  neck.  Spoke with Dr Bogdan jeffers for chest tube at this time. Daily CXR.      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  -out patient follow up with oncology       Opioid use disorder (4/6/2020)  -on suboxone      Tobacco use  Prn nicotine patch         DVT Prophylaxis: Pneumatic Compression Devices  Code Status: Full Code    Disposition: Expected discharge in 1-2 days once cleared by ENT.    Loulou Rosales CNP    Interval History   Denies chest pain, abdominal pain or dyspnea. Feels secretions are decreasing.     -Data reviewed today: I reviewed all new labs and imaging results over the last 24 hours.     Physical Exam   Temp: (P) 98.2  F (36.8  C) Temp src: (P) Tympanic BP: (P) 144/84 Pulse: (P) 81   Resp: (P) 16 SpO2: (P) 96 % O2 Device: (P) Trach dome Oxygen Delivery: 6 LPM  Vitals:    09/05/23 0800 09/06/23 1427 09/07/23 0408   Weight: 52.1 kg (114 lb 13.8 oz) 54.6 kg (120 lb 5.9 oz) 57 kg (125 lb 10.6 oz)     Vital Signs with Ranges  Temp:  [97.3  F (36.3  C)-98.9  F (37.2  C)] (P) 98.2  F (36.8  C)  Pulse:  [57-87] (P) 81  Resp:  [11-18] (P) 16  BP: (123-153)/() (P) 144/84  FiO2 (%):  [20 %-30 %] (P) 20  %  SpO2:  [87 %-100 %] (P) 96 %  I/O last 3 completed shifts:  In: 1224 [I.V.:1224]  Out: 825 [Urine:825]    Peripheral IV 09/07/23 Left Hand (Active)   Site Assessment WDL 09/07/23 1225   Line Status Infusing 09/07/23 1225   Dressing Transparent 09/07/23 1225   Dressing Status clean;dry;intact 09/07/23 1225   Line Intervention Flushed 09/07/23 1225   Phlebitis Scale 0-->no symptoms 09/07/23 1225   Infiltration? no 09/07/23 1225   Number of days: 0       Surgical Airway Tracheostomy Shiley 6.5 Cuffed (Active)   Trach Cap Status Uncapped/Unplugged 09/07/23 1225   Stoma Site Assessment Drainage 09/07/23 1225   Stoma Site Care Gauze applied/changed 09/07/23 1000   Skin Assessment Clean 09/07/23 1225   Skin Integrity Intervention Protective barrier applied 09/07/23 0203   Securement Device Foam trach ties 09/07/23 1225   Trach Tie Assessment 1-2 Finger allowance between skin and ties;Intact 09/07/23 1225   Inner Cannula Care Changed/new 09/06/23 1945   Bedside Safety Supplies Manual resuscitation bag;Trach adaptor;Oxygen source;Suction source;Extra trach of current size;Obturator;Humidity source;10 cc syringe (for cuffed trachs) 09/07/23 0203   Number of days: 2       Incision/Surgical Site 09/05/23 Neck (Active)   Incision Assessment UTV 09/07/23 1225   Closure Approximated;Sutures 09/05/23 1945   Drainage Description Serosanguinous 09/07/23 1225   Dressing Intervention Clean, dry, intact 09/07/23 1225   Number of days: 2       Incision/Surgical Site 09/05/23 Mouth (Active)   Incision Assessment UTV 09/07/23 1225   Number of days: 2     Line/device assessment(s) completed for medical necessity    Constitutional: Awake,alert, no acute distress  Respiratory: Clear bilaterally with upper airway rhonchi noted   Cardiovascular: HRR, murmurs, rubs, thrills   GI: Soft,nontender, bowel sounds positive   Skin/Integumen: No rashes, open areas or rashes.   Other:      Medications    lactated ringers 100 mL/hr at 09/07/23 1400       ampicillin-sulbactam  3 g Intravenous Q6H    buprenorphine HCl-naloxone HCl  1 Film Sublingual BID    ipratropium - albuterol 0.5 mg/2.5 mg/3 mL  3 mL Nebulization Q6H    methylPREDNISolone  40 mg Intravenous Daily    multivitamin w/minerals   Oral Daily    nicotine  1 patch Transdermal Daily    nicotine   Transdermal Q8H    nicotine   Transdermal Q8H    pantoprazole  40 mg Intravenous Daily with breakfast    sodium chloride (PF)  3 mL Intracatheter Q8H       Data   Recent Labs   Lab 09/07/23  1405 09/07/23  0444 09/06/23  0926 09/05/23  1328 09/05/23  0425   WBC  --  10.7 12.6*  --  9.5   HGB  --  10.9* 10.2*  --  12.5*   MCV  --  95 92  --  94   PLT  --  228 252  --  306   NA  --  140 143  --  144   POTASSIUM  --  4.2 3.7  --  3.9   CHLORIDE  --  101 102  --  98   CO2  --  30* 30*  --  31*   BUN  --  26.2* 31.8*  --  24.7*   CR  --  0.55* 0.67  --  0.68   ANIONGAP  --  9 11  --  15   CARA  --  8.4* 8.5*  --  9.6   GLC 94 79 103*   < > 117*   ALBUMIN  --   --   --   --  4.3   PROTTOTAL  --   --   --   --  7.6   BILITOTAL  --   --   --   --  0.5   ALKPHOS  --   --   --   --  110   ALT  --   --   --   --  30   AST  --   --   --   --  34    < > = values in this interval not displayed.       Recent Results (from the past 24 hour(s))   XR Chest Port 1 View    Narrative    PROCEDURE:  XR CHEST PORT 1 VIEW    HISTORY:  pneumothorax.     COMPARISON:  9/6/2023 at 1424 hours    FINDINGS:   The there is a widespread pneumomediastinum and subcutaneous emphysema  stable from previous examination. There is an endotracheal tube with  its tip above the ana maría. No pneumothorax is seen.      Impression    IMPRESSION:  Extensive pneumomediastinum and subcutaneous emphysema  stable from previous examination      FRANKLIN CASTRO MD         SYSTEM ID:  [id number]   XR Video Swallow with SLP or OT    Narrative    PROCEDURE: XR VIDEO SWALLOW WITH SLP OR OT 9/7/2023 2:01 PM    HISTORY: New trach    COMPARISONS: None.    TECHNIQUE:  Modified barium swallow    FINDINGS: On thin barium consistencies the patient aspirated. On  thicker consistencies the patient was able to swallow without  aspiration. No abnormal pooling in the vallecula or piriform sinuses  was seen. The fluoroscopy time was 0.6 minutes         FRANKLIN CASTRO MD         SYSTEM ID:  [id number]

## 2023-09-07 NOTE — PROGRESS NOTES
Plan to complete video swallow evaluation @1330 this afternoon. Updated care team regarding time change. Discussed video swallow with pt and he expressed agreement with completing evaluation.

## 2023-09-07 NOTE — PROGRESS NOTES
"   09/07/23 1700   Appointment Info   Signing Clinician's Name / Credentials (SLP) Edelmira Hayes MS CCC-SLP   General Information   Onset of Illness/Injury or Date of Surgery 09/05/23   Referring Physician Kailyn Campbell PA-C   Patient/Family Therapy Goal Statement (SLP) Wanting to eat again.   Pertinent History of Current Problem Pt is a 68 year old male who was admitted on 9/5/23. After admission pt was scoped by ENT with the following findings;   \" There is a bulky exophytic left supraglottic mass obliterating the left vocal cord with polypoid exophytic extension along the anterior commissure and probable involvement of the right supraglottis.  The left vocal cord is fixed.  There is minimal mobility of the right arytenoid without any obvious right true cord mobility.  The right true cord is visible and there is no sabrina right true cord mass but visualization is difficult due to the size of the left mass.  The glottic is narrowed to 2-3 millimeters.  The pyriform sinuses were open without pooling of secretions.\" and on the same day a trach was placed. Orders received for swallow evaluation. Prior to being admitted pt denied difficulty with swallowing however did have a reported recent weight loss.   General Observations Radiologist reported at the beginning of the evaluation that limited barium was to provided due to pt needing a PET scan next week and the barium having a possibility at interfering.       Present no   Pain Assessment   Patient Currently in Pain   (Pt denied pain with swallowing.)   Type of Evaluation   Type of Evaluation Swallow Evaluation   Oral Motor   Oral Musculature generally intact   Structural Abnormalities present   Mucosal Quality dry   Dentition (Oral Motor)   Dentition (Oral Motor) dental appliance/dentures;dentition impacts chewing ability   Dental Appliance/Denture (Oral Motor) upper  (Pt reports that he does not have lower dental appliance)   Facial Symmetry (Oral " Motor)   Facial Symmetry (Oral Motor) WNL   Lip Function (Oral Motor)   Lip Range of Motion (Oral Motor) WNL   Lip Strength (Oral Motor) WNL   Tongue Function (Oral Motor)   Tongue Strength (Oral Motor) WNL   Tongue Coordination/Speed (Oral Motor) WNL   Tongue ROM (Oral Motor) WNL   Jaw Function (Oral Motor)   Jaw Function (Oral Motor) WNL   Cough/Swallow/Gag Reflex (Oral Motor)   Gag Reflex (Oral Motor) not tested   Volitional Swallow (Oral Motor) WNL   Vocal Quality/Secretion Management (Oral Motor)   Vocal Quality (Oral Motor) aphonia  (Speaking valve not yet in place.)   Secretion Management (Oral Motor) oral suctioning required   General Swallowing Observations   Past History of Dysphagia Pt denied past history of dysphagia. He does have history of laryngeal cancer.   Comment, Secretions/Suctioning Pt continues to require suctioning via trach and mouth.   Respiratory Support (General Swallowing Observations) trach collar   Current Diet/Method of Nutritional Intake (General Swallowing Observations, NIS) NPO   Swallowing Evaluation Videofluoroscopic swallow study (VFSS)   VFSS Evaluation   Radiologist Dr. Colvin   Views Taken right lateral   Physical Location of Procedure DI   VFSS Textures Trialed thin liquids;mildly thick liquids;moderately thick liquids/liquidized;pureed   VFSS Eval: Thin Liquid Texture Trial   Mode of Presentation, Thin Liquid cup;spoon;self-fed   Order of Presentation 1, 4   Preparatory Phase poor bolus control   Oral Phase, Thin Liquid premature pharyngeal entry   Bolus Location When Swallow Triggered pyriforms   Pharyngeal Phase, Thin Liquid residue in vallecula   Rosenbek's Penetration Aspiration Scale: Thin Liquid Trial Results 8 - contrast passes glottis, visible subglottic residue remains, absent patient response (aspiration)   Response to Aspiration absent response   Diagnostic Statement Pt demonstrating overt aspiration during thin liquid trials. Silent response from pt. Attempted  to cue a cough however it remained unproductive.   VFSS Eval: Mildly Thick Liquids   Mode of Presentation spoon;self-fed   Order of Presentation 5   Preparatory Phase poor bolus control   Oral Phase premature pharyngeal entry   Bolus Location When Swallow Triggered valleculae   Pharyngeal Phase residue in vallecula  (coating on epiglottis)   Diagnostic Statement Pt demonstrating overt aspiration during mildly/nectar thickened liquid trials. Silent response from pt. Attempted to cue a cough however it remained unproductive.   VFSS Eval: Moderately Thick Liquids   Mode of Presentation spoon;self-fed   Order of Presentation 6   Preparatory Phase poor bolus control   Oral Phase premature pharyngeal entry   Bolus Location When Swallow Triggered pyriforms   Pharyngeal Phase residue in vallecula  (coating on epiglottis)   Rosenbek's Penetration Aspiration Scale 8 - contrast passes glottis, visible subglottic residue remains, absent patient response (aspiration)   Response to Aspiration absent response   Diagnostic Statement Pt demonstrating overt aspiration during moderately/honey thickened liquid trials. Silent response from pt. Attempted to cue a cough however it remained unproductive.   VFSS Evaluation: Puree Solid Texture Trial   Mode of Presentation, Puree spoon;self-fed   Order of Presentation 3   Preparatory Phase WFL   Oral Phase, Puree premature pharyngeal entry   Bolus Location When Swallow Triggered valleculae   Pharyngeal Phase, Puree residue in vallecula;pharyngeal wall coating   Rosenbek's Penetration Aspiration Scale: Puree Food Trial Results 1 - no aspiration, contrast does not enter airway   Diagnostic Statement No aspiration was witnessed during single pureed texture trial, however residue in valleculae and posterior pharyngeal wall places pt at heightened risk for aspiration.   Esophageal Phase of Swallow   Patient reports or presents with symptoms of esophageal dysphagia No   Esophageal sweep performed  during today s vidofluoroscopic exam  Please refer to radiologist's report for details   Swallowing Recommendations   Diet Consistency Recommendations NPO;consider alternative means of nutrition   Comment, Swallowing Recommendations Recommend continuation of NPO status at this time with considerations for alternate means of nutrition. Pt is demonstrating silent aspiration of IDDSI 0 (thin liquids), IDDSI 2 (mildly/nectar thickened liquids), and IDDSI 3 (moderately/honey thickened liquids). No overt aspiration was witnessed during IDDSI 4 (pureed) texture, however pharyngeal residue places him at heightened risk of aspiration and due to his overall silent response to other textures it is not recommended at this time.   General Therapy Interventions   Planned Therapy Interventions Dysphagia Treatment   Dysphagia treatment Oropharyngeal exercise training;Modified diet education   Clinical Impression   Criteria for Skilled Therapeutic Interventions Met (SLP Eval) Yes, treatment indicated   SLP Diagnosis Oropharyngeal Dysphagia   Problem List (SLP) Swallowing   Functional Limitations Related to Problem List (SLP) Pt is unable to safely tolerate any PO intake at this time. Prior to being admitted pt was reportedly tolerating regular diet/thin liquids.   Risks & Benefits of therapy have been explained evaluation/treatment results reviewed;care plan/treatment goals reviewed;risks/benefits reviewed;current/potential barriers reviewed;participants voiced agreement with care plan;participants included;patient   Clinical Impression Comments Pt seen this afternoon for video fluoroscopic swallow study evaluation. Recommend continuation of NPO status at this time with considerations for alternate means of nutrition. Pt is demonstrating silent aspiration of IDDSI 0 (thin liquids), IDDSI 2 (mildly/nectar thickened liquids), and IDDSI 3 (moderately/honey thickened liquids). No overt aspiration was witnessed during IDDSI 4 (pureed)  texture, however pharyngeal residue places him at heightened risk of aspiration and due to his overall silent response to other textures it is not recommended at this time. Recommend repeat video fluoroscopic swallow study be completed in the next 1-2 weeks (outpatient) for updated assessment of swallow functions. Provided pt with education regarding evaluation results and recommendations of NPO status due to overt aspiration and risk of aspiration. Pt expressed that he does not want to get pneumonia and that he will do whatever it takes to get better. Extensive education was provided regarding silent response to aspiration during variety of textures and pt expressed understanding. Updated nursing staff and hospitalist regarding evaluation results.   SLP Total Evaluation Time   Evaluation, videofluoroscopic eval of swallow function Minutes (30459) 20   SLP Goals   Therapy Frequency (SLP Eval) 5 times/wk   SLP Predicted Duration/Target Date for Goal Attainment 09/15/23   SLP Goals Swallow   SLP: Safely tolerate diet without signs/symptoms of aspiration One P.O. texture;With use of swallow precautions;With use of compensatory swallow strategies;With assistance/supervision   SLP Discharge Planning   SLP Plan Follow up with pt to provide continued education regarding his current swallow function, plan for continued SLP care, and further recommendations.   SLP Discharge Recommendation home with assist;home with outpatient speech therapy   SLP Rationale for DC Rec Pt will require continued SLP services following discharge to target swallow functions.   SLP Brief overview of current status  Pt seen this afternoon for video fluoroscopic swallow study evaluation. Recommend continuation of NPO status at this time with considerations for alternate means of nutrition. Pt is demonstrating silent aspiration of IDDSI 0 (thin liquids), IDDSI 2 (mildly/nectar thickened liquids), and IDDSI 3 (moderately/honey thickened liquids). No  overt aspiration was witnessed during IDDSI 4 (pureed) texture, however pharyngeal residue places him at heightened risk of aspiration. Due to pt's overall silent response to aspiration with other textures and suspected fatigue IDDSI 4 (pureed) is not recommended at this time. Recommend repeat video fluoroscopic swallow study be completed in the next 1-2 weeks (outpatient) for updated assessment of swallow functions. Provided pt with education regarding evaluation results and recommendations of NPO status due to overt aspiration and risk of aspiration. Pt expressed that he does not want to get pneumonia and that he will do whatever it takes to get better. Extensive education was provided regarding silent response to aspiration during variety of textures and pt expressed understanding. Updated nursing staff and hospitalist regarding evaluation results.   Total Session Time   Total Session Time (sum of timed and untimed services) 20

## 2023-09-07 NOTE — PLAN OF CARE
"/83   Pulse 63   Temp 98.9  F (37.2  C) (Tympanic)   Resp 14   Ht 1.727 m (5' 8\")   Wt 57 kg (125 lb 10.6 oz)   SpO2 92%   BMI 19.11 kg/m      A&O, whispers, VSS, afebrile, does c/o pain to throat/neck and back 7/10 did give scheduled tylenol and suboxone with some relief, on 21% fio2 with humidification lungs dim with fine crackles to LLL, telemetry SR, changed trach dressingx3 this shift serosang drainage, tolerating suction well, coughing up red streak thick creamy sputum, remains NPO, did have swallow study completed this shift, upto bedside commode assist 1, 2 loose Bms charted, New IV to L wrist running LR at 100ml/hr up in chair most of shift, call light within reach, makes needs known, bed alarm on and audible.    Face to face report given with opportunity to observe patient.    Report given to Tate Noguera RNs    Krysten Yin RN   9/7/2023  3:44 PM      "

## 2023-09-08 ENCOUNTER — APPOINTMENT (OUTPATIENT)
Dept: GENERAL RADIOLOGY | Facility: HOSPITAL | Age: 68
DRG: 004 | End: 2023-09-08
Attending: NURSE PRACTITIONER
Payer: MEDICARE

## 2023-09-08 ENCOUNTER — APPOINTMENT (OUTPATIENT)
Dept: SPEECH THERAPY | Facility: HOSPITAL | Age: 68
DRG: 004 | End: 2023-09-08
Payer: MEDICARE

## 2023-09-08 ENCOUNTER — ANESTHESIA EVENT (OUTPATIENT)
Dept: SURGERY | Facility: HOSPITAL | Age: 68
DRG: 004 | End: 2023-09-08
Payer: MEDICARE

## 2023-09-08 ENCOUNTER — ANESTHESIA (OUTPATIENT)
Dept: SURGERY | Facility: HOSPITAL | Age: 68
DRG: 004 | End: 2023-09-08
Payer: MEDICARE

## 2023-09-08 ENCOUNTER — TELEPHONE (OUTPATIENT)
Dept: OTOLARYNGOLOGY | Facility: OTHER | Age: 68
End: 2023-09-08

## 2023-09-08 LAB
ANION GAP SERPL CALCULATED.3IONS-SCNC: 13 MMOL/L (ref 7–15)
BASOPHILS # BLD AUTO: 0 10E3/UL (ref 0–0.2)
BASOPHILS NFR BLD AUTO: 0 %
BUN SERPL-MCNC: 15.9 MG/DL (ref 8–23)
CALCIUM SERPL-MCNC: 8.7 MG/DL (ref 8.8–10.2)
CHLORIDE SERPL-SCNC: 94 MMOL/L (ref 98–107)
CREAT SERPL-MCNC: 0.49 MG/DL (ref 0.67–1.17)
DEPRECATED HCO3 PLAS-SCNC: 28 MMOL/L (ref 22–29)
EGFRCR SERPLBLD CKD-EPI 2021: >90 ML/MIN/1.73M2
EOSINOPHIL # BLD AUTO: 0.1 10E3/UL (ref 0–0.7)
EOSINOPHIL NFR BLD AUTO: 1 %
ERYTHROCYTE [DISTWIDTH] IN BLOOD BY AUTOMATED COUNT: 13.7 % (ref 10–15)
GLUCOSE BLDC GLUCOMTR-MCNC: 79 MG/DL (ref 70–99)
GLUCOSE SERPL-MCNC: 77 MG/DL (ref 70–99)
HCT VFR BLD AUTO: 39.1 % (ref 40–53)
HGB BLD-MCNC: 12.1 G/DL (ref 13.3–17.7)
IMM GRANULOCYTES # BLD: 0.1 10E3/UL
IMM GRANULOCYTES NFR BLD: 1 %
LYMPHOCYTES # BLD AUTO: 1.3 10E3/UL (ref 0.8–5.3)
LYMPHOCYTES NFR BLD AUTO: 10 %
MCH RBC QN AUTO: 28.4 PG (ref 26.5–33)
MCHC RBC AUTO-ENTMCNC: 30.9 G/DL (ref 31.5–36.5)
MCV RBC AUTO: 92 FL (ref 78–100)
MONOCYTES # BLD AUTO: 0.9 10E3/UL (ref 0–1.3)
MONOCYTES NFR BLD AUTO: 7 %
NEUTROPHILS # BLD AUTO: 10.2 10E3/UL (ref 1.6–8.3)
NEUTROPHILS NFR BLD AUTO: 81 %
NRBC # BLD AUTO: 0 10E3/UL
NRBC BLD AUTO-RTO: 0 /100
PLATELET # BLD AUTO: 235 10E3/UL (ref 150–450)
POTASSIUM SERPL-SCNC: 3.5 MMOL/L (ref 3.4–5.3)
RBC # BLD AUTO: 4.26 10E6/UL (ref 4.4–5.9)
SODIUM SERPL-SCNC: 135 MMOL/L (ref 136–145)
WBC # BLD AUTO: 12.6 10E3/UL (ref 4–11)

## 2023-09-08 PROCEDURE — 250N000013 HC RX MED GY IP 250 OP 250 PS 637: Performed by: SURGERY

## 2023-09-08 PROCEDURE — 250N000011 HC RX IP 250 OP 636: Performed by: NURSE PRACTITIONER

## 2023-09-08 PROCEDURE — 43246 EGD PLACE GASTROSTOMY TUBE: CPT | Performed by: SURGERY

## 2023-09-08 PROCEDURE — C9113 INJ PANTOPRAZOLE SODIUM, VIA: HCPCS | Mod: JZ | Performed by: NURSE PRACTITIONER

## 2023-09-08 PROCEDURE — 250N000013 HC RX MED GY IP 250 OP 250 PS 637: Performed by: NURSE PRACTITIONER

## 2023-09-08 PROCEDURE — 85025 COMPLETE CBC W/AUTO DIFF WBC: CPT | Performed by: NURSE PRACTITIONER

## 2023-09-08 PROCEDURE — 710N000010 HC RECOVERY PHASE 1, LEVEL 2, PER MIN: Performed by: SURGERY

## 2023-09-08 PROCEDURE — 82310 ASSAY OF CALCIUM: CPT | Performed by: NURSE PRACTITIONER

## 2023-09-08 PROCEDURE — 250N000009 HC RX 250: Performed by: SURGERY

## 2023-09-08 PROCEDURE — 36415 COLL VENOUS BLD VENIPUNCTURE: CPT | Performed by: NURSE PRACTITIONER

## 2023-09-08 PROCEDURE — 250N000011 HC RX IP 250 OP 636: Performed by: NURSE ANESTHETIST, CERTIFIED REGISTERED

## 2023-09-08 PROCEDURE — 99232 SBSQ HOSP IP/OBS MODERATE 35: CPT | Performed by: NURSE PRACTITIONER

## 2023-09-08 PROCEDURE — 120N000001 HC R&B MED SURG/OB

## 2023-09-08 PROCEDURE — 71045 X-RAY EXAM CHEST 1 VIEW: CPT

## 2023-09-08 PROCEDURE — 250N000011 HC RX IP 250 OP 636: Performed by: SURGERY

## 2023-09-08 PROCEDURE — 272N000001 HC OR GENERAL SUPPLY STERILE: Performed by: SURGERY

## 2023-09-08 PROCEDURE — 92526 ORAL FUNCTION THERAPY: CPT | Mod: GN

## 2023-09-08 PROCEDURE — 999N000157 HC STATISTIC RCP TIME EA 10 MIN

## 2023-09-08 PROCEDURE — 258N000003 HC RX IP 258 OP 636: Performed by: NURSE PRACTITIONER

## 2023-09-08 PROCEDURE — 250N000009 HC RX 250: Performed by: NURSE PRACTITIONER

## 2023-09-08 PROCEDURE — 94640 AIRWAY INHALATION TREATMENT: CPT | Mod: 76

## 2023-09-08 PROCEDURE — 370N000017 HC ANESTHESIA TECHNICAL FEE, PER MIN: Performed by: SURGERY

## 2023-09-08 PROCEDURE — 250N000025 HC SEVOFLURANE, PER MIN: Performed by: SURGERY

## 2023-09-08 PROCEDURE — 0DH63UZ INSERTION OF FEEDING DEVICE INTO STOMACH, PERCUTANEOUS APPROACH: ICD-10-PCS | Performed by: SURGERY

## 2023-09-08 PROCEDURE — 360N000075 HC SURGERY LEVEL 2, PER MIN: Performed by: SURGERY

## 2023-09-08 PROCEDURE — 43246 EGD PLACE GASTROSTOMY TUBE: CPT | Performed by: NURSE ANESTHETIST, CERTIFIED REGISTERED

## 2023-09-08 RX ORDER — LIDOCAINE HYDROCHLORIDE 10 MG/ML
INJECTION, SOLUTION EPIDURAL; INFILTRATION; INTRACAUDAL; PERINEURAL PRN
Status: DISCONTINUED | OUTPATIENT
Start: 2023-09-08 | End: 2023-09-08 | Stop reason: HOSPADM

## 2023-09-08 RX ORDER — HYDRALAZINE HYDROCHLORIDE 20 MG/ML
2.5-5 INJECTION INTRAMUSCULAR; INTRAVENOUS EVERY 10 MIN PRN
Status: DISCONTINUED | OUTPATIENT
Start: 2023-09-08 | End: 2023-09-08 | Stop reason: HOSPADM

## 2023-09-08 RX ORDER — LIDOCAINE 40 MG/G
CREAM TOPICAL
Status: DISCONTINUED | OUTPATIENT
Start: 2023-09-08 | End: 2023-09-08 | Stop reason: HOSPADM

## 2023-09-08 RX ORDER — LABETALOL 20 MG/4 ML (5 MG/ML) INTRAVENOUS SYRINGE
10
Status: DISCONTINUED | OUTPATIENT
Start: 2023-09-08 | End: 2023-09-08 | Stop reason: HOSPADM

## 2023-09-08 RX ORDER — TRAMADOL HYDROCHLORIDE 50 MG/1
50 TABLET ORAL EVERY 6 HOURS PRN
Status: DISCONTINUED | OUTPATIENT
Start: 2023-09-08 | End: 2023-09-09

## 2023-09-08 RX ORDER — ALBUTEROL SULFATE 0.83 MG/ML
2.5 SOLUTION RESPIRATORY (INHALATION) EVERY 4 HOURS PRN
Status: DISCONTINUED | OUTPATIENT
Start: 2023-09-08 | End: 2023-09-08 | Stop reason: HOSPADM

## 2023-09-08 RX ORDER — ONDANSETRON 4 MG/1
4 TABLET, ORALLY DISINTEGRATING ORAL EVERY 30 MIN PRN
Status: DISCONTINUED | OUTPATIENT
Start: 2023-09-08 | End: 2023-09-08 | Stop reason: HOSPADM

## 2023-09-08 RX ORDER — CEFAZOLIN SODIUM/WATER 2 G/20 ML
2 SYRINGE (ML) INTRAVENOUS SEE ADMIN INSTRUCTIONS
Status: DISCONTINUED | OUTPATIENT
Start: 2023-09-08 | End: 2023-09-08 | Stop reason: HOSPADM

## 2023-09-08 RX ORDER — FENTANYL CITRATE 50 UG/ML
INJECTION, SOLUTION INTRAMUSCULAR; INTRAVENOUS PRN
Status: DISCONTINUED | OUTPATIENT
Start: 2023-09-08 | End: 2023-09-08

## 2023-09-08 RX ORDER — SODIUM CHLORIDE, SODIUM LACTATE, POTASSIUM CHLORIDE, CALCIUM CHLORIDE 600; 310; 30; 20 MG/100ML; MG/100ML; MG/100ML; MG/100ML
INJECTION, SOLUTION INTRAVENOUS CONTINUOUS
Status: DISCONTINUED | OUTPATIENT
Start: 2023-09-08 | End: 2023-09-08 | Stop reason: HOSPADM

## 2023-09-08 RX ORDER — ONDANSETRON 2 MG/ML
4 INJECTION INTRAMUSCULAR; INTRAVENOUS EVERY 30 MIN PRN
Status: DISCONTINUED | OUTPATIENT
Start: 2023-09-08 | End: 2023-09-08 | Stop reason: HOSPADM

## 2023-09-08 RX ORDER — FENTANYL CITRATE 50 UG/ML
50 INJECTION, SOLUTION INTRAMUSCULAR; INTRAVENOUS EVERY 5 MIN PRN
Status: DISCONTINUED | OUTPATIENT
Start: 2023-09-08 | End: 2023-09-08 | Stop reason: HOSPADM

## 2023-09-08 RX ORDER — HYDROMORPHONE HYDROCHLORIDE 1 MG/ML
0.5 INJECTION, SOLUTION INTRAMUSCULAR; INTRAVENOUS; SUBCUTANEOUS EVERY 5 MIN PRN
Status: DISCONTINUED | OUTPATIENT
Start: 2023-09-08 | End: 2023-09-08 | Stop reason: HOSPADM

## 2023-09-08 RX ORDER — CEFAZOLIN SODIUM/WATER 2 G/20 ML
2 SYRINGE (ML) INTRAVENOUS
Status: DISCONTINUED | OUTPATIENT
Start: 2023-09-08 | End: 2023-09-08 | Stop reason: HOSPADM

## 2023-09-08 RX ORDER — ACETAMINOPHEN 325 MG/1
650 TABLET ORAL EVERY 4 HOURS PRN
Status: DISCONTINUED | OUTPATIENT
Start: 2023-09-08 | End: 2023-09-12 | Stop reason: HOSPADM

## 2023-09-08 RX ORDER — DIPHENHYDRAMINE HCL 50 MG
50 CAPSULE ORAL EVERY 8 HOURS PRN
Status: DISCONTINUED | OUTPATIENT
Start: 2023-09-08 | End: 2023-09-12 | Stop reason: HOSPADM

## 2023-09-08 RX ADMIN — METHYLPREDNISOLONE SODIUM SUCCINATE 40 MG: 40 INJECTION, POWDER, FOR SOLUTION INTRAMUSCULAR; INTRAVENOUS at 09:19

## 2023-09-08 RX ADMIN — ACETAMINOPHEN 650 MG: 325 TABLET, FILM COATED ORAL at 18:40

## 2023-09-08 RX ADMIN — MIDAZOLAM 2 MG: 1 INJECTION INTRAMUSCULAR; INTRAVENOUS at 13:04

## 2023-09-08 RX ADMIN — FENTANYL CITRATE 25 MCG: 50 INJECTION, SOLUTION INTRAMUSCULAR; INTRAVENOUS at 03:02

## 2023-09-08 RX ADMIN — AMPICILLIN SODIUM AND SULBACTAM SODIUM 3 G: 2; 1 INJECTION, POWDER, FOR SOLUTION INTRAMUSCULAR; INTRAVENOUS at 06:12

## 2023-09-08 RX ADMIN — AMPICILLIN SODIUM AND SULBACTAM SODIUM 3 G: 2; 1 INJECTION, POWDER, FOR SOLUTION INTRAMUSCULAR; INTRAVENOUS at 12:54

## 2023-09-08 RX ADMIN — PANTOPRAZOLE SODIUM 40 MG: 40 INJECTION, POWDER, FOR SOLUTION INTRAVENOUS at 09:13

## 2023-09-08 RX ADMIN — FENTANYL CITRATE 50 MCG: 50 INJECTION, SOLUTION INTRAMUSCULAR; INTRAVENOUS at 13:07

## 2023-09-08 RX ADMIN — BUPRENORPHINE AND NALOXONE 1 FILM: 4; 1 FILM, SOLUBLE BUCCAL; SUBLINGUAL at 09:18

## 2023-09-08 RX ADMIN — IPRATROPIUM BROMIDE AND ALBUTEROL SULFATE 3 ML: .5; 3 SOLUTION RESPIRATORY (INHALATION) at 07:20

## 2023-09-08 RX ADMIN — IPRATROPIUM BROMIDE AND ALBUTEROL SULFATE 3 ML: .5; 3 SOLUTION RESPIRATORY (INHALATION) at 19:35

## 2023-09-08 RX ADMIN — IPRATROPIUM BROMIDE AND ALBUTEROL SULFATE 3 ML: .5; 3 SOLUTION RESPIRATORY (INHALATION) at 02:39

## 2023-09-08 RX ADMIN — AMPICILLIN SODIUM AND SULBACTAM SODIUM 3 G: 2; 1 INJECTION, POWDER, FOR SOLUTION INTRAMUSCULAR; INTRAVENOUS at 00:12

## 2023-09-08 RX ADMIN — AMPICILLIN SODIUM AND SULBACTAM SODIUM 3 G: 2; 1 INJECTION, POWDER, FOR SOLUTION INTRAMUSCULAR; INTRAVENOUS at 19:12

## 2023-09-08 RX ADMIN — SODIUM CHLORIDE, POTASSIUM CHLORIDE, SODIUM LACTATE AND CALCIUM CHLORIDE: 600; 310; 30; 20 INJECTION, SOLUTION INTRAVENOUS at 05:25

## 2023-09-08 RX ADMIN — BUPRENORPHINE AND NALOXONE 1 FILM: 4; 1 FILM, SOLUBLE BUCCAL; SUBLINGUAL at 20:06

## 2023-09-08 RX ADMIN — TRAMADOL HYDROCHLORIDE 50 MG: 50 TABLET, COATED ORAL at 18:40

## 2023-09-08 RX ADMIN — FENTANYL CITRATE 25 MCG: 50 INJECTION, SOLUTION INTRAMUSCULAR; INTRAVENOUS at 09:06

## 2023-09-08 RX ADMIN — FENTANYL CITRATE 25 MCG: 50 INJECTION, SOLUTION INTRAMUSCULAR; INTRAVENOUS at 15:43

## 2023-09-08 ASSESSMENT — LIFESTYLE VARIABLES: TOBACCO_USE: 1

## 2023-09-08 ASSESSMENT — COPD QUESTIONNAIRES
COPD: 1
CAT_SEVERITY: SEVERE

## 2023-09-08 ASSESSMENT — ACTIVITIES OF DAILY LIVING (ADL)
ADLS_ACUITY_SCORE: 25
ADLS_ACUITY_SCORE: 24
ADLS_ACUITY_SCORE: 25
ADLS_ACUITY_SCORE: 24
ADLS_ACUITY_SCORE: 24
ADLS_ACUITY_SCORE: 25
ADLS_ACUITY_SCORE: 24
ADLS_ACUITY_SCORE: 25
ADLS_ACUITY_SCORE: 24
ADLS_ACUITY_SCORE: 24
ADLS_ACUITY_SCORE: 25
ADLS_ACUITY_SCORE: 25

## 2023-09-08 NOTE — PLAN OF CARE
Education given on suctioning trach, had patient suction self in front of mirror to demonstrate understanding, performed suctioning adequately.

## 2023-09-08 NOTE — ANESTHESIA CARE TRANSFER NOTE
Patient: Jorge A Mendosa    Procedure: Procedure(s):  ESOPHAGOGASTRODUODENOSCOPY, WITH PEG TUBE INSERTION       Diagnosis: Laryngeal cancer (H) [C32.9]  Diagnosis Additional Information: No value filed.    Anesthesia Type:   General     Note:    Oropharynx: oropharynx clear of all foreign objects (spontaneous respiration via tracheostomy)  Level of Consciousness: awake  Oxygen Supplementation: blow-by O2    Independent Airway: airway patency satisfactory and stable  Dentition: dentition unchanged  Vital Signs Stable: post-procedure vital signs reviewed and stable  Report to RN Given: handoff report given  Patient transferred to: Phase II    Handoff Report: Identifed the Patient, Identified the Reponsible Provider, Reviewed the pertinent medical history, Discussed the surgical course, Reviewed Intra-OP anesthesia mangement and issues during anesthesia, Set expectations for post-procedure period and Allowed opportunity for questions and acknowledgement of understanding      Vitals:  Vitals Value Taken Time   /96 09/08/23 1336   Temp     Pulse 80 09/08/23 1340   Resp 27 09/08/23 1340   SpO2 95 % 09/08/23 1340   Vitals shown include unvalidated device data.    Electronically Signed By: CHERISE HILL CRNA  September 8, 2023  1:41 PM

## 2023-09-08 NOTE — TELEPHONE ENCOUNTER
----- Message from Taisha Mcnally MD sent at 9/8/2023  2:31 PM CDT -----  All biopsies show SCC as expected, call patient or daughter

## 2023-09-08 NOTE — PROGRESS NOTES
09/08/23 0800   Appointment Info   Signing Clinician's Name / Credentials (SLP) Tammi Hernandez MS, CCC-SLP   Interventions   Interventions Quick Adds Swallowing Dysfunction   Swallowing Dysfunction &/or Oral Function for Feeding   Treatment of Swallowing Dysfunction &/or Oral Function for Feeding Minutes (64756) 10   Symptoms Noted During/After Treatment None   Treatment Detail/Skilled Intervention No oral trials completed during this visit due to excessive silent aspiration on video swallow performed yesterday. Patient educted on plan and reviewed MBSS results. Recommend repeat video swallow post PET scan to determine current state of swallowing function and future plan for swallowing.   SLP Discharge Planning   SLP Plan MBSS post PET scan with SLP   SLP Discharge Recommendation home with assist;home with outpatient speech therapy   SLP Rationale for DC Rec Pt will require continued SLP services following discharge to target swallow functions.   Total Session Time   Total Session Time (sum of timed and untimed services) 10

## 2023-09-08 NOTE — PROGRESS NOTES
[facility]    Hospitalist Progress Note    Date of Service (when I saw the patient): 09/08/2023    Assessment & Plan       Acute and chronic respiratory failure with hypoxia (H) (9/5/2023)    S/P Tracheostomy and change out on 9/6/23:  9/7: Tolerating room air-now on humidity collar only. Decreasing secretions from trach. Please see ENT notes.     Dysphagia: Due to supraglottal mass and new trach placement, he failed all swallowing trials on video swallow. PEG tube placement for nutritinal support-consulted Dr. Mcfadden. Will follow RD receommendations.     Pneumomediastinum  Left apical pneumothorax   CT neck done showing pneumomediastinum with a small left apical pneumothorax.  Extensive subcutaneous emphysema in the chest wall and throughout the  neck.  Spoke with Dr Bogdan peterson recs for chest tube at this time. Daily CXR.      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  -out patient follow up with oncology       Opioid use disorder (4/6/2020)  -on suboxone      Tobacco use  Prn nicotine patch         DVT Prophylaxis: Pneumatic Compression Devices  Code Status: Full Code    Disposition: Expected discharge in 1-2 days once cleared by ENT.    Loulou Rosales CNP    Interval History   Denies chest pain, abdominal pain or dyspnea. Feels secretions are decreasing.     -Data reviewed today: I reviewed all new labs and imaging results over the last 24 hours.     Physical Exam   Temp: 98.7  F (37.1  C) Temp src: Tympanic BP: 165/87 Pulse: 69   Resp: 16 SpO2: 96 % O2 Device: Trach dome    Vitals:    09/06/23 1427 09/07/23 0408 09/08/23 1001   Weight: 54.6 kg (120 lb 5.9 oz) 57 kg (125 lb 10.6 oz) 57.5 kg (126 lb 12.2 oz)     Vital Signs with Ranges  Temp:  [98.2   F (36.8  C)-99  F (37.2  C)] 98.7  F (37.1  C)  Pulse:  [65-81] 69  Resp:  [16-18] 16  BP: (144-165)/(78-88) 165/87  FiO2 (%):  [20 %-21 %] 21 %  SpO2:  [94 %-97 %] 96 %  I/O last 3 completed shifts:  In: 982 [I.V.:982]  Out: 550 [Urine:550]    Peripheral IV 09/07/23 Left Hand (Active)   Site Assessment WDL 09/08/23 0915   Line Status Infusing 09/08/23 0915   Dressing Transparent 09/08/23 0915   Dressing Status clean;dry;intact 09/08/23 0915   Line Intervention Flushed 09/08/23 0915   Phlebitis Scale 0-->no symptoms 09/08/23 0915   Infiltration? no 09/08/23 0915   Number of days: 1       Surgical Airway Tracheostomy Shiley 6.5 Cuffed (Active)   Trach Cap Status Uncapped/Unplugged 09/08/23 1001   Stoma Site Assessment Clean;Dry 09/08/23 1001   Stoma Site Care Gauze applied/changed 09/07/23 1000   Skin Assessment Clean;Dry;Intact 09/08/23 1001   Skin Integrity Intervention Protective barrier applied 09/08/23 0722   Securement Device Foam trach ties 09/08/23 1001   Trach Tie Assessment 1-2 Finger allowance between skin and ties 09/08/23 1001   Inner Cannula Care Changed/new 09/08/23 0500   Bedside Safety Supplies Manual resuscitation bag 09/08/23 1001   Number of days: 3       Incision/Surgical Site 09/05/23 Neck (Active)   Incision Assessment UTV 09/08/23 0500   Closure Approximated;Sutures 09/05/23 1945   Drainage Description Serosanguinous 09/07/23 1956   Dressing Intervention Clean, dry, intact 09/08/23 0500   Number of days: 3       Incision/Surgical Site 09/05/23 Mouth (Active)   Incision Assessment UTV 09/08/23 0500   Number of days: 3     Line/device assessment(s) completed for medical necessity    Constitutional: Awake,alert, no acute distress  Respiratory: Clear bilaterally with upper airway rhonchi improved  Cardiovascular: HRR, murmurs, rubs, thrills   GI: Soft,nontender, bowel sounds positive   Skin/Integumen: No rashes, open areas or rashes.   Other:      Medications    lactated ringers      lactated  ringers      lactated ringers 100 mL/hr at 09/08/23 0525      [Auto Hold] ampicillin-sulbactam  3 g Intravenous Q6H    [Auto Hold] buprenorphine HCl-naloxone HCl  1 Film Sublingual BID    ceFAZolin  2 g Intravenous Pre-Op/Pre-procedure x 1 dose    ceFAZolin  2 g Intravenous See Admin Instructions    [Auto Hold] ipratropium - albuterol 0.5 mg/2.5 mg/3 mL  3 mL Nebulization Q6H    [Auto Hold] methylPREDNISolone  40 mg Intravenous Daily    [Auto Hold] multivitamin w/minerals   Oral Daily    [Auto Hold] nicotine  1 patch Transdermal Daily    [Auto Hold] nicotine   Transdermal Q8H    [Auto Hold] pantoprazole  40 mg Intravenous Daily with breakfast    sodium chloride (PF)  3 mL Intracatheter Q8H    [Auto Hold] sodium chloride (PF)  3 mL Intracatheter Q8H       Data   Recent Labs   Lab 09/08/23  1041 09/08/23  0528 09/07/23  1405 09/07/23  0444 09/06/23  0926 09/05/23  1328 09/05/23  0425   WBC  --  12.6*  --  10.7 12.6*  --  9.5   HGB  --  12.1*  --  10.9* 10.2*  --  12.5*   MCV  --  92  --  95 92  --  94   PLT  --  235  --  228 252  --  306   NA  --  135*  --  140 143  --  144   POTASSIUM  --  3.5  --  4.2 3.7  --  3.9   CHLORIDE  --  94*  --  101 102  --  98   CO2  --  28  --  30* 30*  --  31*   BUN  --  15.9  --  26.2* 31.8*  --  24.7*   CR  --  0.49*  --  0.55* 0.67  --  0.68   ANIONGAP  --  13  --  9 11  --  15   CARA  --  8.7*  --  8.4* 8.5*  --  9.6   GLC 79 77 94 79 103*   < > 117*   ALBUMIN  --   --   --   --   --   --  4.3   PROTTOTAL  --   --   --   --   --   --  7.6   BILITOTAL  --   --   --   --   --   --  0.5   ALKPHOS  --   --   --   --   --   --  110   ALT  --   --   --   --   --   --  30   AST  --   --   --   --   --   --  34    < > = values in this interval not displayed.         Recent Results (from the past 24 hour(s))   XR Video Swallow with SLP or OT    Narrative    PROCEDURE: XR VIDEO SWALLOW WITH SLP OR OT 9/7/2023 2:01 PM    HISTORY: New trach    COMPARISONS: None.    TECHNIQUE: Modified barium  swallow    FINDINGS: On thin barium consistencies the patient aspirated. On  thicker consistencies the patient was able to swallow without  aspiration. No abnormal pooling in the vallecula or piriform sinuses  was seen. The fluoroscopy time was 0.6 minutes         FRANKLIN CASTRO MD         SYSTEM ID:  [id number]   XR Chest Port 1 View    Narrative    Procedure:XR CHEST PORT 1 VIEW    Clinical history:Male, 68 years, pneumothorax    Technique: Single view was obtained.    Comparison: 9/7/2023    Findings: The cardiac silhouette is within normal limits.. The  pulmonary vasculature is within normal limits.    The lungs are again hyperinflated however appear to be clear. Large  volume of gas again seen within the soft tissues throughout the thorax  and neck. Tracheostomy tube remains satisfactorily positioned. Bony  structures demonstrate no acute abnormality      Impression    Impression:   No acute abnormality.     Subcutaneous emphysema and pneumomediastinum, similar in appearance  compared to prior study..        RACHEL ANDERSEN MD         SYSTEM ID:  [id number]

## 2023-09-08 NOTE — PLAN OF CARE
"/78 (BP Location: Right arm, Cuff Size: Adult Regular)   Pulse 74   Temp 98.4  F (36.9  C) (Tympanic)   Resp 18   Ht 1.727 m (5' 8\")   Wt 57 kg (125 lb 10.6 oz)   SpO2 94%   BMI 19.11 kg/m          Reports 8/10 throat pain- IV 25 mcg fentanyl given w some relief. > 90% on 21% fio2 w humidification via trach dome. Crackles in lower lobes, productive cough w sticky red streaked sputum & self suctioning w Víctor. LR @ 100 ml/hr, NPO except meds. Ind w bedside urinal, calls appropriately and room near unit      Face to face report given with opportunity to observe patient.    Report given to Ayde ISAAC RN & LOLLY Bashir RN   9/8/2023  7:17 AM            "

## 2023-09-08 NOTE — ANESTHESIA POSTPROCEDURE EVALUATION
Patient: Jorge A Mendosa    Procedure: Procedure(s):  ESOPHAGOGASTRODUODENOSCOPY, WITH PEG TUBE INSERTION       Anesthesia Type:  General    Note:  Disposition: Inpatient   Postop Pain Control: Uneventful            Sign Out: Well controlled pain   PONV: No   Neuro/Psych: Uneventful            Sign Out: Acceptable/Baseline neuro status   Airway/Respiratory: Uneventful            Sign Out: AIRWAY IN SITU/Resp. Support               Airway in situ/Resp. Support: Tracheostomy   CV/Hemodynamics: Uneventful            Sign Out: Acceptable CV status; No obvious hypovolemia; No obvious fluid overload   Other NRE: NONE   DID A NON-ROUTINE EVENT OCCUR? No         Last vitals:  Vitals Value Taken Time   /84 09/08/23 1405   Temp 98.7  F (37.1  C) 09/08/23 1350   Pulse 84 09/08/23 1406   Resp 12 09/08/23 1410   SpO2 94 % 09/08/23 1408   Vitals shown include unvalidated device data.    Electronically Signed By: CHERISE Suárez CRNA  September 8, 2023  2:18 PM

## 2023-09-08 NOTE — PHARMACY-MEDICATION REGIMEN REVIEW
Pharmacy Antimicrobial Stewardship Assessment     Current Antimicrobial Therapy:  Anti-infectives (From now, onward)      Start     Dose/Rate Route Frequency Ordered Stop    23 1830  ampicillin-sulbactam (UNASYN) 3 g vial to attach to  mL bag         3 g  over 15-30 Minutes Intravenous EVERY 6 HOURS 23 1826          Indication: post-procedure (tracheostomy)    Days of Therapy: 4     Pertinent Labs:  Recent Labs   Lab Test 23  0528 23  0444 23  0926   WBC 12.6* 10.7 12.6*     No lab results found.    Invalid input(s): RATE, ESR     Temperature:  Temp (24hrs), Av.6  F (37  C), Min:98.2  F (36.8  C), Max:99  F (37.2  C)    Culture Results:       Recommendations/Interventions:  Patient getting PEG tub placement today. Per UpToDate, post-op tracheostomy antibiotic prophylaxis is generally not recommended unless indicated for another reason (e.g. endocarditis prophylaxis).      Gay Krishna, [facility]  2023

## 2023-09-08 NOTE — PROGRESS NOTES
"CLINICAL NUTRITION SERVICES  -  ASSESSMENT NOTE    REASON FOR ASSESSMENT:  Admission Nutrition Risk Screen - 2-13lb weight loss, reduced appetite/intake, plan for feeding tube placement/starting TFs per care conference    NUTRITION INTERVENTIONS  Recommendations / Nutrition Prescription  TwoCalHN- goal of 4 cartons daily, bolus gravity feeds. 1 carton (237ml) about every 3-4 hours while awake. 60ml water flush before and after formula. 200ml water TID between tube feeds to meet fluid needs. This provides 1900kcal, 80g protein, 1744ml free water  - start with 1/2 carton (120ml) feeds for the first day. Advance to 1 carton as tolerated.  - monitor electrolytes, replace as needed    NUTRITION HISTORY  Jorge A Mendosa is a 68 year old male admitted for acute and chronic respiratory failure with hypoxia, glottic squamous cell carcinoma, s/p tracheostomy. 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 Pt reports he has had some difficulty with swallowing harder/solid foods and was choosing more soft foods/liquids. Portions decreasing over time. Weight trending down significantly over the last 5 months. SLP recommended considering alternate means of nutrition. Noted plan is to place a feeding tube and start tube feeds.     Call out to [facility] checking on Insurance coverage, waiting on call back.    CURRENT NUTRITION ORDERS  Diet Order:   Orders Placed This Encounter      NPO for Medical/Clinical Reasons Except for: Meds  Current Intake/Tolerance: none    ANTHROPOMETRICS  Height: 5' 8\"  Weight: 125 lbs 10.6 oz  Body mass index is 19.11 kg/m .  Weight Status:  Normal BMI  Weight History: 42lb (27%) weight loss in about 5 months  Wt Readings from Last 10 Encounters:   09/07/23 57 kg (125 lb 10.6 oz)   09/05/23 52.1 kg (114 lb 13.8 oz) - first measured weight- standing scale   06/14/23 68.5 kg (151 lb)   03/22/23 71.2 kg (156 lb 14.4 oz)   12/29/22 72.6 kg " (160 lb)   10/06/22 76.2 kg (168 lb)   07/14/22 75.3 kg (166 lb)   05/19/22 76.7 kg (169 lb)   02/24/22 75.8 kg (167 lb)   12/30/21 76.2 kg (168 lb)   11/04/21 73.9 kg (163 lb)      ASSESSED NUTRITION NEEDS: 57kg current weight  Estimated Energy Needs: 9429-8250 kcals (30-35 Kcal/Kg)  Estimated Protein Needs: 58411 grams protein (1.2-1.8 g pro/Kg)  Estimated Fluid Needs: 6802-9082  mL (1 mL/Kcal)    MALNUTRITION:  % Weight Loss:  > 10% in 6 months (severe malnutrition)  % Intake:  </= 75% for >/= 1 month (severe malnutrition)  Muscle and Subcutaneous Fat Loss:  moderate-severe    Malnutrition Diagnosis: Severe malnutrition  In Context of:  Chronic illness or disease    MONITORING AND EVALUATION:  Tf tolerance, intake, weight, labs
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
## 2023-09-08 NOTE — PLAN OF CARE
PACU Transfer Note    Jorge A Mendosa was transferred to 3226 via bed.  Equipment used for transport: bed.  Accompanied by:  Enrique AMBROCIO   Prescriptions were: NA    PACU Respiratory Event Documentation     1) Episodes of Apnea greater than or equal to 10 seconds: 0    2) Bradypnea - less than 8 breaths per minute: 0    3) Pain score on 0 to 10 scal6    4) Pain-sedation mismatch (yes or no): NO    5) Repeated 02 desaturation less than 90% (yes or no): No    Anesthesia notified? (yes or no): NA    Any of the above events occuring repeatedly in separate 30 minute intervals may be considered recurrent PACU respiratory events.    Patient stable and meets phase 1 discharge criteria for transport from PACU to medical/surgical floor.

## 2023-09-08 NOTE — PLAN OF CARE
"Reason for hospital stay:  acute and chronic respiratory failure, and had a trach put in  Living situation PTA: home alone  Most recent vitals: /84 (BP Location: Right arm, Cuff Size: Adult Regular)   Pulse 68   Temp 98.3  F (36.8  C) (Tympanic)   Resp 16   Ht 1.727 m (5' 8\")   Wt 57 kg (125 lb 10.6 oz)   SpO2 95%   BMI 19.11 kg/m      Pain Management:  acetaminophen, fentanyl   LOC:  A/O x4  Cardiac:  tele  Respiratory:  dim, clear bilateral. 21 percent fio2 air with dome, suction small amount thick secretion light color  GI:  bowel sound normal all quads  :  ind use of urinal at bed side  Skin Issues:  busies on both arms from past IV placements, and around new trach     IVF:  left hand   ABX:  ampicillin-sulbactam 3g every      Nutrition: NPO  ADL's:  ind with set up  Ambulation:stand by assist  Safety:  bed alarms , low bed , id band      Comments:  Pain increased to 7-8 gave fentanyl dropped to 6-7     9/7/2023  8:14 PM  Tate Drummond RN       "

## 2023-09-08 NOTE — OP NOTE
PREOPERATIVE DIAGNOSES:   1.  Inanition.   2.  Need for feeding tube access.      POSTOPERATIVE DIAGNOSES:     1.  Inanition.   2.  Need for feeding tube access.         PROCEDURE:     Insertion of percutaneous endoscopic gastrostomy tube.      HISTORY:  This patient with what appears to be recurrent laryngeal cancer failed speech therapy eval.  In view of the dysphagia and inanition, feeding tube access requested.      DESCRIPTION OF PROCEDURE:  With the patient in the supine position on the operating table, general anesthesia was induced.  The requisite timeout pause was observed during which the patient's correct identity and planned procedure were confirmed by the operating room personnel in attendance. The fiberoptic endoscope was introduced and negotiated through the cricopharyngeus. Was able to get into the esophagus with little issue though mass was noted at level of cords.   The esophagus was examined and was without finding.  The stomach was entered and with insufflation, the site of the endoscope was identified by percutaneous illumination.  This site on the abdomen was prepped and draped sterilely and local anesthesia was obtained with infiltration of 1% Xylocaine.  A small stab incision was made and the needle was introduced percutaneously to the lumen of the stomach under direct vision.  A wire was passed and retrieved with the snare forceps and brought through the oropharynx in a retrograde fashion.  The gastrostomy tube was lubricated and passed over the wire in standard Seldinger fashion.  It was brought through the anterior abdominal wall and the mushroom seated firmly though was able to still spin the silicone disk without binding the mucosa.  The bolster was applied and affixed with 2-0 nylon sutures.  The endoscope was reintroduced and the site of gastrostomy placement was visualized and hemostasis was adequate.  Air was aspirated and the endoscope was withdrawn.  The wound site was cleansed and  sterile dressings were applied.  There was no blood loss and there were no apparent complications.  The procedure was satisfactorily tolerated and the patient was returned to the recovery room in satisfactory condition.     MD Noa Cherry actively first assisted during this operation providing necessary retraction and exposure as well as maintaining hemostasis and a clear operative field. This was helpful in allowing the operation to proceed in a safe and expeditious manner. She was present for the entire duration of the operation.

## 2023-09-09 ENCOUNTER — APPOINTMENT (OUTPATIENT)
Dept: GENERAL RADIOLOGY | Facility: HOSPITAL | Age: 68
DRG: 004 | End: 2023-09-09
Attending: SURGERY
Payer: MEDICARE

## 2023-09-09 LAB
ANION GAP SERPL CALCULATED.3IONS-SCNC: 14 MMOL/L (ref 7–15)
BUN SERPL-MCNC: 13.3 MG/DL (ref 8–23)
CALCIUM SERPL-MCNC: 8.7 MG/DL (ref 8.8–10.2)
CHLORIDE SERPL-SCNC: 94 MMOL/L (ref 98–107)
CREAT SERPL-MCNC: 0.47 MG/DL (ref 0.67–1.17)
DEPRECATED HCO3 PLAS-SCNC: 28 MMOL/L (ref 22–29)
EGFRCR SERPLBLD CKD-EPI 2021: >90 ML/MIN/1.73M2
ERYTHROCYTE [DISTWIDTH] IN BLOOD BY AUTOMATED COUNT: 13.6 % (ref 10–15)
GLUCOSE BLDC GLUCOMTR-MCNC: 109 MG/DL (ref 70–99)
GLUCOSE BLDC GLUCOMTR-MCNC: 180 MG/DL (ref 70–99)
GLUCOSE SERPL-MCNC: 87 MG/DL (ref 70–99)
HCT VFR BLD AUTO: 38 % (ref 40–53)
HGB BLD-MCNC: 12.3 G/DL (ref 13.3–17.7)
MAGNESIUM SERPL-MCNC: 1.5 MG/DL (ref 1.7–2.3)
MCH RBC QN AUTO: 29.1 PG (ref 26.5–33)
MCHC RBC AUTO-ENTMCNC: 32.4 G/DL (ref 31.5–36.5)
MCV RBC AUTO: 90 FL (ref 78–100)
PHOSPHATE SERPL-MCNC: 2.4 MG/DL (ref 2.5–4.5)
PLATELET # BLD AUTO: 220 10E3/UL (ref 150–450)
POTASSIUM SERPL-SCNC: 3.5 MMOL/L (ref 3.4–5.3)
RBC # BLD AUTO: 4.23 10E6/UL (ref 4.4–5.9)
SODIUM SERPL-SCNC: 136 MMOL/L (ref 136–145)
WBC # BLD AUTO: 9.5 10E3/UL (ref 4–11)

## 2023-09-09 PROCEDURE — C9113 INJ PANTOPRAZOLE SODIUM, VIA: HCPCS | Mod: JZ | Performed by: SURGERY

## 2023-09-09 PROCEDURE — 250N000013 HC RX MED GY IP 250 OP 250 PS 637: Performed by: SURGERY

## 2023-09-09 PROCEDURE — 250N000011 HC RX IP 250 OP 636: Mod: JZ | Performed by: SURGERY

## 2023-09-09 PROCEDURE — 250N000011 HC RX IP 250 OP 636: Performed by: SURGERY

## 2023-09-09 PROCEDURE — 94640 AIRWAY INHALATION TREATMENT: CPT | Mod: 76

## 2023-09-09 PROCEDURE — 999N000157 HC STATISTIC RCP TIME EA 10 MIN

## 2023-09-09 PROCEDURE — 94640 AIRWAY INHALATION TREATMENT: CPT

## 2023-09-09 PROCEDURE — 250N000013 HC RX MED GY IP 250 OP 250 PS 637: Performed by: NURSE PRACTITIONER

## 2023-09-09 PROCEDURE — 258N000003 HC RX IP 258 OP 636: Performed by: SURGERY

## 2023-09-09 PROCEDURE — 84100 ASSAY OF PHOSPHORUS: CPT | Performed by: NURSE PRACTITIONER

## 2023-09-09 PROCEDURE — 120N000001 HC R&B MED SURG/OB

## 2023-09-09 PROCEDURE — 250N000009 HC RX 250: Performed by: SURGERY

## 2023-09-09 PROCEDURE — 85027 COMPLETE CBC AUTOMATED: CPT | Performed by: NURSE PRACTITIONER

## 2023-09-09 PROCEDURE — 36415 COLL VENOUS BLD VENIPUNCTURE: CPT | Performed by: NURSE PRACTITIONER

## 2023-09-09 PROCEDURE — 71045 X-RAY EXAM CHEST 1 VIEW: CPT

## 2023-09-09 PROCEDURE — 99232 SBSQ HOSP IP/OBS MODERATE 35: CPT | Performed by: NURSE PRACTITIONER

## 2023-09-09 PROCEDURE — 82310 ASSAY OF CALCIUM: CPT | Performed by: NURSE PRACTITIONER

## 2023-09-09 PROCEDURE — 83735 ASSAY OF MAGNESIUM: CPT | Performed by: NURSE PRACTITIONER

## 2023-09-09 PROCEDURE — 250N000011 HC RX IP 250 OP 636: Performed by: NURSE PRACTITIONER

## 2023-09-09 RX ORDER — MAGNESIUM SULFATE HEPTAHYDRATE 40 MG/ML
2 INJECTION, SOLUTION INTRAVENOUS ONCE
Status: COMPLETED | OUTPATIENT
Start: 2023-09-09 | End: 2023-09-09

## 2023-09-09 RX ADMIN — AMPICILLIN SODIUM AND SULBACTAM SODIUM 3 G: 2; 1 INJECTION, POWDER, FOR SOLUTION INTRAMUSCULAR; INTRAVENOUS at 06:29

## 2023-09-09 RX ADMIN — SODIUM CHLORIDE, POTASSIUM CHLORIDE, SODIUM LACTATE AND CALCIUM CHLORIDE: 600; 310; 30; 20 INJECTION, SOLUTION INTRAVENOUS at 13:36

## 2023-09-09 RX ADMIN — ACETAMINOPHEN 650 MG: 325 TABLET, FILM COATED ORAL at 18:18

## 2023-09-09 RX ADMIN — POTASSIUM & SODIUM PHOSPHATES POWDER PACK 280-160-250 MG 1 PACKET: 280-160-250 PACK at 21:17

## 2023-09-09 RX ADMIN — POTASSIUM & SODIUM PHOSPHATES POWDER PACK 280-160-250 MG 1 PACKET: 280-160-250 PACK at 18:18

## 2023-09-09 RX ADMIN — BUPRENORPHINE AND NALOXONE 1 FILM: 4; 1 FILM, SOLUBLE BUCCAL; SUBLINGUAL at 09:54

## 2023-09-09 RX ADMIN — PANTOPRAZOLE SODIUM 40 MG: 40 INJECTION, POWDER, FOR SOLUTION INTRAVENOUS at 10:03

## 2023-09-09 RX ADMIN — IPRATROPIUM BROMIDE AND ALBUTEROL SULFATE 3 ML: .5; 3 SOLUTION RESPIRATORY (INHALATION) at 13:35

## 2023-09-09 RX ADMIN — BUPRENORPHINE AND NALOXONE 1 FILM: 4; 1 FILM, SOLUBLE BUCCAL; SUBLINGUAL at 20:22

## 2023-09-09 RX ADMIN — MAGNESIUM SULFATE IN WATER 2 G: 40 INJECTION, SOLUTION INTRAVENOUS at 13:55

## 2023-09-09 RX ADMIN — IPRATROPIUM BROMIDE AND ALBUTEROL SULFATE 3 ML: .5; 3 SOLUTION RESPIRATORY (INHALATION) at 02:18

## 2023-09-09 RX ADMIN — SODIUM CHLORIDE, POTASSIUM CHLORIDE, SODIUM LACTATE AND CALCIUM CHLORIDE: 600; 310; 30; 20 INJECTION, SOLUTION INTRAVENOUS at 00:50

## 2023-09-09 RX ADMIN — TRAMADOL HYDROCHLORIDE 50 MG: 50 TABLET, COATED ORAL at 05:11

## 2023-09-09 RX ADMIN — METHYLPREDNISOLONE SODIUM SUCCINATE 40 MG: 40 INJECTION, POWDER, FOR SOLUTION INTRAMUSCULAR; INTRAVENOUS at 09:55

## 2023-09-09 RX ADMIN — POTASSIUM & SODIUM PHOSPHATES POWDER PACK 280-160-250 MG 1 PACKET: 280-160-250 PACK at 14:58

## 2023-09-09 RX ADMIN — AMPICILLIN SODIUM AND SULBACTAM SODIUM 3 G: 2; 1 INJECTION, POWDER, FOR SOLUTION INTRAMUSCULAR; INTRAVENOUS at 00:50

## 2023-09-09 RX ADMIN — ACETAMINOPHEN 650 MG: 325 TABLET, FILM COATED ORAL at 08:04

## 2023-09-09 RX ADMIN — IPRATROPIUM BROMIDE AND ALBUTEROL SULFATE 3 ML: .5; 3 SOLUTION RESPIRATORY (INHALATION) at 08:02

## 2023-09-09 ASSESSMENT — ACTIVITIES OF DAILY LIVING (ADL)
ADLS_ACUITY_SCORE: 24
ADLS_ACUITY_SCORE: 28
ADLS_ACUITY_SCORE: 28
ADLS_ACUITY_SCORE: 24

## 2023-09-09 NOTE — PLAN OF CARE
"Reason for hospital stay:  Acute/Chronic Respiratory Failure  Living situation PTA: Home  Most recent vitals: /98 (BP Location: Right arm, Patient Position: Semi-Sanchez's, Cuff Size: Adult Large)   Pulse 69   Temp 98.2  F (36.8  C) (Tympanic)   Resp 14   Ht 1.727 m (5' 8\")   Wt 57.5 kg (126 lb 12.2 oz)   SpO2 94%   BMI 19.27 kg/m      Pain Management:  Rates pain at 7/10-chronic. Scheduled Suboxone  LOC:  A&O x 4  Cardiac:  HRR regular. Pt hr dropped to 40's when sleeping several times. Vitals were done and MD notified w/ no new orders.   Respiratory:  Lungs coarse. Trach in place. Pt self-suctions and tolerates well. O2 93-95% on humidified Air. Suction near bedside and mirror provided to aide pt.  GI:  Normoactive BS x 4-new Peg tube in place and will start feedings in AM.  :  Voids spontaneously w/o difficulty. Frequency/ uses bedside urinal  Skin Issues:  Some scattered bruising present. New trach and peg tub in place w/ new dressings, CDI.    IVF:  LR @ 100 mL/hr  ABX:  Unasyn    Nutrition: NPO-new peg tube in place, feeding to start 9/9 in AM.  Activity: SBA x 1  Safety:  Bed in lowest position w/ wheels locked. Call light and personal items within reach and makes needs known. Room near nurses station w/ door open and frequent rounding.  Face to face report given with opportunity to observe patient.    Report given to LOLLY Petersen RN   9/9/2023  3:15 AM    "

## 2023-09-09 NOTE — PHARMACY
[facility]    Pharmacy      Antimicrobial Stewardship Note     Current antimicrobial therapy:             Recommendations/Interventions:  1. Post-op tracheostomy antibiotic prophylaxis is generally not recommended unless indicated for another reason.      Silva Yin, [facility]  September 9, 2023

## 2023-09-09 NOTE — PROGRESS NOTES
Site without redness or drainage. Ok to begin tube feedings per recommendation of nutrition. Sutures to come out in 10-14 days can be taken out at any visit or ok to create surgical follow up.   Surgery sign off.       Trevor Mcfadden MD

## 2023-09-09 NOTE — PLAN OF CARE
Pt alert and oriented x3, calm and cooperative. VS and assessment as charted, pain managed with Suboxone, Tylenol and Tramadol per MAR. Trach site WDL, inner cannula changed, remains with thick secretions,  trach dome  on for humidification with 21-28% Fi02. IV infusing LR @ 100/hr. NPO, Peg tube patent with medication administration only. Independently positions self. Free from falls/ injury, call light within reach and alarms activated.     Face to face report given with opportunity to observe patient.    Report given to LOLLY Parada RN   9/8/2023  10:49 PM

## 2023-09-09 NOTE — PROGRESS NOTES
[facility]    Hospitalist Progress Note    Date of Service (when I saw the patient): 09/09/2023    Assessment & Plan       Acute and chronic respiratory failure with hypoxia (H) (9/5/2023)    S/P Tracheostomy and change out on 9/6/23:  9/7: Tolerating room air-now on humidity collar only. Decreasing secretions from trach. Please see ENT notes.   -9/9: Stable, denies pain to site    Dysphagia: Due to supraglottal mass and new trach placement, he failed all swallowing trials on video swallow. PEG tube placement for nutritinal support-consulted Dr. Mcfadden. Will follow  receommendations.   -9/9: PEG tube placed 9/8 without complications, denies pain to site. Will start tube feedings today as well as patient teaching.     Pneumomediastinum and Left apical pneumothorax   CT neck done showing pneumomediastinum with a small left apical pneumothorax.  Extensive subcutaneous emphysema in the chest wall and throughout the  neck.  Spoke with Dr Mcfadden no recs for chest tube at this time. Daily CXR.    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  -out patient follow up with oncology       Opioid use disorder (4/6/2020)  -on suboxone      Tobacco use  Prn nicotine patch         DVT Prophylaxis: Pneumatic Compression Devices  Code Status: Full Code    Disposition: Expected discharge in 1-2 days once tube feeding established    Loulou Rosales CNP    Interval History   Denies chest pain, abdominal pain or dyspnea. Feels secretions are decreasing.     -Data reviewed today: I reviewed all new labs and imaging results over the last 24 hours.     Physical Exam   Temp: 98.6  F (37  C) Temp src: Tympanic BP: 146/75 Pulse: 51   Resp: 14 SpO2: 94  % O2 Device: None (Room air) Oxygen Delivery: 6 LPM  Vitals:    09/06/23 1427 09/07/23 0408 09/08/23 1001   Weight: 54.6 kg (120 lb 5.9 oz) 57 kg (125 lb 10.6 oz) 57.5 kg (126 lb 12.2 oz)     Vital Signs with Ranges  Temp:  [98.2  F (36.8  C)-99.2  F (37.3  C)] 98.6  F (37  C)  Pulse:  [51-89] 51  Resp:  [12-18] 14  BP: (136-174)/(74-98) 146/75  FiO2 (%):  [21 %-40 %] 21 %  SpO2:  [87 %-100 %] 94 %  I/O last 3 completed shifts:  In: 2121 [I.V.:1803; NG/GT:230; IV Piggyback:88]  Out: 1955 [Urine:1956]    Peripheral IV 09/07/23 Left Hand (Active)   Site Assessment WDL 09/08/23 2000   Line Status Infusing 09/08/23 2000   Dressing Transparent 09/08/23 2000   Dressing Status clean;dry;intact 09/08/23 2000   Line Intervention Flushed 09/08/23 1929   Phlebitis Scale 0-->no symptoms 09/08/23 2000   Infiltration? no 09/08/23 1929   Number of days: 2       Surgical Airway Tracheostomy Shiley 6.5 Cuffed (Active)   Trach Cap Status Uncapped/Unplugged 09/09/23 0804   Stoma Site Assessment Clean;Dry;No drainage 09/09/23 0804   Stoma Site Care Done 09/09/23 0804   Skin Assessment Clean;Dry;Intact 09/09/23 0804   Skin Integrity Intervention Protective barrier applied 09/08/23 0722   Securement Device Foam trach ties 09/09/23 0515   Trach Tie Assessment Clean;Dry;Intact;1-2 Finger allowance between skin and ties 09/09/23 0804   Inner Cannula Care Changed/new 09/09/23 0804   Bedside Safety Supplies Manual resuscitation bag;Oxygen source;Trach adaptor;Extra trach of current size;Obturator;Trach tie or securement device;Humidity source;10 cc syringe (for cuffed trachs) 09/09/23 0804   Number of days: 4       Gastrostomy/Enterostomy Percutaneous endoscopic gastrostomy (PEG) LUQ 1 20 fr (Active)   Site Description Unable to Visualize 09/09/23 0515   Site care button rotated 1/4 turn 09/09/23 0515   Drainage Appearance Pink tinged 09/08/23 1840   Status - Gastrostomy Clamped 09/09/23 0515   Dressing Status Normal: Clean, Dry & Intact  09/09/23 0515   Intake (ml) 130 ml 09/09/23 0515   Residual (mL) 0 mL 09/09/23 0515   Number of days: 1       Incision/Surgical Site 09/05/23 Neck (Active)   Incision Assessment [facility] 09/08/23 2325   [place]-Incision Assessment [facility] 09/08/23 1001   Closure RYLAN 09/08/23 1535   Incision Drainage Amount UTV 09/08/23 1535   Drainage Description [facility] 09/08/23 1535   Dressing Intervention Clean, dry, intact 09/08/23 2325   Number of days: 4       Incision/Surgical Site 09/05/23 Mouth (Active)   Incision Assessment [facility] 09/08/23 2325   [place]-Incision Assessment [facility] 09/08/23 1001   Closure RYLAN 09/08/23 1535   Incision Drainage Amount [facility] 09/08/23 1535   Drainage Description [facility] 09/08/23 1535   Number of days: 4     Line/device assessment(s) completed for medical necessity    Constitutional: Awake,alert, no acute distress  Respiratory: Clear bilaterally with upper airway rhonchi improved  Cardiovascular: HRR, murmurs, rubs, thrills   GI: Soft,nontender, bowel sounds positive. PEG tube in place with minimal serous drainage on dressing.   Skin/Integumen: No rashes, open areas or rashes.   Other:      Medications    lactated ringers 100 mL/hr at 09/09/23 0050      ampicillin-sulbactam  3 g Intravenous Q6H    buprenorphine HCl-naloxone HCl  1 Film Sublingual BID    ipratropium - albuterol 0.5 mg/2.5 mg/3 mL  3 mL Nebulization Q6H    methylPREDNISolone  40 mg Intravenous Daily    [Held by provider] multivitamin w/minerals   Oral Daily    nicotine  1 patch Transdermal Daily    nicotine   Transdermal Q8H    pantoprazole  40 mg Intravenous Daily with breakfast    sodium chloride (PF)  3 mL Intracatheter Q8H       Data   Recent Labs   Lab 09/09/23  0546 09/08/23  1041 09/08/23  0528 09/07/23  1405 09/07/23  0444 09/05/23  1328 09/05/23  0425   WBC 9.5  --  12.6*  --  10.7   < > 9.5   HGB 12.3*  --  12.1*  --  10.9*   < > 12.5*   MCV 90  --  92  --  95   < > 94     --  235  --  228   < > 306     --  135*  --  140   < > 144    POTASSIUM 3.5  --  3.5  --  4.2   < > 3.9   CHLORIDE 94*  --  94*  --  101   < > 98   CO2 28  --  28  --  30*   < > 31*   BUN 13.3  --  15.9  --  26.2*   < > 24.7*   CR 0.47*  --  0.49*  --  0.55*   < > 0.68   ANIONGAP 14  --  13  --  9   < > 15   CARA 8.7*  --  8.7*  --  8.4*   < > 9.6   GLC 87 79 77   < > 79   < > 117*   ALBUMIN  --   --   --   --   --   --  4.3   PROTTOTAL  --   --   --   --   --   --  7.6   BILITOTAL  --   --   --   --   --   --  0.5   ALKPHOS  --   --   --   --   --   --  110   ALT  --   --   --   --   --   --  30   AST  --   --   --   --   --   --  34    < > = values in this interval not displayed.         Recent Results (from the past 24 hour(s))   XR Chest Port 1 View    Narrative    PROCEDURE:  XR CHEST PORT 1 VIEW    HISTORY:  pneumothorax.     COMPARISON:  9/8/2023    FINDINGS:   The cardiac silhouette is normal in size. The pulmonary vasculature is  normal.  The lungs are clear. No pleural effusion or pneumothorax.  Widespread pneumomediastinum and subcutaneous emphysema is seen.  Tracheostomy tube in place.      Impression    IMPRESSION:  No change from 9/8/2023      FRANKLIN CASTRO MD         SYSTEM ID:  [id number]

## 2023-09-09 NOTE — PLAN OF CARE
"Reason for hospital stay:  acute and chronic respiratory, trach  Living situation PTA: home alone  Most recent vitals: /84   Pulse 70   Temp 99.2  F (37.3  C)   Resp 18   Ht 1.727 m (5' 8\")   Wt 57.5 kg (126 lb 12.2 oz)   SpO2 97%   BMI 19.27 kg/m      Pain Management:  acetaminophen, fentanyl, and suboxone. But fentanyl is now dc  LOC:  A/O x4  Cardiac:  WNL  Respiratory:  has a new trach, with dome on with 21 % fio2 small amount of thick white secretions.   GI:  WNL  :  WNL  Skin Issues:  bruises on both arms from past iv sites    IVF:  Right form arm infusing   ABX:  Unasyn 3g every 6 hours    Nutrition: NPO but new peg in today and can only get med through it en tel tomorrow   ADL's:  IND  Ambulation:ind with standby  Safety:  low bed, call light near pt.       Comments: had new peg tube put in today, and pain medication changed, the fentanyl was discontinue,      Face to face report given with opportunity to observe patient.    Report given to Sharon Drummond RN   9/8/2023  8:28 PM     "

## 2023-09-09 NOTE — PLAN OF CARE
PT is A&O. C/o pain in back rating 7 out of 10. PRN Tramadol given for decrease in pain. VSS. Continues on trach dome as documented. Uses yanker to self suction trach area and mouth. Secretions are thick. PEG tube patent no residual. Voiding into urinal. LR infusing at 100 mL/hr into left hand IV. Received IV unasyn. Pt able to make needs known with pen and paper when this writer could not read his lips. Call light remains within reach.    Face to face report given with opportunity to observe patient.    Report given to Juani Doyle RN   9/9/2023  7:54 AM

## 2023-09-10 PROBLEM — R00.1 SINUS BRADYCARDIA: Status: ACTIVE | Noted: 2023-09-10

## 2023-09-10 LAB
ALBUMIN SERPL BCG-MCNC: 3 G/DL (ref 3.5–5.2)
ALP SERPL-CCNC: 82 U/L (ref 40–129)
ALT SERPL W P-5'-P-CCNC: 20 U/L (ref 0–70)
ANION GAP SERPL CALCULATED.3IONS-SCNC: 6 MMOL/L (ref 7–15)
AST SERPL W P-5'-P-CCNC: 30 U/L (ref 0–45)
BILIRUB SERPL-MCNC: 0.4 MG/DL
BUN SERPL-MCNC: 17.3 MG/DL (ref 8–23)
CALCIUM SERPL-MCNC: 8.4 MG/DL (ref 8.8–10.2)
CHLORIDE SERPL-SCNC: 98 MMOL/L (ref 98–107)
CREAT SERPL-MCNC: 0.49 MG/DL (ref 0.67–1.17)
DEPRECATED HCO3 PLAS-SCNC: 33 MMOL/L (ref 22–29)
EGFRCR SERPLBLD CKD-EPI 2021: >90 ML/MIN/1.73M2
ERYTHROCYTE [DISTWIDTH] IN BLOOD BY AUTOMATED COUNT: 13.6 % (ref 10–15)
GLUCOSE SERPL-MCNC: 143 MG/DL (ref 70–99)
HCT VFR BLD AUTO: 36.9 % (ref 40–53)
HGB BLD-MCNC: 11.8 G/DL (ref 13.3–17.7)
MAGNESIUM SERPL-MCNC: 2 MG/DL (ref 1.7–2.3)
MCH RBC QN AUTO: 28.6 PG (ref 26.5–33)
MCHC RBC AUTO-ENTMCNC: 32 G/DL (ref 31.5–36.5)
MCV RBC AUTO: 90 FL (ref 78–100)
PHOSPHATE SERPL-MCNC: 2 MG/DL (ref 2.5–4.5)
PLATELET # BLD AUTO: 226 10E3/UL (ref 150–450)
POTASSIUM SERPL-SCNC: 3.4 MMOL/L (ref 3.4–5.3)
PROT SERPL-MCNC: 5.6 G/DL (ref 6.4–8.3)
RBC # BLD AUTO: 4.12 10E6/UL (ref 4.4–5.9)
SODIUM SERPL-SCNC: 137 MMOL/L (ref 136–145)
WBC # BLD AUTO: 8.6 10E3/UL (ref 4–11)

## 2023-09-10 PROCEDURE — 83735 ASSAY OF MAGNESIUM: CPT | Performed by: NURSE PRACTITIONER

## 2023-09-10 PROCEDURE — 999N000157 HC STATISTIC RCP TIME EA 10 MIN

## 2023-09-10 PROCEDURE — 94640 AIRWAY INHALATION TREATMENT: CPT | Mod: 76

## 2023-09-10 PROCEDURE — C9113 INJ PANTOPRAZOLE SODIUM, VIA: HCPCS | Mod: JZ | Performed by: SURGERY

## 2023-09-10 PROCEDURE — 120N000001 HC R&B MED SURG/OB

## 2023-09-10 PROCEDURE — 99231 SBSQ HOSP IP/OBS SF/LOW 25: CPT | Performed by: NURSE PRACTITIONER

## 2023-09-10 PROCEDURE — 258N000003 HC RX IP 258 OP 636: Performed by: SURGERY

## 2023-09-10 PROCEDURE — 250N000009 HC RX 250: Performed by: SURGERY

## 2023-09-10 PROCEDURE — 80053 COMPREHEN METABOLIC PANEL: CPT | Performed by: NURSE PRACTITIONER

## 2023-09-10 PROCEDURE — 250N000011 HC RX IP 250 OP 636: Mod: JZ | Performed by: SURGERY

## 2023-09-10 PROCEDURE — 250N000009 HC RX 250: Performed by: NURSE PRACTITIONER

## 2023-09-10 PROCEDURE — 94640 AIRWAY INHALATION TREATMENT: CPT

## 2023-09-10 PROCEDURE — 250N000013 HC RX MED GY IP 250 OP 250 PS 637: Performed by: SURGERY

## 2023-09-10 PROCEDURE — 250N000013 HC RX MED GY IP 250 OP 250 PS 637: Performed by: NURSE PRACTITIONER

## 2023-09-10 PROCEDURE — 84100 ASSAY OF PHOSPHORUS: CPT | Performed by: NURSE PRACTITIONER

## 2023-09-10 PROCEDURE — 36415 COLL VENOUS BLD VENIPUNCTURE: CPT | Performed by: NURSE PRACTITIONER

## 2023-09-10 PROCEDURE — 85027 COMPLETE CBC AUTOMATED: CPT | Performed by: NURSE PRACTITIONER

## 2023-09-10 RX ADMIN — PANTOPRAZOLE SODIUM 40 MG: 40 INJECTION, POWDER, FOR SOLUTION INTRAVENOUS at 09:09

## 2023-09-10 RX ADMIN — PANTOPRAZOLE SODIUM 40 MG: 40 TABLET, DELAYED RELEASE ORAL at 20:59

## 2023-09-10 RX ADMIN — BUPRENORPHINE AND NALOXONE 1 FILM: 4; 1 FILM, SOLUBLE BUCCAL; SUBLINGUAL at 20:59

## 2023-09-10 RX ADMIN — SODIUM CHLORIDE, POTASSIUM CHLORIDE, SODIUM LACTATE AND CALCIUM CHLORIDE: 600; 310; 30; 20 INJECTION, SOLUTION INTRAVENOUS at 00:30

## 2023-09-10 RX ADMIN — POTASSIUM & SODIUM PHOSPHATES POWDER PACK 280-160-250 MG 1 PACKET: 280-160-250 PACK at 13:13

## 2023-09-10 RX ADMIN — IPRATROPIUM BROMIDE AND ALBUTEROL SULFATE 3 ML: .5; 3 SOLUTION RESPIRATORY (INHALATION) at 13:58

## 2023-09-10 RX ADMIN — BUPRENORPHINE AND NALOXONE 1 FILM: 4; 1 FILM, SOLUBLE BUCCAL; SUBLINGUAL at 08:49

## 2023-09-10 RX ADMIN — POTASSIUM & SODIUM PHOSPHATES POWDER PACK 280-160-250 MG 1 PACKET: 280-160-250 PACK at 17:42

## 2023-09-10 RX ADMIN — IPRATROPIUM BROMIDE AND ALBUTEROL SULFATE 3 ML: .5; 3 SOLUTION RESPIRATORY (INHALATION) at 08:14

## 2023-09-10 RX ADMIN — POTASSIUM & SODIUM PHOSPHATES POWDER PACK 280-160-250 MG 1 PACKET: 280-160-250 PACK at 08:56

## 2023-09-10 ASSESSMENT — ACTIVITIES OF DAILY LIVING (ADL)
ADLS_ACUITY_SCORE: 24
ADLS_ACUITY_SCORE: 28
ADLS_ACUITY_SCORE: 24
ADLS_ACUITY_SCORE: 28
ADLS_ACUITY_SCORE: 24
ADLS_ACUITY_SCORE: 24

## 2023-09-10 NOTE — PLAN OF CARE
Pt A&O x 4 this shift. VSS. Pt afebrile. Pt remains on 21% FiO2 with trach dome this shift. Pt suctions self as needed. RT per flowsheets completes trach cares, and changes inner cannula x 2 this shift.Trach ties, and dressing with collar clean, dry, and intact this shift. Pt complaints of chronic back pain this shift, but denies PRN medication when offered this shift.   Pt receives PEG tube feedings x 4 this shift as charted in flowsheets, no residual at these times.  Scant drainage, area cleansed with wet wash cloth and new IV sponge changed this shift.     Pt independent ambulating in room this shift.   Pt continent, using urinal this shift. Output is straw yellow.     Face to face report given with opportunity to observe patient.    Report given to LOLLY Lepe.     Krysten Palm RN   9/10/2023  7:22 PM

## 2023-09-10 NOTE — PLAN OF CARE
"Most recent vitals: /80 (BP Location: Right arm, Patient Position: Sitting, Cuff Size: Adult Regular)   Pulse 88   Temp 97.7  F (36.5  C) (Tympanic)   Resp 16   Ht 1.727 m (5' 8\")   Wt 57.5 kg (126 lb 12.2 oz)   SpO2 98%   BMI 19.27 kg/m      Pain Management: reports 7/10 pain- chronic. Suboxone given as scheduled.   LOC: A&O x4  Cardiac: apical & radial pulse regular. Bradycardic at times throughout the night lowest was 38 BPM when pt was asleep, when awoken pulse went up to 54. Vitals checked and stable.   Respiratory: tracheostomy in place with trach dome at 21%. Pt suctions self independently as needed. LS clear & equal bilaterally.   GI: audible, normoactive. Peg tube in place.   : voids spontaneously without difficulty  Skin Issues: scattered scabs and bruising    IVF: LR @ 100 mL/hr    Nutrition: feeding through Peg tube, tolerating well.   ADL's: partial bath this evening. Participates in cares and clams/unclamps peg tube and holds syringe during feedings.    Ambulation: SBA w/ GB  Safety: remained free from falls, call light within reach, bed in lowest position, wheels locked.          Face to face report given with opportunity to observe patient.    Report given to LOLLY Bashir.    Nemo Pratt RN   9/10/2023  7:13 AM                 "

## 2023-09-10 NOTE — PROGRESS NOTES
[facility]    Hospitalist Progress Note    Date of Service (when I saw the patient): 09/10/2023    Assessment & Plan       Acute and chronic respiratory failure with hypoxia (H) (9/5/2023)    S/P Tracheostomy and change out on 9/6/23:  9/7: Tolerating room air-now on humidity collar only. Decreasing secretions from trach. Please see ENT notes.   -9/9: Stable, denies pain to site    Dysphagia: Due to supraglottal mass and new trach placement, he failed all swallowing trials on video swallow. PEG tube placement for nutritinal support-consulted Dr. Mcfadden. Will follow  receommendations.   -9/9: PEG tube placed 9/8 without complications, denies pain to site. Will start tube feedings today as well as patient teaching.   -9/10: Tolerating feeds well without significant residual, increase to goal feeds, free water and stop IVF. Anticipate he will need tube feeding for adequate nutrition for >90 days, likely 6 months or more.    Pneumomediastinum and Left apical pneumothorax   CT neck done showing pneumomediastinum with a small left apical pneumothorax.  Extensive subcutaneous emphysema in the chest wall and throughout the  neck.  Spoke with Dr Mcfadden no recs for chest tube at this time. Daily CXR.    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  -out patient follow up with oncology       Opioid use disorder (4/6/2020)  -on suboxone      Tobacco use  Prn nicotine patch         DVT Prophylaxis: Pneumatic Compression Devices  Code Status: Full Code    Disposition: Expected discharge tomorrow once tube feeding established    Loulou Rosales CNP    Interval History   Denies chest pain, abdominal pain or dyspnea. Suctioning himself and  changing his own inner cannula, now also doing feeds himself.      -Data reviewed today: I reviewed all new labs and imaging results over the last 24 hours.     Physical Exam   Temp: 98.1  F (36.7  C) Temp src: Tympanic BP: 127/59 Pulse: 80   Resp: 18 SpO2: 94 % O2 Device: Trach dome Oxygen Delivery: 4 LPM  Vitals:    09/07/23 0408 09/08/23 1001 09/10/23 0544   Weight: 57 kg (125 lb 10.6 oz) 57.5 kg (126 lb 12.2 oz) 53.4 kg (117 lb 11.6 oz)     Vital Signs with Ranges  Temp:  [97.7  F (36.5  C)-98.6  F (37  C)] 98.1  F (36.7  C)  Pulse:  [54-88] 80  Resp:  [16-18] 18  BP: (120-144)/(59-80) 127/59  FiO2 (%):  [21 %] 21 %  SpO2:  [92 %-98 %] 94 %  I/O last 3 completed shifts:  In: 2405 [I.V.:975; NG/GT:505]  Out: 1700 [Urine:1700]    Surgical Airway Tracheostomy Shiley 6.5 Cuffed (Active)   Trach Cap Status Uncapped/Unplugged 09/10/23 0817   Stoma Site Assessment Dry;Clean;No drainage;No bleeding 09/10/23 0817   Stoma Site Care Done 09/09/23 0804   Skin Assessment Clean;Dry;Intact 09/10/23 0817   Skin Integrity Intervention Protective barrier applied 09/08/23 0722   Securement Device Foam trach ties 09/10/23 0255   Trach Tie Assessment Clean;Dry;Intact;1-2 Finger allowance between skin and ties 09/10/23 0255   Inner Cannula Care Changed/new 09/10/23 0817   Bedside Safety Supplies Manual resuscitation bag;Trach tie or securement device;Oxygen source;Suction source;Extra trach of current size;Obturator;Humidity source 09/10/23 0255   Number of days: 5       Gastrostomy/Enterostomy Percutaneous endoscopic gastrostomy (PEG) LUQ 1 20 fr (Active)   Site Description WDL 09/10/23 0240   Site care button rotated 1/4 turn 09/09/23 2230   Drainage Appearance Pink tinged 09/08/23 1840   Status - Gastrostomy Clamped 09/10/23 0240   Dressing Status Normal: Clean, Dry & Intact 09/10/23 0240   Intake (ml) 125 ml 09/10/23 0240   Flush/Free Water (mL) 60 mL 09/10/23 0240   Residual (mL) 10 mL 09/10/23 0240   Number of days: 2        Incision/Surgical Site 09/05/23 Neck (Active)   Incision Assessment [facility] 09/09/23 1934   Carmel-Incision Assessment [facility] 09/08/23 1001   Closure RYLAN 09/09/23 0746   Incision Drainage Amount [facility] 09/09/23 0746   Drainage Description [facility] 09/09/23 0746   Dressing Intervention Clean, dry, intact 09/08/23 2325   Number of days: 5       Incision/Surgical Site 09/05/23 Mouth (Active)   Incision Assessment [facility] 09/09/23 1934   Carmel-Incision Assessment [facility] 09/08/23 1001   Closure RYLAN 09/09/23 0746   Incision Drainage Amount [facility] 09/09/23 0746   Drainage Description [facility] 09/09/23 0746   Number of days: 5     Line/device assessment(s) completed for medical necessity    Constitutional: Awake,alert, no acute distress  Respiratory: Clear bilaterally with upper airway rhonchi improved  Cardiovascular: HRR, murmurs, rubs, thrills   GI: Soft,nontender, bowel sounds positive. PEG tube in place with minimal serous drainage on dressing.   Skin/Integumen: No rashes, open areas or rashes.   Other:      Medications        buprenorphine HCl-naloxone HCl  1 Film Sublingual BID    ipratropium - albuterol 0.5 mg/2.5 mg/3 mL  3 mL Nebulization Q6H    [Held by provider] multivitamin w/minerals   Oral Daily    nicotine  1 patch Transdermal Daily    nicotine   Transdermal Q8H    pantoprazole  40 mg Intravenous Daily with breakfast    potassium & sodium phosphates  1 packet Oral 4x Daily       Data   Recent Labs   Lab 09/10/23  0538 09/09/23  1825 09/09/23  1207 09/09/23  0546 09/08/23  1041 09/08/23  0528 09/05/23  1328 09/05/23  0425   WBC 8.6  --   --  9.5  --  12.6*   < > 9.5   HGB 11.8*  --   --  12.3*  --  12.1*   < > 12.5*   MCV 90  --   --  90  --  92   < > 94     --   --  220  --  235   < > 306     --   --  136  --  135*   < > 144   POTASSIUM 3.4  --   --  3.5  --  3.5   < > 3.9   CHLORIDE 98  --   --  94*  --  94*   < > 98   CO2 33*  --   --  28  --  28   < > 31*   BUN 17.3  --   --  13.3  --  15.9   < > 24.7*   CR 0.49*  --   --   0.47*  --  0.49*   < > 0.68   ANIONGAP 6*  --   --  14  --  13   < > 15   CARA 8.4*  --   --  8.7*  --  8.7*   < > 9.6   * 180* 109* 87   < > 77   < > 117*   ALBUMIN 3.0*  --   --   --   --   --   --  4.3   PROTTOTAL 5.6*  --   --   --   --   --   --  7.6   BILITOTAL 0.4  --   --   --   --   --   --  0.5   ALKPHOS 82  --   --   --   --   --   --  110   ALT 20  --   --   --   --   --   --  30   AST 30  --   --   --   --   --   --  34    < > = values in this interval not displayed.         No results found for this or any previous visit (from the past 24 hour(s)).

## 2023-09-10 NOTE — PLAN OF CARE
"Reason for hospital stay:  acute Respiratory failure ending with new trach and new peg tube  Living situation PTA: home alone  Most recent vitals: /75 (BP Location: Right arm, Patient Position: Semi-Sanchez's, Cuff Size: Adult Regular)   Pulse 51   Temp 98.6  F (37  C) (Tympanic)   Resp 14   Ht 1.727 m (5' 8\")   Wt 57.5 kg (126 lb 12.2 oz)   SpO2 92%   BMI 19.27 kg/m      Pain Management:  acetaminophen prn . The other prns pain medication the MD rodo   LOC:  A/O x 4  Cardiac:  WNL  Respiratory:  WNL after suction but sometimes fine crackles sound during the day in all fields  GI:  WNL normal bowel sound  :  WNL     Skin Issues:  bruise on right forearm from surgery seen today.     IVF:  LR running throw night with plan to stop in morning if feeding in peg tube does well.   ABX:  none    Nutrition: feeding started in new peg tube today and patient is tolerating well.   ADL's:  refused in am but wanted bed bath in pm hours.  Ambulation:stands at bed side to void in urinal   Safety:  low bed, call light in near pt, and id band.    Comments: pt helped with the feeding task by holding feeding syringe and un clamping tube.      Face to face report given with opportunity to observe patient.    Report given to Nemo Drummond RN   9/9/2023  7:55 PM          "

## 2023-09-11 ENCOUNTER — TRANSCRIBE ORDERS (OUTPATIENT)
Dept: OTHER | Age: 68
End: 2023-09-11

## 2023-09-11 ENCOUNTER — HOME INFUSION (PRE-WILLOW HOME INFUSION) (OUTPATIENT)
Dept: PHARMACY | Facility: CLINIC | Age: 68
End: 2023-09-11

## 2023-09-11 DIAGNOSIS — J38.7 SUPRAGLOTTIC MASS: Primary | ICD-10-CM

## 2023-09-11 PROBLEM — E43 SEVERE PROTEIN-CALORIE MALNUTRITION (H): Status: ACTIVE | Noted: 2023-09-11

## 2023-09-11 LAB
HOLD SPECIMEN: NORMAL
PHOSPHATE SERPL-MCNC: 3.4 MG/DL (ref 2.5–4.5)

## 2023-09-11 PROCEDURE — 84100 ASSAY OF PHOSPHORUS: CPT | Performed by: NURSE PRACTITIONER

## 2023-09-11 PROCEDURE — 36415 COLL VENOUS BLD VENIPUNCTURE: CPT | Performed by: NURSE PRACTITIONER

## 2023-09-11 PROCEDURE — 99231 SBSQ HOSP IP/OBS SF/LOW 25: CPT | Performed by: NURSE PRACTITIONER

## 2023-09-11 PROCEDURE — 120N000001 HC R&B MED SURG/OB

## 2023-09-11 PROCEDURE — 250N000009 HC RX 250: Performed by: SURGERY

## 2023-09-11 PROCEDURE — 250N000013 HC RX MED GY IP 250 OP 250 PS 637: Performed by: NURSE PRACTITIONER

## 2023-09-11 PROCEDURE — 999N000157 HC STATISTIC RCP TIME EA 10 MIN

## 2023-09-11 PROCEDURE — 94640 AIRWAY INHALATION TREATMENT: CPT | Mod: 76

## 2023-09-11 PROCEDURE — 94640 AIRWAY INHALATION TREATMENT: CPT

## 2023-09-11 PROCEDURE — 250N000009 HC RX 250: Performed by: NURSE PRACTITIONER

## 2023-09-11 PROCEDURE — 250N000013 HC RX MED GY IP 250 OP 250 PS 637: Performed by: SURGERY

## 2023-09-11 RX ORDER — NICOTINE 21 MG/24HR
1 PATCH, TRANSDERMAL 24 HOURS TRANSDERMAL DAILY
Qty: 14 PATCH | Refills: 0 | Status: SHIPPED | OUTPATIENT
Start: 2023-09-12 | End: 2023-09-20

## 2023-09-11 RX ADMIN — PANTOPRAZOLE SODIUM 40 MG: 40 TABLET, DELAYED RELEASE ORAL at 20:06

## 2023-09-11 RX ADMIN — IPRATROPIUM BROMIDE AND ALBUTEROL SULFATE 3 ML: .5; 3 SOLUTION RESPIRATORY (INHALATION) at 14:17

## 2023-09-11 RX ADMIN — IPRATROPIUM BROMIDE AND ALBUTEROL SULFATE 3 ML: .5; 3 SOLUTION RESPIRATORY (INHALATION) at 07:51

## 2023-09-11 RX ADMIN — BUPRENORPHINE AND NALOXONE 1 FILM: 4; 1 FILM, SOLUBLE BUCCAL; SUBLINGUAL at 08:58

## 2023-09-11 RX ADMIN — IPRATROPIUM BROMIDE AND ALBUTEROL SULFATE 3 ML: .5; 3 SOLUTION RESPIRATORY (INHALATION) at 19:43

## 2023-09-11 RX ADMIN — BUPRENORPHINE AND NALOXONE 1 FILM: 4; 1 FILM, SOLUBLE BUCCAL; SUBLINGUAL at 17:45

## 2023-09-11 ASSESSMENT — ACTIVITIES OF DAILY LIVING (ADL)
ADLS_ACUITY_SCORE: 24

## 2023-09-11 NOTE — PROGRESS NOTES
Checked in with Jorge A.  He is anxious about discharging home today.  He feels it would be helpful for home care to see him.  Will call [place] home care agencies, as initially had called [place] agencies as he resides in [place] but will be staying at his daughter Artis's in [place].      Pt/family was provided with the Medicare Compare list for Home Care.  Discussed associated Medicare star ratings to assist with choice for referrals/discharge planning Yes    Education was given to pt/family that star ratings are updated/maintained by Medicare and can be reviewed by visiting www.medicare.gov Yes    Patient/family choices for facility in priority are:   First Choice : [facility] [place]: [facility]    [phone]; awaiting call back about availability   10:30- message received indicating they can provide nursing services for Jorge A     Second Choice: [facility] [place]: [facility]         [phone]      [facility] updated on discharge.

## 2023-09-11 NOTE — PROGRESS NOTES
[facility]    Hospitalist Progress Note    Date of Service (when I saw the patient): 09/11/2023    Assessment & Plan       Acute and chronic respiratory failure with hypoxia (H) (9/5/2023)    S/P Tracheostomy and change out on 9/6/23:  9/7: Tolerating room air-now on humidity collar only. Decreasing secretions from trach. Please see ENT notes.   -9/9: Stable, denies pain to site  -9/11: No changes, spoke with Dr. Erazo, she approves speech therapy to try speaking valve.     Dysphagia: Due to supraglottal mass and new trach placement, he failed all swallowing trials on video swallow. PEG tube placement for nutritinal support-consulted Dr. Mcfadden. Will follow RD receommendations.   -9/9: PEG tube placed 9/8 without complications, denies pain to site. Will start tube feedings today as well as patient teaching.   -9/10: Tolerating feeds well without significant residual, increase to goal feeds, free water and stop IVF. Anticipate he will need tube feeding for adequate nutrition for >90 days, likely 6 months or more.    Pneumomediastinum and Left apical pneumothorax   CT neck done showing pneumomediastinum with a small left apical pneumothorax.  Extensive subcutaneous emphysema in the chest wall and throughout the  neck.  Spoke with Dr Bogdan peterson recs for chest tube at this time. Daily CXR.    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  -out patient follow up with oncology       Opioid use disorder (4/6/2020)  -on suboxone      Tobacco use  Prn nicotine patch         DVT Prophylaxis: Pneumatic Compression Devices  Code Status: Full Code    Disposition: Expected discharge tomorrow once tube feeding established    Loulou  IBETH Rosales    Interval History   Denies chest pain, abdominal pain or dyspnea. Suctioning himself and changing his own inner cannula, now also doing feeds himself.      -Data reviewed today: I reviewed all new labs and imaging results over the last 24 hours.     Physical Exam   Temp: 98.1  F (36.7  C) Temp src: Tympanic BP: 104/68 Pulse: 65   Resp: 16 SpO2: 96 % O2 Device: Trach dome Oxygen Delivery: 5 LPM  Vitals:    09/07/23 0408 09/08/23 1001 09/10/23 0544   Weight: 57 kg (125 lb 10.6 oz) 57.5 kg (126 lb 12.2 oz) 53.4 kg (117 lb 11.6 oz)     Vital Signs with Ranges  Temp:  [97.5  F (36.4  C)-98.5  F (36.9  C)] 98.1  F (36.7  C)  Pulse:  [62-68] 65  Resp:  [14-18] 16  BP: (104-129)/(68-84) 104/68  FiO2 (%):  [21 %] 21 %  SpO2:  [96 %-97 %] 96 %  I/O last 3 completed shifts:  In: 2580 [NG/GT:1210]  Out: 1400 [Urine:1400]    Surgical Airway Tracheostomy Shiley 6 Cuffed;XLT (Active)   Trach Cap Status Uncapped/Unplugged 09/11/23 1417   Stoma Site Assessment Clean 09/11/23 1417   Stoma Site Care Done 09/11/23 0900   Skin Assessment Clean 09/11/23 1417   Skin Integrity Intervention Foam dressing 09/11/23 0900   Securement Device Foam trach ties 09/11/23 1417   Trach Tie Assessment Clean 09/11/23 1417   Inner Cannula Care Changed/new 09/11/23 1417   Bedside Safety Supplies Manual resuscitation bag 09/11/23 0900   Number of days: 6       Gastrostomy/Enterostomy Percutaneous endoscopic gastrostomy (PEG) LUQ 1 20 fr (Active)   Site Description WDL 09/11/23 0900   Site care cleansed with soap and water 09/11/23 0900   Drainage Appearance Pink tinged 09/11/23 0900   Status - Gastrostomy Clamped 09/11/23 0900   Dressing Status Normal: Clean, Dry & Intact 09/11/23 0900   Intake (ml) 250 ml 09/11/23 1300   Flush/Free Water (mL) 120 mL 09/11/23 1300   Residual (mL) 10 mL 09/11/23 1300   Number of days: 3       Incision/Surgical Site 09/05/23 Neck (Active)   Incision Assessment [facility] 09/11/23 0900   Carmel-Incision Assessment [facility]  09/11/23 0900   Closure RYLAN 09/11/23 0900   Incision Drainage Amount [facility] 09/11/23 0900   Drainage Description [facility] 09/11/23 0900   Dressing Intervention Clean, dry, intact 09/10/23 1722   Number of days: 6       Incision/Surgical Site 09/05/23 Mouth (Active)   Incision Assessment [facility] 09/11/23 0900   Carmel-Incision Assessment [facility] 09/11/23 0900   Closure RYLAN 09/11/23 0900   Incision Drainage Amount [facility] 09/11/23 0900   Drainage Description [facility] 09/11/23 0900   Number of days: 6     Line/device assessment(s) completed for medical necessity    Constitutional: Awake,alert, no acute distress  Respiratory: Clear bilaterally with upper airway rhonchi improved  Cardiovascular: HRR, murmurs, rubs, thrills   GI: Soft,nontender, bowel sounds positive. PEG tube in place with minimal serous drainage on dressing.   Skin/Integumen: No rashes, open areas or rashes.   Other:      Medications        buprenorphine HCl-naloxone HCl  1 Film Sublingual BID    ipratropium - albuterol 0.5 mg/2.5 mg/3 mL  3 mL Nebulization Q6H    [Held by provider] multivitamin w/minerals   Oral Daily    nicotine  1 patch Transdermal Daily    nicotine   Transdermal Q8H    pantoprazole  40 mg Oral or G tube Daily       Data   Recent Labs   Lab 09/10/23  0538 09/09/23  1825 09/09/23  1207 09/09/23  0546 09/08/23  1041 09/08/23  0528 09/05/23  1328 09/05/23  0425   WBC 8.6  --   --  9.5  --  12.6*   < > 9.5   HGB 11.8*  --   --  12.3*  --  12.1*   < > 12.5*   MCV 90  --   --  90  --  92   < > 94     --   --  220  --  235   < > 306     --   --  136  --  135*   < > 144   POTASSIUM 3.4  --   --  3.5  --  3.5   < > 3.9   CHLORIDE 98  --   --  94*  --  94*   < > 98   CO2 33*  --   --  28  --  28   < > 31*   BUN 17.3  --   --  13.3  --  15.9   < > 24.7*   CR 0.49*  --   --  0.47*  --  0.49*   < > 0.68   ANIONGAP 6*  --   --  14  --  13   < > 15   CARA 8.4*  --   --  8.7*  --  8.7*   < > 9.6   * 180* 109* 87   < > 77   < > 117*   ALBUMIN 3.0*  --   --    --   --   --   --  4.3   PROTTOTAL 5.6*  --   --   --   --   --   --  7.6   BILITOTAL 0.4  --   --   --   --   --   --  0.5   ALKPHOS 82  --   --   --   --   --   --  110   ALT 20  --   --   --   --   --   --  30   AST 30  --   --   --   --   --   --  34    < > = values in this interval not displayed.         No results found for this or any previous visit (from the past 24 hour(s)).

## 2023-09-11 NOTE — DISCHARGE INSTRUCTIONS
Tube Feeding Instructions  TwoCal HN- 4 cartons daily, bolus gravity feeds.   1 carton (237ml) every 3-4 hours while awake. 60ml water flush before and after formula. Flush 300ml water 2 times per day to meet fluid needs  Example Schedule: change times to fit your schedule  6am- 60ml water, 1 carton, 60ml water  10am- 60ml water, 1 carton, 60ml water  1pm- 60ml water, 1 carton, 60ml water  5pm- 60ml water, 1 carton, 60ml water  7pm-300ml water with protonix (protonix should be given on empty stomach not with tube feeding formula)  9pm 300ml water.     [facility]- [phone]

## 2023-09-11 NOTE — DISCHARGE SUMMARY
[facility]    Discharge Summary  Hospitalist    Date of Admission:  9/5/2023  Date of Discharge:  09/12/2023  Discharging Provider: Jovanny Van MD  Date of Service (when I saw the patient): 09/12/2023    Discharge Diagnoses     Principal Problem:    Acute and chronic respiratory failure with hypoxia (H)  Active Problems:    Chronic pain syndrome    Laryngeal cancer (H)    History of radiation therapy    COPD exacerbation (H)    Voice hoarseness    Sinus bradycardia    Severe protein-calorie malnutrition (H)      History of Present Illness   From admission: Jorge A Mendosa is a 68 year old male who is diagnosis of prior laryngeal cancer with resection and radiation.  Patient also has history of chronic pain syndrome tobacco use.  Patient has been to the emergency room f last week twice he presented again with wheezing was started on DuoNebs and steroids for COPD exacerbation.  Chest x-ray did not show any acute findings.  Patient denies any fever chills sweating, he was COVID-negative, his white blood cell was 9.5, he was afebrile.  Patient was discussed with ENT Dr. Mcnally as he was supposed to follow-up with her regarding his prior cancer diagnosis but has not.  Patient will be seen in the hospital for further care and work-up.  He may need imaging of his neck.  We are continuing DuoNebs and steroids in the hospital.  He is a full code.     Hospital Course     Acute and chronic respiratory failure with hypoxia (H) (9/5/2023)  : Resolved. Due to obstructed airway from encroaching tumor    S/P Tracheostomy and change out on 9/6/23:  9/7: Tolerating room air-now on humidity collar only. Decreasing secretions from trach. Please see ENT notes.   -9/9: Stable, denies pain to site  -9/11: No changes, spoke with Dr. Erazo, she approves speech therapy to try speaking valve.      Dysphagia: Due to supraglottal mass and new trach placement, he failed all swallowing trials on video swallow. PEG  tube placement for nutritinal support-consulted Dr. Mcfadden. Will follow RD receommendations.   -9/9: PEG tube placed 9/8 without complications, denies pain to site. Will start tube feedings today as well as patient teaching.   -9/10: Tolerating feeds well without significant residual, increase to goal feeds, free water and stop IVF. Anticipate he will need tube feeding for adequate nutrition for >90 days, likely 6 months or more.     Pneumomediastinum and Left apical pneumothorax   CT neck done showing pneumomediastinum with a small left apical pneumothorax.  Extensive subcutaneous emphysema in the chest wall and throughout the  neck.  Spoke with Dr Mcfadden no recs for chest tube at this time. Daily CXR.    -9/9: stable for several days, stop daily CXR    Recurrent supraglottic mass:ENT- Dr. Mcnally took Cm to surgery on 10/30/2018 for bilateral glottic squamous cell carcinoma.  There was a bulky exophytic mass that crosses the anterior commissure which appeared to have originated on the left cord. He was recommended to follow-up every 6 months for routine follow-up. He was last examined  on 7/13/2020 without evidence of disease on office laryngoscopy.  He was then lost to follow-up. Laryngoscopy was performed 9/5/23: There is a bulky exophytic left supraglottic mass obliterating the left vocal cord with polypoid exophytic extension along the anterior commissure and probable involvement of the right supraglottis.  The left vocal cord is fixed.  There is minimal mobility of the right arytenoid without any obvious right true cord mobility.  The right true cord is visible and there is no sabrina right true cord mass but visualization is difficult due to the size of the left mass.  The glottic is narrowed to 2-3 millimeters.  Tracheostomy, laryngostomy with biopsies performed on 9/5/23 as well.        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 He did have a prolonged course of treatment due to continued odynophagia.       History of radiation therapy (7/29/2019)    -Invasive keratinizing squamous cell carcinoma.  -out patient follow up with oncology       Opioid use disorder (4/6/2020)     Tobacco use: Prescribed nicotine patch    -9/12: Patient is actually doing very well he is alert pleasant abdominal pain says that 2 feedings are going fine today.  I think we have basically everything set up for him at home.  He will follow-up with [facility] within the next 7 to 10 days.  Then follow-up with Dr. Mcnally per their recommendations.          Jovanny Van MD      Pending Results     Unresulted Labs Ordered in the Past 30 Days of this Admission       No orders found from 8/6/2023 to 9/6/2023.            Code Status   Full Code       Primary Care Physician   Regina Hicks       Discharge Disposition   Discharged to home  Condition at discharge: Stable    Consultations This Hospital Stay   ENT IP CONSULT  RESPIRATORY CARE IP CONSULT  SPEECH LANGUAGE PATH ADULT IP CONSULT  SURGERY GENERAL IP CONSULT  SPIRITUAL HEALTH SERVICES IP CONSULT  PHARMACY IP CONSULT  SMOKING CESSATION PROGRAM IP CONSULT    Time Spent on this Encounter   I, Jovanny Van MD, personally saw the patient today and spent greater than 30 minutes discharging this patient.    Discharge Orders      Speech Therapy Referral      Home Care Referral      Reason for your hospital stay    Supraglottal mass with impaired airway     Follow-up and recommended labs and tests     Follow up with primary care provider, Regina Hicks, within 7 days for hospital follow- up.  No follow up labs or test are needed.     Activity    Your activity upon discharge: activity as tolerated     Full Code     Miscellaneous Order for DME - ONLY FOR DME    I, the undersigned, certify  that the above prescribed supplies are medically necessary for this patient and is both reasonable and necessary in reference to accepted standards of medical and necessary in reference to accepted standards of medical practice in the treatment of this patient's condition and is not prescribed as a convenience.      Diet    Follow this diet upon discharge: Orders Placed This Encounter      Adult Formula Bolus Feeding: TwoCal HN; Route: Gastrostomy; 4 times daily (times:6,10,13,17); Volume per Bolus: 250 mL(s);   Additional free water (mL): 60 ml water flush before and after each feed. An additional 300ml water at 1900 and 2100     Discharge Medications   Current Discharge Medication List        START taking these medications    Details   nicotine (NICODERM CQ) 21 MG/24HR 24 hr patch Place 1 patch onto the skin daily  Qty: 14 patch, Refills: 0    Associated Diagnoses: Tobacco abuse      pantoprazole (PROTONIX) 2 mg/mL SUSP suspension 20 mLs (40 mg) by Per G Tube route daily for 30 days  Qty: 600 mL, Refills: 0    Associated Diagnoses: Severe protein-calorie malnutrition (H)           CONTINUE these medications which have NOT CHANGED    Details   acetaminophen (TYLENOL) 500 MG tablet Take 500 mg by mouth every 6 hours as needed for mild pain      Ascorbic Acid (VITAMIN C PO) 1 daily      buprenorphine HCl-naloxone HCl (SUBOXONE) 4-1 MG per film Place 1 Film under the tongue 2 times daily  Qty: 56 each, Refills: 2    Associated Diagnoses: Opioid use disorder      ipratropium - albuterol 0.5 mg/2.5 mg/3 mL (DUONEB) 0.5-2.5 (3) MG/3ML neb solution Take 1 vial (3 mLs) by nebulization every 6 hours as needed for shortness of breath, wheezing or cough  Qty: 90 mL, Refills: 0      Multiple Vitamins-Minerals (MULTIVITAMIN ADULT PO) Take by mouth daily      naproxen sodium (ANAPROX) 220 MG tablet Take 1 tablet by mouth As directed when needed      ibuprofen (ADVIL/MOTRIN) 200 MG capsule Take 200 mg by mouth every 4 hours as  needed      naloxone (NARCAN) 4 MG/0.1ML nasal spray Spray 1 spray (4 mg) into one nostril alternating nostrils as needed for opioid reversal every 2-3 minutes until assistance arrives  Qty: 0.2 mL, Refills: 1           STOP taking these medications       omeprazole (PRILOSEC) 40 MG DR capsule Comments:   Reason for Stopping:             Allergies   Allergies   Allergen Reactions    Benzodiazepines      Contraindicated - on Suboxone    Celebrex [Celecoxib] GI Disturbance    Contrast Dye Swelling     Difficulty swallowing during contrast given for CT neck    Elavil [Amitriptyline] Dizziness and Nausea     Data   Most Recent 3 CBC's:  Recent Labs   Lab Test 09/10/23  0538 09/09/23  0546 09/08/23  0528   WBC 8.6 9.5 12.6*   HGB 11.8* 12.3* 12.1*   MCV 90 90 92    220 235      Most Recent 3 BMP's:  Recent Labs   Lab Test 09/10/23  0538 09/09/23  1825 09/09/23  1207 09/09/23  0546 09/08/23  1041 09/08/23  0528     --   --  136  --  135*   POTASSIUM 3.4  --   --  3.5  --  3.5   CHLORIDE 98  --   --  94*  --  94*   CO2 33*  --   --  28  --  28   BUN 17.3  --   --  13.3  --  15.9   CR 0.49*  --   --  0.47*  --  0.49*   ANIONGAP 6*  --   --  14  --  13   CARA 8.4*  --   --  8.7*  --  8.7*   * 180* 109* 87   < > 77    < > = values in this interval not displayed.     Most Recent 2 LFT's:  Recent Labs   Lab Test 09/10/23  0538 09/05/23  0425   AST 30 34   ALT 20 30   ALKPHOS 82 110   BILITOTAL 0.4 0.5     Most Recent INR's and Anticoagulation Dosing History:  Anticoagulation Dose History           No data to display              Most Recent 3 Troponin's:No lab results found.  Most Recent Cholesterol Panel:  Recent Labs   Lab Test 07/14/22  1056   CHOL 151   LDL 71   HDL 71   TRIG 47     Most Recent 6 Bacteria Isolates From Any Culture (See EPIC Reports for Culture Details):  Recent Labs   Lab Test 02/11/20  1601   CULT Heavy growth  Beta hemolytic Streptococcus group A  *  Significant value called to and  read back by  Rita Adair [id number] 2/14/20 MRN       Most Recent TSH, T4 and A1c Labs:No lab results found.  Results for orders placed or performed during the hospital encounter of 09/05/23   XR Chest Port 1 View    Narrative    Procedure:XR CHEST PORT 1 VIEW    Clinical history:Male, 68 years, sob    Technique: Single view was obtained.    Comparison: 8/23/2023    Findings: The cardiac silhouette is similar appearance with persistent  ectasia of the thoracic aorta. The pulmonary vasculature is within  normal limits.    The lungs are again hyperinflated with interstitial thickening Bony  structures are unremarkable.      Impression    Impression:   No acute abnormality. No evidence of acute or active cardiopulmonary  disease.     This report is in agreement with the preliminary report.    RACHEL ANDERSEN MD         SYSTEM ID:  [id number]   XR Chest Port 1 View    Narrative    PROCEDURE:  XR CHEST PORT 1 VIEW    HISTORY: trach placement. .    COMPARISON:  9/5/2023    FINDINGS:  A tracheostomy tube is in position. Lungs are well aerated. There is  pronounced subcutaneous emphysema. The cardiomediastinal contours are  stable.      Impression    IMPRESSION: Subcutaneous emphysema following tracheostomy placement.  Lungs appear well aerated.    EVAN SANTOYO MD         SYSTEM ID:  [id number]   CT Soft Tissue Neck w Contrast    Addendum: 9/7/2023    The extensive subcutaneous emphysema makes visualization of soft  tissues in the neck difficult. There is no thyroid cartilage invasion  or destruction seen by CT scanning.    FRANKLIN CASTRO MD         SYSTEM ID:  [id number]      Narrative    PROCEDURE: CT SOFT TISSUE NECK W CONTRAST 9/6/2023 1:52 PM    HISTORY: Laryngeal SCC    COMPARISONS: 2018    Meds/Dose Given: Isovue 370    TECHNIQUE: CT scan of the neck with IV contrast sagittal coronal  reconstructions were obtained    FINDINGS: Pneumomediastinum is seen. There is subcutaneous emphysema  in the chest wall and  extending up throughout the neck. There is a  small left apical pneumothorax.    There is a tracheostomy tube in place.    There is abnormal soft tissue thickening seen in the larynx.    No cervical lymphadenopathy is seen.         Impression    IMPRESSION: Pneumomediastinum with a small left apical pneumothorax.    Extensive subcutaneous emphysema in the chest wall and throughout the  neck.    FRANKLIN CASTRO MD         SYSTEM ID:  [id number]   XR Chest Port 1 View    Narrative    PROCEDURE:  XR CHEST PORT 1 VIEW    HISTORY:  upright chest xrya for left sided pneumothorax.     COMPARISON:  None.    FINDINGS:   The cardiac silhouette is normal in size. The pulmonary vasculature is  normal.  The lungs are clear. There is widespread pneumomediastinum  and subcutaneous emphysema throughout the chest. This is tracheostomy  tube in place. The small pneumothorax seen on a recent CT scan was not  seen on the plain film.      Impression    IMPRESSION:  Extensive pneumomediastinum and subcutaneous emphysema.      FRANKLIN CASTRO MD         SYSTEM ID:  [id number]   XR Video Swallow with SLP or OT    Narrative    PROCEDURE: XR VIDEO SWALLOW WITH SLP OR OT 9/7/2023 2:01 PM    HISTORY: New trach    COMPARISONS: None.    TECHNIQUE: Modified barium swallow    FINDINGS: On thin barium consistencies the patient aspirated. On  thicker consistencies the patient was able to swallow without  aspiration. No abnormal pooling in the vallecula or piriform sinuses  was seen. The fluoroscopy time was 0.6 minutes         FRANKLIN CASTRO MD         SYSTEM ID:  [id number]   XR Chest Port 1 View    Narrative    PROCEDURE:  XR CHEST PORT 1 VIEW    HISTORY:  pneumothorax.     COMPARISON:  9/6/2023 at 1424 hours    FINDINGS:   The there is a widespread pneumomediastinum and subcutaneous emphysema  stable from previous examination. There is an endotracheal tube with  its tip above the ana maría. No pneumothorax is seen.      Impression    IMPRESSION:   Extensive pneumomediastinum and subcutaneous emphysema  stable from previous examination      FRANKLIN CASTRO MD         SYSTEM ID:  [id number]   XR Chest Port 1 View    Narrative    Procedure:XR CHEST PORT 1 VIEW    Clinical history:Male, 68 years, pneumothorax    Technique: Single view was obtained.    Comparison: 9/7/2023    Findings: The cardiac silhouette is within normal limits.. The  pulmonary vasculature is within normal limits.    The lungs are again hyperinflated however appear to be clear. Large  volume of gas again seen within the soft tissues throughout the thorax  and neck. Tracheostomy tube remains satisfactorily positioned. Bony  structures demonstrate no acute abnormality      Impression    Impression:   No acute abnormality.     Subcutaneous emphysema and pneumomediastinum, similar in appearance  compared to prior study..        RACHEL ANDERSEN MD         SYSTEM ID:  [id number]   XR Chest Port 1 View    Narrative    PROCEDURE:  XR CHEST PORT 1 VIEW    HISTORY:  pneumothorax.     COMPARISON:  9/8/2023    FINDINGS:   The cardiac silhouette is normal in size. The pulmonary vasculature is  normal.  The lungs are clear. No pleural effusion or pneumothorax.  Widespread pneumomediastinum and subcutaneous emphysema is seen.  Tracheostomy tube in place.      Impression    IMPRESSION:  No change from 9/8/2023      FRANKLIN CASTRO MD         SYSTEM ID:  [id number]

## 2023-09-11 NOTE — PROGRESS NOTES
Reviewed inner cannula changing with Jorge A.  He changed the inner cannula successfully himself.  No incidents.

## 2023-09-11 NOTE — PLAN OF CARE
"Most recent vitals: /84 (BP Location: Left arm, Patient Position: Semi-Sanchez's, Cuff Size: Adult Regular)   Pulse 64   Temp 98.1  F (36.7  C) (Tympanic)   Resp 14   Ht 1.727 m (5' 8\")   Wt 53.4 kg (117 lb 11.6 oz)   SpO2 96%   BMI 17.90 kg/m      Pain Management:  Denies pain, has PRN medication if needed  LOC:  A/Ox4  Cardiac:  Normotensive, afebrile  Respiratory:  Trach done, FiO2 at 21% at 5 liters, lung sounds have fine crackles throughout  GI:  Independent. PEG tube site sutured and CDI  :  Urinal at night times, otherwise independent  Skin Issues:  See flowsheet for full assessment    IVF:  No PIV, providers aware  ABX:  n/a     Nutrition: PEG tube feeding x4 a day, minor residual during shift, see orders for full details  ADL's:  Independent with stand by assist  Ambulation:Independent with stand by assist  Safety:  Call light within reach, bed in lowest position, ID band in place    **possible discharge today**    Face to face report given with opportunity to observe patient.     Report given to LOLLY Hidalgo RN  9/11/23       "

## 2023-09-11 NOTE — PROGRESS NOTES
Therapy: Enteral TF  Insurance: Medicare  Co-Insurance: 20%    The patient meets Medicare criteria for Enteral TF. The patient will be responsible for 20% of charges.    Please contact Intake with any questions, [phone] or In Basket pool,  Home Infusion ([phone]).

## 2023-09-11 NOTE — PLAN OF CARE
Goal Outcome Evaluation:  No acute changes this shift, plan for discharge tomorrow with home medical supplies being delivered at [phone], [facility] will see tomorrow at home as well.     Neuro  A&Ox4, PERRL, moves and extremities appropriately    Cardio  VSS, no fevers, CMS intact    Resp  Shiley 6.0 XLT with inner cannula, 5L via trach dome at 21%, patient suctions self orally prn, LS crackles fine,     GI/  PEG tube, feedings and fluid boluses given per orders     Skin  No new skin issues, sitting up at edge of bed    Face to face report given with opportunity to observe patient.    Report given to Tal Cook, LOLLY   9/11/2023  6:48 PM

## 2023-09-11 NOTE — PROGRESS NOTES
"CLINICAL NUTRITION SERVICES  -  REASSESSMENT NOTE    REASON FOR ASSESSMENT:  follow up    NUTRITION INTERVENTIONS  Recommendations / Nutrition Prescription  TwoCal HN- goal of 4 cartons daily, bolus gravity feeds. 1 carton (237ml) about every 3-4 hours while awake. 60ml water flush before and after formula. 200ml water TID between tube feeds to meet fluid needs. This provides 1900kcal, 80g protein, 1744ml free water      NUTRITION HISTORY  Jorge A Mendosa is a 68 year old male admitted for acute and chronic respiratory failure with hypoxia, glottic squamous cell carcinoma, s/p tracheostomy. 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 Pt reports tolerating Tfs. No nausea, stomach pain, bloating. Had a loose BM yesterday. Pt has been feeding himself and feels he is doing well with the syringe gravity feeds.     Waiting to hear back from [facility] on insurance coverage. Pt is meets criteria and is covered for tube feeding supplies. He will be responsible for 20% of charges per his plan.      CURRENT NUTRITION ORDERS  Diet Order:   TwoCal HN- 250ml 4x per day. 60ml flush before and after bolus. 600ml free water BID. Providing 2000kcal, 83.5g protein, 1780ml free water.    ANTHROPOMETRICS  Height: 5' 8\"  Weight: 117 lbs 11.61 oz  Body mass index is 17.9 kg/m .  Weight Status:  Underweight BMI <18.5  Weight History: 42lb (27%) weight loss in about 5 months   Wt Readings from Last 10 Encounters:   09/10/23 53.4 kg (117 lb 11.6 oz)   09/05/23 52.1 kg (114 lb 13.8 oz) - first measured weight standing scale   06/14/23 68.5 kg (151 lb)   03/22/23 71.2 kg (156 lb 14.4 oz)   12/29/22 72.6 kg (160 lb)   10/06/22 76.2 kg (168 lb)   07/14/22 75.3 kg (166 lb)   05/19/22 76.7 kg (169 lb)   02/24/22 75.8 kg (167 lb)   12/30/21 76.2 kg (168 lb)   11/04/21 73.9 kg (163 lb)      ASSESSED NUTRITION NEEDS: 57kg current weight  Estimated Energy Needs: 7301-3287 kcals (30-35 " Kcal/Kg)  Estimated Protein Needs:  grams protein (1.2-1.8 g pro/Kg)  Estimated Fluid Needs: 2198-2090  mL (1 mL/Kcal)     MALNUTRITION:  % Weight Loss:  > 10% in 6 months (severe malnutrition)  % Intake:  </= 75% for >/= 1 month (severe malnutrition)  Muscle and Subcutaneous Fat Loss:  moderate-severe     Malnutrition Diagnosis: Severe malnutrition- expected to improve with TFs  In Context of:  Chronic illness or disease     MONITORING AND EVALUATION:  TF tolerance, intake, weight, labs

## 2023-09-11 NOTE — PROGRESS NOTES
Received notification from [facility] that they are unable to deliver equipment today but are able around 1300 tomorrow.  Jorge A and daughter, Artis updated.

## 2023-09-12 ENCOUNTER — TELEPHONE (OUTPATIENT)
Dept: EMERGENCY MEDICINE | Facility: HOSPITAL | Age: 68
End: 2023-09-12

## 2023-09-12 VITALS
DIASTOLIC BLOOD PRESSURE: 68 MMHG | HEART RATE: 68 BPM | SYSTOLIC BLOOD PRESSURE: 98 MMHG | BODY MASS INDEX: 17.84 KG/M2 | HEIGHT: 68 IN | TEMPERATURE: 98.6 F | WEIGHT: 117.73 LBS | OXYGEN SATURATION: 95 % | RESPIRATION RATE: 20 BRPM

## 2023-09-12 DIAGNOSIS — Z91.041 ALLERGY TO CONTRAST MEDIA (USED FOR DIAGNOSTIC X-RAYS): Primary | ICD-10-CM

## 2023-09-12 PROCEDURE — 999N000157 HC STATISTIC RCP TIME EA 10 MIN

## 2023-09-12 PROCEDURE — 250N000013 HC RX MED GY IP 250 OP 250 PS 637: Performed by: SURGERY

## 2023-09-12 RX ORDER — METHYLPREDNISOLONE 16 MG/1
TABLET ORAL
Qty: 4 TABLET | Refills: 0 | Status: SHIPPED | OUTPATIENT
Start: 2023-09-12 | End: 2023-10-26

## 2023-09-12 RX ADMIN — BUPRENORPHINE AND NALOXONE 1 FILM: 4; 1 FILM, SOLUBLE BUCCAL; SUBLINGUAL at 08:03

## 2023-09-12 ASSESSMENT — ACTIVITIES OF DAILY LIVING (ADL)
ADLS_ACUITY_SCORE: 24

## 2023-09-12 NOTE — PROGRESS NOTES
[facility] delivery for [phone].  Noa with [facility] updated on discharge, they plan to see tomorrow.  Artis indicates family will be here at 1200 to  Jorge AJuly Esparza RN updated     Name: Jorge A Mendosa    MRN#: [id number]    Reason for Hospitalization: COPD exacerbation (H) [J44.1];Encounter for tobacco use cessation counseling [Z71.6]    GAL: low     Discharge Date: 9/12/2023    Patient / Family response to discharge plan: agreeable    Follow-Up Appt: No future appointments.    Other Providers (Care Coordinator, County Services, PCA services etc): No both home infusion and home care to grab from Epic    Discharge Disposition: home    AURELIA Yoon

## 2023-09-12 NOTE — TELEPHONE ENCOUNTER
Received PA request from [facility] for Pantoprazole Sodium 40MG packets.    PA Submitted on CMM, waiting for response.    Preferred products are:   PANTOPRAZOLE TAB 40MG [id number]  LANSOPRAZOLE CAP 30MG  [id number]  PANTOPRAZOLE TAB 40MG [id number]

## 2023-09-12 NOTE — PLAN OF CARE
"Reason for hospital stay:  Acute and Chronic Respiratory Failure  Living situation PTA: Going home w/ Daughter  Most recent vitals: /71 (BP Location: Left arm, Patient Position: Sitting, Cuff Size: Adult Regular)   Pulse 72   Temp 98.9  F (37.2  C) (Tympanic)   Resp 18   Ht 1.727 m (5' 8\")   Wt 53.4 kg (117 lb 11.6 oz)   SpO2 94%   BMI 17.90 kg/m      Pain Management:  Denies pain at beginning of shift-rates pain at 8/10  LOC:  A&O x 4  Cardiac:  HRR regular  Respiratory: Inspiratory/Expiratory faint wheezes. Humidified air-21% w/ trach dome in place. O2 94%.  GI:  Normoactive BS x 4-Peg tube in place.   :  Voids spontaneously w/o difficulty-utilized bedside urinal.  Skin Issues:  Some scattered bruising present-no adjustments needed. New Peg tube in place, sutures-CDI. Trach in place, CDI    IVF:  No IV Access  ABX:  N/A    Nutrition: Scheduled tube feeds and free flush water.  Activity: SBA x 1  Safety:  Bed in lowest position, with wheels locked. Call light and personal items within reach and makes needs known. Room near nurses station w/ door open and frequent rounding.  Face to face report given with opportunity to observe patient.    Report given to LOLLY Thomas RN   9/12/2023  7:33 AM      " 22-Sep-2022

## 2023-09-12 NOTE — TELEPHONE ENCOUNTER
Received [facility] for Pantoprazole Sodium 40MG packets 9/12/2023.      Preferred products are:   PANTOPRAZOLE TAB 40MG           [id number]  LANSOPRAZOLE CAP 30MG      [id number]  PANTOPRAZOLE TAB 40MG           [id number]    No sure if any of the above medications would be covered with this diagnosis either.  Letter scanned to Epic.

## 2023-09-12 NOTE — PLAN OF CARE
Patient discharged at 12:17 PM via wheel chair accompanied by other:family and staff. Prescriptions sent to patients preferred pharmacy. All belongings sent with patient.     Discharge instructions reviewed with pt. Listed belongings gathered and returned to patient. All personal. Sent home equipment for home tube feedings and crushing medications if needed. Tube feeding formula and the HME were talked about how not wearing them if having lots of secretions as they will get clogged and pt will not be able to breath. Pt opted not to wear one as it was difficult to get off for pt without seeing what he was doing. home    Patient discharged to home.   Report called to Home Care:  [facility]. Can see in epic     Surgical Patient   Surgical Procedures during stay: trach  Did patient receive discharge instruction on wound care and recognition of infection symptoms? Yes    MISC  Follow up appointment made:  Yes  Home medications returned to patient: Yes  Patient reports pain was well managed at discharge: Yes. Pt rates 5/10 tolerable with the suboxone.       Pt helped with tube feeding today. States understands how to do them. Adjusted times for pts lifestyle.

## 2023-09-12 NOTE — PROGRESS NOTES
Patients inner cannula changed by patient.  Patient demos back proper technique.  Refused neb, clear and on room air.

## 2023-09-13 ENCOUNTER — TELEPHONE (OUTPATIENT)
Dept: INTERVENTIONAL RADIOLOGY/VASCULAR | Facility: HOSPITAL | Age: 68
End: 2023-09-13

## 2023-09-13 DIAGNOSIS — Z93.0 TRACHEOSTOMY DEPENDENCE (H): Primary | ICD-10-CM

## 2023-09-13 DIAGNOSIS — C32.9 LARYNGEAL CANCER (H): ICD-10-CM

## 2023-09-13 DIAGNOSIS — Z53.9 PERSONS ENCOUNTERING HEALTH SERVICES FOR SPECIFIC PROCEDURES, NOT CARRIED OUT: Primary | ICD-10-CM

## 2023-09-13 PROCEDURE — G0180 MD CERTIFICATION HHA PATIENT: HCPCS | Performed by: FAMILY MEDICINE

## 2023-09-13 RX ORDER — METHYLPREDNISOLONE 32 MG/1
32 TABLET ORAL DAILY
Qty: 2 TABLET | Refills: 0 | Status: SHIPPED | OUTPATIENT
Start: 2023-09-13 | End: 2023-10-26

## 2023-09-13 NOTE — PROVIDER NOTIFICATION
Pt's daughter was questioning if medrol could be crushed. Spoke with pharmacy and pharmacy stated it can be crushed. Daughter informed that medrol can be crushed and to give a dose at 0200 and at 1200 tomorrow 9/14/2023. Daughter verbalized understanding.

## 2023-09-14 ENCOUNTER — TELEPHONE (OUTPATIENT)
Dept: FAMILY MEDICINE | Facility: OTHER | Age: 68
End: 2023-09-14

## 2023-09-14 ENCOUNTER — HOSPITAL ENCOUNTER (OUTPATIENT)
Dept: CT IMAGING | Facility: HOSPITAL | Age: 68
Discharge: HOME | End: 2023-09-14
Attending: PHYSICIAN ASSISTANT | Admitting: PHYSICIAN ASSISTANT
Payer: MEDICARE

## 2023-09-14 DIAGNOSIS — Z93.0 TRACHEOSTOMY DEPENDENCE (H): ICD-10-CM

## 2023-09-14 DIAGNOSIS — C32.9 LARYNGEAL CANCER (H): ICD-10-CM

## 2023-09-14 PROCEDURE — G1010 CDSM STANSON: HCPCS

## 2023-09-14 PROCEDURE — 250N000011 HC RX IP 250 OP 636: Performed by: PHYSICIAN ASSISTANT

## 2023-09-14 RX ORDER — IOPAMIDOL 755 MG/ML
75 INJECTION, SOLUTION INTRAVASCULAR ONCE
Status: COMPLETED | OUTPATIENT
Start: 2023-09-14 | End: 2023-09-14

## 2023-09-14 RX ADMIN — IOPAMIDOL 75 ML: 755 INJECTION, SOLUTION INTRAVENOUS at 14:43

## 2023-09-14 NOTE — TELEPHONE ENCOUNTER
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] ref by Kailyn Campbell PA-C, recs in [facility] - sched per pt daughter     RECORDS REQUESTED FROM:       Clinic name Comments Records Status Imaging Status   [facility] Otolaryngology 7/13/2020 OV with Taisha Mcnally MD  [facility] PET 9/27/23 - scheduled  CT neck 9/14/23, 9/6/23, 2018  XR Video Swallow 9/7/23 Epic PACS   Procedure 9/5/2023 CREATION, TRACHEOSTOMY with Taisha Mcnally MD     10/30/2018 MICRODIRECT LARYNGOSCOPY WITH BIOPSIES, FROZENS with Taisha Mcnally MD  [facility]   Pathology Department  [address]  [place] [address]  Path #: [phone]   Fax: [phone]  9/5/2023 Case: [id number]   10/30/2018 Case: [id number]     [employer]: [id number] Epic    Path req 10/3/23    10/4/23- received path        October 3, 2023 at 6:37 AM - req for path slides -Deneen  October 4, 2023 3:10 PM - Received path from [facility] and took it to [facility] to be process- Natasha

## 2023-09-14 NOTE — TELEPHONE ENCOUNTER
Received from [facility].   Request for Home Care Skilled Nursing orders:  2x week x 1 week, 1x week x 1 week, and 3 PRN for labs, education, trach issues, PEG tube issues.   Orders placed on your desk.

## 2023-09-18 NOTE — PROGRESS NOTES
Assessment & Plan     Opioid use disorder  Refilled   - buprenorphine HCl-naloxone HCl (SUBOXONE) 4-1 MG per film; Place 1 Film under the tongue 2 times daily    Laryngeal cancer (H)  Continue to follow up with ENT and specialty   No concerns with trach today - he states he is changing dressing and using voice   - Speech Therapy Referral; Future  - omeprazole (PRILOSEC) 2 mg/mL suspension; Take 10 mLs (20 mg) by mouth daily Through PEG tube    Gastroesophageal reflux disease, unspecified whether esophagitis present  Waiting on PA - message sent to pharmacist to see if we have an option for PPI he can put into the peg tube   - omeprazole (PRILOSEC) 2 mg/mL suspension; Take 10 mLs (20 mg) by mouth daily Through PEG tube    Messaged Dr Mcfadden about peg burnett sutures.  Ok for them to come out at this time.  Will send message to family - ok to have home care nursing remove suture from PEG tube burnett       I spent a total of 37 minutes on the day of the visit.   Time spent by me doing chart review, history and exam, documentation and further activities per the note     MED REC REQUIRED  Post Medication Reconciliation Status: discharge medications reconciled and changed, per note/orders  See Patient Instructions    Return for keep scheduled appointment.    CHERISE Nava [facility] - DARRELL Jules is a 68 year old, presenting for the following health issues:  Hospital F/U      [facility]       Hospital Follow-up Visit:    Hospital/Nursing Home/IP Rehab Facility: [facility]  Date of Admission: 9/5/23  Date of Discharge: 9/12/23  Reason(s) for Admission: Acute respiratory failure with hypoxia, COPD, Laryngeal cancer    Was your hospitalization related to COVID-19? No   Problems taking medications regularly:  None  Medication changes since discharge: Nicoderm and Protonix but not taking  Stopped smoking cold turkey   Problems adhering to non-medication therapy:   none    Summary of hospitalization:  [facility] discharge summary reviewed  Diagnostic Tests/Treatments reviewed.  Follow up needed: speech therapy, ENT (head and neck)  Other Healthcare Providers Involved in Patient s Care:         Homecare  ENT in [facility]   Home care coming in 2 times a week   Update since discharge: improved.         Plan of care communicated with patient and family               Review of Systems   CONSTITUTIONAL: NEGATIVE for fever, chills, change in weight  INTEGUMENTARY/SKIN: NEGATIVE for worrisome rashes, moles or lesions  EYES: NEGATIVE for vision changes or irritation  ENT/MOUTH: trach in place - working well   RESP: NEGATIVE for significant cough or SOB  CV: NEGATIVE for chest pain, palpitations or peripheral edema  GI: nausea  : negative for dysuria, hematuria, decreased urinary stream  MUSCULOSKELETAL: chronic back pain   NEURO: NEGATIVE for weakness, dizziness or paresthesias  ENDOCRINE: NEGATIVE for temperature intolerance, skin/hair changes  PSYCHIATRIC: NEGATIVE for changes in mood or affect      Objective    Pulse 80   Temp 96.8  F (36  C) (Tympanic)   Wt 53.5 kg (118 lb)   SpO2 95%   BMI 17.94 kg/m    Body mass index is 17.94 kg/m .  Physical Exam   GENERAL: alert and no distress  NECK: trach in place   RESP: decreased breath sounds throughout  CV: regular rate and rhythm, normal S1 S2, no S3 or S4, no murmur, click or rub, no peripheral edema and peripheral pulses strong  ABDOMEN: soft, nontender, without hepatosplenomegaly or masses, bowel sounds normal, and peg tube in place   PSYCH: mentation appears normal, affect normal/bright    No results displayed because visit has over 200 results.

## 2023-09-19 ENCOUNTER — PATIENT OUTREACH (OUTPATIENT)
Dept: CARE COORDINATION | Facility: OTHER | Age: 68
End: 2023-09-19

## 2023-09-19 ENCOUNTER — OFFICE VISIT (OUTPATIENT)
Dept: FAMILY MEDICINE | Facility: OTHER | Age: 68
End: 2023-09-19
Attending: NURSE PRACTITIONER
Payer: MEDICARE

## 2023-09-19 ENCOUNTER — TELEPHONE (OUTPATIENT)
Dept: FAMILY MEDICINE | Facility: OTHER | Age: 68
End: 2023-09-19

## 2023-09-19 VITALS — HEART RATE: 80 BPM | BODY MASS INDEX: 17.94 KG/M2 | OXYGEN SATURATION: 95 % | TEMPERATURE: 96.8 F | WEIGHT: 118 LBS

## 2023-09-19 DIAGNOSIS — K21.9 GASTROESOPHAGEAL REFLUX DISEASE, UNSPECIFIED WHETHER ESOPHAGITIS PRESENT: ICD-10-CM

## 2023-09-19 DIAGNOSIS — C32.9 LARYNGEAL CANCER (H): Primary | ICD-10-CM

## 2023-09-19 DIAGNOSIS — F11.90 OPIOID USE DISORDER: ICD-10-CM

## 2023-09-19 PROCEDURE — 99214 OFFICE O/P EST MOD 30 MIN: CPT | Performed by: NURSE PRACTITIONER

## 2023-09-19 PROCEDURE — G0463 HOSPITAL OUTPT CLINIC VISIT: HCPCS

## 2023-09-19 RX ORDER — BUPRENORPHINE AND NALOXONE 4; 1 MG/1; MG/1
1 FILM, SOLUBLE BUCCAL; SUBLINGUAL 2 TIMES DAILY
Qty: 56 EACH | Refills: 2 | Status: SHIPPED | OUTPATIENT
Start: 2023-09-19 | End: 2023-10-26

## 2023-09-19 NOTE — PATIENT INSTRUCTIONS
Changed protonix to omeprazole due to insurance reasons - if not covered let us know     Refilled medications needed    Referral placed for speech therapy

## 2023-09-19 NOTE — PROGRESS NOTES
Clinic Care Coordination Contact  Follow Up Progress Note      Assessment: CC attended office visit with pt, pt's daughter - Artis, and Siena Pino this date.  This was a hospital follow up appointment.  He is currently doing well.  Has trach in place.  Also having tube feeds at home, which he is managing by himself.  Currently living with his daughter, Artis.  All follow up appointments are scheduled.  Has all needed supplies.  Siena Pino did fill his Suboxone this date.  Quit smoking, which he was commended on.    Care Gaps:    Health Maintenance Due   Topic Date Due    SPIROMETRY  Never done    COPD ACTION PLAN  Never done    COVID-19 Vaccine (1) Never done    ZOSTER IMMUNIZATION (1 of 2) Never done    Pneumococcal Vaccine: 65+ Years (2 - PCV) 11/02/2012    AORTIC ANEURYSM SCREENING (SYSTEM ASSIGNED)  Never done    LUNG CANCER SCREENING  11/27/2019    DTAP/TDAP/TD IMMUNIZATION (2 - Td or Tdap) 10/30/2021    INFLUENZA VACCINE (1) 09/01/2023       Will continue to work on these    Care Plans      Intervention/Education provided during outreach: Has all needed supplies at home.  All follow up appointments are scheduled.  Provided daughter, Artis, with CC's business card and encouraged her to call with any questions/concerns.  Speech therapy referral placed this date.     Outreach Frequency: monthly        Plan:   No change in Suboxone dose.  Care Coordinator will follow up in 4 weeks, sooner if he has concerns.    Margot Joel RN-BSN, [facility] Care Coordinator  [phone] opt. #3

## 2023-09-19 NOTE — TELEPHONE ENCOUNTER
[facility]-Om[facility] 2MG/ML suspension.  PA Submitted on CMM, waiting for response.

## 2023-09-19 NOTE — Clinical Note
Is there an option for PPI if liquids are not covered?  He is taking all medication through peg tube at this time

## 2023-09-19 NOTE — Clinical Note
Please notify Jorge A or family it is ok to have home care nursing remove sutures from peg tube burnett   Siena Pino APRN FNP-BC Family Nurse Practitioner

## 2023-09-19 NOTE — Clinical Note
Jorge A has suture holding the peg tub burnett in place.  Do those stay in or come out?  Siena Pino APRN FNP-BC Family Nurse Practitioner

## 2023-09-20 ENCOUNTER — PATIENT OUTREACH (OUTPATIENT)
Dept: OTOLARYNGOLOGY | Facility: CLINIC | Age: 68
End: 2023-09-20
Payer: MEDICARE

## 2023-09-20 ENCOUNTER — TELEPHONE (OUTPATIENT)
Dept: FAMILY MEDICINE | Facility: OTHER | Age: 68
End: 2023-09-20

## 2023-09-20 RX ORDER — ESOMEPRAZOLE MAGNESIUM 40 MG/1
CAPSULE, DELAYED RELEASE ORAL
Qty: 90 CAPSULE | Refills: 1 | Status: SHIPPED | OUTPATIENT
Start: 2023-09-20 | End: 2023-09-20

## 2023-09-20 RX ORDER — LANSOPRAZOLE 30 MG/1
CAPSULE, DELAYED RELEASE ORAL
Qty: 90 CAPSULE | Refills: 3 | Status: ON HOLD | OUTPATIENT
Start: 2023-09-20 | End: 2023-11-14

## 2023-09-20 NOTE — TELEPHONE ENCOUNTER
Received Denial from [employer] for First-Omeprazole 2MG/ML suspension 9/20/2023      Letter scanned to [facility].

## 2023-09-20 NOTE — TELEPHONE ENCOUNTER
Insurance is not covering solution or powder - working with pharmacist to find alternative therapy to use PPI down PEG tube

## 2023-09-20 NOTE — TELEPHONE ENCOUNTER
Based off my note from yesterday, I still have a pending auth this patient's insurance for First-Omeprazole 2MG/ML suspension.    This has not yet been denied by insurance. This might take a couple days to hear, but determination should be back soon.    Thanks  Karen CUENCA team

## 2023-09-20 NOTE — TELEPHONE ENCOUNTER
Called patient's daughter to attempt to move patient's appointment to a sooner date. Offered appointments on 9/27 and 9/29. Daughter declined scheduling and wanted to keep appointment at previously scheduled date. Daughter will call clinic with further questions or concerns.

## 2023-09-20 NOTE — PROGRESS NOTES
Relayed Providers instructions.  Patient relayed understanding.  No further concerns at calls end.      Siena Pino APRN CNP  P  Family Care Team 1  Please notify Jorge A or family it is ok to have home care nursing remove sutures from peg tube burnett    Siena LUONG FNP-BC  Family Nurse Practitioner

## 2023-09-25 ENCOUNTER — TELEPHONE (OUTPATIENT)
Dept: FAMILY MEDICINE | Facility: OTHER | Age: 68
End: 2023-09-25

## 2023-09-25 DIAGNOSIS — C32.9 LARYNGEAL CANCER (H): Primary | ICD-10-CM

## 2023-09-25 NOTE — TELEPHONE ENCOUNTER
Received fax from [employer].  *Patient reports constipation. Okay for polyethylene glycol 3350, 17g daily as needed? (By G-tube)  *Continue skilled nurse visits. Frequency-weekly for trach teaching/monitoring.  *Can you write a prescription for adjustable foam tracheostomy collars please? And send that prescription to [facility] at [phone]?

## 2023-09-25 NOTE — TELEPHONE ENCOUNTER
Ok for     Polyethylene glycol 3350, 17g daily prn constipation.    Skilled nursing visits weekly for tracheostomy teaching and monitoring.    Order below signed as ewll for trach supplies.

## 2023-09-26 ENCOUNTER — TELEPHONE (OUTPATIENT)
Dept: PET IMAGING | Facility: HOSPITAL | Age: 68
End: 2023-09-26

## 2023-09-27 ENCOUNTER — HOSPITAL ENCOUNTER (OUTPATIENT)
Dept: PET IMAGING | Facility: HOSPITAL | Age: 68
Discharge: HOME | End: 2023-09-27
Attending: PHYSICIAN ASSISTANT | Admitting: PHYSICIAN ASSISTANT
Payer: MEDICARE

## 2023-09-27 DIAGNOSIS — C32.1 MALIGNANT NEOPLASM OF SUPRAGLOTTIS (H): ICD-10-CM

## 2023-09-27 DIAGNOSIS — J38.7 SUPRAGLOTTIC MASS: ICD-10-CM

## 2023-09-27 PROCEDURE — 78815 PET IMAGE W/CT SKULL-THIGH: CPT | Mod: PI,MG

## 2023-09-27 PROCEDURE — A9552 F18 FDG: HCPCS | Performed by: PHYSICIAN ASSISTANT

## 2023-09-27 PROCEDURE — 343N000001 HC RX 343: Performed by: PHYSICIAN ASSISTANT

## 2023-09-27 RX ADMIN — FLUDEOXYGLUCOSE F-18 14.24 MILLICURIE: 500 INJECTION, SOLUTION INTRAVENOUS at 08:47

## 2023-09-28 ENCOUNTER — THERAPY VISIT (OUTPATIENT)
Dept: SPEECH THERAPY | Facility: HOSPITAL | Age: 68
End: 2023-09-28
Attending: NURSE PRACTITIONER
Payer: MEDICARE

## 2023-09-28 DIAGNOSIS — C32.9 LARYNGEAL CANCER (H): ICD-10-CM

## 2023-09-28 DIAGNOSIS — C32.9 LARYNGEAL CANCER (H): Primary | ICD-10-CM

## 2023-09-28 PROCEDURE — 92610 EVALUATE SWALLOWING FUNCTION: CPT | Mod: GN

## 2023-09-28 NOTE — PROGRESS NOTES
"SPEECH LANGUAGE PATHOLOGY EVALUATION    See electronic medical record for Abuse and Falls Screening details.    Subjective      Presenting condition or subjective complaint: I have a tracheostomy and PEG tube  Date of onset: 09/05/23    Relevant medical history: Arthritis; Cancer; COPD; History of fractures; Radiation treatment; Smoking   Dates & types of surgery:  None reported.     Prior diagnostic imaging/testing results: CT scan; X-ray; Video fluoroscopic swallow study     Video  fluoroscopic swallow study completed on 09/07/23 with the following recommendations; \"Recommend continuation of NPO status at this time with considerations for alternate means of nutrition. Pt is demonstrating silent aspiration of IDDSI 0 (thin liquids), IDDSI 2 (mildly/nectar thickened liquids), and IDDSI 3 (moderately/honey thickened liquids). No overt aspiration was witnessed during IDDSI 4 (pureed) texture, however pharyngeal residue places him at heightened risk of aspiration. Due to pt's overall silent response to aspiration with other textures and suspected fatigue IDDSI 4 (pureed) is not recommended at this time\"    Prior therapy history for the same diagnosis, illness or injury: No      Living Environment  Social support: With family members   Help at home: Self Cares (home health aide/personal care attendant, family, etc); Medication and/or finances; Assist for driving and community activities    Employment: No    Hobbies/Interests:  None reported.     Patient goals for therapy: Eat and drink and not gave a peg tube    Pain assessment:  None reported.      Objective     SWALLOW EVALUTION  Dysphagia history: Pt is a 68 year old male who presents today for a outpatient evaluation following recent diagnosis of laryngeal cancer. Pt was recently admitted at current facility with the following course of treatment during hospital stay and following discharge;   --Flexible Endoscopy complete by ENT on 09/05/23: \"The base of tongue was " "free of lesions, and the vallecula was open.  There is a bulky exophytic left supraglottic mass obliterating the left vocal cord with polypoid exophytic extension along the anterior commissure and probable involvement of the right supraglottis.  The left vocal cord is fixed.  There is minimal mobility of the right arytenoid without any obvious right true cord mobility.  The right true cord is visible and there is no sabrina right true cord mass but visualization is difficult due to the size of the left mass. The glottic is narrowed to 2-3 millimeters.  The pyriform sinuses were open without pooling of secretions.\"  --Tracheostomy on 09/05/23   --Video Fluoroscopic Swallow Study completed on 09/07/23  --PEG Tube placed on 09/08/23  --Pt discharged from facility on 09/12/23  --CT Soft Tissue on 09/14/23 \"IMPRESSION: Laryngeal mass greater on the left than the right. No CT  evidence of thyroid cartilage invasion.\"  -PET Scan 09/27/23:   \"IMPRESSION:   1.  Hypermetabolic supraglottic mass, consistent with known  malignancy.  2.  Mildly hypermetabolic subpleural density in the left lower lobe,  could represent infectious or inflammatory change or metastatic  disease. Recommend attention on follow-up.  3.  Hypermetabolic rib fractures, favored benign posttraumatic change.  Recommend attention on follow-up.\"    Pt reports that he has continued with his NPO recommendations, stating that he will at times use liquid to wet his mouth however he will then spit them out. He reports that he is hopeful to start eating something again, even if it is something small such as pudding. Pt denied any difficulty with trach or PEG tube at this time, stating that he feels that he has adjusted well to his situation.     Current Diet/Method of Nutritional Intake: NPO, gastrostomy tube (PEG)        CLINICAL SWALLOW EVALUATION  Oral Motor Function: generally intact  Dentition: lower dentures  Secretion management: Pt reported tolerating " secretions however at times he does need to use suction when secretions are thicker.   Mucosal quality: dry, sticky  Mandibular function: intact  Oral labial function: WFL  Lingual function: WFL  Buccal function: intact  Laryngeal function: Trach in place, pt did have speaking valve present at time of evaluation which he reported he is tolerating well at home.        Textures Trialed:  No textures were trialed for clinical swallow evaluation during today's session, due to pt's history of dysphagia. During most recent video fluoroscopic swallow study, pt demonstrated silent aspiration on IDDSI 0, 2, and 3 textures. Due to pt's silent response to all aspiration no PO trials were assessed today. Recommended that repeat VFSS be completed to instrumentally assess swallow functions.     ADDITIONAL EVAL COMPLETED TODAY : No additional evaluations were completed today however a video fluoroscopic swallow study is recommended following final decision regarding cancer treatment plan.     ESOPHAGEAL PHASE OF SWALLOW  no observed or reported concerns related to esophageal function     SWALLOW ASSESSMENT CLINICAL IMPRESSIONS AND RATIONALE  Diet Consistency Recommendations: NPO, gastrostomy tube (PEG)    Recommended Feeding/Eating Techniques: None at this time due to NPO status.    Medication Administration Recommendations: Pt is currently tolerating medications via PEG tube.   Instrumental Assessment Recommendations: VFSS (videofluoroscopic swallowing study)     Assessment & Plan   CLINICAL IMPRESSIONS   Medical Diagnosis: Laryngeal cancer (H) (C32.9)    Treatment Diagnosis: Dysphagia   Impression/Assessment: Pt is a 68 year old male who presents with difficulty swallowing. The following significant findings have been identified: impaired swallowing, characterized by silent aspiration on previous VFSS of IDDSI 0, IDDSI 2, and IDDSI 3 textures.  Identified deficits interfere with their ability to consume an oral diet as compared  to previous level of function.  No textures were trialed for clinical swallow evaluation during today's evaluation, due to pt's history of dysphagia with silent aspiration. Provided pt with education regarding current swallow function, rationale for continued NPO diet recommendation, and plan for possibly repeating video swallow evaluation. [facility] with possibilities including; chemo/rad and or/surgical intervention. Recommend completing of video swallow after pt has been seen by ENT provider and decision has been made regarding course of treatment. Pt expressed understanding to all information that was provided and denied any questions at this time. Skilled services are recommended following ENT provider visit on 10/11.     PLAN OF CARE  Treatment Interventions: Swallowing dysfunction and/or oral function for feeding    Prognosis to achieve stated therapy goals is fair   Rehab potential is impacted by: comorbidities, pending medical intervention    Long Term Goals   SLP Goal 1  Goal Identifier: LTG 1  Goal Description: Pt will complete video fluoroscopic swallow study to instrumentally assess swallow functions.  Rationale: To maximize safety, ease and/or independence of oral intake  Target Date: 10/28/23      Frequency of Treatment: 1x per week  Duration of Treatment: 90 days     Recommended Referrals to Other Professionals:  Possible Cancer Rehab PT/OT referral  Education Assessment:   Learner/Method: Patient  Education Comments: Provided pt with education regarding dysphagia, recommendations for NPO, and plan for repeat video fluoroscopic swallow study.    Risks and benefits of evaluation/treatment have been explained.   Patient/Family/caregiver agrees with Plan of Care.     Evaluation Time:    SLP Eval: oral/pharyngeal swallow function, clinical minutes (90532): 40     Present: Not applicable     Signing Clinician: Edelmira  GILES Hayes    [facility]                                                                                   OUTPATIENT SPEECH LANGUAGE PATHOLOGY      PLAN OF TREATMENT FOR OUTPATIENT REHABILITATION   Patient's Last Name, First Name, Jorge A Qureshi YOB: 1955   Provider's Name   [facility]   Medical Record No.  [id number]     Onset Date: 09/05/23 Start of Care Date: 09/28/23     Medical Diagnosis:  Laryngeal cancer (H) (C32.9)      SLP Treatment Diagnosis: Dysphagia  Plan of Treatment  Frequency/Duration: 1x per week  / 90 days     Certification date from 09/28/23   To 12/26/23          See note for plan of treatment details and functional goals     GILES Reed                         I CERTIFY THE NEED FOR THESE SERVICES FURNISHED UNDER        THIS PLAN OF TREATMENT AND WHILE UNDER MY CARE     (Physician attestation of this document indicates review and certification of the therapy plan).                Referring Provider:  Siena Pino      Initial Assessment  See Epic Evaluation- 09/28/23

## 2023-09-29 ENCOUNTER — TELEPHONE (OUTPATIENT)
Dept: RADIATION ONCOLOGY | Facility: HOSPITAL | Age: 68
End: 2023-09-29

## 2023-09-29 NOTE — TELEPHONE ENCOUNTER
CALL TO SCHEDULE APPOINTMENT. REGARDING REFERRAL FROM [facility]     PT AND PT DAUGHTER WERE NOT AWARE OF REFERRAL PLACE FOR RADIATION THERAPY    PT AND PT DAUGHTER INDICATED THAT PT HAD APPOINTMENT AT [facility] ON 10/11/23 TO DISCUSS WHAT WAS AVAILABLE TREATMENT PLAN FOR PT

## 2023-10-05 ENCOUNTER — LAB REQUISITION (OUTPATIENT)
Dept: LAB | Facility: CLINIC | Age: 68
End: 2023-10-05
Payer: MEDICARE

## 2023-10-05 PROCEDURE — 88321 CONSLTJ&REPRT SLD PREP ELSWR: CPT | Mod: GC | Performed by: STUDENT IN AN ORGANIZED HEALTH CARE EDUCATION/TRAINING PROGRAM

## 2023-10-06 LAB
PATH REPORT.COMMENTS IMP SPEC: ABNORMAL
PATH REPORT.COMMENTS IMP SPEC: ABNORMAL
PATH REPORT.COMMENTS IMP SPEC: YES
PATH REPORT.FINAL DX SPEC: ABNORMAL
PATH REPORT.GROSS SPEC: ABNORMAL
PATH REPORT.MICROSCOPIC SPEC OTHER STN: ABNORMAL
PATH REPORT.RELEVANT HX SPEC: ABNORMAL
PATH REPORT.RELEVANT HX SPEC: ABNORMAL
PATH REPORT.SITE OF ORIGIN SPEC: ABNORMAL
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
## 2023-10-11 ENCOUNTER — PRE VISIT (OUTPATIENT)
Dept: OTOLARYNGOLOGY | Facility: CLINIC | Age: 68
End: 2023-10-11

## 2023-10-11 ENCOUNTER — OFFICE VISIT (OUTPATIENT)
Dept: OTOLARYNGOLOGY | Facility: CLINIC | Age: 68
End: 2023-10-11
Attending: PHYSICIAN ASSISTANT
Payer: MEDICARE

## 2023-10-11 ENCOUNTER — THERAPY VISIT (OUTPATIENT)
Dept: SPEECH THERAPY | Facility: CLINIC | Age: 68
End: 2023-10-11
Payer: COMMERCIAL

## 2023-10-11 VITALS
SYSTOLIC BLOOD PRESSURE: 109 MMHG | WEIGHT: 118 LBS | HEIGHT: 68 IN | OXYGEN SATURATION: 98 % | HEART RATE: 57 BPM | DIASTOLIC BLOOD PRESSURE: 65 MMHG | BODY MASS INDEX: 17.88 KG/M2

## 2023-10-11 DIAGNOSIS — C32.9 LARYNGEAL CANCER (H): Primary | ICD-10-CM

## 2023-10-11 PROCEDURE — 31575 DIAGNOSTIC LARYNGOSCOPY: CPT | Mod: 51 | Performed by: OTOLARYNGOLOGY

## 2023-10-11 PROCEDURE — 92522 EVALUATE SPEECH PRODUCTION: CPT | Mod: GN | Performed by: SPEECH-LANGUAGE PATHOLOGIST

## 2023-10-11 PROCEDURE — 31615 TRCHEOBRNCHSC EST TRACHS INC: CPT | Performed by: OTOLARYNGOLOGY

## 2023-10-11 PROCEDURE — 99205 OFFICE O/P NEW HI 60 MIN: CPT | Mod: 25 | Performed by: OTOLARYNGOLOGY

## 2023-10-11 ASSESSMENT — PAIN SCALES - GENERAL: PAINLEVEL: NO PAIN (0)
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
## 2023-10-11 NOTE — NURSING NOTE
"Chief Complaint   Patient presents with    Consult     Supraglottic mass       Blood pressure 109/65, pulse 57, height 1.727 m (5' 8\"), weight 53.5 kg (118 lb), SpO2 98%.    Valerie Steward, EMT    "

## 2023-10-13 ENCOUNTER — TUMOR CONFERENCE (OUTPATIENT)
Dept: ONCOLOGY | Facility: CLINIC | Age: 68
End: 2023-10-13
Payer: MEDICARE

## 2023-10-13 NOTE — TUMOR CONFERENCE
Head & Neck Tumor Conference Note   Status: New   Staff: Dr. Atkinson    Tumor Site: Larynx  Tumor Pathology: SCC  Tumor Stage: E8yW1V7 , nown probably T3  Tumor Treatment:   - Radiation, completed 2/14/2019    Reason for Review: Review imaging, path, and POC    Brief History:   Jorge A Mendosa is a 68 year old man referred for evaluation of recurrent laryngeal cancer. He has a history of a O8eF3R6 SCC of the larynx treated with radiation. He started treatment 12/17/2018, completed 2/14/2019. Of note, he only received 5512 cGy in 26 fractions in prolonged fashion rather than the planned 70 Gy in 33 fractions due to compliance issues.     He saw local ENT PA on 8/15/2023 (follow-up issues by patient) with worsening hoarseness. Scope exam at that time demonstrated glottic gap.      He presented to local ER on 8/22/2023 with shortness of breath.     He presented to local ER on 9/3/2023 again with shortness of breath. Scope exam by Dr Mcnally demonstrated bulky exophytic tumor of the left supraglottis with involvement of the anterior commissure, left cord fixation, decreased mobility of right cord, narrowed glottis to 2-3 mm. He was taken to the OR for awake tracheostomy and DL with biopsy on 9/5/2023 by Dr Mcnally. Pathology demonstrated invasive SCC. During his inpatient admission he was cleared for speaking valve use with SLP. He had PEG placement on 9/8 by surgery team after failing video swallow study. He had a CT neck on 9/6/2023 which showed extensive pneumomediastinum which complicated evaluation, no obvious thyroid cartilage erosion. He had repeat CT neck on 9/14/2023 which showed laryngeal mass without thyroid cartilage erosion, no lymphadenopathy. He had a PET scan on 9/27/2023 which showed FDG avid supraglottic tumor, no metastatic lymphadenopathy, 12 x 9 mm mildly FDG avid left lower lobe nodular density.      He says his breathing is much better after the tracheostomy. He felt that it was not  particularly bad before the trach but that he just had a sudden episode of breathing issues that resulted in the tracheostomy. He he felt before the tracheostomy he felt his swallowing ok. He is is not taking anything by mouth currently. He is able to swallow his saliva, does not cough on the saliva. He is not having pain in his neck or throat. He wants to get rid of his PEG. He has no ear pain. He has a small amount of blood on his inner cannula at times. He does 5 cans of tube feeds per day. Gaining weight, lost 40 lbs in the last year.     Pertinent PMH:   Past Medical History:   Diagnosis Date     Cancer (H)      Chronic pain syndrome 03/07/2011     COPD exacerbation (H) 9/5/2023     Lumbago 01/07/2002     Sinus bradycardia 9/10/2023     Smoking Hx:   Social History     Tobacco Use     Smoking status: Every Day     Packs/day: 0.25     Years: 30.00     Additional pack years: 0.00     Total pack years: 7.50     Types: Cigarettes     Start date: 1/1/1976     Passive exposure: Current     Smokeless tobacco: Never     Tobacco comments:     only smokes a little bit-noted 2-   Substance Use Topics     Alcohol use: No     Drug use: No     Imaging:   PET-CT 9/27/23:   IMPRESSION:   1.  Hypermetabolic supraglottic mass, consistent with known  malignancy.  2.  Mildly hypermetabolic subpleural density in the left lower lobe,  could represent infectious or inflammatory change or metastatic  disease. Recommend attention on follow-up.  3.  Hypermetabolic rib fractures, favored benign posttraumatic change.  Recommend attention on follow-up.    Tumor Board Recommendation:   Discussion:   On review of imaging and clinically, the patient demonstrates recurrence of his cancer.  To address the disease, this patient will likely require a salvage laryngectomy, if not then palliative chemotherapy alone.  The patient salvage laryngectomy would be supplemented with a pectoralis flap to help with closure.  Additionally the surgical  team would do bilateral neck dissections.    Plan:   -We will discuss moving forward with salvage laryngectomy, bilateral neck dissections, pectoralis flap and likely book the patient for surgery    Osito Valencia MD  Otolaryngology Head and Neck Surgery Resident, PGY-3    Documentation / Disclaimer Cancer Tumor Board Note: Cancer tumor board recommendations do not override what is determined to be reasonable care and treatment, which is dependent on the circumstances of a patient's case; the patient's medical, social, and personal concerns; and the clinical judgment of the oncologist [physician].

## 2023-10-13 NOTE — TUMOR CONFERENCE
Called patient's daughter to review recommendations from tumor conference. Discussed recommendation was for patient to move forward with salvage laryngectomy. Daughter verbalized understanding and confirmed that patient wants to have surgery. Will work on finding surgical date for patient. Daughter will call clinic with further questions or concerns.    Ana Cristina MORENON, RN

## 2023-10-14 ENCOUNTER — PREP FOR PROCEDURE (OUTPATIENT)
Dept: OTOLARYNGOLOGY | Facility: CLINIC | Age: 68
End: 2023-10-14
Payer: MEDICARE

## 2023-10-14 DIAGNOSIS — C32.9 LARYNGEAL CANCER (H): Primary | ICD-10-CM

## 2023-10-16 ENCOUNTER — TELEPHONE (OUTPATIENT)
Dept: OTOLARYNGOLOGY | Facility: OTHER | Age: 68
End: 2023-10-16

## 2023-10-16 NOTE — TELEPHONE ENCOUNTER
Patient was seen at the [facility]NT on 10/13/2023. I spoke with patient's daughter prior to the appointment regarding oncology/radiation referral, and she said they wanted to be seen at the [facility] first, before having the referral. Spoke with patient's daughter today and asked what was decided about the referral. She stated, it was determined the patient is not a candidate for radiation, as he has had too much radiation, and his neck won't take it. He will have a total laryngectomy.

## 2023-10-17 ENCOUNTER — TELEPHONE (OUTPATIENT)
Dept: OTOLARYNGOLOGY | Facility: OTHER | Age: 68
End: 2023-10-17

## 2023-10-17 ENCOUNTER — TELEPHONE (OUTPATIENT)
Dept: OTOLARYNGOLOGY | Facility: CLINIC | Age: 68
End: 2023-10-17
Payer: MEDICARE

## 2023-10-17 NOTE — TELEPHONE ENCOUNTER
Patient is scheduled for surgery with Dr. Atkinson.     Spoke with: Patients daughterArtis.     Date of Surgery: 11/14/23    Location: [facility] OR     Pre op with Provider: [facility]     H&P: Patient is scheduled for a virtual visit with PAC on 11/07/23 at 5:00 pm.     Additional imaging/appointments: Patient will be scheduled for a 2 video swallow study.     Surgery packet: Will mail packet, patients daughter asked that packet be mailed to address other than listed within patients chart. Will mail packet to [address] [place] [address], [id number].    Additional comments: Writer informed patients daughter that arrival time for surgery should be given at PAC appointment and if not, a pre op nurse will call a few days prior to surgery to go over further details/give arrival time.         Shanda Payan on 10/17/2023 at 9:01 AM

## 2023-10-17 NOTE — TELEPHONE ENCOUNTER
FUTURE VISIT INFORMATION      SURGERY INFORMATION:  Date: 23  Location: [facility] or  Surgeon:  Birdie Atkinson MD   Anesthesia Type:  general  Procedure: LARYNGOSCOPY Bilateral neck dissections Pectoralis flap - right versus left     RECORDS REQUESTED FROM:       Primary Care Provider: Regina Hicks MD - [facility]    Pertinent Medical History: Sinus Bradycardia    Most recent EKG+ Tracin/3/23

## 2023-10-20 DIAGNOSIS — C32.9 LARYNGEAL CANCER (H): Primary | ICD-10-CM

## 2023-10-23 NOTE — PROGRESS NOTES
SPEECH LANGUAGE PATHOLOGY EVALUATION    See electronic medical record for Abuse and Falls Screening details.    Subjective      Presenting condition or subjective complaint: I have a tracheostomy and PEG tube  Date of onset:      Relevant medical history: Arthritis; Cancer; COPD; History of fractures; Radiation treatment; Smoking   Dates & types of surgery:      Prior diagnostic imaging/testing results: CT scan; X-ray; Video fluoroscopic swallow study     Prior therapy history for the same diagnosis, illness or injury: No      Living Environment  Social support: With family members   Help at home: Self Cares (home health aide/personal care attendant, family, etc); Medication and/or finances; Assist for driving and community activities  Equipment owned:       Employment: No    Hobbies/Interests:      Patient goals for therapy: Eat and drink and not gave a peg tube    Pain assessment: Pain denied     Objective       PRE-OPERATIVE ASSESSMENT  Swallowing:     Current Diet/Method of Nutritional Intake: NPO with enteral support via PEG     Expected post op diet/changes: Pt provided training on NPO status following surgery and slow transition to po after surgery. He will have enteral support during the period of NPO and will require video swallow study prior to onset of po intake. All questions answered within scope of practice. He is hopeful to return to po intake once medically appropriate.     Communication:     Current communication function: voicing      Oral motor structure assessment: WFL     Post-op communication options: Pt will transition to electrolarynx once medically appropriate. Consider tracheoesophageal prosthesis puncture and placement after pt has healed from laryngectomy surgery and he has passed insufflation testing.    Cognition: Pt able to attend to task and ask appropriate questions during session.    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      Changes in breathing function after surgery: Pt will breath through laryngectomy stoma. Anatomy and physiology of the changes described in detail to pt and his daughter.    Assessed need and provided training in the following areas: patient/family pre-operative counseling, pre- and post-anatomy, 3 primary methods of communication post total laryngectomy, functional anatomy changes, electrolarynx introduction/demonstration, post-operative therapy plan, [facility] getting back to life packet provided      Laryngectomy:     Primary Site of Tumor: glottis     Staging: rT3N0      Extent of proposed surgery: Total Laryngectomy, Neck Dissection, pectoralis flap for coverage over the closure       Proposed surgery date: 11/14/23     Post-operative radiation therapy: no      Post-op communication options: electrolarynx     Assessment & Plan   CLINICAL IMPRESSIONS   Medical Diagnosis: laryngeal cancer    Treatment Diagnosis: dysphagia, dysphonia   Impression/Assessment:  Pt and his daughter participated in pre-operative teaching. He demonstrated understanding for the anatomy and physiology changes, the follow up care after surgery, SLP services following surgery and changes swallowing and breathing. He was given the [facility] getting back to life packet to review at home. Pt verbalized understanding. Forms not filled out today as pt is still deciding if he wants to undergo surgery.      PLAN OF CARE  Evaluation only    Recommended Referrals to Other Professionals: none  Education Assessment:   Learner/Method: Patient;Family;No Barriers to Learning    Risks and benefits of evaluation/treatment have been explained.   Patient/Family/caregiver agrees with Plan of Care.     Evaluation Time:    Sound production (artic, phonology, apraxia, dysarthria) Minutes (81815): 45     Present: Not applicable     Signing Clinician: Marialuisa Krishna, SLP

## 2023-10-24 NOTE — COMMUNITY RESOURCES LIST (ENGLISH)
10/24/2023   [facility] [employer]  N/A  For questions about this resource list or additional care needs, please contact your primary care clinic or care manager.  Phone: [phone]   Email: N/A   Address: [address] [place] [id number]   Hours: N/A        Transportation       Free or low-cost transportation  1  [facility] [employer] [facility] - Free or low-cost transportation Distance: 24.34 miles      In-Person   [address] [place] [address]  Language: English  Hours: Mon 8:00 AM - 4:30 PM Appt. Only, Tue - Thu 8:00 AM - 4:30 PM , Fri 8:00 AM - 4:30 PM Appt. Only  Fees: Free   Phone: (250) [phone] Website: [email or web address]          Important Numbers & Websites       Emergency Services   911  City Services   311  Poison Control   (398) 315-1720  Suicide Prevention Lifeline   (592) 639-1675 (TALK)  Child Abuse Hotline   (486) 988-5589 (4-A-Child)  Sexual Assault Hotline   (167) 205-8975 (HOPE)  National Runaway Safeline   (115) 402-3088 (RUNAWAY)  All-Options Talkline   (890) 260-8522  Substance Abuse Referral   (711) 379-7802 (HELP)

## 2023-10-25 NOTE — PROGRESS NOTES
Assessment & Plan     Opioid abuse, in remission (H)  Stable.  Continue Suboxone - current dose - can continue throughout surgical process.  Letter provided to patient - see letters.  - Urine Drug Screen Buprenorphine Urine Qualitative [id number], Ethanol Urine Qualitative [id number], Methadone Urine Qualitative [id number], Oxycodone Urine Qualitative [id number], Creatinine Urine Random [id number]; No    Opioid use disorder  As above.  - buprenorphine HCl-naloxone HCl (SUBOXONE) 4-1 MG per film; Place 1 Film under the tongue 2 times daily    Laryngeal cancer (H)  Surgery is scheduled.  Patient handling things well.  Good support in place with his medical team, his daughter who is an NP.    See Patient Instructions    Return in about 6 weeks (around 12/7/2023) for MAT.    Regina Hobbs MD  [facility] - DARRELL Jules is a 68 year old, presenting for the following health issues:  Recheck Medication      HPI     He is currently taking 4/1 mg of buprenorphine 2 times daily.   Last fill 10.14.23 #56.    Status Since Last Visit:  Have you used any opioids since your last visit?: no use since last visit  Do you feel that your dose of suboxone is too high or too low? Adequate  Have there been cravings for opioids? No   Any withdrawal symptoms?  no     Any side effects from the medication?  no  Any alcohol use? no  Any other recreational drug use? no    Precipitating Factors:  Triggers have been: non-existent   Other Supports:  Do you attend counseling or meet with a therapist? No  Do you attend NA or AA meetings? No  Do you have/meet with a sponsor? No  Family and support systems have been: Helpful  What other goals have you been working on (job, family, relationships, etc)?      Has had a lot going on recently due to recurrent laryngeal cancer    Will be having laryngectomy with bilateral neck dissections and pectoralis flap on 11/14/23 at  of      Preop set up on 11/7/23 at    Living with  "daughter   Multiple appointments     Feeding tube - 5 times per day.  Some oral- puddings.  Weight is up -was 120s, down to 117 back up to 127.     Weekly nurse visits.        9/28/2020     1:00 PM 7/14/2022    11:00 AM 10/6/2022    11:00 AM   PHQ-9 SCORE   PHQ-9 Total Score 0 0 0           3/22/2023    10:56 AM 6/14/2023    11:16 AM 6/14/2023    11:26 AM   MICHAEL-7 SCORE   Total Score  0 (minimal anxiety)    Total Score 0 0 0     PDMP Review         Value Time User    State PDMP site checked  Yes 10/26/2023  9:54 AM Regina Hicks MD                Review of Systems   Constitutional, HEENT, cardiovascular, pulmonary, gi and gu systems are negative, except as otherwise noted.      Objective    /64 (BP Location: Right arm, Patient Position: Sitting, Cuff Size: Adult Regular)   Pulse 75   Temp 97.9  F (36.6  C) (Tympanic)   Ht 1.727 m (5' 8\")   Wt 57.8 kg (127 lb 8 oz)   SpO2 98%   BMI 19.39 kg/m    Body mass index is 19.39 kg/m .  Physical Exam   GENERAL: alert, no distress, and thin  NECK: tracheostomy in place  RESP: lungs clear to auscultation - no rales, rhonchi or wheezes  CV: regular rate and rhythm, normal S1 S2, no S3 or S4, no murmur, click or rub, no peripheral edema and peripheral pulses strong  ABDOMEN: soft, nontender, without hepatosplenomegaly or masses, bowel sounds normal, and PEG tube in place  MS: no gross musculoskeletal defects noted, no edema  PSYCH: mentation appears normal, affect normal/bright    Results for orders placed or performed in visit on 10/26/23 (from the past 24 hour(s))   Urine Drug Screen Buprenorphine Urine Qualitative [id number], Ethanol Urine Qualitative WAN985, Methadone Urine Qualitative LOV7167, Oxycodone Urine Qualitative OKU9014, Creatinine Urine Random ZAL684; No    Narrative    The following orders were created for panel order Urine Drug Screen Buprenorphine Urine Qualitative [id number], Ethanol Urine Qualitative QLI683, Methadone Urine Qualitative VPF0892, " Oxycodone Urine Qualitative [id number], Creatinine Urine Random REO696; No.  Procedure                               Abnormality         Status                     ---------                               -----------         ------                     Urine Drug Screen Panel[[id number]]      Normal              Final result               Buprenorphine Urine, Dangelo...[[id number]]  Abnormal            Final result               Ethanol urine[[id number]]                Normal              Final result               Methadone Qual Urine[[id number]]         Normal              Final result               Oxycodone Urine, Qualita...[[id number]]  Normal              Final result               Creatinine random urine[[id number]]                          Final result                 Please view results for these tests on the individual orders.   Urine Drug Screen Panel   Result Value Ref Range    Amphetamines Urine Screen Negative Screen Negative    Barbituates Urine Screen Negative Screen Negative    Benzodiazepine Urine Screen Negative Screen Negative    Cannabinoids Urine Screen Negative Screen Negative    Cocaine Urine Screen Negative Screen Negative    Fentanyl Qual Urine Screen Negative Screen Negative    Opiates Urine Screen Negative Screen Negative    PCP Urine Screen Negative Screen Negative   Buprenorphine Urine, Qualitative   Result Value Ref Range    Buprenorphine Qual Urine Screen Positive (A) Screen Negative   Ethanol urine   Result Value Ref Range    Ethanol Urine Screen Negative Screen Negative   Methadone Qual Urine   Result Value Ref Range    Methadone Urine Screen Negative Screen Negative   Oxycodone Urine, Qualitative   Result Value Ref Range    Oxycodone Urine Screen Negative Screen Negative   Creatinine random urine   Result Value Ref Range    Creatinine Urine mg/dL 51.4 mg/dL

## 2023-10-26 ENCOUNTER — APPOINTMENT (OUTPATIENT)
Dept: LAB | Facility: OTHER | Age: 68
End: 2023-10-26
Payer: MEDICARE

## 2023-10-26 ENCOUNTER — OFFICE VISIT (OUTPATIENT)
Dept: FAMILY MEDICINE | Facility: OTHER | Age: 68
End: 2023-10-26
Attending: FAMILY MEDICINE
Payer: MEDICARE

## 2023-10-26 ENCOUNTER — PATIENT OUTREACH (OUTPATIENT)
Dept: CARE COORDINATION | Facility: OTHER | Age: 68
End: 2023-10-26

## 2023-10-26 VITALS
TEMPERATURE: 97.9 F | WEIGHT: 127.5 LBS | OXYGEN SATURATION: 98 % | DIASTOLIC BLOOD PRESSURE: 64 MMHG | BODY MASS INDEX: 19.32 KG/M2 | SYSTOLIC BLOOD PRESSURE: 110 MMHG | HEIGHT: 68 IN | HEART RATE: 75 BPM

## 2023-10-26 DIAGNOSIS — C32.9 LARYNGEAL CANCER (H): ICD-10-CM

## 2023-10-26 DIAGNOSIS — F11.90 OPIOID USE DISORDER: ICD-10-CM

## 2023-10-26 DIAGNOSIS — F11.11 OPIOID ABUSE, IN REMISSION (H): Primary | ICD-10-CM

## 2023-10-26 LAB
AMPHETAMINES UR QL SCN: NORMAL
BARBITURATES UR QL SCN: NORMAL
BENZODIAZ UR QL SCN: NORMAL
BUPRENORPHINE UR QL: ABNORMAL
BZE UR QL SCN: NORMAL
CANNABINOIDS UR QL SCN: NORMAL
CREAT UR-MCNC: 51.4 MG/DL
ETHANOL UR QL SCN: NORMAL
FENTANYL UR QL: NORMAL
METHADONE UR QL SCN: NORMAL
OPIATES UR QL SCN: NORMAL
OXYCODONE UR QL: NORMAL
PCP QUAL URINE (ROCHE): NORMAL

## 2023-10-26 PROCEDURE — G0463 HOSPITAL OUTPT CLINIC VISIT: HCPCS | Performed by: FAMILY MEDICINE

## 2023-10-26 PROCEDURE — 80307 DRUG TEST PRSMV CHEM ANLYZR: CPT | Mod: ZL,91 | Performed by: FAMILY MEDICINE

## 2023-10-26 PROCEDURE — 82570 ASSAY OF URINE CREATININE: CPT | Mod: ZL | Performed by: FAMILY MEDICINE

## 2023-10-26 PROCEDURE — 80307 DRUG TEST PRSMV CHEM ANLYZR: CPT | Mod: ZL | Performed by: FAMILY MEDICINE

## 2023-10-26 PROCEDURE — 99213 OFFICE O/P EST LOW 20 MIN: CPT | Performed by: FAMILY MEDICINE

## 2023-10-26 RX ORDER — BUPRENORPHINE AND NALOXONE 4; 1 MG/1; MG/1
1 FILM, SOLUBLE BUCCAL; SUBLINGUAL 2 TIMES DAILY
Qty: 56 EACH | Refills: 0 | Status: SHIPPED | OUTPATIENT
Start: 2023-10-26 | End: 2023-11-24

## 2023-10-26 NOTE — LETTER
To Whom It May Concern:    It is my understanding that my patient Jorge A Mendosa ([id number]) will be having surgery on 11/14/2023. Buprenorphine should not impact his/her anesthesia plans. Below you will find my recommendations on managing his/her pain perioperatively and postoperatively.     Recommendations for Laryngectomy surgical/procedure:      Patient should continue his buprenorphine, as prescribed.      Utilize local anesthesia intra-operatively, as appropriate.      Limit use of benzodiazepines as they will, in combination with buprenorphine, increase the risk of respiratory depression. If benzodiazepines are needed, close monitoring of respiratory status is recommended.      Should opioids need to be administered during or after the procedure, full opioid agonists, such as Fentanyl or Hydromorphone will be more effective. Note it may take up to 2x the standard or recommended dose to achieve therapeutic response.      Post-operatively, acetaminophen and/or ibuprofen is recommended, as appropriate.      Buprenorphine dose and frequency can often be utilized to achieve appropriate analgesia, further minimizing the need for prescribed opioids. Please feel free to contact me for dosing adjustments concerning their buprenorphine.     If opioids need to be prescribed:     Avoid long-acting preparations   Limit quantity   Avoid the patient's drug of choice, as able (oxycodone vs hydrocodone etc.)     If you have questions or concerns, please call [phone] option #3         Thank you,           Dr. Regina Hicks MD  [facility]

## 2023-10-26 NOTE — PROGRESS NOTES
Clinic Care Coordination Contact  Follow Up Progress Note      Assessment: CC attended office visit with pt and Dr. Hicks.  Jorge A is doing relatively well despite multiple stressors - recurrent laryngeal cancer, multiple appointments, upcoming surgery, etc.   Jorge A did express some concern over the possibility of needing opioids postoperatively.   Is worried since he has not used opioids in over 3 years.  Validated his concerns and discussed postop pain management.  Provided him with a letter on these recommendations, which will also be available to his surgery team when he does have surgery.  It is also available in his MyChart.  Has trach and PEG tube in place.  No issues with either of these.  Living with his daughter, Artis, at this time who is a NP.   Has a homecare nurse coming in at least 1 time per week.      Care Gaps:    Health Maintenance Due   Topic Date Due    SPIROMETRY  Never done    COPD ACTION PLAN  Never done    COVID-19 Vaccine (1) Never done    HEPATITIS A IMMUNIZATION (1 of 2 - Risk 2-dose series) Never done    ZOSTER IMMUNIZATION (1 of 2) Never done    Pneumococcal Vaccine: 65+ Years (2 - PCV) 11/02/2012    RSV VACCINE 60+ (1 - 1-dose 60+ series) Never done    AORTIC ANEURYSM SCREENING (SYSTEM ASSIGNED)  Never done    DTAP/TDAP/TD IMMUNIZATION (2 - Td or Tdap) 10/30/2021    INFLUENZA VACCINE (1) 09/01/2023       Will continue to work on these    Care Plans      Intervention/Education provided during outreach: Continue medications as prescribed.  Went over postop pain management instructions.       Outreach Frequency: 6 weeks, more frequently as needed        Plan:   No change in treatment plan.  Care Coordinator will follow up in 6 weeks, sooner if he has concerns.    Margot Joel, RN-BSN, [facility] Care Coordinator  [phone] opt. #3

## 2023-11-05 DIAGNOSIS — R10.13 ABDOMINAL PAIN, EPIGASTRIC: ICD-10-CM

## 2023-11-06 RX ORDER — OMEPRAZOLE 40 MG/1
CAPSULE, DELAYED RELEASE ORAL
Qty: 90 CAPSULE | Refills: 0 | OUTPATIENT
Start: 2023-11-06

## 2023-11-06 NOTE — PHARMACY - PREOPERATIVE ASSESSMENT CENTER
PAC PHARMACIST - PREOPERATIVE PAIN CONSIDERATIONS FOR UPCOMING SURGERY     Jorge A Mendosa was is scheduled for laryngoscopy, laryngectomy, BL neck dissections, pectoralis flap - R vs L on 11/14 with Dr. Atkinson.  Patient is seen by Dr. Regina Hicks for his ongoing opioid abuse (in remission) treatment with Suboxone and has offered perioperative pain management recommendations in her letter dated 10/26/23.  Phone call placed to patient today but unable to speak with him directly.  Spoke with his daughter (proxy for patient, consent on file) Artis and declined a full postoperative pain management plan at this time (though I am open to calling him back if they change their mind).  General plan from his last hospitalization that worked well was using hydromorphone and ketorolac.  We also discussed the potential use of ketamine and open to that idea.  Artis denied that Jorge A has any history of abusing ketamine and no PMH of cardiac arrhythmias, PTSD, BPAD, schizophrenia, psychiatric disorders (eg. hallucinations/delirium), heart failure, stroke or glaucoma.     OUTPATIENT PRIOR TO ADMISSION MEDICATIONS (related to pain management):  -- Long-acting opioid: buprenorphine -naloxone (SUBOXONE) 4-1 mg film - use 1 film sublingually twice daily.   -- Short-acting opioid: none  -- Oral adjuvant(s): acetaminophen PRN   -- Topicals: none  -- Bowel regimen: none     Outpatient opioids prescribed by Regina Rothman MD     RECOMMENDATIONS:   From letter by Regina Hicks 10/26/23 (copied for reference for inpatient team):       It is my understanding that my patient Jorge A Mendosa ([id number]) will be having surgery on 11/14/2023. Buprenorphine should not impact his/her anesthesia plans. Below you will find my recommendations on managing his/her pain perioperatively and postoperatively.      Recommendations for Laryngectomy surgical/procedure:       Patient should continue his buprenorphine, as prescribed.       Utilize  "local anesthesia intra-operatively, as appropriate.       Limit use of benzodiazepines as they will, in combination with buprenorphine, increase the risk of respiratory depression. If benzodiazepines are needed, close monitoring of respiratory status is recommended.       Should opioids need to be administered during or after the procedure, full opioid agonists, such as Fentanyl or Hydromorphone will be more effective. Note it may take up to 2x the standard or recommended dose to achieve therapeutic response.       Post-operatively, acetaminophen and/or ibuprofen is recommended, as appropriate.       Buprenorphine dose and frequency can often be utilized to achieve appropriate analgesia, further minimizing the need for prescribed opioids. Please feel free to contact me for dosing adjustments concerning their buprenorphine.      If opioids need to be prescribed:      Avoid long-acting preparations   Limit quantity   Avoid the patient's drug of choice, as able (oxycodone vs hydrocodone etc.)        Allan Rios, [facility]  November 6, 2023  3:04 PM    If questions or immediate assistance is needed postoperatively please contact the Inpatient Pain Service via [facility]: \"PAIN MANAGEMENT ACUTE INPATIENT/ [facility] or [facility] (depending on location of patient)  "

## 2023-11-06 NOTE — PROGRESS NOTES
Preoperative Assessment Center Medication History Note  Medication history completed on November 6, 2023 by this writer prior to patient's PAC appointment. See Epic admission navigator for prior to admission medications. Operating room staff will still need to confirm medications and last dose information on day of surgery.     Medication history interview sources  Family member and CareEverywhere/SureScripts via phone    Changes made to PTA medication list  Added: omeprazole  Deleted: vitamin C, naproxen, ibuprofen, mvi.   Changed: lansoprazole - not covered by insurance    -- unable to do allergies as patient's daughter was not aware. Please review when discussing with patient at PAC visit on 11/7/23.     -- No recent (within 30 days) course of antibiotics  -- No recent (within 30 days) course of systemic steroids  -- Reports not being on blood thinning medications    -- Declines being on any other prescription or over-the-counter medications    Prior to Admission medications    Medication Sig Last Dose Taking? Auth Provider Long Term End Date   acetaminophen (TYLENOL) 500 MG tablet 500 mg by Oral or Feeding Tube route every 6 hours as needed for mild pain Taking Yes Reported, Patient     buprenorphine HCl-naloxone HCl (SUBOXONE) 4-1 MG per film Place 1 Film under the tongue 2 times daily Taking Yes Regina Hicks MD     omeprazole (PRILOSEC) 20 MG DR capsule 20 mg daily Taking Yes Unknown, Entered By History No    ipratropium - albuterol 0.5 mg/2.5 mg/3 mL (DUONEB) 0.5-2.5 (3) MG/3ML neb solution Take 1 vial (3 mLs) by nebulization every 6 hours as needed for shortness of breath, wheezing or cough  Patient not taking: Reported on 11/6/2023 Not Taking  Washington Shaver MD Yes    LANsoprazole (PREVACID) 30 MG DR capsule daily via peg tube: Lansoprazole via nasogastric tube/PEG tube: Capsule: Capsule can be opened, the granules mixed (not crushed) with 40 mL of apple juice and then administered through  the NG tube into the stomach, then flush tube with additional apple juice. Do not mix with other liquids. Thirty milligrams has also been suspended in 10 mL of 8.4% sodium bicarbonate solution (or apple juice) or divided into 4 equal parts and flushed with water and administered via NG tube (Carlitos 2010; Emma 2000).  Patient not taking: Reported on 11/6/2023 Not Taking  Siena Pino APRN CNP     naloxone (NARCAN) 4 MG/0.1ML nasal spray Spray 1 spray (4 mg) into one nostril alternating nostrils as needed for opioid reversal every 2-3 minutes until assistance arrives  Patient not taking: Reported on 11/6/2023 Not Taking  Regina Hicks MD Yes         Medication History Completed By: Allan Rios RPH 11/6/2023 3:04 PM

## 2023-11-07 ENCOUNTER — VIRTUAL VISIT (OUTPATIENT)
Dept: SURGERY | Facility: CLINIC | Age: 68
End: 2023-11-07
Payer: MEDICARE

## 2023-11-07 ENCOUNTER — ANESTHESIA EVENT (OUTPATIENT)
Dept: SURGERY | Facility: CLINIC | Age: 68
DRG: 011 | End: 2023-11-07
Payer: MEDICARE

## 2023-11-07 ENCOUNTER — PRE VISIT (OUTPATIENT)
Dept: SURGERY | Facility: CLINIC | Age: 68
End: 2023-11-07

## 2023-11-07 DIAGNOSIS — Z01.818 PREOP EXAMINATION: Primary | ICD-10-CM

## 2023-11-07 DIAGNOSIS — C32.9 LARYNGEAL CANCER (H): ICD-10-CM

## 2023-11-07 PROCEDURE — 99203 OFFICE O/P NEW LOW 30 MIN: CPT | Mod: 95 | Performed by: NURSE PRACTITIONER

## 2023-11-07 ASSESSMENT — ENCOUNTER SYMPTOMS: ORTHOPNEA: 0

## 2023-11-07 ASSESSMENT — COPD QUESTIONNAIRES
CAT_SEVERITY: MILD
COPD: 1

## 2023-11-07 NOTE — PATIENT INSTRUCTIONS
Preparing for Your Surgery      Name:  Jorge A Mendosa   MRN:  [id number]   :  1955   Today's Date:  2023       Arriving for surgery:  Surgery date:  23  Arrival time:  6 am    Please come to:     [facility] [address]  [address] [place]  [address]      The [facility] Patient /Visitor Ramp is located at [address]. Patients and visitors who self-park will receive the reduced hospital parking rate. If the Patient /Visitor Ramp is full, please follow the signs to the [employer] parking located at the main hospital entrance.     parking is available ( 24 hours/ 7 days a week)    Discounted parking pass options are available for patients and visitors. They can be purchased at the [employer] desk at the [facility] hospital entrance.    -  Stop at the security desk and they will direct surgery patients to Registration, and then the 3rd floor Surgery Waiting Room. [phone]     -  If you are in need of directions, wheelchair or escort please stop at the Information/security desk in the lobby.       What can I eat or drink?  -  You may eat and drink normally up to 8 hours prior to arrival time. Includes tube feedings. (Until 10 pm 23)  -  You may have clear liquids until 2 hours prior to arrival time. (Until 4 am)    Examples of clear liquids:  Water  Clear broth  Juices (apple, white grape, white cranberry  and cider) without pulp  Noncarbonated, powder based beverages  (lemonade and Delano-Aid)  Sodas (Sprite, 7-Up, ginger ale and seltzer)  Coffee or tea (without milk or cream)  Gatorade    -  No Alcohol or cannabis products for at least 24 hours before surgery.     Which medicines can I take?  Hold Aspirin for 7 days before surgery.   Hold Multivitamins for 7 days before surgery.  Hold Supplements for 7 days before surgery.  Hold Ibuprofen (Advil, Motrin) for 1 day before surgery--unless otherwise directed by  surgeon.  Hold Naproxen (Aleve) for 4 days before surgery.  Acetaminophen (Tylenol) is okay to take if needed.    -  DO NOT take these medications the day of surgery:  Nothing to hold AM of surgery.    -  PLEASE TAKE these medications the day of surgery:  Buprenorphine HCl-Nalaxone HCl (Suboxone)  Omeprazole (Prilosec)  Acetaminophen (Tylenol) if needed  Ipratropium-Albuterol (DUOneb) neb if needed    How do I prepare myself?  - Please take 2 showers (one the night prior to surgery and one the morning of surgery) using Scrubcare or Hibiclens soap.    Use this soap only from the neck to your toes.     Leave the soap on your skin for one minute--then rinse thoroughly.      You may use your own shampoo and conditioner. No other hair products.   - Please remove all jewelry and body piercings.  - No lotions, deodorants or fragrance.  - Bring your ID and insurance card.    -If you have a Deep Brain Stimulator, Spinal Cord Stimulator, or any Neuro Stimulator device---you must bring the remote control to the hospital.      ALL PATIENTS GOING HOME THE SAME DAY OF SURGERY ARE REQUIRED TO HAVE A RESPONSIBLE ADULT TO DRIVE AND BE IN ATTENDANCE WITH THEM FOR 24 HOURS FOLLOWING SURGERY.    Covid testing policy as of 12/06/2022  Your surgeon will notify and schedule you for a COVID test if one is needed before surgery--please direct any questions or COVID symptoms to your surgeon      Questions or Concerns:    - For any questions regarding the day of surgery or your hospital stay, please contact the Pre Admission Nursing Office at [phone].       - If you have health changes between today and your surgery, please call your surgeon.       - For questions after surgery, please call your surgeons office.           Current Visitor Guidelines    You may have 2 visitors in the pre op area.    Visiting hours: 8 a.m. to 8:30 p.m.    You may have four visitors during your inpatient hospital stay.    Patients confirmed or suspected to  have symptoms of COVID 19 or flu:     No visitors allowed for adult patients.   Children (under age 18) can have 1 named visitor.     People who are sick or showing symptoms of COVID 19 or flu:    Are not allowed to visit patients--we can only make exceptions in special situations.       Please follow these guidelines for your visit:          Please maintain social distance          Masking is optional--however at times you may be asked to wear a mask for the safety of yourself and others     Clean your hands with alcohol hand . Do this when you arrive at and leave the building and patient room,    And again after you touch your mask or anything in the room.     Go directly to and from the room you are visiting.     Stay in the patient s room during your visit. Limit going to other places in the hospital as much as possible     Leave bags and jackets at home or in the car.     For everyone s health, please don t come and go during your visit. That includes for smoking   during your visit.

## 2023-11-07 NOTE — PROGRESS NOTES
Jorge A is a 68 year old who is being evaluated via a billable video visit.      How would you like to obtain your AVS? Ольга Moore   Jorge A is a 68 year old, presenting for the following health issues:  Pre-Op Exam (/)            CARLOS Arriola LPN
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
## 2023-11-08 LAB
ABO/RH(D): NORMAL
ANTIBODY SCREEN: NEGATIVE
SPECIMEN EXPIRATION DATE: NORMAL

## 2023-11-09 ENCOUNTER — LAB (OUTPATIENT)
Dept: LAB | Facility: OTHER | Age: 68
End: 2023-11-09
Payer: MEDICARE

## 2023-11-09 DIAGNOSIS — Z01.818 PREOP EXAMINATION: ICD-10-CM

## 2023-11-09 DIAGNOSIS — C32.9 LARYNGEAL CANCER (H): ICD-10-CM

## 2023-11-09 LAB
ERYTHROCYTE [DISTWIDTH] IN BLOOD BY AUTOMATED COUNT: 13.6 % (ref 10–15)
HCT VFR BLD AUTO: 36.3 % (ref 40–53)
HGB BLD-MCNC: 11.4 G/DL (ref 13.3–17.7)
MCH RBC QN AUTO: 29.5 PG (ref 26.5–33)
MCHC RBC AUTO-ENTMCNC: 31.4 G/DL (ref 31.5–36.5)
MCV RBC AUTO: 94 FL (ref 78–100)
PLATELET # BLD AUTO: 229 10E3/UL (ref 150–450)
RBC # BLD AUTO: 3.86 10E6/UL (ref 4.4–5.9)
WBC # BLD AUTO: 5.9 10E3/UL (ref 4–11)

## 2023-11-09 PROCEDURE — 36415 COLL VENOUS BLD VENIPUNCTURE: CPT | Mod: ZL

## 2023-11-09 PROCEDURE — 85027 COMPLETE CBC AUTOMATED: CPT | Mod: ZL

## 2023-11-09 PROCEDURE — 80048 BASIC METABOLIC PNL TOTAL CA: CPT | Mod: ZL

## 2023-11-09 PROCEDURE — 84443 ASSAY THYROID STIM HORMONE: CPT | Mod: ZL

## 2023-11-09 PROCEDURE — 84439 ASSAY OF FREE THYROXINE: CPT | Mod: ZL

## 2023-11-09 PROCEDURE — 86901 BLOOD TYPING SEROLOGIC RH(D): CPT

## 2023-11-10 ENCOUNTER — PATIENT OUTREACH (OUTPATIENT)
Dept: OTOLARYNGOLOGY | Facility: CLINIC | Age: 68
End: 2023-11-10
Payer: MEDICARE

## 2023-11-10 ENCOUNTER — TELEPHONE (OUTPATIENT)
Dept: FAMILY MEDICINE | Facility: OTHER | Age: 68
End: 2023-11-10

## 2023-11-10 DIAGNOSIS — E89.0 HYPOTHYROIDISM DUE TO RADIATION: Primary | ICD-10-CM

## 2023-11-10 LAB
ANION GAP SERPL CALCULATED.3IONS-SCNC: 11 MMOL/L (ref 7–15)
BUN SERPL-MCNC: 18.4 MG/DL (ref 8–23)
CALCIUM SERPL-MCNC: 9.4 MG/DL (ref 8.8–10.2)
CHLORIDE SERPL-SCNC: 100 MMOL/L (ref 98–107)
CREAT SERPL-MCNC: 0.57 MG/DL (ref 0.67–1.17)
DEPRECATED HCO3 PLAS-SCNC: 27 MMOL/L (ref 22–29)
EGFRCR SERPLBLD CKD-EPI 2021: >90 ML/MIN/1.73M2
GLUCOSE SERPL-MCNC: 116 MG/DL (ref 70–99)
POTASSIUM SERPL-SCNC: 3.8 MMOL/L (ref 3.4–5.3)
SODIUM SERPL-SCNC: 138 MMOL/L (ref 135–145)
T4 FREE SERPL-MCNC: 0.84 NG/DL (ref 0.9–1.7)
TSH SERPL DL<=0.005 MIU/L-ACNC: 5.85 UIU/ML (ref 0.3–4.2)

## 2023-11-10 RX ORDER — LEVOTHYROXINE SODIUM 100 UG/1
100 TABLET ORAL DAILY
Qty: 60 TABLET | Refills: 1 | Status: SHIPPED | OUTPATIENT
Start: 2023-11-10 | End: 2024-03-18

## 2023-11-10 NOTE — RESULT ENCOUNTER NOTE
Nic Jules,    Your test results are attached.  Your labs are stable and good for surgery.         Krysten Partida DNP, RN, ANP-C

## 2023-11-10 NOTE — TELEPHONE ENCOUNTER
Ana Orr     Verbal order to continue skilled nursing for today and he will be discharged today    Verbal order given:    Aziza Corona RN

## 2023-11-13 ENCOUNTER — MEDICAL CORRESPONDENCE (OUTPATIENT)
Dept: HEALTH INFORMATION MANAGEMENT | Facility: CLINIC | Age: 68
End: 2023-11-13
Payer: MEDICARE

## 2023-11-14 ENCOUNTER — HOSPITAL ENCOUNTER (INPATIENT)
Facility: CLINIC | Age: 68
LOS: 7 days | Discharge: HOME HEALTH | DRG: 011 | End: 2023-11-21
Attending: OTOLARYNGOLOGY | Admitting: OTOLARYNGOLOGY
Payer: MEDICARE

## 2023-11-14 ENCOUNTER — ANESTHESIA (OUTPATIENT)
Dept: SURGERY | Facility: CLINIC | Age: 68
DRG: 011 | End: 2023-11-14
Payer: MEDICARE

## 2023-11-14 DIAGNOSIS — Z90.02 S/P LARYNGECTOMY: ICD-10-CM

## 2023-11-14 DIAGNOSIS — C32.9 LARYNGEAL CANCER (H): Primary | ICD-10-CM

## 2023-11-14 LAB
ABO/RH(D): NORMAL
ANTIBODY SCREEN: NEGATIVE
BASE EXCESS BLDA CALC-SCNC: 2.1 MMOL/L (ref -9.6–2)
BASE EXCESS BLDA CALC-SCNC: 5.5 MMOL/L (ref -9.6–2)
BASE EXCESS BLDA CALC-SCNC: 6.2 MMOL/L (ref -9.6–2)
CA-I BLD-MCNC: 4.3 MG/DL (ref 4.4–5.2)
CA-I BLD-MCNC: 4.4 MG/DL (ref 4.4–5.2)
CA-I BLD-MCNC: 4.5 MG/DL (ref 4.4–5.2)
CA-I BLD-MCNC: 4.8 MG/DL (ref 4.4–5.2)
CA-I BLD-MCNC: 4.9 MG/DL (ref 4.4–5.2)
GLUCOSE BLD-MCNC: 124 MG/DL (ref 70–99)
GLUCOSE BLD-MCNC: 136 MG/DL (ref 70–99)
GLUCOSE BLD-MCNC: 174 MG/DL (ref 70–99)
GLUCOSE BLDC GLUCOMTR-MCNC: 119 MG/DL (ref 70–99)
HCO3 BLDA-SCNC: 28 MMOL/L (ref 21–28)
HCO3 BLDA-SCNC: 30 MMOL/L (ref 21–28)
HCO3 BLDA-SCNC: 30 MMOL/L (ref 21–28)
HGB BLD-MCNC: 10.4 G/DL (ref 13.3–17.7)
HGB BLD-MCNC: 10.6 G/DL (ref 13.3–17.7)
HGB BLD-MCNC: 10.7 G/DL (ref 13.3–17.7)
LACTATE BLD-SCNC: 0.5 MMOL/L
LACTATE BLD-SCNC: 0.5 MMOL/L
LACTATE BLD-SCNC: 1.4 MMOL/L
O2/TOTAL GAS SETTING VFR VENT: 30 %
O2/TOTAL GAS SETTING VFR VENT: 30 %
O2/TOTAL GAS SETTING VFR VENT: 35 %
PCO2 BLDA: 39 MM HG (ref 35–45)
PCO2 BLDA: 42 MM HG (ref 35–45)
PCO2 BLDA: 45 MM HG (ref 35–45)
PH BLDA: 7.39 [PH] (ref 7.35–7.45)
PH BLDA: 7.46 [PH] (ref 7.35–7.45)
PH BLDA: 7.5 [PH] (ref 7.35–7.45)
PO2 BLDA: 100 MM HG (ref 80–105)
PO2 BLDA: 88 MM HG (ref 80–105)
PO2 BLDA: 96 MM HG (ref 80–105)
POTASSIUM BLD-SCNC: 3.7 MMOL/L (ref 3.5–5)
POTASSIUM BLD-SCNC: 3.9 MMOL/L (ref 3.5–5)
POTASSIUM BLD-SCNC: 4.1 MMOL/L (ref 3.5–5)
PTH-INTACT SERPL-MCNC: 47 PG/ML (ref 15–65)
SODIUM BLD-SCNC: 138 MMOL/L (ref 135–145)
SODIUM BLD-SCNC: 138 MMOL/L (ref 135–145)
SODIUM BLD-SCNC: 139 MMOL/L (ref 135–145)
SPECIMEN EXPIRATION DATE: NORMAL

## 2023-11-14 PROCEDURE — 82330 ASSAY OF CALCIUM: CPT | Performed by: STUDENT IN AN ORGANIZED HEALTH CARE EDUCATION/TRAINING PROGRAM

## 2023-11-14 PROCEDURE — 250N000009 HC RX 250: Performed by: OTOLARYNGOLOGY

## 2023-11-14 PROCEDURE — 250N000011 HC RX IP 250 OP 636: Performed by: OTOLARYNGOLOGY

## 2023-11-14 PROCEDURE — 258N000003 HC RX IP 258 OP 636: Performed by: OTOLARYNGOLOGY

## 2023-11-14 PROCEDURE — 710N000010 HC RECOVERY PHASE 1, LEVEL 2, PER MIN: Performed by: OTOLARYNGOLOGY

## 2023-11-14 PROCEDURE — 38724 REMOVAL OF LYMPH NODES NECK: CPT | Mod: 50 | Performed by: OTOLARYNGOLOGY

## 2023-11-14 PROCEDURE — 120N000002 HC R&B MED SURG/OB UMMC

## 2023-11-14 PROCEDURE — 83970 ASSAY OF PARATHORMONE: CPT | Performed by: STUDENT IN AN ORGANIZED HEALTH CARE EDUCATION/TRAINING PROGRAM

## 2023-11-14 PROCEDURE — 370N000017 HC ANESTHESIA TECHNICAL FEE, PER MIN: Performed by: OTOLARYNGOLOGY

## 2023-11-14 PROCEDURE — 83605 ASSAY OF LACTIC ACID: CPT

## 2023-11-14 PROCEDURE — 250N000011 HC RX IP 250 OP 636: Performed by: NURSE ANESTHETIST, CERTIFIED REGISTERED

## 2023-11-14 PROCEDURE — 258N000003 HC RX IP 258 OP 636

## 2023-11-14 PROCEDURE — 88311 DECALCIFY TISSUE: CPT | Mod: 26 | Performed by: PATHOLOGY

## 2023-11-14 PROCEDURE — 86901 BLOOD TYPING SEROLOGIC RH(D): CPT | Performed by: OTOLARYNGOLOGY

## 2023-11-14 PROCEDURE — 88331 PATH CONSLTJ SURG 1 BLK 1SPC: CPT | Mod: TC | Performed by: OTOLARYNGOLOGY

## 2023-11-14 PROCEDURE — 250N000011 HC RX IP 250 OP 636: Performed by: STUDENT IN AN ORGANIZED HEALTH CARE EDUCATION/TRAINING PROGRAM

## 2023-11-14 PROCEDURE — 88307 TISSUE EXAM BY PATHOLOGIST: CPT | Mod: 26 | Performed by: PATHOLOGY

## 2023-11-14 PROCEDURE — 88360 TUMOR IMMUNOHISTOCHEM/MANUAL: CPT | Mod: 26 | Performed by: PATHOLOGY

## 2023-11-14 PROCEDURE — 07T20ZZ RESECTION OF LEFT NECK LYMPHATIC, OPEN APPROACH: ICD-10-PCS | Performed by: OTOLARYNGOLOGY

## 2023-11-14 PROCEDURE — 250N000013 HC RX MED GY IP 250 OP 250 PS 637: Performed by: STUDENT IN AN ORGANIZED HEALTH CARE EDUCATION/TRAINING PROGRAM

## 2023-11-14 PROCEDURE — 250N000009 HC RX 250

## 2023-11-14 PROCEDURE — 88331 PATH CONSLTJ SURG 1 BLK 1SPC: CPT | Mod: 26 | Performed by: PATHOLOGY

## 2023-11-14 PROCEDURE — 36415 COLL VENOUS BLD VENIPUNCTURE: CPT | Performed by: STUDENT IN AN ORGANIZED HEALTH CARE EDUCATION/TRAINING PROGRAM

## 2023-11-14 PROCEDURE — 250N000009 HC RX 250: Performed by: ANESTHESIOLOGY

## 2023-11-14 PROCEDURE — 250N000011 HC RX IP 250 OP 636: Mod: JZ | Performed by: NURSE ANESTHETIST, CERTIFIED REGISTERED

## 2023-11-14 PROCEDURE — 250N000009 HC RX 250: Performed by: NURSE ANESTHETIST, CERTIFIED REGISTERED

## 2023-11-14 PROCEDURE — 15734 MUSCLE-SKIN GRAFT TRUNK: CPT | Mod: 51 | Performed by: OTOLARYNGOLOGY

## 2023-11-14 PROCEDURE — 272N000001 HC OR GENERAL SUPPLY STERILE: Performed by: OTOLARYNGOLOGY

## 2023-11-14 PROCEDURE — 360N000077 HC SURGERY LEVEL 4, PER MIN: Performed by: OTOLARYNGOLOGY

## 2023-11-14 PROCEDURE — 258N000003 HC RX IP 258 OP 636: Performed by: ANESTHESIOLOGY

## 2023-11-14 PROCEDURE — 86850 RBC ANTIBODY SCREEN: CPT | Performed by: OTOLARYNGOLOGY

## 2023-11-14 PROCEDURE — 258N000003 HC RX IP 258 OP 636: Performed by: STUDENT IN AN ORGANIZED HEALTH CARE EDUCATION/TRAINING PROGRAM

## 2023-11-14 PROCEDURE — 0K840ZZ DIVISION OF TONGUE, PALATE, PHARYNX MUSCLE, OPEN APPROACH: ICD-10-PCS | Performed by: OTOLARYNGOLOGY

## 2023-11-14 PROCEDURE — 88309 TISSUE EXAM BY PATHOLOGIST: CPT | Mod: 26 | Performed by: PATHOLOGY

## 2023-11-14 PROCEDURE — 0CTS0ZZ RESECTION OF LARYNX, OPEN APPROACH: ICD-10-PCS | Performed by: OTOLARYNGOLOGY

## 2023-11-14 PROCEDURE — 88305 TISSUE EXAM BY PATHOLOGIST: CPT | Mod: 26 | Performed by: PATHOLOGY

## 2023-11-14 PROCEDURE — 0CJS8ZZ INSPECTION OF LARYNX, VIA NATURAL OR ARTIFICIAL OPENING ENDOSCOPIC: ICD-10-PCS | Performed by: OTOLARYNGOLOGY

## 2023-11-14 PROCEDURE — 88311 DECALCIFY TISSUE: CPT | Mod: TC | Performed by: OTOLARYNGOLOGY

## 2023-11-14 PROCEDURE — 82330 ASSAY OF CALCIUM: CPT

## 2023-11-14 PROCEDURE — 31360 REMOVAL OF LARYNX: CPT | Mod: 22 | Performed by: OTOLARYNGOLOGY

## 2023-11-14 PROCEDURE — 07T10ZZ RESECTION OF RIGHT NECK LYMPHATIC, OPEN APPROACH: ICD-10-PCS | Performed by: OTOLARYNGOLOGY

## 2023-11-14 PROCEDURE — 999N000141 HC STATISTIC PRE-PROCEDURE NURSING ASSESSMENT: Performed by: OTOLARYNGOLOGY

## 2023-11-14 PROCEDURE — 250N000025 HC SEVOFLURANE, PER MIN: Performed by: OTOLARYNGOLOGY

## 2023-11-14 PROCEDURE — 0KX40ZZ TRANSFER TONGUE, PALATE, PHARYNX MUSCLE, OPEN APPROACH: ICD-10-PCS | Performed by: OTOLARYNGOLOGY

## 2023-11-14 PROCEDURE — 250N000011 HC RX IP 250 OP 636: Performed by: ANESTHESIOLOGY

## 2023-11-14 PROCEDURE — 0BB10ZZ EXCISION OF TRACHEA, OPEN APPROACH: ICD-10-PCS | Performed by: OTOLARYNGOLOGY

## 2023-11-14 RX ORDER — DEXMEDETOMIDINE HYDROCHLORIDE 4 UG/ML
.1-1.2 INJECTION, SOLUTION INTRAVENOUS CONTINUOUS
Status: DISCONTINUED | OUTPATIENT
Start: 2023-11-14 | End: 2023-11-14 | Stop reason: HOSPADM

## 2023-11-14 RX ORDER — ONDANSETRON 2 MG/ML
4 INJECTION INTRAMUSCULAR; INTRAVENOUS EVERY 30 MIN PRN
Status: DISCONTINUED | OUTPATIENT
Start: 2023-11-14 | End: 2023-11-14 | Stop reason: HOSPADM

## 2023-11-14 RX ORDER — LIDOCAINE HYDROCHLORIDE ANHYDROUS AND DEXTROSE MONOHYDRATE .8; 5 G/100ML; G/100ML
1 INJECTION, SOLUTION INTRAVENOUS CONTINUOUS
Status: DISCONTINUED | OUTPATIENT
Start: 2023-11-14 | End: 2023-11-14 | Stop reason: HOSPADM

## 2023-11-14 RX ORDER — OXYCODONE HYDROCHLORIDE 5 MG/1
5 TABLET ORAL
Status: DISCONTINUED | OUTPATIENT
Start: 2023-11-14 | End: 2023-11-14 | Stop reason: HOSPADM

## 2023-11-14 RX ORDER — AMPICILLIN AND SULBACTAM 2; 1 G/1; G/1
3 INJECTION, POWDER, FOR SOLUTION INTRAMUSCULAR; INTRAVENOUS EVERY 6 HOURS
Status: COMPLETED | OUTPATIENT
Start: 2023-11-14 | End: 2023-11-16

## 2023-11-14 RX ORDER — PHENYLEPHRINE HCL IN 0.9% NACL 50MG/250ML
.5-1.25 PLASTIC BAG, INJECTION (ML) INTRAVENOUS CONTINUOUS
Status: DISCONTINUED | OUTPATIENT
Start: 2023-11-14 | End: 2023-11-14 | Stop reason: HOSPADM

## 2023-11-14 RX ORDER — ACETAMINOPHEN 325 MG/1
650 TABLET ORAL EVERY 4 HOURS PRN
Status: DISCONTINUED | OUTPATIENT
Start: 2023-11-17 | End: 2023-11-21 | Stop reason: HOSPADM

## 2023-11-14 RX ORDER — NALOXONE HYDROCHLORIDE 0.4 MG/ML
0.4 INJECTION, SOLUTION INTRAMUSCULAR; INTRAVENOUS; SUBCUTANEOUS
Status: DISCONTINUED | OUTPATIENT
Start: 2023-11-14 | End: 2023-11-21 | Stop reason: HOSPADM

## 2023-11-14 RX ORDER — DEXTROSE MONOHYDRATE, SODIUM CHLORIDE, AND POTASSIUM CHLORIDE 50; 1.49; 4.5 G/1000ML; G/1000ML; G/1000ML
INJECTION, SOLUTION INTRAVENOUS CONTINUOUS
Status: DISCONTINUED | OUTPATIENT
Start: 2023-11-14 | End: 2023-11-16

## 2023-11-14 RX ORDER — NALOXONE HYDROCHLORIDE 0.4 MG/ML
0.2 INJECTION, SOLUTION INTRAMUSCULAR; INTRAVENOUS; SUBCUTANEOUS
Status: DISCONTINUED | OUTPATIENT
Start: 2023-11-14 | End: 2023-11-21 | Stop reason: HOSPADM

## 2023-11-14 RX ORDER — OXYCODONE HYDROCHLORIDE 10 MG/1
10 TABLET ORAL EVERY 4 HOURS PRN
Status: DISCONTINUED | OUTPATIENT
Start: 2023-11-14 | End: 2023-11-15

## 2023-11-14 RX ORDER — KETAMINE HYDROCHLORIDE 10 MG/ML
INJECTION INTRAMUSCULAR; INTRAVENOUS PRN
Status: DISCONTINUED | OUTPATIENT
Start: 2023-11-14 | End: 2023-11-14

## 2023-11-14 RX ORDER — CALCIUM CHLORIDE 100 MG/ML
INJECTION INTRAVENOUS; INTRAVENTRICULAR PRN
Status: DISCONTINUED | OUTPATIENT
Start: 2023-11-14 | End: 2023-11-14

## 2023-11-14 RX ORDER — LIDOCAINE 40 MG/G
CREAM TOPICAL
Status: DISCONTINUED | OUTPATIENT
Start: 2023-11-14 | End: 2023-11-14 | Stop reason: HOSPADM

## 2023-11-14 RX ORDER — CALCIUM CARBONATE 500(1250)
1000 TABLET ORAL 3 TIMES DAILY
Status: DISCONTINUED | OUTPATIENT
Start: 2023-11-14 | End: 2023-11-14

## 2023-11-14 RX ORDER — DEXAMETHASONE SODIUM PHOSPHATE 4 MG/ML
INJECTION, SOLUTION INTRA-ARTICULAR; INTRALESIONAL; INTRAMUSCULAR; INTRAVENOUS; SOFT TISSUE PRN
Status: DISCONTINUED | OUTPATIENT
Start: 2023-11-14 | End: 2023-11-14

## 2023-11-14 RX ORDER — ONDANSETRON 2 MG/ML
4 INJECTION INTRAMUSCULAR; INTRAVENOUS EVERY 6 HOURS PRN
Status: DISCONTINUED | OUTPATIENT
Start: 2023-11-14 | End: 2023-11-21 | Stop reason: HOSPADM

## 2023-11-14 RX ORDER — GLYCOPYRROLATE 0.2 MG/ML
INJECTION, SOLUTION INTRAMUSCULAR; INTRAVENOUS PRN
Status: DISCONTINUED | OUTPATIENT
Start: 2023-11-14 | End: 2023-11-14

## 2023-11-14 RX ORDER — SODIUM CHLORIDE, SODIUM LACTATE, POTASSIUM CHLORIDE, CALCIUM CHLORIDE 600; 310; 30; 20 MG/100ML; MG/100ML; MG/100ML; MG/100ML
INJECTION, SOLUTION INTRAVENOUS CONTINUOUS PRN
Status: DISCONTINUED | OUTPATIENT
Start: 2023-11-14 | End: 2023-11-14

## 2023-11-14 RX ORDER — BISACODYL 10 MG
10 SUPPOSITORY, RECTAL RECTAL DAILY PRN
Status: DISCONTINUED | OUTPATIENT
Start: 2023-11-14 | End: 2023-11-21 | Stop reason: HOSPADM

## 2023-11-14 RX ORDER — EPHEDRINE SULFATE 50 MG/ML
INJECTION, SOLUTION INTRAMUSCULAR; INTRAVENOUS; SUBCUTANEOUS PRN
Status: DISCONTINUED | OUTPATIENT
Start: 2023-11-14 | End: 2023-11-14

## 2023-11-14 RX ORDER — ONDANSETRON 2 MG/ML
4 INJECTION INTRAMUSCULAR; INTRAVENOUS EVERY 6 HOURS
Status: COMPLETED | OUTPATIENT
Start: 2023-11-14 | End: 2023-11-16

## 2023-11-14 RX ORDER — AMOXICILLIN 250 MG
1 CAPSULE ORAL 2 TIMES DAILY
Status: DISCONTINUED | OUTPATIENT
Start: 2023-11-14 | End: 2023-11-20

## 2023-11-14 RX ORDER — LEVOTHYROXINE SODIUM 100 UG/1
100 TABLET ORAL DAILY
Status: DISCONTINUED | OUTPATIENT
Start: 2023-11-14 | End: 2023-11-14

## 2023-11-14 RX ORDER — POLYETHYLENE GLYCOL 3350 17 G/17G
17 POWDER, FOR SOLUTION ORAL DAILY
Status: DISCONTINUED | OUTPATIENT
Start: 2023-11-15 | End: 2023-11-21 | Stop reason: HOSPADM

## 2023-11-14 RX ORDER — ONDANSETRON 2 MG/ML
INJECTION INTRAMUSCULAR; INTRAVENOUS PRN
Status: DISCONTINUED | OUTPATIENT
Start: 2023-11-14 | End: 2023-11-14

## 2023-11-14 RX ORDER — FENTANYL CITRATE 50 UG/ML
50 INJECTION, SOLUTION INTRAMUSCULAR; INTRAVENOUS EVERY 5 MIN PRN
Status: DISCONTINUED | OUTPATIENT
Start: 2023-11-14 | End: 2023-11-14 | Stop reason: HOSPADM

## 2023-11-14 RX ORDER — ACETAMINOPHEN 325 MG/1
650 TABLET ORAL EVERY 4 HOURS PRN
Status: DISCONTINUED | OUTPATIENT
Start: 2023-11-17 | End: 2023-11-14

## 2023-11-14 RX ORDER — OXYCODONE HYDROCHLORIDE 5 MG/1
5 TABLET ORAL EVERY 4 HOURS PRN
Status: DISCONTINUED | OUTPATIENT
Start: 2023-11-14 | End: 2023-11-14

## 2023-11-14 RX ORDER — AMOXICILLIN 250 MG
1 CAPSULE ORAL 2 TIMES DAILY
Status: DISCONTINUED | OUTPATIENT
Start: 2023-11-14 | End: 2023-11-14

## 2023-11-14 RX ORDER — OXYCODONE HYDROCHLORIDE 10 MG/1
10 TABLET ORAL EVERY 4 HOURS PRN
Status: DISCONTINUED | OUTPATIENT
Start: 2023-11-14 | End: 2023-11-14

## 2023-11-14 RX ORDER — ONDANSETRON 4 MG/1
4 TABLET, ORALLY DISINTEGRATING ORAL EVERY 6 HOURS PRN
Status: DISCONTINUED | OUTPATIENT
Start: 2023-11-14 | End: 2023-11-21 | Stop reason: HOSPADM

## 2023-11-14 RX ORDER — FENTANYL CITRATE 50 UG/ML
INJECTION, SOLUTION INTRAMUSCULAR; INTRAVENOUS PRN
Status: DISCONTINUED | OUTPATIENT
Start: 2023-11-14 | End: 2023-11-14

## 2023-11-14 RX ORDER — ONDANSETRON 4 MG/1
4 TABLET, ORALLY DISINTEGRATING ORAL EVERY 30 MIN PRN
Status: DISCONTINUED | OUTPATIENT
Start: 2023-11-14 | End: 2023-11-14 | Stop reason: HOSPADM

## 2023-11-14 RX ORDER — ACETAMINOPHEN 325 MG/1
975 TABLET ORAL EVERY 8 HOURS
Qty: 27 TABLET | Refills: 0 | Status: COMPLETED | OUTPATIENT
Start: 2023-11-14 | End: 2023-11-17

## 2023-11-14 RX ORDER — HYDROMORPHONE HCL IN WATER/PF 6 MG/30 ML
0.4 PATIENT CONTROLLED ANALGESIA SYRINGE INTRAVENOUS
Status: DISCONTINUED | OUTPATIENT
Start: 2023-11-14 | End: 2023-11-15

## 2023-11-14 RX ORDER — OXYCODONE HYDROCHLORIDE 5 MG/1
5 TABLET ORAL EVERY 4 HOURS PRN
Status: DISCONTINUED | OUTPATIENT
Start: 2023-11-14 | End: 2023-11-15

## 2023-11-14 RX ORDER — DEXAMETHASONE SODIUM PHOSPHATE 4 MG/ML
10 INJECTION, SOLUTION INTRA-ARTICULAR; INTRALESIONAL; INTRAMUSCULAR; INTRAVENOUS; SOFT TISSUE ONCE
Status: DISCONTINUED | OUTPATIENT
Start: 2023-11-14 | End: 2023-11-14 | Stop reason: HOSPADM

## 2023-11-14 RX ORDER — SODIUM CHLORIDE, SODIUM LACTATE, POTASSIUM CHLORIDE, CALCIUM CHLORIDE 600; 310; 30; 20 MG/100ML; MG/100ML; MG/100ML; MG/100ML
INJECTION, SOLUTION INTRAVENOUS CONTINUOUS
Status: DISCONTINUED | OUTPATIENT
Start: 2023-11-14 | End: 2023-11-14 | Stop reason: HOSPADM

## 2023-11-14 RX ORDER — PROCHLORPERAZINE MALEATE 5 MG
5 TABLET ORAL EVERY 6 HOURS PRN
Status: DISCONTINUED | OUTPATIENT
Start: 2023-11-14 | End: 2023-11-21 | Stop reason: HOSPADM

## 2023-11-14 RX ORDER — MAGNESIUM HYDROXIDE 1200 MG/15ML
LIQUID ORAL PRN
Status: DISCONTINUED | OUTPATIENT
Start: 2023-11-14 | End: 2023-11-21 | Stop reason: HOSPADM

## 2023-11-14 RX ORDER — HYDROMORPHONE HCL IN WATER/PF 6 MG/30 ML
0.4 PATIENT CONTROLLED ANALGESIA SYRINGE INTRAVENOUS EVERY 5 MIN PRN
Status: DISCONTINUED | OUTPATIENT
Start: 2023-11-14 | End: 2023-11-14 | Stop reason: HOSPADM

## 2023-11-14 RX ORDER — AMPICILLIN AND SULBACTAM 1; .5 G/1; G/1
1.5 INJECTION, POWDER, FOR SOLUTION INTRAMUSCULAR; INTRAVENOUS SEE ADMIN INSTRUCTIONS
Status: DISCONTINUED | OUTPATIENT
Start: 2023-11-14 | End: 2023-11-14 | Stop reason: HOSPADM

## 2023-11-14 RX ORDER — HYDROMORPHONE HCL IN WATER/PF 6 MG/30 ML
0.2 PATIENT CONTROLLED ANALGESIA SYRINGE INTRAVENOUS EVERY 5 MIN PRN
Status: DISCONTINUED | OUTPATIENT
Start: 2023-11-14 | End: 2023-11-14 | Stop reason: HOSPADM

## 2023-11-14 RX ORDER — LEVOTHYROXINE SODIUM 100 UG/1
100 TABLET ORAL DAILY
Status: DISCONTINUED | OUTPATIENT
Start: 2023-11-15 | End: 2023-11-21 | Stop reason: HOSPADM

## 2023-11-14 RX ORDER — AMPICILLIN AND SULBACTAM 2; 1 G/1; G/1
3 INJECTION, POWDER, FOR SOLUTION INTRAMUSCULAR; INTRAVENOUS
Status: COMPLETED | OUTPATIENT
Start: 2023-11-14 | End: 2023-11-14

## 2023-11-14 RX ORDER — PROPOFOL 10 MG/ML
INJECTION, EMULSION INTRAVENOUS PRN
Status: DISCONTINUED | OUTPATIENT
Start: 2023-11-14 | End: 2023-11-14

## 2023-11-14 RX ORDER — HYDROMORPHONE HCL IN WATER/PF 6 MG/30 ML
0.2 PATIENT CONTROLLED ANALGESIA SYRINGE INTRAVENOUS
Status: DISCONTINUED | OUTPATIENT
Start: 2023-11-14 | End: 2023-11-15

## 2023-11-14 RX ORDER — LIDOCAINE HYDROCHLORIDE 20 MG/ML
INJECTION, SOLUTION INFILTRATION; PERINEURAL PRN
Status: DISCONTINUED | OUTPATIENT
Start: 2023-11-14 | End: 2023-11-14

## 2023-11-14 RX ORDER — FENTANYL CITRATE 50 UG/ML
25 INJECTION, SOLUTION INTRAMUSCULAR; INTRAVENOUS EVERY 5 MIN PRN
Status: DISCONTINUED | OUTPATIENT
Start: 2023-11-14 | End: 2023-11-14 | Stop reason: HOSPADM

## 2023-11-14 RX ORDER — BUPRENORPHINE AND NALOXONE 4; 1 MG/1; MG/1
1 FILM, SOLUBLE BUCCAL; SUBLINGUAL 2 TIMES DAILY
Status: DISCONTINUED | OUTPATIENT
Start: 2023-11-14 | End: 2023-11-21 | Stop reason: HOSPADM

## 2023-11-14 RX ORDER — OXYCODONE HYDROCHLORIDE 10 MG/1
10 TABLET ORAL
Status: DISCONTINUED | OUTPATIENT
Start: 2023-11-14 | End: 2023-11-14 | Stop reason: HOSPADM

## 2023-11-14 RX ORDER — LIDOCAINE HYDROCHLORIDE AND EPINEPHRINE 10; 10 MG/ML; UG/ML
INJECTION, SOLUTION INFILTRATION; PERINEURAL PRN
Status: DISCONTINUED | OUTPATIENT
Start: 2023-11-14 | End: 2023-11-14 | Stop reason: HOSPADM

## 2023-11-14 RX ADMIN — PHENYLEPHRINE HYDROCHLORIDE 50 MCG: 10 INJECTION INTRAVENOUS at 15:06

## 2023-11-14 RX ADMIN — SODIUM CHLORIDE, POTASSIUM CHLORIDE, SODIUM LACTATE AND CALCIUM CHLORIDE: 600; 310; 30; 20 INJECTION, SOLUTION INTRAVENOUS at 10:25

## 2023-11-14 RX ADMIN — HYDROMORPHONE HYDROCHLORIDE 0.5 MG: 1 INJECTION, SOLUTION INTRAMUSCULAR; INTRAVENOUS; SUBCUTANEOUS at 16:54

## 2023-11-14 RX ADMIN — FENTANYL CITRATE 100 MCG: 50 INJECTION INTRAMUSCULAR; INTRAVENOUS at 08:30

## 2023-11-14 RX ADMIN — EPHEDRINE SULFATE 5 MG: 5 INJECTION INTRAVENOUS at 13:39

## 2023-11-14 RX ADMIN — AMPICILLIN SODIUM AND SULBACTAM SODIUM 1.5 G: 1; .5 INJECTION, POWDER, FOR SOLUTION INTRAMUSCULAR; INTRAVENOUS at 09:31

## 2023-11-14 RX ADMIN — SODIUM CHLORIDE, POTASSIUM CHLORIDE, SODIUM LACTATE AND CALCIUM CHLORIDE: 600; 310; 30; 20 INJECTION, SOLUTION INTRAVENOUS at 13:46

## 2023-11-14 RX ADMIN — PHENYLEPHRINE HYDROCHLORIDE 100 MCG: 10 INJECTION INTRAVENOUS at 13:03

## 2023-11-14 RX ADMIN — SUGAMMADEX 200 MG: 100 INJECTION, SOLUTION INTRAVENOUS at 17:27

## 2023-11-14 RX ADMIN — GLYCOPYRROLATE 0.1 MG: 0.2 INJECTION, SOLUTION INTRAMUSCULAR; INTRAVENOUS at 14:25

## 2023-11-14 RX ADMIN — ACETAMINOPHEN 975 MG: 325 TABLET, FILM COATED ORAL at 21:27

## 2023-11-14 RX ADMIN — SODIUM CHLORIDE 10 MG/HR: 9 INJECTION, SOLUTION INTRAVENOUS at 09:20

## 2023-11-14 RX ADMIN — PHENYLEPHRINE HYDROCHLORIDE 50 MCG: 10 INJECTION INTRAVENOUS at 14:21

## 2023-11-14 RX ADMIN — EPHEDRINE SULFATE 5 MG: 5 INJECTION INTRAVENOUS at 13:01

## 2023-11-14 RX ADMIN — FENTANYL CITRATE 50 MCG: 50 INJECTION INTRAMUSCULAR; INTRAVENOUS at 08:26

## 2023-11-14 RX ADMIN — DEXAMETHASONE SODIUM PHOSPHATE 10 MG: 4 INJECTION, SOLUTION INTRA-ARTICULAR; INTRALESIONAL; INTRAMUSCULAR; INTRAVENOUS; SOFT TISSUE at 16:47

## 2023-11-14 RX ADMIN — SODIUM CHLORIDE, POTASSIUM CHLORIDE, SODIUM LACTATE AND CALCIUM CHLORIDE: 600; 310; 30; 20 INJECTION, SOLUTION INTRAVENOUS at 08:14

## 2023-11-14 RX ADMIN — FENTANYL CITRATE 50 MCG: 50 INJECTION INTRAMUSCULAR; INTRAVENOUS at 08:32

## 2023-11-14 RX ADMIN — DEXMEDETOMIDINE HYDROCHLORIDE 0.3 MCG/KG/HR: 400 INJECTION INTRAVENOUS at 09:07

## 2023-11-14 RX ADMIN — PROPOFOL 30 MG: 10 INJECTION, EMULSION INTRAVENOUS at 12:48

## 2023-11-14 RX ADMIN — PHENYLEPHRINE HYDROCHLORIDE 50 MCG: 10 INJECTION INTRAVENOUS at 16:05

## 2023-11-14 RX ADMIN — EPHEDRINE SULFATE 5 MG: 5 INJECTION INTRAVENOUS at 11:32

## 2023-11-14 RX ADMIN — LIDOCAINE HYDROCHLORIDE 1 MG/KG/HR: 8 INJECTION, SOLUTION INTRAVENOUS at 09:14

## 2023-11-14 RX ADMIN — REMIFENTANIL HYDROCHLORIDE 0.05 MCG/KG/MIN: 1 INJECTION, POWDER, LYOPHILIZED, FOR SOLUTION INTRAVENOUS at 09:20

## 2023-11-14 RX ADMIN — AMPICILLIN SODIUM AND SULBACTAM SODIUM 3 G: 2; 1 INJECTION, POWDER, FOR SOLUTION INTRAMUSCULAR; INTRAVENOUS at 19:12

## 2023-11-14 RX ADMIN — FENTANYL CITRATE 25 MCG: 50 INJECTION, SOLUTION INTRAMUSCULAR; INTRAVENOUS at 18:55

## 2023-11-14 RX ADMIN — Medication 0.3 MCG/KG/MIN: at 16:05

## 2023-11-14 RX ADMIN — EPHEDRINE SULFATE 5 MG: 5 INJECTION INTRAVENOUS at 11:24

## 2023-11-14 RX ADMIN — PHENYLEPHRINE HYDROCHLORIDE 50 MCG: 10 INJECTION INTRAVENOUS at 13:13

## 2023-11-14 RX ADMIN — OXYCODONE HYDROCHLORIDE 10 MG: 10 TABLET ORAL at 21:27

## 2023-11-14 RX ADMIN — POTASSIUM CHLORIDE, DEXTROSE MONOHYDRATE AND SODIUM CHLORIDE: 150; 5; 450 INJECTION, SOLUTION INTRAVENOUS at 18:38

## 2023-11-14 RX ADMIN — HYDROMORPHONE HYDROCHLORIDE 0.5 MG: 1 INJECTION, SOLUTION INTRAMUSCULAR; INTRAVENOUS; SUBCUTANEOUS at 15:51

## 2023-11-14 RX ADMIN — SODIUM CHLORIDE, POTASSIUM CHLORIDE, SODIUM LACTATE AND CALCIUM CHLORIDE: 600; 310; 30; 20 INJECTION, SOLUTION INTRAVENOUS at 17:01

## 2023-11-14 RX ADMIN — PHENYLEPHRINE HYDROCHLORIDE 50 MCG: 10 INJECTION INTRAVENOUS at 13:58

## 2023-11-14 RX ADMIN — CALCIUM CHLORIDE INJECTION 250 MG: 100 INJECTION, SOLUTION INTRAVENOUS at 10:46

## 2023-11-14 RX ADMIN — EPHEDRINE SULFATE 5 MG: 5 INJECTION INTRAVENOUS at 14:21

## 2023-11-14 RX ADMIN — EPHEDRINE SULFATE 5 MG: 5 INJECTION INTRAVENOUS at 11:11

## 2023-11-14 RX ADMIN — Medication 50 MG: at 08:43

## 2023-11-14 RX ADMIN — AMPICILLIN SODIUM AND SULBACTAM SODIUM 1.5 G: 1; .5 INJECTION, POWDER, FOR SOLUTION INTRAMUSCULAR; INTRAVENOUS at 13:32

## 2023-11-14 RX ADMIN — ONDANSETRON 4 MG: 2 INJECTION INTRAMUSCULAR; INTRAVENOUS at 21:27

## 2023-11-14 RX ADMIN — Medication 10 MG: at 12:30

## 2023-11-14 RX ADMIN — GLYCOPYRROLATE 0.2 MG: 0.2 INJECTION, SOLUTION INTRAMUSCULAR; INTRAVENOUS at 09:21

## 2023-11-14 RX ADMIN — CALCIUM CHLORIDE INJECTION 250 MG: 100 INJECTION, SOLUTION INTRAVENOUS at 11:24

## 2023-11-14 RX ADMIN — HYDROMORPHONE HYDROCHLORIDE 0.4 MG: 0.2 INJECTION, SOLUTION INTRAMUSCULAR; INTRAVENOUS; SUBCUTANEOUS at 22:34

## 2023-11-14 RX ADMIN — LIDOCAINE HYDROCHLORIDE 100 MG: 20 INJECTION, SOLUTION INFILTRATION; PERINEURAL at 08:26

## 2023-11-14 RX ADMIN — FENTANYL CITRATE 25 MCG: 50 INJECTION, SOLUTION INTRAMUSCULAR; INTRAVENOUS at 18:47

## 2023-11-14 RX ADMIN — SODIUM CHLORIDE, POTASSIUM CHLORIDE, SODIUM LACTATE AND CALCIUM CHLORIDE: 600; 310; 30; 20 INJECTION, SOLUTION INTRAVENOUS at 18:11

## 2023-11-14 RX ADMIN — EPHEDRINE SULFATE 5 MG: 5 INJECTION INTRAVENOUS at 13:56

## 2023-11-14 RX ADMIN — PHENYLEPHRINE HYDROCHLORIDE 100 MCG: 10 INJECTION INTRAVENOUS at 15:39

## 2023-11-14 RX ADMIN — FENTANYL CITRATE 25 MCG: 50 INJECTION, SOLUTION INTRAMUSCULAR; INTRAVENOUS at 19:25

## 2023-11-14 RX ADMIN — AMPICILLIN SODIUM AND SULBACTAM SODIUM 3 G: 2; 1 INJECTION, POWDER, FOR SOLUTION INTRAMUSCULAR; INTRAVENOUS at 07:23

## 2023-11-14 RX ADMIN — DEXAMETHASONE SODIUM PHOSPHATE 10 MG: 4 INJECTION, SOLUTION INTRA-ARTICULAR; INTRALESIONAL; INTRAMUSCULAR; INTRAVENOUS; SOFT TISSUE at 08:48

## 2023-11-14 RX ADMIN — PHENYLEPHRINE HYDROCHLORIDE 50 MCG: 10 INJECTION INTRAVENOUS at 13:30

## 2023-11-14 RX ADMIN — SODIUM CHLORIDE, SODIUM LACTATE, POTASSIUM CHLORIDE, CALCIUM CHLORIDE: 600; 310; 30; 20 INJECTION, SOLUTION INTRAVENOUS at 08:50

## 2023-11-14 RX ADMIN — EPHEDRINE SULFATE 5 MG: 5 INJECTION INTRAVENOUS at 10:14

## 2023-11-14 RX ADMIN — PROPOFOL 90 MG: 10 INJECTION, EMULSION INTRAVENOUS at 08:26

## 2023-11-14 RX ADMIN — ONDANSETRON 4 MG: 2 INJECTION INTRAMUSCULAR; INTRAVENOUS at 16:56

## 2023-11-14 RX ADMIN — CALCIUM CHLORIDE INJECTION 250 MG: 100 INJECTION, SOLUTION INTRAVENOUS at 12:24

## 2023-11-14 RX ADMIN — PHENYLEPHRINE HYDROCHLORIDE 50 MCG: 10 INJECTION INTRAVENOUS at 13:19

## 2023-11-14 RX ADMIN — Medication 40 MG: at 08:27

## 2023-11-14 RX ADMIN — SENNOSIDES AND DOCUSATE SODIUM 1 TABLET: 8.6; 5 TABLET ORAL at 21:27

## 2023-11-14 RX ADMIN — REMIFENTANIL HYDROCHLORIDE 0.05 MCG/KG/MIN: 1 INJECTION, POWDER, LYOPHILIZED, FOR SOLUTION INTRAVENOUS at 15:55

## 2023-11-14 RX ADMIN — AMPICILLIN SODIUM AND SULBACTAM SODIUM 1.5 G: 1; .5 INJECTION, POWDER, FOR SOLUTION INTRAMUSCULAR; INTRAVENOUS at 11:37

## 2023-11-14 RX ADMIN — CALCIUM CARBONATE (ANTACID) CHEW TAB 500 MG 2500 MG: 500 CHEW TAB at 22:27

## 2023-11-14 RX ADMIN — CALCIUM CHLORIDE INJECTION 250 MG: 100 INJECTION, SOLUTION INTRAVENOUS at 12:34

## 2023-11-14 RX ADMIN — PHENYLEPHRINE HYDROCHLORIDE 50 MCG: 10 INJECTION INTRAVENOUS at 14:56

## 2023-11-14 RX ADMIN — Medication 20 MG: at 11:51

## 2023-11-14 ASSESSMENT — ACTIVITIES OF DAILY LIVING (ADL)
ADLS_ACUITY_SCORE: 26

## 2023-11-14 ASSESSMENT — COPD QUESTIONNAIRES
CAT_SEVERITY: MILD
COPD: 1

## 2023-11-14 ASSESSMENT — ENCOUNTER SYMPTOMS: ORTHOPNEA: 0

## 2023-11-14 NOTE — ANESTHESIA PROCEDURE NOTES
Airway       Patient location during procedure: OR       Procedure Start/Stop Times: 11/14/2023 8:28 AM  Staff -        CRNA: Alanna Schaefer APRN CRNA       Performed By: CRNAIndications and Patient Condition       Indications for airway management: grecia-procedural       Induction type:intravenous       Mask difficulty assessment: 0 - not attempted    Final Airway Details       Final airway type: endotracheal airway       Successful airway: Reinforced tube  Endotracheal Airway Details        ETT size (mm): 7.0       Cuffed: yes       Measured from: stoma       Bite block used: None    Post intubation assessment        Placement verified by: capnometry, equal breath sounds and chest rise        Number of attempts at approach: 1       Number of other approaches attempted: 0       Secured with: sutures       Ease of procedure: easy       Dentition: Intact and Unchanged    Medication(s) Administered   Medication Administration Time: 11/14/2023 8:28 AM    Additional Comments       Sutured in place per surgeon.

## 2023-11-14 NOTE — OP NOTE
Date of Procedure: 11/14/2023    Attending Physician: Birdie Atkinson MD    Resident Physicians: Camden Swain MD    Procedure Performed:  Direct laryngoscopy  Total laryngectomy  Bilateral neck dissection (levels II-IV)  Cricopharyngeal myotomy  Pectoralis myofascial flap    Preoperative Diagnosis:   Laryngeal cancer  Tracheostomy dependence  PEG dependence  BMI less than 18  History of radiation  Hypothyroidism   History of opioid abuse    Postoperative Diagnosis: same    Anesthesia: General    Blood loss: 50 cc    Specimens:   ID Type Source Tests Collected by Time Destination   1 : left level 2A Tissue Other SURGICAL PATHOLOGY EXAM Birdie Atkinson MD 11/14/2023 10:20 AM     2 : left level 3 Tissue Other SURGICAL PATHOLOGY EXAM Birdie Atkinson MD 11/14/2023 10:20 AM     3 : left level 4 Tissue Other SURGICAL PATHOLOGY EXAM Birdie Atkinson MD 11/14/2023 10:20 AM     4 : left level 2B Tissue Other SURGICAL PATHOLOGY EXAM Birdie Atkinson MD 11/14/2023 10:20 AM     5 : right level 2A Tissue Other SURGICAL PATHOLOGY EXAM Birdie Atkinson MD 11/14/2023 11:28 AM     6 : right level 3 Tissue Other SURGICAL PATHOLOGY EXAM Birdie Atkinson MD 11/14/2023 11:28 AM     7 : right level 4 Tissue Other SURGICAL PATHOLOGY EXAM Birdie Atkinson MD 11/14/2023 11:28 AM     8 : right level 2B Tissue Other SURGICAL PATHOLOGY EXAM Birdie Atkinson MD 11/14/2023 11:28 AM     9 : Tissue medial to right thyroid lobe Tissue Other SURGICAL PATHOLOGY EXAM Birdie Atkinson MD 11/14/2023 11:57 AM     10 : Right level 6 node Tissue Other SURGICAL PATHOLOGY EXAM Birdie Atkinson MD 11/14/2023 12:34 PM     11 : Right post cricoid  mucosa Tissue Other SURGICAL PATHOLOGY EXAM Birdie Atkinson MD 11/14/2023  1:04 PM     12 : Left Post cricoid  mucosa Tissue Other SURGICAL PATHOLOGY EXAM Birdie Atkinson MD 11/14/2023  1:04 PM     13 : Left inferior pharyngeal wall mucosa Tissue Other SURGICAL PATHOLOGY EXAM  Birdie Atkinson MD 11/14/2023  1:04 PM     14 : Left Superior pharyngeal  wall mucosa Tissue Other SURGICAL PATHOLOGY EXAM Birdie Atkinson MD 11/14/2023  1:04 PM     15 : Left vallecula mucosa margin Tissue Other SURGICAL PATHOLOGY EXAM Birdie Atkinson MD 11/14/2023  1:04 PM     16 : Right vallecula mucosa Margin Tissue Other SURGICAL PATHOLOGY EXAM Birdie Atkinson MD 11/14/2023  1:04 PM     17 : Right superior pharyngeal Wall mucosa margin Tissue Other SURGICAL PATHOLOGY EXAM Birdie Atkinson MD 11/14/2023  1:04 PM     18 : Right Inferior pharyngeal wall mucosa margin Tissue Other SURGICAL PATHOLOGY EXAM Birdie Atkinson MD 11/14/2023  1:07 PM     19 : Posterial tracheal wall mucosa margin Tissue Other SURGICAL PATHOLOGY EXAM Birdie Atkinson MD 11/14/2023  1:08 PM     20 : Right anterior tracheal wall mucosa margin Tissue Other SURGICAL PATHOLOGY EXAM Birdie Atkinson MD 11/14/2023  1:08 PM     21 : Left Anterior tracheal wall mucosa margin Tissue Other SURGICAL PATHOLOGY EXAM Birdie Atkinson MD 11/14/2023  1:08 PM     22 : Total laryngectomy Tissue Other SURGICAL PATHOLOGY EXAM Birdie Atkinson MD 11/14/2023  1:10 PM          Implants:  AFSHAN drain x 2 in chest  AFSHAN drain x 3 in neck    Complications: None    Findings:  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 He has a history of a [id number] SCC of the larynx treated with radiation. He started treatment 12/17/2018, completed 2/14/2019. Of note, he only received 5512 cGy in 26 fractions in prolonged fashion rather than the planned 70 Gy in 33 fractions due  to compliance issues. He developed issues with shortness of breath, resulting in ER evaluation and scope evaluation by Dr Mcnally on 9/3/2023 at which time there was bulky exophytic tumor of the left supraglottis with involvement of the anterior commissure, left cord fixation, decreased mobility of right cord, narrowed glottis to 2-3 mm. He was taken to the OR for awake tracheostomy and DL with biopsy on 9/5/2023 by Dr Mcnally. Pathology demonstrated invasive SCC. He had PEG placement on 9/8 by surgery team after failing video swallow study.  He had CT neck on 9/14/2023 which showed laryngeal mass without thyroid cartilage erosion, no lymphadenopathy. He had a PET scan on 9/27/2023 which showed FDG avid supraglottic tumor, no metastatic lymphadenopathy, 12 x 9 mm mildly FDG avid left lower lobe nodular density. He is indicated for salvage total laryngectomy with pectoralis flap.       Description of Procedure:  After informed consent was obtained, the patient was brought back to main operating room and placed in a supine position.  The bed had already been turned 180 degrees from anesthesia. General anesthesia was induced through the Shiley and the tube was switched out to a 7.0 wire reinforced endotracheal tube. An arterial line and senior were placed.      The arms were tucked and a shoulder roll was placed. A brief time out was performed. The patient was edentulous. The Dedo laryngoscope was introduced and used to visualize the larynx. The patient had a normal appearing base of tongue and vallecula. The epiglottis was normal.  The pyriform sinuses and postcricoid area were normal. The patient had a tumor that completely obstructed the glottis and prevented visualization of the subglottis. There was involvement of the left false cord, but all tumor was endolaryngeal. The laryngoscope was removed.    The patient was then prepped and draped in sterile fashion for the planned surgical procedure.  A marking pen  was used to fish an apron incision in the neck with a 1 cm margin circumferentially around the tracheostomy site.  The incision was injected with 1% lidocaine with 1:100,000 epinephrine.  A #15 blade was used to make an incision through the skin down to the platysma.  The monopolar cautery was used to divide the platysma.  Subplatysmal flaps were raised superiorly up to the level of the hyoid medially and the mandible laterally.  Subplatysmal flaps were also raised inferiorly down past to the level of the clavicle.       We turned our attention to the left neck dissection.  The fascia was released off the SCM working from inferior to superior, and releasing it along its medial border.  We continued to release the fascia working toward the floor of the neck. Blunt dissection was performed to release the inferior border of the submandibular gland. The digastric muscle was identified inferior to the submandibular gland and traced posteriorly toward the mastoid tip, defining the superior border of our dissection. We continued to release the fascia of the SCM and the spinal accessory nerve was defined.  This was traced up superiorly towards its junction with the internal jugular vein. The lateral border of the internal jugular vein was defined. We then continued to release the fibrofatty contents of levels II through IV towards the floor of the neck and the cervical rootlets were defined. There was minimal fibrofatty contents given the patient's body habitus and previous treatment. Grasping the contents of levels II through IV of the neck, we then released the specimen off the cervical rootlets along the floor of the neck using the monopolar cautery.  The omohyoid muscle was divided as it crossed the internal jugular vein. As we were releasing the specimen inferiorly in level IV, care was taken to clip the specimen in level IV to reduce the risk of a chyle leak.  We defined the inferior border of our dissection at the  clavicle. The lateral border of the internal jugular vein was defined in level IV.  We then continued to release the fibrofatty contents of levels II through IV towards the carotid sheath. These were then bluntly dissected off the carotid artery and the internal jugular vein working medially. The transverse cervical vessels were preserved. We then defined the medial border of our dissection. The monopolar cautery was used to release the fibrofatty contents off the medial border of the omohyoid. The fibrofatty contents along the medial border of the internal jugular vein were released to be taken in continuity with the neck dissection specimen. The superior thyroid pedicle was traced and preserved given the plans to preserve the thyroid. The neck dissection specimen was released off the internal jugular vein and then connected to the medial portion of the dissection. The specimen was divided into its respective level then handed off to nursing for permanent pathology. The spinal accessory nerve was freed circumferentially. An Allis clamp was used to grasp the contents of level IIB. The Metzenbaum scissors were used to release the contents of level IIB from the floor of the neck. The specimen was handed off to nursing for permanent pathology.      We then started to mobilize the larynx on the left side.  The carotid was bluntly dissected so it could be released off the laryngeal cartilage laterally. The midline raphe of the strap muscles was defined. The strap muscles were divided at the sternum on the left side. The strap muscles were dissected off the thyroid gland and retracted superiorly. The thyroid isthmus was divided on the left side of the tracheostomy site and the thyroid gland was reflected laterally. The lateral border of the thyroid cartilage was identified and the constrictor muscles were released off the thyroid ala. A Phillips was used to release the mucosa on the posterior aspect of the cartilage so it  could be preserved for use in reconstruction.  The suprahyoid musculature was released off the hyoid working medial to lateral.  The entire cornu of the hyoid was released until the left side of the larynx was fully mobilized.      We then turned our attention to the right neck dissection. We started to release the fascia from the SCM and release it along its medial border. The inferior border of the submandibular gland was identified and released using monopolar cautery. From here we were able to identify the digastric muscle which defined the superior border of our dissection. This was traced posteriorly towards the mastoid tip.  The floor the neck was identified. We identified the spinal accessory nerve on the right side and this was traced superiorly towards the internal jugular vein. The lateral border of the internal jugular vein was identified. The spinal accessory nerve was freed up along its inferior border. We then continued to release the contents of levels II through IV along the floor of the neck. The cervical rootlets were identified. The omohyoid was divided as it crossed the lateral neck.  The inferior border of the dissection was defined at the clavicle. We then continued to release the fibrofatty contents of levels II through IV off the rootlets working towards the carotid sheath. We then continued to release the medial border of the dissection along the omohyoid and release it off of the anterior surface of the carotid sheath. Once again the superior thyroid pedicle was preserved. The lateral and medial borders of the neck dissection were then connected. Once the specimen was completely released, it was divided into its respective levels and handed off to nursing for permanent pathology. The spinal accessory nerve was freed circumferentially with scissors. An Allis clamp was used to grasp the contents of level IIB and the Metzenbaum scissors were used to release the contents of level IIB from the  floor of the neck. The specimen was handed off to [facility] for permanent pathology. Hemostasis was achieved with bipolar cautery.      We then started to mobilize the larynx on the right side. The medial border of the carotid artery was defined and dissected away from the trachea. The strap muscles were divided inferiorly down to the clavicle. The strap muscles were dissected off the thyroid gland and reflected superiorly. The thyroid isthmus was divided on the right side of the tracheostomy and the thyroid gland was released off the trachea, reflecting it laterally. The thyroid gland was pedicled off the inferior thyroid vessels.  We then mobilized the remainder of the thyroid cartilage releasing the constrictor muscles. A freer was used to release the musculature off the posterior border of the cartilage to preserve as much mucosa as possible. The superior border of the hyoid bone was released on the right side. We then continued to release the rest of the suprahyoid musculature until the entire right side of the larynx was mobilized.     A Bradly retractor was placed within the oral cavity and down into the vallecula on the right side.  Monopolar cautery was used to enter the right vallecula.  An Allis clamp was used to grasp the epiglottis and retract it out of the larynx.  The superior cut was completed along the right vallecula towards the midline and then into the left vallecula.  At this point, we were able to better visualize the larynx and the monopolar cautery was used to make the pharyngeal cuts following the aryepiglottic folds, working from superior to inferiorly.  We then transitioned to our transverse cut at the postcricoid space. Dissection was then performed along the common party wall between the posterior tracheal wall and anterior esophagus. This dissection was carried out to inferior to the level of the previous tracheostomy. This allowed for improved access to the anterior trachea with ability  to partially retract the specimen superiorly into the neck. The remainder of the strap muscle attachments inferior to the tracheostomy was released. This dissection was difficult due to the inferior nature of the tracheostomy as well as the inflammatory changes to the soft tissue around the tracheostomy. Considerable time was spent trying to release the soft tissue anterior to the tracheal wall. Once the soft tissue was released off the anterior tracheal wall below the previous tracheostomy, additional dissection was performed along the common party wall to ensure adequate dissection. At this point it was determined that the previous tracheostomy was between the 4th and 5th tracheal ring. The ETT was removed from the tracheostomy and an incision was made along the anterior tracheal wall immediately inferior to the previous tracheostomy. Beveled cuts were made with curved scissors along the lateral tracheal walls. Given how low the tracheal cuts had to be made, a stay suture was placed to ensure that the trachea would not retract into the chest. A 15 blade was then used to make the cut along the posterior tracheal wall, connecting the tracheal cuts to the laryngectomy cuts so the specimen could be released. The ETT was replaced in the stoma and end tidal CO2 was verified. The laryngectomy specimen was inspected and the specimen appeared to have adequate gross margins. Mucosal margins were sent from the specimen including the lateral pharyngeal walls, vallecula, postcricoid space, posterior and anterior tracheal wall sand sent for frozen section.  All of these were ultimately negative for carcinoma. The main specimen was taken to pathology for orientation.      A cricopharyngeal myotomy was performed along the anterior esophageal wall.  The sternal heads of the sternocleidomastoid muscle were released bilaterally. Hemostasis was ensured with multiple sustained Valsalvas performed    Given the history of radiation, the  decision was made that the patient was an appropriate candidate for a pectoralis flap to provide vascularized coverage. Attention was turned to the left chest for the pectoralis flap. A lap sponge was used to measure the distance from the superior most aspect of the base of tongue down to the chest, with rotation over the clavicle, and the inferior aspect of the muscle needed to fill the defect was marked on the chest. An incision was marked and made with a 15 blade across the chest and then extended up toward the axilla. The incision was made down to the pectoralis muscle. The monopolar cautery was used to raise skin flap off the pectoralis muscle inferiorly until the inferior border of the muscle was identified along with the rectus fascia. Similarly, the skin flap was raised off pectoralis muscle superiorly up to the clavicle. The monopolar cautery was used to release the inferior border of the pectoralis muscle with a small amount of rectus sheath and the muscle was raised off the ribs working from inferior to superiorly, taking care to clip the intercostal perforators. As the pectoralis was released medially near the sternum the perforators were similarly clipped. The pectoralis major muscle continued to be raised off the chest wall, taking care to clip any perforators. The pectoralis minor was identified and the pectoralis major was bluntly dissected off, with identification of the pedicle on the underside of the muscle. Once this was in view, the lateral border of the pectoralis was further released working superiorly. The innervation to the pectoralis muscle was identified and divided/clipped, taking care to leave the blood supply to the muscle intact. The lateral attachments of the muscle were released to help increase the arc of rotation of the pectoralis muscle. The lateral pedicle was taken down but the main pedicle was left intact. A tunnel was made from the chest into the neck and widened to allow 5  fingerbreadths to pass through the tunnel. The muscle was then carefully rotated into the neck, taking care not to kink or compress the pedicle.       Hemostasis was achieved within the pectoralis donor site. Two 10 mm fully perforated AFSHAN drains were placed in the chest defect and secured with a suture, taking care to place the drains away from the chest tube site. The pectoralis muscle incision was closed with buried 3-0 vicryl deep followed by 3-0 vicryl in the dermis and then a running 3-0 nylon for the skin.       We then turned our attention to the pharyngeal closure.  A straight line closure was performed. The base of the tongue was closed to itself with an interrupted modified Bentonia stitch with 3-0 vicryl. The lateral pharyngeal walls were closed closed to themself using an interrupted modified Gideon stitch with 3-0 vicryl working down toward the esophagus. After the first layer of closure had been performed, the closure was tested with dilute hydrogen peroxide with no leak noted. A second layer of closure was performed with the constrictor muscles. The pectoralis muscle was then used to cover the pharyngeal closure and was secured to the remnant constrictor muscles and SCM, providing coverage for the pharyngeal closure and the vessels.    The trachea was bluntly dissected along the anterior tracheal wall to improve mobilization of the trachea to assist with closure. Similarly some dissection was performed along the posterior tracheal wall in the common party wall. Care was taken to limit any dissection along the lateral walls to avoid disruption to the blood supply to the trachea. This mobilization provided slight improvement in the ability to get the trachea to the skin edges for stoma maturation. The anterior tracheal wall was matured to the skin with a 3-0 PDS in vertical mattress fashion, with considerable tension. The trachea was displaced below the sternal heads.      Three 10 mm fully perforated  AFSHAN drains were placed in the neck (1 in the right lateral neck, 1 in the left lateral neck, 1 extending toward the pharyngeal closure). These were secured to the skin with a 3-0 nylon suture. The patient was placed into flexion to assist with closure of the neck. The posterior wall of the trachea was secured to the pectoralis flap muscle with 3-0 vicryl, again with tension but improved with head flexion and removal of the shoulder roll. The neck incision was closed deeply with a buried interrupted 3-0 Vicryl.  The skin was then closed with a running 5-0 prolene. Bacitracin was applied to the incision.     The patient was handed back to Anesthesia for emergence and taken to the PACU in stable condition. I was present for and participated in the entire procedure.      Birdie Atkinson MD    Department of Otolaryngology      The laryngectomy should be billed with a 22 modifier. The patient's previous tracheostomy was low in the neck - at the 4th/5th tracheal ring which complicated the surgical resection and stoma maturation due to the trachea being displaced into the chest, with the patient essentially undergoing a tracheal resection (5 rings resected). The neck dissections were also complicated by the patient's previous radiation which made dissection of the planes more complicated and time consuming.

## 2023-11-14 NOTE — PROGRESS NOTES
SPIRITUAL HEALTH SERVICES  [facility] ([place]) [facility]C   PRE-SURGERY VISIT    Had pre-surgery visit with pt. Jorge A GARIBAY Deondre and Daughter Jacque. Provided spiritual support, prayer.     Fidel Rosales  Chaplain Resident  Pager [phone]    * [facility] remains available 24/7 for emergent requests/referrals, either by having the switchboard page the on-call  or by entering an ASAP/STAT consult in Epic (this will also page the on-call ). Routine Epic consults receive an initial response within 24 hours.*

## 2023-11-14 NOTE — LETTER
Transition Communication Hand-off for Care Transitions to Next Level of Care Provider    Name: Jorge A Mendosa  : 1955  MRN #: [id number]  Primary Care Provider: Regina Hicks     Primary Clinic: [address] [place] [address] [place] [address]     Reason for Hospitalization:  Laryngeal cancer (H) [C32.9]  Admit Date/Time: 2023  5:57 AM  Discharge Date: 23  Payor Source: Payor: MEDICARE / Plan: MEDICARE / Product Type: Medicare /     Reason for Communication Hand-off Referral: Other continuity of care    Discharge Plan: discharge today s/p laryngectomy (supplied by [facility]), resume home enteral feeds (supplied by [facility]), resume home care with [facility]     Concern for non-adherence with plan of care:   Y/N no  Discharge Needs Assessment:  Needs      Flowsheet Row Most Recent Value   Equipment Currently Used at Home none   # of Referrals Placed by  Durable Medical Equipment (DME)          Follow-up specialty is recommended: Yes    Follow-up plan:    Future Appointments   Date Time Provider Department Center   2023  2:30 PM Debbie Yin, OTR [facility]   2023 10:40 AM Birdie Atkinson MD [facility]   2023  2:00 PM Marialuisa Krishna, SLP [facility]   2023  2:00 PM [id number]   2023 11:15 AM Regina Hicks MD [facility]       Any outstanding tests or procedures:        Referrals       Future Labs/Procedures    Home Care Referral     Comments:    [facility] - accepted home RN  Ph: [phone]    Your provider has ordered home health services. If you have not been contacted within 2 days of your discharge please call the selected Home Care agency listed on your Discharge document.  If a Home Care agency is NOT listed, please call [phone].    Home Infusion Referral     Comments:    [facility] - resume home enteral formula and supplies  Ph: [phone]  Fax:  [phone]            Supplies       Future Labs/Procedures    Tracheostomy/Laryngectomy Supplies     Process Instructions:    By signing this order, the Authorizing Provider is attesting that they have completed a face-to-face evaluation on the patient to determine their need for this equipment in the last 60 days. A new face-to-face evaluation is required each time  A new prescription for one of the specified items is ordered.   If an additional provider completed the evaluation, please indicate their name below.     **As of 2018, an order requisition and face sheet will print for all DME orders. Please give printed order and face sheet to patient if not obtaining product from [facility] DME closet.     Comments:    Equipment being ordered: Laryngectomy supplies    Cool mist humidity by trach dome  Large gloves  Cotton tip applicators  0.9% sodium chloride 3 mL puffs for lavage  0.9% sodium chloride 1000 mL bottles  Red blank catheters    Treatment Diagnosis: laryngeal cancer, s/p laryngectomy      Trach/Supplies Documentation:   Patient has an open surgical laryngectomy. The laryngectomy has been or is expected to remain open for duration of lifetime. The patient requires the ordered laryngectomy supplies to provide appropriate routine laryngectomy care.     I, the undersigned, certify that the above prescribed supplies are medically necessary for this patient and is both reasonable and necessary in reference to accepted standards of medical and necessary in reference to accepted standards of medical practice in the treatment of this patient's condition and is not prescribed as a convenience.            Key Recommendations:  please see attached AVS    Saba Huddleston RN    AVS/Discharge Summary is the source of truth; this is a helpful guide for improved communication of patient story

## 2023-11-14 NOTE — LETTER
[facility] [address] [address]  [phone]    FACSIMILE TRANSMITTAL SHEET    TO: [facility]        FROM: Saba CISNEROS  PHONE: [phone]  DATE: 11/21/23      NOTES/COMMENTS: JANETH discharge orders.                                       IF YOU DID NOT RECEIVE THE CORRECT NUMBER OF PAGES OR THE FAX DID NOT COME THROUGH CLEARLY, PLEASE CALL THE SENDER     CONFIDENTIALITY STATEMENT: Confidential information that may accompany this transmission contains protected health information under state and federal law and is legally privileged. This information is intended only for the use of the individual or entity named above and may be used only for carrying out treatment, payment or other healthcare operations. The recipient or person responsible for delivering this information is prohibited by law from disclosing this information without proper authorization to any other party, unless required to do so by law or regulation. If you are not the intended recipient, you are hereby notified that any review, dissemination, distribution, or copying of this message is strictly prohibited. If you have received this communication in error, please destroy the materials and contact us immediately by calling the number listed above. No response indicates that the information was received by the appropriate authorized party

## 2023-11-14 NOTE — BRIEF OP NOTE
[facility]    Brief Operative Note    Pre-operative diagnosis: Laryngeal cancer (H) [C32.9]  Post-operative diagnosis Same as pre-operative diagnosis    Procedure: LARYNGOSCOPY, N/A - Throat  LARYNGECTOMY, N/A - Throat  Bilateral neck dissections, Bilateral - Neck  Pectoralis flap left, N/A - Chest    Surgeon: Surgeon(s) and Role:     * Birdie Atkinson MD - Primary     * Camden Swain MD - Resident - Assisting  Anesthesia: General   Estimated Blood Loss: 50 mL    Drains: Wallace-Montoya  Specimens:   ID Type Source Tests Collected by Time Destination   1 : left level 2A Tissue Other SURGICAL PATHOLOGY EXAM Birdie Atkinson MD 11/14/2023 10:20 AM    2 : left level 3 Tissue Other SURGICAL PATHOLOGY EXAM Birdie Atkinson MD 11/14/2023 10:20 AM    3 : left level 4 Tissue Other SURGICAL PATHOLOGY EXAM Birdie Atkinson MD 11/14/2023 10:20 AM    4 : left level 2B Tissue Other SURGICAL PATHOLOGY EXAM Birdie Atkinson MD 11/14/2023 10:20 AM    5 : right level 2A Tissue Other SURGICAL PATHOLOGY EXAM Birdie Atkinson MD 11/14/2023 11:28 AM    6 : right level 3 Tissue Other SURGICAL PATHOLOGY EXAM Birdie Atkinson MD 11/14/2023 11:28 AM    7 : right level 4 Tissue Other SURGICAL PATHOLOGY EXAM Birdie Atkinson MD 11/14/2023 11:28 AM    8 : right level 2B Tissue Other SURGICAL PATHOLOGY EXAM Birdie Atkinson MD 11/14/2023 11:28 AM    9 : Tissue medial to right thyroid lobe Tissue Other SURGICAL PATHOLOGY EXAM Birdie Atkinson MD 11/14/2023 11:57 AM    10 : Right level 6 node Tissue Other SURGICAL PATHOLOGY EXAM Birdie Atkinson MD 11/14/2023 12:34 PM    11 : Right post cricoid  mucosa Tissue Other SURGICAL PATHOLOGY EXAM Birdie Atkinson MD 11/14/2023  1:04 PM    12 : Left Post cricoid  mucosa Tissue Other SURGICAL PATHOLOGY EXAM Birdie Atkinson MD 11/14/2023  1:04 PM    13 : Left inferior pharyngeal wall mucosa Tissue Other  SURGICAL PATHOLOGY EXAM Birdie Atkinson MD 11/14/2023  1:04 PM    14 : Left Superior pharyngeal  wall mucosa Tissue Other SURGICAL PATHOLOGY EXAM Birdie Atkinson MD 11/14/2023  1:04 PM    15 : Left vallecula mucosa margin Tissue Other SURGICAL PATHOLOGY EXAM Birdie Atkinson MD 11/14/2023  1:04 PM    16 : Right vallecula mucosa Margin Tissue Other SURGICAL PATHOLOGY EXAM Birdie Atkinson MD 11/14/2023  1:04 PM    17 : Right superior pharyngeal Wall mucosa margin Tissue Other SURGICAL PATHOLOGY EXAM Birdie Atkinson MD 11/14/2023  1:04 PM    18 : Right Inferior pharyngeal wall mucosa margin Tissue Other SURGICAL PATHOLOGY EXAM Birdie Atkinson MD 11/14/2023  1:07 PM    19 : Posterial tracheal wall mucosa margin Tissue Other SURGICAL PATHOLOGY EXAM Birdie Atkinson MD 11/14/2023  1:08 PM    20 : Right anterior tracheal wall mucosa margin Tissue Other SURGICAL PATHOLOGY EXAM Birdie Atkinson MD 11/14/2023  1:08 PM    21 : Left Anterior tracheal wall mucosa margin Tissue Other SURGICAL PATHOLOGY EXAM Birdie Atkinson MD 11/14/2023  1:08 PM    22 : Total laryngectomy Tissue Other SURGICAL PATHOLOGY EXAM Birdie Atkinson MD 11/14/2023  1:10 PM      Findings:   Endolaryngeal tumor, left pec flap onlay .  Complications: None.  Implants: * No implants in log *    ASSESSMENT  Mr. Jules is a 68M with recurrent laryngeal cancer s/p total laryngectomy, bilateral ND, with left pectoralis myofascial pedicled onlay 11/14.     PLAN  NEURO  Tylenol, oxy, dilaudid prn  PTA suboxone continued  Pain management consult POD1  HEENT  Laryngectomy cares  AFSHAN cares, incision cares  C/V  Vitals  FEN/GI  NPO  Pt has PTA G tube, trickle TF POD1  BMP, Mg, Phos POD1  D51/2NS+Kcl @ 75 until up to goal for TF  Zofran x48h, then prn  Bowel reg  HEME/ID  Unasyn x48h  CBC POD  ENDO  PTH, iCal POD0  PTA synthroid continued  TSH, Prealbumin, Albumin POD1  Ppx  Consider lovenox POD1  SCDs  CONSULTS  PT, OT, Nutrition, SLP  POD1, Pain Management POD1, PLC laryngectomy  DISPO  6A pending course    Camden Swain MD  ENT resident
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
## 2023-11-14 NOTE — ANESTHESIA PROCEDURE NOTES
Arterial Line Procedure Note    Pre-Procedure   Staff -        Anesthesiologist:  Dwight Aldrich MD       Performed By: anesthesiologist       Pre-Anesthestic Checklist: patient identified, IV checked, risks and benefits discussed, informed consent, monitors and equipment checked, pre-op evaluation and at physician/surgeon's request  Timeout:       Correct Patient: Yes        Correct Procedure: Yes        Correct Site: Yes        Correct Position: Yes   Line Placement:   This line was placed Post Induction  Procedure   Procedure: arterial line       Laterality: right       Insertion Site: radial.  Sterile Prep        Standard elements of sterile barrier followed       Skin prep: Chloraprep  Insertion/Injection        Technique: ultrasound guided        1. Ultrasound was used to evaluate the access site.       2. Artery evaluated via ultrasound for patency/adequacy.       3. Using real-time ultrasound the needle/catheter was observed entering the artery/vein.       5. The visualized structures were anatomically normal.       6. There were no apparent abnormal pathologic findings.       Catheter Type/Size: 20 G, 1.75 in/4.5 cm quick cath (integral wire)  Narrative        Tegaderm dressing used.       Complications: None apparent,        Arterial waveform: Yes    Comments:  First attempt distal radial by CRNA, despite easy wire advance and catheter no blood flow. Second attempt by myself (attending), smooth, good pulsatile waveform.

## 2023-11-15 ENCOUNTER — APPOINTMENT (OUTPATIENT)
Dept: OCCUPATIONAL THERAPY | Facility: CLINIC | Age: 68
DRG: 011 | End: 2023-11-15
Attending: OTOLARYNGOLOGY
Payer: MEDICARE

## 2023-11-15 ENCOUNTER — APPOINTMENT (OUTPATIENT)
Dept: SPEECH THERAPY | Facility: CLINIC | Age: 68
DRG: 011 | End: 2023-11-15
Attending: OTOLARYNGOLOGY
Payer: MEDICARE

## 2023-11-15 LAB
ALBUMIN SERPL BCG-MCNC: 3.4 G/DL (ref 3.5–5.2)
ANION GAP SERPL CALCULATED.3IONS-SCNC: 9 MMOL/L (ref 7–15)
BUN SERPL-MCNC: 16.1 MG/DL (ref 8–23)
CA-I BLD-MCNC: 4.3 MG/DL (ref 4.4–5.2)
CA-I BLD-MCNC: 4.3 MG/DL (ref 4.4–5.2)
CA-I BLD-MCNC: 4.5 MG/DL (ref 4.4–5.2)
CA-I BLD-MCNC: 4.6 MG/DL (ref 4.4–5.2)
CALCIUM SERPL-MCNC: 8.6 MG/DL (ref 8.8–10.2)
CHLORIDE SERPL-SCNC: 100 MMOL/L (ref 98–107)
CREAT SERPL-MCNC: 0.64 MG/DL (ref 0.67–1.17)
DEPRECATED HCO3 PLAS-SCNC: 27 MMOL/L (ref 22–29)
EGFRCR SERPLBLD CKD-EPI 2021: >90 ML/MIN/1.73M2
ERYTHROCYTE [DISTWIDTH] IN BLOOD BY AUTOMATED COUNT: 14.1 % (ref 10–15)
GLUCOSE SERPL-MCNC: 154 MG/DL (ref 70–99)
HCT VFR BLD AUTO: 35.3 % (ref 40–53)
HGB BLD-MCNC: 11.4 G/DL (ref 13.3–17.7)
MAGNESIUM SERPL-MCNC: 1.9 MG/DL (ref 1.7–2.3)
MCH RBC QN AUTO: 29.6 PG (ref 26.5–33)
MCHC RBC AUTO-ENTMCNC: 32.3 G/DL (ref 31.5–36.5)
MCV RBC AUTO: 92 FL (ref 78–100)
PHOSPHATE SERPL-MCNC: 3.3 MG/DL (ref 2.5–4.5)
PLATELET # BLD AUTO: 248 10E3/UL (ref 150–450)
POTASSIUM SERPL-SCNC: 4.4 MMOL/L (ref 3.4–5.3)
PREALB SERPL-MCNC: 11.4 MG/DL (ref 20–40)
RBC # BLD AUTO: 3.85 10E6/UL (ref 4.4–5.9)
SODIUM SERPL-SCNC: 136 MMOL/L (ref 135–145)
T4 FREE SERPL-MCNC: 0.85 NG/DL (ref 0.9–1.7)
T4 FREE SERPL-MCNC: 0.87 NG/DL (ref 0.9–1.7)
TSH SERPL DL<=0.005 MIU/L-ACNC: 4.67 UIU/ML (ref 0.3–4.2)
WBC # BLD AUTO: 15 10E3/UL (ref 4–11)

## 2023-11-15 PROCEDURE — 97535 SELF CARE MNGMENT TRAINING: CPT | Mod: GO | Performed by: OCCUPATIONAL THERAPIST

## 2023-11-15 PROCEDURE — 999N000043 HC STATISTIC CTO2 CONT OXYGEN TECH TIME EA 90 MIN

## 2023-11-15 PROCEDURE — 85027 COMPLETE CBC AUTOMATED: CPT | Performed by: STUDENT IN AN ORGANIZED HEALTH CARE EDUCATION/TRAINING PROGRAM

## 2023-11-15 PROCEDURE — 250N000011 HC RX IP 250 OP 636: Mod: JZ | Performed by: STUDENT IN AN ORGANIZED HEALTH CARE EDUCATION/TRAINING PROGRAM

## 2023-11-15 PROCEDURE — 82330 ASSAY OF CALCIUM: CPT | Performed by: STUDENT IN AN ORGANIZED HEALTH CARE EDUCATION/TRAINING PROGRAM

## 2023-11-15 PROCEDURE — 92507 TX SP LANG VOICE COMM INDIV: CPT | Mod: GN

## 2023-11-15 PROCEDURE — 250N000011 HC RX IP 250 OP 636: Mod: JZ | Performed by: PHYSICIAN ASSISTANT

## 2023-11-15 PROCEDURE — 120N000002 HC R&B MED SURG/OB UMMC

## 2023-11-15 PROCEDURE — 36415 COLL VENOUS BLD VENIPUNCTURE: CPT | Performed by: STUDENT IN AN ORGANIZED HEALTH CARE EDUCATION/TRAINING PROGRAM

## 2023-11-15 PROCEDURE — C9113 INJ PANTOPRAZOLE SODIUM, VIA: HCPCS | Mod: JZ | Performed by: STUDENT IN AN ORGANIZED HEALTH CARE EDUCATION/TRAINING PROGRAM

## 2023-11-15 PROCEDURE — 999N000215 HC STATISTIC HFNC ADULT NON-CPAP

## 2023-11-15 PROCEDURE — 84443 ASSAY THYROID STIM HORMONE: CPT | Performed by: STUDENT IN AN ORGANIZED HEALTH CARE EDUCATION/TRAINING PROGRAM

## 2023-11-15 PROCEDURE — 86376 MICROSOMAL ANTIBODY EACH: CPT | Performed by: INTERNAL MEDICINE

## 2023-11-15 PROCEDURE — 272N000054 HC CANNULA HIGH FLOW, ADULT

## 2023-11-15 PROCEDURE — 36415 COLL VENOUS BLD VENIPUNCTURE: CPT

## 2023-11-15 PROCEDURE — 99222 1ST HOSP IP/OBS MODERATE 55: CPT | Mod: AI | Performed by: ANESTHESIOLOGY

## 2023-11-15 PROCEDURE — 250N000013 HC RX MED GY IP 250 OP 250 PS 637: Performed by: PHYSICIAN ASSISTANT

## 2023-11-15 PROCEDURE — 92522 EVALUATE SPEECH PRODUCTION: CPT | Mod: GN

## 2023-11-15 PROCEDURE — 80069 RENAL FUNCTION PANEL: CPT | Performed by: STUDENT IN AN ORGANIZED HEALTH CARE EDUCATION/TRAINING PROGRAM

## 2023-11-15 PROCEDURE — 999N000157 HC STATISTIC RCP TIME EA 10 MIN

## 2023-11-15 PROCEDURE — 250N000013 HC RX MED GY IP 250 OP 250 PS 637: Performed by: STUDENT IN AN ORGANIZED HEALTH CARE EDUCATION/TRAINING PROGRAM

## 2023-11-15 PROCEDURE — 83735 ASSAY OF MAGNESIUM: CPT | Performed by: STUDENT IN AN ORGANIZED HEALTH CARE EDUCATION/TRAINING PROGRAM

## 2023-11-15 PROCEDURE — 250N000009 HC RX 250: Performed by: PHYSICIAN ASSISTANT

## 2023-11-15 PROCEDURE — 97110 THERAPEUTIC EXERCISES: CPT | Mod: GO | Performed by: OCCUPATIONAL THERAPIST

## 2023-11-15 PROCEDURE — 97165 OT EVAL LOW COMPLEX 30 MIN: CPT | Mod: GO | Performed by: OCCUPATIONAL THERAPIST

## 2023-11-15 PROCEDURE — 258N000003 HC RX IP 258 OP 636: Performed by: STUDENT IN AN ORGANIZED HEALTH CARE EDUCATION/TRAINING PROGRAM

## 2023-11-15 PROCEDURE — 84439 ASSAY OF FREE THYROXINE: CPT

## 2023-11-15 PROCEDURE — 99231 SBSQ HOSP IP/OBS SF/LOW 25: CPT | Mod: GC | Performed by: INTERNAL MEDICINE

## 2023-11-15 PROCEDURE — 36415 COLL VENOUS BLD VENIPUNCTURE: CPT | Performed by: OTOLARYNGOLOGY

## 2023-11-15 PROCEDURE — 999N000147 HC STATISTIC PT IP EVAL DEFER

## 2023-11-15 PROCEDURE — 84134 ASSAY OF PREALBUMIN: CPT | Performed by: STUDENT IN AN ORGANIZED HEALTH CARE EDUCATION/TRAINING PROGRAM

## 2023-11-15 PROCEDURE — G0463 HOSPITAL OUTPT CLINIC VISIT: HCPCS

## 2023-11-15 PROCEDURE — 84439 ASSAY OF FREE THYROXINE: CPT | Performed by: STUDENT IN AN ORGANIZED HEALTH CARE EDUCATION/TRAINING PROGRAM

## 2023-11-15 RX ORDER — GINSENG 100 MG
CAPSULE ORAL 3 TIMES DAILY
Status: COMPLETED | OUTPATIENT
Start: 2023-11-15 | End: 2023-11-16

## 2023-11-15 RX ORDER — ENOXAPARIN SODIUM 100 MG/ML
40 INJECTION SUBCUTANEOUS EVERY 24 HOURS
Status: DISCONTINUED | OUTPATIENT
Start: 2023-11-15 | End: 2023-11-21 | Stop reason: HOSPADM

## 2023-11-15 RX ORDER — LIDOCAINE 4 G/G
1-2 PATCH TOPICAL
Status: DISCONTINUED | OUTPATIENT
Start: 2023-11-15 | End: 2023-11-21 | Stop reason: HOSPADM

## 2023-11-15 RX ORDER — OXYCODONE HYDROCHLORIDE 10 MG/1
10 TABLET ORAL EVERY 4 HOURS PRN
Status: DISCONTINUED | OUTPATIENT
Start: 2023-11-15 | End: 2023-11-16

## 2023-11-15 RX ORDER — DOXAZOSIN 1 MG/1
1 TABLET ORAL DAILY
Status: DISCONTINUED | OUTPATIENT
Start: 2023-11-15 | End: 2023-11-21 | Stop reason: HOSPADM

## 2023-11-15 RX ORDER — MINERAL OIL/HYDROPHIL PETROLAT
OINTMENT (GRAM) TOPICAL 3 TIMES DAILY
Status: DISCONTINUED | OUTPATIENT
Start: 2023-11-16 | End: 2023-11-21 | Stop reason: HOSPADM

## 2023-11-15 RX ORDER — CHLORHEXIDINE GLUCONATE ORAL RINSE 1.2 MG/ML
15 SOLUTION DENTAL 2 TIMES DAILY
Status: DISCONTINUED | OUTPATIENT
Start: 2023-11-15 | End: 2023-11-21 | Stop reason: HOSPADM

## 2023-11-15 RX ORDER — GUAIFENESIN 600 MG/1
15 TABLET, EXTENDED RELEASE ORAL DAILY
Status: DISCONTINUED | OUTPATIENT
Start: 2023-11-15 | End: 2023-11-21 | Stop reason: HOSPADM

## 2023-11-15 RX ORDER — DEXTROSE MONOHYDRATE 100 MG/ML
INJECTION, SOLUTION INTRAVENOUS CONTINUOUS PRN
Status: DISCONTINUED | OUTPATIENT
Start: 2023-11-15 | End: 2023-11-21 | Stop reason: HOSPADM

## 2023-11-15 RX ORDER — HYDROMORPHONE HCL IN WATER/PF 6 MG/30 ML
0.2 PATIENT CONTROLLED ANALGESIA SYRINGE INTRAVENOUS 2 TIMES DAILY PRN
Status: DISCONTINUED | OUTPATIENT
Start: 2023-11-15 | End: 2023-11-21 | Stop reason: HOSPADM

## 2023-11-15 RX ADMIN — PANTOPRAZOLE SODIUM 40 MG: 40 INJECTION, POWDER, FOR SOLUTION INTRAVENOUS at 09:07

## 2023-11-15 RX ADMIN — DOXAZOSIN 1 MG: 1 TABLET ORAL at 10:49

## 2023-11-15 RX ADMIN — AMPICILLIN SODIUM AND SULBACTAM SODIUM 3 G: 2; 1 INJECTION, POWDER, FOR SOLUTION INTRAMUSCULAR; INTRAVENOUS at 06:28

## 2023-11-15 RX ADMIN — OXYCODONE HYDROCHLORIDE 10 MG: 10 TABLET ORAL at 09:15

## 2023-11-15 RX ADMIN — AMPICILLIN SODIUM AND SULBACTAM SODIUM 3 G: 2; 1 INJECTION, POWDER, FOR SOLUTION INTRAMUSCULAR; INTRAVENOUS at 00:53

## 2023-11-15 RX ADMIN — AMPICILLIN SODIUM AND SULBACTAM SODIUM 3 G: 2; 1 INJECTION, POWDER, FOR SOLUTION INTRAMUSCULAR; INTRAVENOUS at 18:23

## 2023-11-15 RX ADMIN — SENNOSIDES AND DOCUSATE SODIUM 1 TABLET: 8.6; 5 TABLET ORAL at 19:58

## 2023-11-15 RX ADMIN — POLYETHYLENE GLYCOL 3350 17 G: 17 POWDER, FOR SOLUTION ORAL at 09:06

## 2023-11-15 RX ADMIN — ACETAMINOPHEN 975 MG: 325 TABLET, FILM COATED ORAL at 05:10

## 2023-11-15 RX ADMIN — ACETAMINOPHEN 975 MG: 325 TABLET, FILM COATED ORAL at 13:26

## 2023-11-15 RX ADMIN — BUPRENORPHINE AND NALOXONE 1 FILM: 4; 1 FILM BUCCAL; SUBLINGUAL at 20:01

## 2023-11-15 RX ADMIN — ENOXAPARIN SODIUM 40 MG: 40 INJECTION SUBCUTANEOUS at 12:09

## 2023-11-15 RX ADMIN — ACETAMINOPHEN 975 MG: 325 TABLET, FILM COATED ORAL at 21:27

## 2023-11-15 RX ADMIN — POTASSIUM CHLORIDE, DEXTROSE MONOHYDRATE AND SODIUM CHLORIDE: 150; 5; 450 INJECTION, SOLUTION INTRAVENOUS at 06:18

## 2023-11-15 RX ADMIN — BACITRACIN: 500 OINTMENT TOPICAL at 15:38

## 2023-11-15 RX ADMIN — SENNOSIDES AND DOCUSATE SODIUM 1 TABLET: 8.6; 5 TABLET ORAL at 09:06

## 2023-11-15 RX ADMIN — HYDROMORPHONE HYDROCHLORIDE 0.4 MG: 0.2 INJECTION, SOLUTION INTRAMUSCULAR; INTRAVENOUS; SUBCUTANEOUS at 06:27

## 2023-11-15 RX ADMIN — OXYCODONE HYDROCHLORIDE 10 MG: 10 TABLET ORAL at 05:09

## 2023-11-15 RX ADMIN — OXYCODONE HYDROCHLORIDE 15 MG: 5 TABLET ORAL at 17:21

## 2023-11-15 RX ADMIN — Medication 15 ML: at 12:09

## 2023-11-15 RX ADMIN — OXYCODONE HYDROCHLORIDE 10 MG: 10 TABLET ORAL at 00:52

## 2023-11-15 RX ADMIN — CALCIUM CARBONATE (ANTACID) CHEW TAB 500 MG 2500 MG: 500 CHEW TAB at 14:17

## 2023-11-15 RX ADMIN — CALCIUM CARBONATE (ANTACID) CHEW TAB 500 MG 2500 MG: 500 CHEW TAB at 10:51

## 2023-11-15 RX ADMIN — BACITRACIN: 500 OINTMENT TOPICAL at 14:17

## 2023-11-15 RX ADMIN — POTASSIUM CHLORIDE, DEXTROSE MONOHYDRATE AND SODIUM CHLORIDE: 150; 5; 450 INJECTION, SOLUTION INTRAVENOUS at 18:27

## 2023-11-15 RX ADMIN — ONDANSETRON 4 MG: 2 INJECTION INTRAMUSCULAR; INTRAVENOUS at 02:59

## 2023-11-15 RX ADMIN — AMPICILLIN SODIUM AND SULBACTAM SODIUM 3 G: 2; 1 INJECTION, POWDER, FOR SOLUTION INTRAMUSCULAR; INTRAVENOUS at 12:09

## 2023-11-15 RX ADMIN — HYDROMORPHONE HYDROCHLORIDE 0.4 MG: 0.2 INJECTION, SOLUTION INTRAMUSCULAR; INTRAVENOUS; SUBCUTANEOUS at 03:08

## 2023-11-15 RX ADMIN — LEVOTHYROXINE SODIUM 100 MCG: 100 TABLET ORAL at 09:07

## 2023-11-15 RX ADMIN — CHLORHEXIDINE GLUCONATE 15 ML: 1.2 RINSE ORAL at 09:07

## 2023-11-15 RX ADMIN — CHLORHEXIDINE GLUCONATE 15 ML: 1.2 RINSE ORAL at 19:58

## 2023-11-15 RX ADMIN — OXYCODONE HYDROCHLORIDE 15 MG: 5 TABLET ORAL at 21:27

## 2023-11-15 RX ADMIN — OXYCODONE HYDROCHLORIDE 15 MG: 5 TABLET ORAL at 13:26

## 2023-11-15 RX ADMIN — CALCIUM CARBONATE (ANTACID) CHEW TAB 500 MG 2500 MG: 500 CHEW TAB at 19:58

## 2023-11-15 RX ADMIN — HYDROMORPHONE HYDROCHLORIDE 0.2 MG: 0.2 INJECTION, SOLUTION INTRAMUSCULAR; INTRAVENOUS; SUBCUTANEOUS at 15:38

## 2023-11-15 RX ADMIN — BUPRENORPHINE AND NALOXONE 1 FILM: 4; 1 FILM BUCCAL; SUBLINGUAL at 09:15

## 2023-11-15 ASSESSMENT — ACTIVITIES OF DAILY LIVING (ADL)
ADLS_ACUITY_SCORE: 28
ADLS_ACUITY_SCORE: 38
ADLS_ACUITY_SCORE: 34
ADLS_ACUITY_SCORE: 38
ADLS_ACUITY_SCORE: 34
PREVIOUS_RESPONSIBILITIES: MEAL PREP;HOUSEKEEPING;LAUNDRY;SHOPPING;MEDICATION MANAGEMENT;FINANCES
ADLS_ACUITY_SCORE: 38
ADLS_ACUITY_SCORE: 34
ADLS_ACUITY_SCORE: 34
ADLS_ACUITY_SCORE: 38
ADLS_ACUITY_SCORE: 34

## 2023-11-15 NOTE — PHARMACY-CONSULT NOTE
Pharmacy Tube Feeding Consult    Medication reviewed for administration by feeding tube and for potential food/drug interactions.    Recommendation: No changes are needed at this time.     Pharmacy will continue to follow as new medications are ordered.     Gerri Zelaya RPH

## 2023-11-15 NOTE — ANESTHESIA POSTPROCEDURE EVALUATION
Patient: Jorge A Mendosa    Procedure: Procedure(s):  LARYNGOSCOPY  LARYNGECTOMY  Bilateral neck dissections  Pectoralis flap left       Anesthesia Type:  General    Note:  Disposition: Admission   Postop Pain Control: Uneventful            Sign Out: Well controlled pain   PONV:    Neuro/Psych: Uneventful            Sign Out: Acceptable/Baseline neuro status   Airway/Respiratory: Uneventful            Sign Out: Acceptable/Baseline resp. status   CV/Hemodynamics: Uneventful            Sign Out: Acceptable CV status; No obvious hypovolemia; No obvious fluid overload   Other NRE: NONE   DID A NON-ROUTINE EVENT OCCUR? No    Event details/Postop Comments:  No complications.           Last vitals:  Vitals Value Taken Time   /67 11/14/23 1800   Temp 37.4  C (99.4  F) 11/14/23 1755   Pulse 81 11/14/23 1812   Resp 0 11/14/23 1812   SpO2 98 % 11/14/23 1812   Vitals shown include unfiled device data.    Electronically Signed By: Myke Negron MD  November 14, 2023  6:13 PM

## 2023-11-15 NOTE — PHARMACY-ADMISSION MEDICATION HISTORY
Pharmacist Admission Medication History    Admission medication history is complete. The information provided in this note is only as accurate as the sources available at the time of the update.    Information Source(s): Family member and CareEverywhere/SureScripts via  med history completed prior to admission by the PAC PharmD (see note dated 11/7/2023); last doses updated by RN pre-op    Pertinent Information: Levothyroxine prescribed and filled by patient since PAC med history completed    Changes made to PTA medication list:  Added: None  Deleted: lansoprazole (replaced by omeprazole)  Changed: None    Allergies reviewed with patient and updates made in EHR: unable to assess    Medication History Completed By: Sue Ruiz, PharmD 11/14/2023 8:43 PM    PTA Med List   Medication Sig Last Dose    acetaminophen (TYLENOL) 500 MG tablet 500 mg by Oral or Feeding Tube route every 6 hours as needed for mild pain     buprenorphine HCl-naloxone HCl (SUBOXONE) 4-1 MG per film Place 1 Film under the tongue 2 times daily 11/14/2023 at 0400    ipratropium - albuterol 0.5 mg/2.5 mg/3 mL (DUONEB) 0.5-2.5 (3) MG/3ML neb solution Take 1 vial (3 mLs) by nebulization every 6 hours as needed for shortness of breath, wheezing or cough Unknown    levothyroxine (SYNTHROID/LEVOTHROID) 100 MCG tablet Take 1 tablet (100 mcg) by mouth daily Unknown    naloxone (NARCAN) 4 MG/0.1ML nasal spray Spray 1 spray (4 mg) into one nostril alternating nostrils as needed for opioid reversal every 2-3 minutes until assistance arrives Unknown    omeprazole (PRILOSEC) 20 MG DR capsule Take 20 mg by mouth daily 11/13/2023

## 2023-11-15 NOTE — PROGRESS NOTES
CLINICAL NUTRITION SERVICES - ASSESSMENT NOTE     Nutrition Prescription    RECOMMENDATIONS FOR MDs/PROVIDERS TO ORDER:  Prior to oral intake recommend speech therapy eval    Malnutrition Status:    Severe malnutrition in the context of chronic illness    Recommendations already ordered by Registered Dietitian (RD):  Ordered TwoCal HN @ 40 mL/hr (960 mL/day) to provide 1920 kcals (35 kcal/kg/day), 81 g PRO (1.5 g/kg/day), 672 mL H2O, 210 g CHO and 5 g Fiber daily.  - start at 10 ml/hr for 8 hrs and increase 10 ml q 8 hours until goal rate  - multivitamin w/minerals liquid 15 ml/daily (patient with skin breakdown)  - 60 ml H2O flush q 4 hours for tube patency. Will need to adjust to meet estimated needs for fluid when IVF discontinued     Future/Additional Recommendations:  -- monitor TF advancement tolerance  -- Stop continuous TF for 1-2 hrs to let stomach empty prior to starting gravity feeding. Begin 1st gravity feeding @ 120 mL x 2 feedings (separate 3-4 hrs apart). If tolerated, increase  goal vol of 1 can x 4-5 feedings per day.  Flush with 150 mL of H20 before and after each feeding if TF to provide full hydration. Do not give feedings at night while pt asleep (during the day only).       REASON FOR ASSESSMENT  Jorge A Mendosa is a/an 68 year old male assessed by the dietitian for Provider Order - Diet progression/feeding tube readiness   POST OP Laryngectomy    NUTRITION HISTORY  Per EMR patient with PEG placement (9/8/23) after failed VFSS. PMHx: laryngeal cancer, GERD, COPD, anemia, and opioid abuse  S/p total laryngectomy, bilateral neck dissection, and L pectoralis flap on 11/14      Home EN regimen: 2cal HN 4- 5 cans daily with 300 ml fluid after each feeding. He at least gets 4 cans of his TF daily and will infuse 5 cans depending on how full he feels. He endorses recent weight gain. Jorge A also reports he takes ice cream/pudding at home orally     Home EN regimen provides: (948 ml-1185 ml):  "7997-3411 kcals,  gram protein, 208-260 gram CHO, 5-6 gram fiber, 664 ml- 830 ml Free H2O    CURRENT NUTRITION ORDERS  Diet: NPO    LABS  Labs reviewed  (11/15): BUN 16.1 mg/dL, Cr 0.64 mg/dL (L)    MEDICATIONS  Medications reviewed  TUMS  Zofran  Miralax  Senokot-S    ANTHROPOMETRICS  Height: 172.7 cm (5' 8\")  Most Recent Weight: 55.1 kg (121 lb 7.6 oz)    IBW: 70 kg  BMI: Underweight BMI <18.5  Weight History:   Wt Readings from Last 10 Encounters:   11/14/23 55.1 kg (121 lb 7.6 oz)   10/26/23 57.8 kg (127 lb 8 oz)   10/11/23 53.5 kg (118 lb)   09/19/23 53.5 kg (118 lb)   09/10/23 53.4 kg (117 lb 11.6 oz)   06/14/23 68.5 kg (151 lb)   03/22/23 71.2 kg (156 lb 14.4 oz)   12/29/22 72.6 kg (160 lb)   10/06/22 76.2 kg (168 lb)   07/14/22 75.3 kg (166 lb)   24.2% weight loss in 1 year.   Dosing Weight: 55 kg (actual BW)    ASSESSED NUTRITION NEEDS  Estimated Energy Needs: 5499-1614+ kcals/day (30 - 35+ kcals/kg )  Justification: Increased needs, underweight and Repletion  Estimated Protein Needs:  grams protein/day (1.5 - 2 grams of pro/kg)  Justification: Increased needs, wound healing and Repletion  Estimated Fluid Needs: (1 mL/kcal)   Justification: Maintenance    PHYSICAL FINDINGS  See malnutrition section below.  WOC nurse consulted - per EMR - Pressure Injury: mid thoracic/lumbar back      MALNUTRITION  % Intake: No decreased intake noted (on TF at home)   % Weight Loss: > 20% in 1 year (severe)  Subcutaneous Fat Loss: global severe  Muscle Loss: global severe  Fluid Accumulation/Edema: None noted  Malnutrition Diagnosis: Severe malnutrition in the context of chronic illness    NUTRITION DIAGNOSIS  Inadequate oral intake related to minimal oral intake, weight loss as evidenced by need for alternative route for nutrition to meet 100% of estimated nutritional needs       INTERVENTIONS  Implementation  Nutrition Education: Provided education on RD role in nutrition POC, TF initiation with continuous TF   "   Collaboration with other providers - discussed FEN/GI with team. Team okayed this writer to start TF and advance to goal rate. Discussed with bedside nurse  Enteral Nutrition - Initiate     Goals  Total avg nutritional intake to meet a minimum of 30 kcal/kg and 1.5 g PRO/kg daily (per dosing wt 55 kg).     Monitoring/Evaluation  Progress toward goals will be monitored and evaluated per protocol.  Mary Carmen Quan MS/RD/LD/CNSC  6A/5A ([id number]) RD pager: [phone]  Weekend/Holiday RD pager: [phone]
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
## 2023-11-15 NOTE — PROGRESS NOTES
RT assessed pt with new silvino site. Policy/information printed and shared with bedside RN and charge RN. EASY seal silvino mask attached to ambu bag in room with picture at [facility]. Pt set up on TD 25% 30L satting high 90's. RT will continue to follow.    Damián Garrido, RT on 11/15/2023 at 5:59 AM

## 2023-11-15 NOTE — PLAN OF CARE
Status: S/p total laryngectomy, bilateral neck dissection, L pectoralis flap 11/14. Hx of laryngeal cancer and tracheostomy and PEG in 9/2023  Vitals: VSS on 21% HTD. Continuous pulse ox in place  Neuros: Intact ex writes to communicate  IV: PIV infusing D5 & 0.45 NS + Kcl @ 75 mL/hr. Other PIV TKO btwn abx  Labs/Electrolytes: Calcium ionized q8hr.  Resp/trach: Michelle site. Lavaged x2. Oral suctioning with red maribel.   Diet: NPO. TF started 1415 @ 10 mL/hr. Goal of 40 mL/hr. 60 mL FWF q4h  Bowel status: LBM PTA  : Voiding but high PVR's. Straight cath for PVR's > 400.   Skin: Michelle stoma. Bilateral neck incisions w/ erythema. L neck bump. L chest transverse incision w/ sutures JEFFERSON. PEG site CDI. 3 neck JPs. 2 L chest JPs. Healed PI to sacrum. WOC saw pt for back skin tear- dressing CDI  Pain: Incisional pain managed with scheduled Tylenol and PRN Oxycodone x2  Activity: A1, GB.  Social: Daughter at bedside earlier  Plan: Continue to monitor     Goal Outcome Evaluation:      Plan of Care Reviewed With: patient    Overall Patient Progress: no change    Outcome Evaluation: Pain management with oral medications. TF started this shift.

## 2023-11-15 NOTE — PLAN OF CARE
Status: S/p total laryngectomy, bilateral neck dissection, L pectoralis flap 11/14. Hx of laryngeal cancer and tracheostomy and PEG in 9/2023   Vitals: VSS on 21% HTD  Neuros: Intact ex writes to communicate  IV: PIV TKO, PIV infusing D5 & 0.45 NS + Kcl @ 75 mL/hr  Labs/Electrolytes: Ionized calcium q8  Resp/trach: Laryngectomy stoma, lavaged x2. Small amount of bloody secretions. Oral suctions with red maribel independently  Diet: NPO. TF via PEG increased to 20 mL/hr at 2200 with q4 60 mL FWF. Goal of 40 mL/hr  Bowel status: LBM PTA, took senna  : Voiding with retention, cath > 500 mL.  mL @ 2200  Skin: Laryngectomy stoma, bilateral neck incisions closed with sutures, AFSHAN x3, JEFFERSON. L neck bump. L chest incision closed with sutures, AFSHAN x2, JEFFERSON. PEG site WNL. WOC saw pt for back skin tear- dressing CDI   Pain: Incisional pain controlled with PRN oxycodone, PRN dilaudid x1  Activity: Up with 1, GB  Social: No visitors  Plan/updates: Laryngectomy PLC 11/19 @ 1430, daughter Artis will join via iPad. Continue POC

## 2023-11-15 NOTE — CONSULTS
[facility] Nurse Inpatient Assessment     Consulted for: mid thoracic/lumbar back    Patient History (according to H&P provider note(s) 11/15/23:      Jorge A Mendosa is a 68 year old male  with COPD, anemia, chronic back pain, GERD and opioid abuse in remission that has recurrent laryngeal cancer.  He was initially diagnosed with laryngeal cancer in 2018 and completed radiation treatment in 2019.  Local ENT follow up was missed on 8/15/23, but he presented to the emergency room on 8/22/23 with shortness of breath and again on 9/3/23.  Scope exam by Dr Mcnally there demonstrated bulky exophytic tumor of the left supraglottis with involvement of the anterior commissure, left cord fixation, decreased mobility of right cord, narrowed glottis to 2-3 mm. He was taken to the OR for awake tracheostomy and DL with biopsy on 9/5/2023 by Dr Mcnally. Pathology demonstrated invasive SCC.  He had PEG placement on 9/8 by surgery team after failing video swallow study. He had a CT neck on 9/6/2023 which showed extensive pneumomediastinum which complicated evaluation, no obvious thyroid cartilage erosion. He had repeat CT neck on 9/14/2023 which showed laryngeal mass without thyroid cartilage erosion, no lymphadenopathy. He had a PET scan on 9/27/2023 which showed FDG avid supraglottic tumor, no metastatic lymphadenopathy, 12 x 9 mm mildly FDG avid left lower lobe nodular density.      S/p total laryngectomy, bilateral neck dissection, and L pectoralis flap on 11/14 with Dr. Atkinson    Assessment:      Areas visualized during today's visit:  back    Wound location: mid-back to left of spine (not over bony prominence)    Last photo: 11/15/23  Wound due to: Trauma  Wound history/plan of care: Pt thinks he may have scratched the area causing wound  Wound base: 100 % dermis, superficial     Palpation of the wound bed: normal      Drainage: scant     Description of drainage:  serosanguinous     Measurements (length x width x depth, in cm): 0.8  x 0.4  x  0.1 cm      Tunneling: N/A     Undermining: N/A  Periwound skin: Intact      Color: normal and consistent with surrounding tissue      Temperature: normal   Odor: none  Pain: denies , none  Pain interventions prior to dressing change: patient tolerated well  Treatment goal: Heal  and Protection  STATUS: initial assessment  Supplies ordered: supplies stored on unit, discussed with RN, and discussed with patient        Treatment Plan:     Mid back to left of spine wound(s): Every 3 days  Cleanse wound with wound cleanser or NS and gently pat dry  Apply 4x4 mepilex over area     Orders: Written    RECOMMEND PRIMARY TEAM ORDER: None, at this time  Education provided: plan of care and wound progress  Discussed plan of care with: Patient and Nurse  WOC nurse follow-up plan: weekly  Notify WOC if wound(s) deteriorate.  Nursing to notify the Provider(s) and re-consult the WOC Nurse if new skin concern.    DATA:     Current support surface: Standard  Standard gel/foam mattress (IsoFlex, Atmos air, etc)  Containment of urine/stool: Incontinent pad in bed  BMI: Body mass index is 18.47 kg/m .   Active diet order: Orders Placed This Encounter      NPO for Medical/Clinical Reasons Except for: No Exceptions, Other; Specify: Ok to use PEG for meds only     Output: I/O last 3 completed shifts:  In: 3320 [I.V.:3200; NG/GT:120]  Out: 1576.2 [Urine:1225; Drains:301.2; Blood:50]     Labs:   Recent Labs   Lab 11/15/23  0530 11/15/23  0340   ALBUMIN 3.4*  --    PREALB 11.4*  --    HGB  --  11.4*   WBC  --  15.0*     Pressure injury risk assessment:   Sensory Perception: 4-->no impairment  Moisture: 3-->occasionally moist  Activity: 2-->chairfast  Mobility: 3-->slightly limited  Nutrition: 2-->probably inadequate  Friction and Shear: 2-->potential problem  Josh Score: 16    Veronica Guillen RN CWOCN  Pager no longer is use, please contact through [employer]    [place] group: [facility]  Dept. Office Number: [phone]

## 2023-11-15 NOTE — PROGRESS NOTES
"Otolaryngology Progress Note    S: No acute events overnight. Pain well-controlled. No nausea/vomiting/diarrhea. No acute questions or concerns.    O: /76 (BP Location: Left arm)   Pulse 70   Temp 99  F (37.2  C) (Oral)   Resp 18   Ht 1.727 m (5' 8\")   Wt 55.1 kg (121 lb 7.6 oz)   SpO2 100%   BMI 18.47 kg/m    General: Alert and oriented x 3, No acute distress  HEENT: EOMI. HB 1/6. Incision line clean dry and intact. No areas of fullness or redness. Laryngectomy stoma is healing well. No crusting.   Pulmonary: Breathing non-labored, no stridor, no accessory muscle use.      Intake/Output Summary (Last 24 hours) at 11/15/2023 1005  Last data filed at 11/15/2023 0700  Gross per 24 hour   Intake 3747.5 ml   Output 1426.2 ml   Net 2321.3 ml     AFSHAN drain output(s): (last 24 hours)/(last shift)  Chest drain: 20/32.3  Drain 1 neck: 22.4/ 40.8  Drain 2 neck: 41.2/45.5  Drain 3 neck: 7/ 26.7  Drain 4 chest: 16/49.4    Recent Labs   Lab 11/15/23  0530 11/14/23  1424 11/14/23  1118 11/14/23  1002 11/14/23  0642 11/09/23  1359    138 138 139  --  138   POTASSIUM 4.4 3.7 4.1 3.9  --  3.8   CHLORIDE 100  --   --   --   --  100   CARA 8.6*  --   --   --   --  9.4   CO2 27  --   --   --   --  27   BUN 16.1  --   --   --   --  18.4   CR 0.64*  --   --   --   --  0.57*   * 174* 136* 124*   < > 116*    < > = values in this interval not displayed.     CBC  Recent Labs   Lab 11/15/23  0340 11/14/23  1424 11/14/23  1118 11/14/23  1002 11/09/23  1359   WBC 15.0*  --   --   --  5.9   RBC 3.85*  --   --   --  3.86*   HGB 11.4* 10.4* 10.7* 10.6* 11.4*   HCT 35.3*  --   --   --  36.3*   MCV 92  --   --   --  94   MCH 29.6  --   --   --  29.5   MCHC 32.3  --   --   --  31.4*   RDW 14.1  --   --   --  13.6     --   --   --  229     INRNo lab results found in last 7 days.    A/P: Mr. Jules is a 68M with recurrent laryngeal cancer s/p total laryngectomy, bilateral ND, with left pectoralis myofascial pedicled onlay " 11/14.      HEENT:  - Laryngectomy cares  - AFSHAN cares, incision cares  - Peridex    Neuro:  - Tylenol, oxy, dilaudid prn  - PTA suboxone continued  - Pain management consult POD1  - Get up to chair on POD 1    CV:  - Vitals    GI:  - NPO  - Pt has PTA G tube, trickle TF POD1  - BMP, Mg, Phos POD1  - D51/2NS+Kcl @ 75 until up to goal for TF  - Zofran x48h, then prn  - Bowel reg    - Inadequate oral intake related to minimal oral intake, weight loss as evidenced by need for alternative route for nutrition to meet 100% of estimated nutritional needs     Interventions  Nutrition Education: Provided education on RD role in nutrition POC, TF initiation with continuous TF         Endocrine:  - PTH, iCal POD0  - PTA synthroid continued  - TSH, T4, Prealbumin, Albumin POD1    ID:  - Unasyn x 48 hours  - Peridex mouth rinses QID on POD 1    Heme:  - Unasyn x48h  - CBC    Consults  - PT  - OT  - Nutrition  - SLP POD1  - Pain Management POD1  - PLC laryngectomy     -- Patient and above plan discussed with Demetrio Nicolas MD  Otolaryngology-Head & Neck Surgery PGY1  Please contact ENT with questions by dialing [phone] and entering job code [phone] when prompted.

## 2023-11-15 NOTE — ANESTHESIA CARE TRANSFER NOTE
Patient: Jorge A Mendosa    Procedure: Procedure(s):  LARYNGOSCOPY  LARYNGECTOMY  Bilateral neck dissections  Pectoralis flap left       Diagnosis: Laryngeal cancer (H) [C32.9]  Diagnosis Additional Information: No value filed.    Anesthesia Type:   General     Note:    Oropharynx: spontaneously breathing (surgical stoma as airway)  Level of Consciousness: drowsy  Oxygen Supplementation: blow-by O2    Independent Airway: airway patency not satisfactory and stable  Dentition: dentition unchanged  Vital Signs Stable: post-procedure vital signs reviewed and stable  Report to RN Given: handoff report given  Patient transferred to: PACU    Handoff Report: Identifed the Patient, Identified the Reponsible Provider, Reviewed the pertinent medical history, Discussed the surgical course, Reviewed Intra-OP anesthesia mangement and issues during anesthesia, Set expectations for post-procedure period and Allowed opportunity for questions and acknowledgement of understanding      Vitals:  Vitals Value Taken Time   /65 11/14/23 1753   Temp     Pulse 74 11/14/23 1800   Resp 11 11/14/23 1800   SpO2 93 % 11/14/23 1800   Vitals shown include unfiled device data.    Electronically Signed By: CHERISE Sharp CRNA  November 14, 2023  6:01 PM

## 2023-11-15 NOTE — PROGRESS NOTES
"   11/15/23 6681   Appointment Info   Signing Clinician's Name / Credentials (SLP) Keshia Palencia MA CCC-SLP   General Information   Onset of Illness/Injury or Date of Surgery 11/14/23   Referring Physician Camden Swain MD   Patient/Family Therapy Goal Statement (SLP) None stated   Pertinent History of Current Problem Pt is a 68M with recurrent laryngeal cancer s/p total laryngectomy, bilateral ND, with left pectoralis myofascial pedicled onlay 11/14. Laryngectomy pulmonary evaluation completed per MD order.   General Observations Upon SLP arrival, pt positioned upright in bed, awake, pleasant, and willing to participate.   Type of Evaluation   Type of Evaluation Alaryngeal Communication   Alaryngeal Communication   Orders Received Laryngectomy Pulmonary Training   [place] with Stoma Cares RN performing stoma cares   Primary Communication Method Written expression;Gestures;Mouthing words   Postoperative Assessment Laryngectomy Pulmonary Training   Preoperative Functioning Swallowing;Speech   Date of Surgery 11/14/23   Primary Site of Tumor Recurrent laryngeal   Patient received preoperative counseling? 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 \"VFSS completed on 09/07/23 with the following recommendations; \"Recommend continuation of NPO status at this time with considerations for alternate means of nutrition. Pt is demonstrating silent aspiration of IDDSI 0 (thin liquids), IDDSI 2 (mildly/nectar thickened liquids), and IDDSI 3 (moderately/honey thickened liquids). No overt aspiration was witnessed during IDDSI 4 (pureed) texture, however pharyngeal residue places him at heightened risk of aspiration.\"   Laryngectomy Pulmonary Training   Patient and Family Education Extensive education provided for ptre: anatomical and sensory changes post TL, important safety considerations, " importnac eof humidity, and instructions for cleaning and managing larytube and HME at home.   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 Pt provided w/ laryngectomy pulmonary kit, including larytube and HMEs. Per ENT, no larytube/HME placement yet. Pt successfully communicating via mouthing words and gestures. Extensive education provided re: anatomical changes s/p laryngectomy, purpose of larytube/HME, HME guidelines, and SLP role and POC while pt is inpatient. Neck breather sign hung above pt's bed. Pt will benefit from ongoing training and education.   SLP Total Evaluation Time   Eval: Sound production Minutes (artic, phonology, apraxia, dysarthria) (60247) 34   SLP Goals   Therapy Frequency (SLP Eval) daily   SLP Predicted Duration/Target Date for Goal Attainment 12/29/23   SLP Goals Laryngectomy Education;SLP Goal 1;SLP Goal 2;SLP Goal 3   SLP: Laryngectomy Education Patient/caregiver will state understanding of anatomy changes related to communication, feeding and swallowing status post total laryngectomy   SLP: Goal 1 Pt will demonstrate independent placement, removal, and cleaning of larytube over 3 sessions.   SLP: Goal 2 Pt will tolerate HME placement for a 24 hour period without desatting into the 80s.    SLP: Goal 3 Pt will demonstrate independent placement and removal of HME over 3 consecutive sessions.   Interventions   Interventions Quick Adds Speech, Language, Voice & Communication   Speech, Language, Voice Communication&/or Auditory Processing   Treatment of Speech, Language, Voice Communication&/or Auditory Processing Minutes (90826) 15   Symptoms Noted During/After Treatment None   Treatment Detail/Skilled Intervention Extensive education provided re: anatomical changes s/p laryngectomy, purpose of larytube/HME, HME guidelines, and SLP role and POC while pt is inpatient. Neck breather sign hung above pt's bed.   SLP Discharge Planning   SLP Plan LPK training, insertion/removal of larytube (Per ENT, no larytube yet), education, forms completed and emailed   SLP Discharge Recommendation home with outpatient therapy services   SLP Rationale for DC Rec pt with new communication and swallwo needs s/p laryngectomy; pt will benefit from ongoing ST targeting these deficits   SLP Brief overview of current status  Per ENT, no larytube/HME placement yet. Pt can recieve humidity via trach dome until larytube is placed. SLP to follow for ongoing training/education.   Total Session Time   Total Session Time (sum of timed and untimed services) 49

## 2023-11-15 NOTE — PLAN OF CARE
Arrived from: PACU   Belongings/meds: remains with patient, clothing and shoes  2 RN Skin Assessment Completed by: Zaynab GARIBAY RN and Sam WATSON RN    Non-intact findings documented (yes/no/NA): Michelle stoma, bilateral neck incisions, L neck hardened bump, L chest transverse incision, 5 AFSHAN drains (3 to bilateral neck, 2 to L chest), PEG site, healed pressure injury to sacrum, small open skin on midline back, scab to mid back.

## 2023-11-15 NOTE — PLAN OF CARE
Status: S/p total laryngectomy, bilateral neck dissection, and L pectoralis flap on 11/14. Hx of COPD, GERD, laryngeal cancer, opioid abuse, and chronic pain.  Vitals: VSS on 25% HTD. Temp max 99.6, scheduled tylenol given.  Neuros: Intact ex writes to communicate d/t ever.  IV: PIV x2: one infusing D5 & .45NS + 20mEq/L Kcl @ 75 mL/hr, one TKO in between IV antibiotics.  Labs/Electrolytes: Trending ionized calcium q8hr, last 4.3 @ 0530. Redraws this AM.  Resp/trach: on 25% HTD. Ever site, cleansed per orders. Per pt, intermittently SOB. Irrigated x3.  Diet: NPO ex meds. Meds thru PEG tube. Per pt, receives 5 cans on bolus TF @ home. Intermittent nausea, scheduled zofran given.  Bowel status: BS hypoactive. No BM this shift. LBM PTA.  : Voiding via urinal in small amounts, some retention no straight cath needed. MD paged for flomax.  Skin: Ever stoma, reddened.  Bilateral neck incisions with erythema, cleansed and bacitracin applied.  L neck hardened bump. L chest transverse incision with erythema, cleansed and bacitracin applied. 5 AFSHAN drains (3 to bilateral neck, 2 to L chest). PEG site, cleansed and no dressing applied. Healed pressure injury to sacrum, Mepilex applied. Small open skin on midline back, MD ordered WOC consult, Mepilex applied. Scab to mid back.   Pain: 7-10/10 incisional pain managed with scheduled tylenol, PRN oxycodone x2, PRN diluadid x3.   Activity: not OOB since surgery. Encouraged weight shifting.  Social: Patient resting in between cares.  Plan: Continue to monitor and follow POC. WOC and nutrition consult. PT/OT evals. Needing ever PLC, education began.

## 2023-11-15 NOTE — CONSULTS
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       Plan:   Overall goal is to wean off IV opioids and transition to PO. Patient states the oxycodone is effective in making pain manageable.  - Increase oxycodone to 10-15 mg q4h PRN  - Try to wean off IV Dilaudid today  - If his higher oxycodone dose is ineffective, could transition to only PO Dilaudid (4-6 mg q4h PRN is equivalent)  - Lidocaine patches, apply 1-2 next to bilateral neck incisions  - Continue Tylenol        Thank you for the opportunity to participate in the care of Jorge A Mendosa  Pain Service will continue to follow.    Discussed with attending anesthesiologist  Primary Service Contacted with Recommendations? Yes    Please Page the Pain Team [facility]: \"PAIN MANAGEMENT ACUTE INPATIENT/ [facility]\"    Washington Arango MD  Anesthesiology Resident, CA-2, PGY-3        Chief Complaint:  Chronic Suboxone use and pain    History of Present Illness:  Jorge A Menodsa is a 68 year old male. The pain is reported to be acute, located in the throat and b/l incisional neck, and radiates to shoulder.  Current pain is rated at 7-8/10 and goal is 6/10. The patient is with chronic pain with baseline pain 5/10. Pain is sharp stabbing pain, constant. Hx opioid abuse in remission, on chronic Suboxone. Patient not on long-term opioids outside of Suboxone, " other than acute pain treatment in perioperative periods. He has tried gabapentin in past but had dizziness and does not want to try it. Denies muscle spasms in neck. States oxycodone is working well for him and that it brings his pain down to about 6/10 which is manageable for him.     Pain Assessment:   Description of Pain: sharp  Location: throat, b/l neck  Current Pain: 7-8/10  Goal: 6/10  Baseline Pain Level: 5/10  Onset: perioperative, yesterday   Relieving/exacerbating factors: swallowing worsens   Radiating: to b/l shoulders   Localized: throat, b/l neck incisions  Constant: yes  Intermittent: when swallowing    Per [place]  review pulled from system on 11/15/23. Last refill on 23, indicating the following analgesic usage: Suboxone 4-1 mg film BID.     Past Medical History:  Past Medical History:   Diagnosis Date    Chronic pain syndrome 2011    COPD (chronic obstructive pulmonary disease) (H)     GERD (gastroesophageal reflux disease)     Laryngeal cancer (H)     Lumbago 2002    Opioid abuse, in remission (H)     Sinus bradycardia 09/10/2023     Family History:    Family History   Problem Relation Age of Onset    Dementia Mother 76        Cause of Death    Lymphoma Mother     Heart Disease Father 77        Congenital Heart Disease/MI - Cause of Death    C.A.D. Father     Dementia Sister         pancrease issues, hypertension    Lymphoma Sister     Diabetes Sister     Alcohol/Drug Brother         Recovering    Hypertension Brother     Cancer Paternal Uncle         Pt doens't know the type    Cancer Paternal Uncle         Pt doesn't know the type    Anesthesia Reaction No family hx of     Thrombosis No family hx of        Social History:  Social History     Tobacco Use    Smoking status: Former     Packs/day: 0.25     Years: 30.00     Additional pack years: 0.00     Total pack years: 7.50     Types: Cigarettes     Start date: 1976     Quit date: 2023     Years since quittin.3      Passive exposure: Current    Smokeless tobacco: Never   Substance Use Topics    Alcohol use: No         Tobacco:   no  ETOH:  no  H/O Substance Abuse:  yes    Review of Systems:  Complete ROS reviewed. Unless otherwise noted, all other systems found to be negative.      Laboratory Results:  Recent Labs   Lab Test 11/15/23  0530 11/15/23  0340   PLT  --  248   BUN 16.1  --      Allergies:  Allergies   Allergen Reactions    Benzodiazepines      Contraindicated - on Suboxone    Celebrex [Celecoxib] GI Disturbance    Contrast Dye Swelling     Difficulty swallowing during contrast given for CT neck    Elavil [Amitriptyline] Dizziness and Nausea     Pain Medications:  Pain Medications/Prescriber: Ted Hong    Current Pain Related Medications:  Medications related to Pain Management (From now, onward)      Start     Dose/Rate Route Frequency Ordered Stop    11/17/23 0000  acetaminophen (TYLENOL) tablet 650 mg         650 mg Per G Tube EVERY 4 HOURS PRN 11/14/23 1847      11/15/23 0800  polyethylene glycol (MIRALAX) Packet 17 g         17 g Oral DAILY 11/14/23 1832 11/14/23 2200  acetaminophen (TYLENOL) tablet 975 mg         975 mg Per G Tube EVERY 8 HOURS 11/14/23 1832 11/17/23 2159    11/14/23 2000  buprenorphine HCl-naloxone HCl (SUBOXONE) 4-1 MG per film 1 Film        Note to Pharmacy: PTA Sig:Place 1 Film under the tongue 2 times daily      1 Film Sublingual 2 TIMES DAILY 11/14/23 1832 11/14/23 2000  senna-docusate (SENOKOT-S/PERICOLACE) 8.6-50 MG per tablet 1 tablet         1 tablet Per G Tube 2 TIMES DAILY 11/14/23 1847 11/14/23 1847  oxyCODONE IR (ROXICODONE) tablet 10 mg        See Hyperspace for full Linked Orders Report.    10 mg Per G Tube EVERY 4 HOURS PRN 11/14/23 1847 11/14/23 1847  oxyCODONE (ROXICODONE) tablet 5 mg        See Hyperspace for full Linked Orders Report.    5 mg Per G Tube EVERY 4 HOURS PRN 11/14/23 1847      11/14/23 1819  HYDROmorphone (DILAUDID) injection 0.2 mg        See  "Hyperspace for full Linked Orders Report.    0.2 mg Intravenous EVERY 2 HOURS PRN 11/14/23 1832      11/14/23 1819  HYDROmorphone (DILAUDID) injection 0.4 mg        See Hyperspace for full Linked Orders Report.    0.4 mg Intravenous EVERY 2 HOURS PRN 11/14/23 1832      11/14/23 1812  magnesium hydroxide (MILK OF MAGNESIA) suspension 30 mL         30 mL Oral DAILY PRN 11/14/23 1832 11/14/23 1812  bisacodyl (DULCOLAX) suppository 10 mg         10 mg Rectal DAILY PRN 11/14/23 1832            Physical Exam:  Vitals: /76 (BP Location: Left arm)   Pulse 70   Temp 99  F (37.2  C) (Oral)   Resp 18   Ht 1.727 m (5' 8\")   Wt 55.1 kg (121 lb 7.6 oz)   SpO2 100%   BMI 18.47 kg/m      Physical Exam:   CONSTITUTIONAL/GENERAL APPEARANCE:  NAD  EYES: EOMI, sclera anicteric, PERRLA  ENT/NECK: b/l neck incisions without erythema  RESPIRATORY: non-labored breathing. Trach dome  MUSCULOSKELETAL/BACK/SPINE/EXTREMITIES: Moves all extremities purposefully.  No edema or obvious joint deformities   NEURO: Alert and Oriented x3. Answers questions appropriately  SKIN/VASCULAR EXAM:  No jaundice, no visible rashes or lesions      Please see A&P for additional details of medical decision making.       "

## 2023-11-15 NOTE — PLAN OF CARE
Physical Therapy defer - Orders received, chart reviewed, discussed with care team. No IP PT needs indicated. Per OT, pt mobilizing well postoperatively, appropriate for 1 discipline to follow while IP, and has good support from family at home. Plan for OT to continue to follow to address functional mobility as needed. Will complete consult and defer evaluation, please reconsult as appropriate if patient has decline in functional mobility requiring further skilled inpatient PT needs. Defer Discharge recommendations to OT/medical team.

## 2023-11-16 ENCOUNTER — APPOINTMENT (OUTPATIENT)
Dept: OCCUPATIONAL THERAPY | Facility: CLINIC | Age: 68
DRG: 011 | End: 2023-11-16
Attending: OTOLARYNGOLOGY
Payer: MEDICARE

## 2023-11-16 ENCOUNTER — APPOINTMENT (OUTPATIENT)
Dept: SPEECH THERAPY | Facility: CLINIC | Age: 68
DRG: 011 | End: 2023-11-16
Attending: OTOLARYNGOLOGY
Payer: MEDICARE

## 2023-11-16 LAB
ANION GAP SERPL CALCULATED.3IONS-SCNC: 5 MMOL/L (ref 7–15)
BUN SERPL-MCNC: 12.1 MG/DL (ref 8–23)
CA-I BLD-MCNC: 4.4 MG/DL (ref 4.4–5.2)
CALCIUM SERPL-MCNC: 7.9 MG/DL (ref 8.8–10.2)
CALCIUM SERPL-MCNC: 8.1 MG/DL (ref 8.8–10.2)
CHLORIDE SERPL-SCNC: 103 MMOL/L (ref 98–107)
CREAT SERPL-MCNC: 0.58 MG/DL (ref 0.67–1.17)
DEPRECATED HCO3 PLAS-SCNC: 28 MMOL/L (ref 22–29)
EGFRCR SERPLBLD CKD-EPI 2021: >90 ML/MIN/1.73M2
ERYTHROCYTE [DISTWIDTH] IN BLOOD BY AUTOMATED COUNT: 14.3 % (ref 10–15)
GLUCOSE SERPL-MCNC: 133 MG/DL (ref 70–99)
HCT VFR BLD AUTO: 33.1 % (ref 40–53)
HGB BLD-MCNC: 10.2 G/DL (ref 13.3–17.7)
MAGNESIUM SERPL-MCNC: 1.7 MG/DL (ref 1.7–2.3)
MCH RBC QN AUTO: 29.3 PG (ref 26.5–33)
MCHC RBC AUTO-ENTMCNC: 30.8 G/DL (ref 31.5–36.5)
MCV RBC AUTO: 95 FL (ref 78–100)
PHOSPHATE SERPL-MCNC: 2.3 MG/DL (ref 2.5–4.5)
PLATELET # BLD AUTO: 195 10E3/UL (ref 150–450)
POTASSIUM SERPL-SCNC: 3.9 MMOL/L (ref 3.4–5.3)
RBC # BLD AUTO: 3.48 10E6/UL (ref 4.4–5.9)
SODIUM SERPL-SCNC: 136 MMOL/L (ref 135–145)
THYROPEROXIDASE AB SERPL-ACNC: 2884 IU/ML
WBC # BLD AUTO: 10.9 10E3/UL (ref 4–11)

## 2023-11-16 PROCEDURE — 250N000011 HC RX IP 250 OP 636: Mod: JZ | Performed by: PHYSICIAN ASSISTANT

## 2023-11-16 PROCEDURE — 250N000011 HC RX IP 250 OP 636: Performed by: STUDENT IN AN ORGANIZED HEALTH CARE EDUCATION/TRAINING PROGRAM

## 2023-11-16 PROCEDURE — 92507 TX SP LANG VOICE COMM INDIV: CPT | Mod: GN

## 2023-11-16 PROCEDURE — 250N000013 HC RX MED GY IP 250 OP 250 PS 637: Performed by: STUDENT IN AN ORGANIZED HEALTH CARE EDUCATION/TRAINING PROGRAM

## 2023-11-16 PROCEDURE — 97110 THERAPEUTIC EXERCISES: CPT | Mod: GO | Performed by: OCCUPATIONAL THERAPIST

## 2023-11-16 PROCEDURE — 250N000013 HC RX MED GY IP 250 OP 250 PS 637: Performed by: PHYSICIAN ASSISTANT

## 2023-11-16 PROCEDURE — 99232 SBSQ HOSP IP/OBS MODERATE 35: CPT | Performed by: PHYSICIAN ASSISTANT

## 2023-11-16 PROCEDURE — 82310 ASSAY OF CALCIUM: CPT

## 2023-11-16 PROCEDURE — 82330 ASSAY OF CALCIUM: CPT | Performed by: STUDENT IN AN ORGANIZED HEALTH CARE EDUCATION/TRAINING PROGRAM

## 2023-11-16 PROCEDURE — 999N000157 HC STATISTIC RCP TIME EA 10 MIN

## 2023-11-16 PROCEDURE — 36415 COLL VENOUS BLD VENIPUNCTURE: CPT

## 2023-11-16 PROCEDURE — 85027 COMPLETE CBC AUTOMATED: CPT

## 2023-11-16 PROCEDURE — 258N000003 HC RX IP 258 OP 636: Performed by: STUDENT IN AN ORGANIZED HEALTH CARE EDUCATION/TRAINING PROGRAM

## 2023-11-16 PROCEDURE — 36415 COLL VENOUS BLD VENIPUNCTURE: CPT | Performed by: STUDENT IN AN ORGANIZED HEALTH CARE EDUCATION/TRAINING PROGRAM

## 2023-11-16 PROCEDURE — 80048 BASIC METABOLIC PNL TOTAL CA: CPT | Performed by: STUDENT IN AN ORGANIZED HEALTH CARE EDUCATION/TRAINING PROGRAM

## 2023-11-16 PROCEDURE — 250N000013 HC RX MED GY IP 250 OP 250 PS 637: Performed by: OTOLARYNGOLOGY

## 2023-11-16 PROCEDURE — 84100 ASSAY OF PHOSPHORUS: CPT | Performed by: OTOLARYNGOLOGY

## 2023-11-16 PROCEDURE — 258N000003 HC RX IP 258 OP 636

## 2023-11-16 PROCEDURE — 83735 ASSAY OF MAGNESIUM: CPT | Performed by: OTOLARYNGOLOGY

## 2023-11-16 PROCEDURE — 250N000013 HC RX MED GY IP 250 OP 250 PS 637: Performed by: INTERNAL MEDICINE

## 2023-11-16 PROCEDURE — 120N000002 HC R&B MED SURG/OB UMMC

## 2023-11-16 RX ORDER — MAGNESIUM OXIDE 400 MG/1
400 TABLET ORAL EVERY 4 HOURS
Status: COMPLETED | OUTPATIENT
Start: 2023-11-16 | End: 2023-11-16

## 2023-11-16 RX ORDER — OXYCODONE HYDROCHLORIDE 10 MG/1
10 TABLET ORAL
Status: DISCONTINUED | OUTPATIENT
Start: 2023-11-16 | End: 2023-11-18

## 2023-11-16 RX ORDER — CALCITRIOL 0.25 UG/1
0.5 CAPSULE, LIQUID FILLED ORAL DAILY
Status: DISCONTINUED | OUTPATIENT
Start: 2023-11-16 | End: 2023-11-16

## 2023-11-16 RX ORDER — CALCITRIOL 1 UG/ML
0.5 SOLUTION ORAL DAILY
Status: DISCONTINUED | OUTPATIENT
Start: 2023-11-16 | End: 2023-11-21 | Stop reason: HOSPADM

## 2023-11-16 RX ORDER — SODIUM CHLORIDE, SODIUM LACTATE, POTASSIUM CHLORIDE, CALCIUM CHLORIDE 600; 310; 30; 20 MG/100ML; MG/100ML; MG/100ML; MG/100ML
INJECTION, SOLUTION INTRAVENOUS CONTINUOUS
Status: DISCONTINUED | OUTPATIENT
Start: 2023-11-16 | End: 2023-11-21 | Stop reason: HOSPADM

## 2023-11-16 RX ADMIN — POTASSIUM & SODIUM PHOSPHATES POWDER PACK 280-160-250 MG 1 PACKET: 280-160-250 PACK at 12:21

## 2023-11-16 RX ADMIN — WHITE PETROLATUM: 1.75 OINTMENT TOPICAL at 10:10

## 2023-11-16 RX ADMIN — CALCIUM CARBONATE (ANTACID) CHEW TAB 500 MG 2500 MG: 500 CHEW TAB at 21:01

## 2023-11-16 RX ADMIN — AMPICILLIN SODIUM AND SULBACTAM SODIUM 3 G: 2; 1 INJECTION, POWDER, FOR SOLUTION INTRAMUSCULAR; INTRAVENOUS at 12:21

## 2023-11-16 RX ADMIN — CALCIUM CARBONATE (ANTACID) CHEW TAB 500 MG 2500 MG: 500 CHEW TAB at 13:24

## 2023-11-16 RX ADMIN — OXYCODONE HYDROCHLORIDE 15 MG: 5 TABLET ORAL at 13:25

## 2023-11-16 RX ADMIN — ENOXAPARIN SODIUM 40 MG: 40 INJECTION SUBCUTANEOUS at 12:18

## 2023-11-16 RX ADMIN — POTASSIUM CHLORIDE, DEXTROSE MONOHYDRATE AND SODIUM CHLORIDE: 150; 5; 450 INJECTION, SOLUTION INTRAVENOUS at 09:00

## 2023-11-16 RX ADMIN — CHLORHEXIDINE GLUCONATE 15 ML: 1.2 RINSE ORAL at 08:47

## 2023-11-16 RX ADMIN — CHLORHEXIDINE GLUCONATE 15 ML: 1.2 RINSE ORAL at 20:06

## 2023-11-16 RX ADMIN — Medication 15 ML: at 13:24

## 2023-11-16 RX ADMIN — OXYCODONE HYDROCHLORIDE 15 MG: 5 TABLET ORAL at 20:56

## 2023-11-16 RX ADMIN — ACETAMINOPHEN 975 MG: 325 TABLET, FILM COATED ORAL at 06:15

## 2023-11-16 RX ADMIN — BUPRENORPHINE AND NALOXONE 1 FILM: 4; 1 FILM BUCCAL; SUBLINGUAL at 20:06

## 2023-11-16 RX ADMIN — HYDROMORPHONE HYDROCHLORIDE 0.2 MG: 0.2 INJECTION, SOLUTION INTRAMUSCULAR; INTRAVENOUS; SUBCUTANEOUS at 16:21

## 2023-11-16 RX ADMIN — LEVOTHYROXINE SODIUM 100 MCG: 100 TABLET ORAL at 08:47

## 2023-11-16 RX ADMIN — POLYETHYLENE GLYCOL 3350 17 G: 17 POWDER, FOR SOLUTION ORAL at 08:47

## 2023-11-16 RX ADMIN — SENNOSIDES AND DOCUSATE SODIUM 1 TABLET: 8.6; 5 TABLET ORAL at 08:47

## 2023-11-16 RX ADMIN — AMPICILLIN SODIUM AND SULBACTAM SODIUM 3 G: 2; 1 INJECTION, POWDER, FOR SOLUTION INTRAMUSCULAR; INTRAVENOUS at 01:13

## 2023-11-16 RX ADMIN — ACETAMINOPHEN 975 MG: 325 TABLET, FILM COATED ORAL at 21:01

## 2023-11-16 RX ADMIN — DOXAZOSIN 1 MG: 1 TABLET ORAL at 08:48

## 2023-11-16 RX ADMIN — AMPICILLIN SODIUM AND SULBACTAM SODIUM 3 G: 2; 1 INJECTION, POWDER, FOR SOLUTION INTRAMUSCULAR; INTRAVENOUS at 06:16

## 2023-11-16 RX ADMIN — MAGNESIUM OXIDE TAB 400 MG (241.3 MG ELEMENTAL MG) 400 MG: 400 (241.3 MG) TAB at 14:21

## 2023-11-16 RX ADMIN — WHITE PETROLATUM: 1.75 OINTMENT TOPICAL at 16:07

## 2023-11-16 RX ADMIN — POTASSIUM & SODIUM PHOSPHATES POWDER PACK 280-160-250 MG 1 PACKET: 280-160-250 PACK at 16:24

## 2023-11-16 RX ADMIN — OXYCODONE HYDROCHLORIDE 15 MG: 5 TABLET ORAL at 17:32

## 2023-11-16 RX ADMIN — CALCITRIOL 0.5 MCG: 1 SOLUTION ORAL at 13:24

## 2023-11-16 RX ADMIN — ACETAMINOPHEN 975 MG: 325 TABLET, FILM COATED ORAL at 14:21

## 2023-11-16 RX ADMIN — BUPRENORPHINE AND NALOXONE 1 FILM: 4; 1 FILM BUCCAL; SUBLINGUAL at 08:48

## 2023-11-16 RX ADMIN — Medication 40 MG: at 13:24

## 2023-11-16 RX ADMIN — SODIUM CHLORIDE, POTASSIUM CHLORIDE, SODIUM LACTATE AND CALCIUM CHLORIDE: 600; 310; 30; 20 INJECTION, SOLUTION INTRAVENOUS at 16:18

## 2023-11-16 RX ADMIN — HYDROMORPHONE HYDROCHLORIDE 0.2 MG: 0.2 INJECTION, SOLUTION INTRAMUSCULAR; INTRAVENOUS; SUBCUTANEOUS at 01:01

## 2023-11-16 RX ADMIN — OXYCODONE HYDROCHLORIDE 15 MG: 5 TABLET ORAL at 10:11

## 2023-11-16 RX ADMIN — OXYCODONE HYDROCHLORIDE 15 MG: 5 TABLET ORAL at 01:38

## 2023-11-16 RX ADMIN — BACITRACIN: 500 OINTMENT TOPICAL at 01:14

## 2023-11-16 RX ADMIN — POTASSIUM & SODIUM PHOSPHATES POWDER PACK 280-160-250 MG 1 PACKET: 280-160-250 PACK at 06:17

## 2023-11-16 RX ADMIN — MAGNESIUM OXIDE TAB 400 MG (241.3 MG ELEMENTAL MG) 400 MG: 400 (241.3 MG) TAB at 10:11

## 2023-11-16 RX ADMIN — SENNOSIDES AND DOCUSATE SODIUM 1 TABLET: 8.6; 5 TABLET ORAL at 21:02

## 2023-11-16 RX ADMIN — OXYCODONE HYDROCHLORIDE 15 MG: 5 TABLET ORAL at 06:16

## 2023-11-16 RX ADMIN — CALCIUM CARBONATE (ANTACID) CHEW TAB 500 MG 2500 MG: 500 CHEW TAB at 16:24

## 2023-11-16 ASSESSMENT — ACTIVITIES OF DAILY LIVING (ADL)
ADLS_ACUITY_SCORE: 38
DEPENDENT_IADLS:: INDEPENDENT
ADLS_ACUITY_SCORE: 38

## 2023-11-16 NOTE — PLAN OF CARE
Goal Outcome Evaluation:      Plan of Care Reviewed With: patient, child    Overall Patient Progress: no changeOverall Patient Progress: no change

## 2023-11-16 NOTE — CONSULTS
Care Management Initial Consult    General Information  Assessment completed with: Patient, Children, Jorge A, Artis  Type of CM/SW Visit: Initial Assessment    Primary Care Provider verified and updated as needed: Yes   Readmission within the last 30 days:     Reason for Consult: discharge planning  Advance Care Planning: Advance Care Planning Reviewed: no concerns identified        Communication Assessment  Patient's communication style:  (writes to communicate)    Hearing Difficulty or Deaf: no   Wear Glasses or Blind: yes    Cognitive  Cognitive/Neuro/Behavioral: .WDL except  Level of Consciousness: alert  Arousal Level: opens eyes spontaneously  Orientation: oriented x 4  Mood/Behavior: cooperative, calm  Best Language: 0 - No aphasia  Speech: other (see comments) (ever)    Living Environment:   People in home: child(birgit), adult, grandchild(birgit)  Artis  Current living Arrangements: house      Able to return to prior arrangements: yes     Family/Social Support:  Care provided by: self  Provides care for: no one     Children          Description of Support System: Involved, Supportive    Support Assessment: Adequate family and caregiver support    Current Resources:   Patient receiving home care services: Yes  Skilled Home Care Services: Skilled Nursing  Community Resources: None  Equipment currently used at home: none  Supplies currently used at home: Other (suction and humidity)    Employment/Financial:  Employment Status:       Financial Concerns:       Does the patient's insurance plan have a 3 day qualifying hospital stay waiver?  No    Lifestyle & Psychosocial Needs:  Social Determinants of Health     Food Insecurity: Low Risk  (10/23/2023)    Food Insecurity     Within the past 12 months, did you worry that your food would run out before you got money to buy more?: No     Within the past 12 months, did the food you bought just not last and you didn t have money to get more?: No   Depression: Not at risk  (3/22/2023)    PHQ-2     PHQ-2 Score: 0   Housing Stability: Low Risk  (10/23/2023)    Housing Stability     Do you have housing? : Yes     Are you worried about losing your housing?: No   Tobacco Use: Medium Risk (11/15/2023)    Patient History     Smoking Tobacco Use: Former     Smokeless Tobacco Use: Never     Passive Exposure: Current   Financial Resource Strain: Low Risk  (10/23/2023)    Financial Resource Strain     Within the past 12 months, have you or your family members you live with been unable to get utilities (heat, electricity) when it was really needed?: No   Alcohol Use: Not At Risk (12/29/2022)    AUDIT-C     Frequency of Alcohol Consumption: Never     Average Number of Drinks: Patient does not drink     Frequency of Binge Drinking: Never   Transportation Needs: High Risk (10/23/2023)    Transportation Needs     Within the past 12 months, has lack of transportation kept you from medical appointments, getting your medicines, non-medical meetings or appointments, work, or from getting things that you need?: Yes   Physical Activity: Not on file   Interpersonal Safety: Not on file   Stress: Not on file   Social Connections: Not on file     Functional Status:  Prior to admission patient needed assistance:   Dependent ADLs:: Independent  Dependent IADLs:: Independent     Mental Health Status:  Mental Health Status: No Current Concerns       Chemical Dependency Status:  Chemical Dependency Status: No Current Concerns           Values/Beliefs:  Spiritual, Cultural Beliefs, Protestant Practices, Values that affect care: no             Additional Information:  Patient status reviewed, discussed in discharge rounds. Patient s/p laryngectomy. Patient previously had tracheostomy and PEG place in 9/2023.    Met with patient to complete initial care management assessment. Patient currently lives in [facility] with his daughter, Artis. He denies any assistive devices at baseline. Patient states he does still have  his suction and humidity from [facility]. He has TF supplied by [facility]. Patient has a home care RN he believes through [facility]. He states he is independent with ADLs/IADLs at baseline. Patient was unsure of daughters address, gave RNCC permission to contact her.    Call placed to patient's daughter, Artis. She provided home address of [address] [facility] [address]. Updated in patient's chart. Artis confirmed patient has suction and humidity at home.     Call placed to [facility] in [place] (ph: [phone]), they confirmed they supply Jorge A's trach supplies including suction and humidity. Informed them patient had laryngectomy and I will be faxing an updated DME order. Updated DME order faxed at this time to fax: [phone].    Updated [place] [facility] liaison, of patient's admission and possible discharge Monday.     Call placed to [facility] (ph: [phone]) to confirm home services. They state patient was just discharged from home care on Friday of last week. They agreed they could re-open the patient with a new referral. Orders placed for provider to co-sign.    7677 Addendum:   Call placed to [facility], spoke with Arcadio. Arcadio states he received the fax sent over earlier. They last sent patient supplies on 11/9. He will check what was all sent to patient and if there is anything on the new DME order the patient did not receive he will send those supplies out as well.     Spoke with Keshia SKAGGS and confirmed patient received ATOS ever kit.     RNCC will continue to follow    [facility] - accepted home RN  Ph: [phone]  Fax: [phone]  *orders placed    [facility] - resume home enteral formula and supplies  Ph: [phone]  Fax: [phone]  *orders placed    [facility] - laryngectomy supplies  Ph: [phone]  Fax: [phone]    Saba Huddleston, RN, BSN  6A RN Care Coordinator  Ph: [phone]   Pager: [phone]

## 2023-11-16 NOTE — PROVIDER NOTIFICATION
MD paged @ 2149     [email or web address] [email or web address] - Pt voided 275 mL, . Orders to cath > 350, could we change order to cath > 500 mL??? [facility] [place] [id number]      ADDEND   Dr. Smith called back @ 2151, OK to change orders but was in the middle of an emergent situation. If orders not changed by 0000, she said to page again.

## 2023-11-16 NOTE — PROGRESS NOTES
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      Pain service following to assist with pain management.    Jorge A is able to communicate with mouthing, gesturing and writing.  He reports pain level at 7/10 which is tolerable.  Feels the pain medications are providing him with benefits however feels oxycodone does not last 4 hours.   He would like to be able to use oxycodone sooner especially around activities/movements. Will      Plan/Recommendations:  Overall goal is to wean off IV opioids and transition to PO. Patient states the oxycodone is effective in making pain manageable.    Continue PTA dose of Suboxone 4/1 film SL BID.  Change oxycodone to 10-15 mg q3h PRN.   Will plan on discontinuing IV Dilaudid on 11/17/23.   If his higher oxycodone dose is ineffective, could transition to only PO Dilaudid (4-6 mg q3h PRN is equivalent)   Lidocaine patches, apply 1-2 next to bilateral neck incisions.  IF not adhering,can consider lidocaine ointment.   Continue Tylenol   bowel regimen    Pain Service will continue to follow.    Discussed with attending anesthesiologist    Jocy Hurley PA-C  11/16/2023       Diet: NPO for Medical/Clinical Reasons Except for: No Exceptions, Other; Specify: Ok to use PEG for meds only  Adult Formula Drip Feeding: Continuous TwoCal HN; Gastrostomy; Goal Rate: 40; mL/hr; start at 10 ml/hr for 8 hrs and increase 10 ml q 8 hrs until goal rate; Do not advance tube feeding rate unless K+ is = or > 3.0, Mg++ is = or > 1.5, and Phos is ...    Relevant Labs:  Recent Labs  "  Lab Test 11/16/23  0449      BUN 12.1       Physical Exam:  Vitals: /73 (BP Location: Right arm)   Pulse 61   Temp 98.7  F (37.1  C) (Oral)   Resp 20   Ht 1.727 m (5' 8\")   Wt 55.1 kg (121 lb 7.6 oz)   SpO2 94%   BMI 18.47 kg/m      Physical Exam:   CONSTITUTIONAL/GENERAL APPEARANCE: Conversant.  EYES: EOMI, sclerae clear  ENT/NECK: neck is supple  RESPIRATORY:  CARDIOVASCULAR: HR within normal limits  GI:soft, nontender,   MUSCULOSKELETAL/BACK/SPINE/EXTREMITIES: Moving UE and LE independently.     NEURO:  AAOx3.   SKIN/VASCULAR EXAM:  Dry and warm.  PSYCHIATRIC/BEHAVIORAL/OBSERVATIONS:  No objective signs of pain observed during our interview.   Judgment/Insight -fair   Orientation - x3   Memory -fair   Mood and affect - calm, pleasant, cooperative         Relevant Medications:  Current Pain Medications:  Medications related to Pain Management (From now, onward)      Start     Dose/Rate Route Frequency Ordered Stop    11/17/23 0000  acetaminophen (TYLENOL) tablet 650 mg         650 mg Per G Tube EVERY 4 HOURS PRN 11/14/23 1847      11/15/23 0930  Lidocaine (LIDOCARE) 4 % Patch 1-2 patch         1-2 patch  over 12 Hours Transdermal EVERY 24 HOURS 0800 11/15/23 0913      11/15/23 0930  HYDROmorphone (DILAUDID) injection 0.2 mg        See Hyperspace for full Linked Orders Report.    0.2 mg Intravenous 2 TIMES DAILY PRN 11/15/23 0913      11/15/23 0928  oxyCODONE IR (ROXICODONE) tablet 10 mg        See Hyperspace for full Linked Orders Report.    10 mg Per G Tube EVERY 4 HOURS PRN 11/15/23 0929      11/15/23 0928  oxyCODONE (ROXICODONE) tablet 15 mg        See Hyperspace for full Linked Orders Report.    15 mg Per G Tube EVERY 4 HOURS PRN 11/15/23 0929      11/15/23 0800  polyethylene glycol (MIRALAX) Packet 17 g         17 g Oral DAILY 11/14/23 1832 11/14/23 2200  acetaminophen (TYLENOL) tablet 975 mg         975 mg Per G Tube EVERY 8 HOURS 11/14/23 1832 11/17/23 2159    11/14/23 2000  " "buprenorphine HCl-naloxone HCl (SUBOXONE) 4-1 MG per film 1 Film        Note to Pharmacy: PTA Sig:Place 1 Film under the tongue 2 times daily      1 Film Sublingual 2 TIMES DAILY 11/14/23 1832 11/14/23 2000  senna-docusate (SENOKOT-S/PERICOLACE) 8.6-50 MG per tablet 1 tablet         1 tablet Per G Tube 2 TIMES DAILY 11/14/23 1847 11/14/23 1812  magnesium hydroxide (MILK OF MAGNESIA) suspension 30 mL         30 mL Oral DAILY PRN 11/14/23 1832 11/14/23 1812  bisacodyl (DULCOLAX) suppository 10 mg         10 mg Rectal DAILY PRN 11/14/23 1832              Primary Service Contacted with Recommendations? Yes            Acute Inpatient Pain Service [facility]  Hours of pain coverage 24/7   Page via Amcom- Please Page the Pain Team Via Amcom: \"PAIN MANAGEMENT ACUTE INPATIENT/ [facility]\"            "

## 2023-11-16 NOTE — PROGRESS NOTES
"Otolaryngology Progress Note    S: No acute events overnight. Pain well-controlled. No nausea/vomiting/diarrhea. No acute questions or concerns. Began doxazosin yesterday due to urinary retention.     O: /73 (BP Location: Right arm)   Pulse 61   Temp 98.7  F (37.1  C) (Oral)   Resp 20   Ht 1.727 m (5' 8\")   Wt 55.1 kg (121 lb 7.6 oz)   SpO2 94%   BMI 18.47 kg/m    General: Alert and oriented x 3, No acute distress  HEENT: EOMI. HB 1/6. Incision line clean dry and intact. No areas of fullness or redness. Laryngectomy stoma is healing well. Crusting around edges and in suture line that was cleared this morning.   Pulmonary: Breathing non-labored, no stridor, no accessory muscle use.      Intake/Output Summary (Last 24 hours) at 11/15/2023 1005  Last data filed at 11/15/2023 0700  Gross per 24 hour   Intake 3747.5 ml   Output 1426.2 ml   Net 2321.3 ml     AFSHAN drain output(s): (last 24 hours)/(last shift)  Chest drain: 26.5/45/x  Drain 1 neck: 7/43/x  Drain 2 neck: 8.5/48/x  Drain 3 neck: 4/15/x  Drain 4 chest: 19/45/x    Recent Labs   Lab 11/16/23  0449 11/15/23  0530 11/14/23  1424 11/14/23  1118 11/14/23  0642 11/09/23  1359    136 138 138   < > 138   POTASSIUM 3.9 4.4 3.7 4.1   < > 3.8   CHLORIDE 103 100  --   --   --  100   CARA 8.1* 8.6*  --   --   --  9.4   CO2 28 27  --   --   --  27   BUN 12.1 16.1  --   --   --  18.4   CR 0.58* 0.64*  --   --   --  0.57*   * 154* 174* 136*   < > 116*    < > = values in this interval not displayed.     CBC  Recent Labs   Lab 11/16/23  0449 11/15/23  0340 11/14/23  1424 11/14/23  1118 11/14/23  1002 11/09/23  1359   WBC 10.9 15.0*  --   --   --  5.9   RBC 3.48* 3.85*  --   --   --  3.86*   HGB 10.2* 11.4* 10.4* 10.7*   < > 11.4*   HCT 33.1* 35.3*  --   --   --  36.3*   MCV 95 92  --   --   --  94   MCH 29.3 29.6  --   --   --  29.5   MCHC 30.8* 32.3  --   --   --  31.4*   RDW 14.3 14.1  --   --   --  13.6    248  --   --   --  229    < > = values " in this interval not displayed.     INRNo lab results found in last 7 days.    A/P: Mr. Jules is a 68M with recurrent laryngeal cancer s/p total laryngectomy, bilateral ND, with left pectoralis myofascial pedicled onlay 11/14.      HEENT:  - Laryngectomy cares  - Keep ever tube in place with HME  - AFSHAN cares, incision cares  - Peridex    Neuro:  - Tylenol, oxy, dilaudid prn  - PTA suboxone continued  - Pain management consult - appreciate recs  - Get up to chair on POD 1    CV:  - Vitals    GI:  - Inadequate oral intake related to minimal oral intake, weight loss as evidenced by need for alternative route for nutrition to meet 100% of estimated nutritional needs     - Nutrition Education: Provided education on RD role in nutrition POC, TF initiation with continuous TF   - NPO  - Pt has PTA G tube, trickle TF POD1  - BMP, Mg, Phos POD1  - D51/2NS+Kcl @ 75 until up to goal for TF  - Zofran x48h, then prn  - Bowel reg    :   - Doxazosin    Endocrine:  - PTH, iCal   - PTA synthroid continued  - TSH, T4, Prealbumin, Albumin POD1    ID:  - Unasyn x 48 hours  - Peridex mouth rinses QID on POD 1    Heme:  - Unasyn x48h  - CBC    Consults  - PT  - OT  - Nutrition  - SLP POD1  - Pain Management POD1  - PLC laryngectomy     -- Patient and above plan discussed with Demetrio Nicolas MD  Otolaryngology-Head & Neck Surgery PGY1  Please contact ENT with questions by dialing [phone] and entering job code [phone] when prompted.

## 2023-11-16 NOTE — PLAN OF CARE
Status: S/p total laryngectomy, bilateral neck dissection, L pectoralis flap 11/14. Hx of laryngeal cancer and tracheostomy and PEG in 9/2023   Vitals: VSS on 21% HTD. Tmax 99.1  Neuros: A&Ox4, Intact ex writes to communicate  IV: PIV TKO w/abx, PIV infusing D5 & 0.45 NS + Kcl @ 75 mL/hr  Resp/trach: Laryngectomy stoma w/ever tube and HME, lavaged x2. Suctioned per pt request approx Q2hrs, shallow sxn as pt has productive cough. Small/moderate amount of thick/thin bloody secretions. Oral sxn independently  Diet: NPO. TF via PEG @ to 30 mL/hr, increase to goal of 40 mL/hr @1600 (TF paused for 2 hrs for synthroid)  Bowel status: LBM PTA, BS+, bowel meds given  : Voiding with retention, cath > 500 mL. PVR WNL, see flowsheets  Skin: Laryngectomy stoma, bilateral neck incisions closed with sutures, AFSHAN x3 to neck, JEFFERSON. L neck bump. L chest incision closed with sutures, AFSHAN x2, JEFFERSON. PEG site WNL. Skin tear to back, dressing CDI (Change 11/18)  Pain: Incisional pain controlled with PRN oxycodone and suboxone  Activity: SBA d/t lines  Social: No visitors this shift  Plan/updates: Laryngectomy PLC 11/19 @ 1430, daughter Artis will join via iPad. Continue to manage pain and teach cares

## 2023-11-16 NOTE — PLAN OF CARE
Status: S/p total laryngectomy, bilateral neck dissection, L pectoralis flap 11/14. Hx of laryngeal cancer and tracheostomy and PEG in 9/2023   Vitals: VSS on 21% HTD. Max temp 99.4  Neuros: Intact ex writes to communicate  IV: PIV TKO, PIV infusing D5 & 0.45 NS + Kcl @ 75 mL/hr  Resp/trach: Laryngectomy stoma, lavaged x2. Small/moderate amount of thick/thin bloody secretions. Oral suctions with red blank independently  Diet: NPO. TF via PEG increased to 30 mL/hr at 0600 with q4 60 mL FWF. Goal of 40 mL/hr. Increase to goal rate @ 1400  Bowel status: LBM PTA, BS+  : Voiding with retention, cath > 500 mL.  mL @ 0150  Skin: Laryngectomy stoma, bilateral neck incisions closed with sutures, AFSHAN x3, JEFFERSON. L neck bump. L chest incision closed with sutures, AFSHAN x2, JEFFERSON. PEG site WNL. Skin tear to back, dressing CDI and due to be changed on 11/18  Pain: Incisional pain controlled with PRN oxycodone, PRN dilaudid x1  Activity: Up with SBA due to lines   Social: No visitors  Plan/updates: Laryngectomy PLC 11/19 @ 1430, daughter Artis will join via iPad. Continue POC

## 2023-11-17 ENCOUNTER — APPOINTMENT (OUTPATIENT)
Dept: SPEECH THERAPY | Facility: CLINIC | Age: 68
DRG: 011 | End: 2023-11-17
Attending: OTOLARYNGOLOGY
Payer: MEDICARE

## 2023-11-17 LAB
ANION GAP SERPL CALCULATED.3IONS-SCNC: 7 MMOL/L (ref 7–15)
BUN SERPL-MCNC: 10.1 MG/DL (ref 8–23)
CALCIUM SERPL-MCNC: 8.4 MG/DL (ref 8.8–10.2)
CHLORIDE SERPL-SCNC: 102 MMOL/L (ref 98–107)
CREAT SERPL-MCNC: 0.5 MG/DL (ref 0.67–1.17)
DEPRECATED HCO3 PLAS-SCNC: 28 MMOL/L (ref 22–29)
EGFRCR SERPLBLD CKD-EPI 2021: >90 ML/MIN/1.73M2
ERYTHROCYTE [DISTWIDTH] IN BLOOD BY AUTOMATED COUNT: 14 % (ref 10–15)
GLUCOSE SERPL-MCNC: 118 MG/DL (ref 70–99)
HCT VFR BLD AUTO: 31.8 % (ref 40–53)
HGB BLD-MCNC: 10 G/DL (ref 13.3–17.7)
MAGNESIUM SERPL-MCNC: 1.7 MG/DL (ref 1.7–2.3)
MCH RBC QN AUTO: 29.2 PG (ref 26.5–33)
MCHC RBC AUTO-ENTMCNC: 31.4 G/DL (ref 31.5–36.5)
MCV RBC AUTO: 93 FL (ref 78–100)
PHOSPHATE SERPL-MCNC: 3.1 MG/DL (ref 2.5–4.5)
PLATELET # BLD AUTO: 192 10E3/UL (ref 150–450)
POTASSIUM SERPL-SCNC: 3.8 MMOL/L (ref 3.4–5.3)
RBC # BLD AUTO: 3.42 10E6/UL (ref 4.4–5.9)
SODIUM SERPL-SCNC: 137 MMOL/L (ref 135–145)
WBC # BLD AUTO: 7.7 10E3/UL (ref 4–11)

## 2023-11-17 PROCEDURE — 99232 SBSQ HOSP IP/OBS MODERATE 35: CPT | Performed by: PHYSICIAN ASSISTANT

## 2023-11-17 PROCEDURE — 250N000013 HC RX MED GY IP 250 OP 250 PS 637: Performed by: INTERNAL MEDICINE

## 2023-11-17 PROCEDURE — 80048 BASIC METABOLIC PNL TOTAL CA: CPT | Performed by: STUDENT IN AN ORGANIZED HEALTH CARE EDUCATION/TRAINING PROGRAM

## 2023-11-17 PROCEDURE — 999N000215 HC STATISTIC HFNC ADULT NON-CPAP

## 2023-11-17 PROCEDURE — 999N000123 HC STATISTIC OXYGEN O2DAILY TECH TIME

## 2023-11-17 PROCEDURE — 85027 COMPLETE CBC AUTOMATED: CPT

## 2023-11-17 PROCEDURE — 250N000013 HC RX MED GY IP 250 OP 250 PS 637: Performed by: PHYSICIAN ASSISTANT

## 2023-11-17 PROCEDURE — 84100 ASSAY OF PHOSPHORUS: CPT | Performed by: OTOLARYNGOLOGY

## 2023-11-17 PROCEDURE — 999N000157 HC STATISTIC RCP TIME EA 10 MIN

## 2023-11-17 PROCEDURE — 120N000002 HC R&B MED SURG/OB UMMC

## 2023-11-17 PROCEDURE — 250N000013 HC RX MED GY IP 250 OP 250 PS 637: Performed by: OTOLARYNGOLOGY

## 2023-11-17 PROCEDURE — 250N000013 HC RX MED GY IP 250 OP 250 PS 637: Performed by: STUDENT IN AN ORGANIZED HEALTH CARE EDUCATION/TRAINING PROGRAM

## 2023-11-17 PROCEDURE — 36415 COLL VENOUS BLD VENIPUNCTURE: CPT | Performed by: STUDENT IN AN ORGANIZED HEALTH CARE EDUCATION/TRAINING PROGRAM

## 2023-11-17 PROCEDURE — 92507 TX SP LANG VOICE COMM INDIV: CPT | Mod: GN

## 2023-11-17 PROCEDURE — 250N000011 HC RX IP 250 OP 636: Mod: JZ | Performed by: PHYSICIAN ASSISTANT

## 2023-11-17 PROCEDURE — 99207 PR NO CHARGE LOS: CPT | Performed by: INTERNAL MEDICINE

## 2023-11-17 PROCEDURE — 83735 ASSAY OF MAGNESIUM: CPT | Performed by: OTOLARYNGOLOGY

## 2023-11-17 RX ORDER — POTASSIUM CHLORIDE 20MEQ/15ML
20 LIQUID (ML) ORAL ONCE
Qty: 15 ML | Refills: 0 | Status: COMPLETED | OUTPATIENT
Start: 2023-11-17 | End: 2023-11-17

## 2023-11-17 RX ORDER — MAGNESIUM OXIDE 400 MG/1
400 TABLET ORAL EVERY 4 HOURS
Status: COMPLETED | OUTPATIENT
Start: 2023-11-17 | End: 2023-11-17

## 2023-11-17 RX ADMIN — OXYCODONE HYDROCHLORIDE 15 MG: 5 TABLET ORAL at 17:01

## 2023-11-17 RX ADMIN — WHITE PETROLATUM: 1.75 OINTMENT TOPICAL at 23:33

## 2023-11-17 RX ADMIN — CHLORHEXIDINE GLUCONATE 15 ML: 1.2 RINSE ORAL at 07:49

## 2023-11-17 RX ADMIN — DOXAZOSIN 1 MG: 1 TABLET ORAL at 07:49

## 2023-11-17 RX ADMIN — POLYETHYLENE GLYCOL 3350 17 G: 17 POWDER, FOR SOLUTION ORAL at 07:48

## 2023-11-17 RX ADMIN — OXYCODONE HYDROCHLORIDE 15 MG: 5 TABLET ORAL at 14:05

## 2023-11-17 RX ADMIN — MAGNESIUM OXIDE TAB 400 MG (241.3 MG ELEMENTAL MG) 400 MG: 400 (241.3 MG) TAB at 07:49

## 2023-11-17 RX ADMIN — OXYCODONE HYDROCHLORIDE 15 MG: 5 TABLET ORAL at 00:41

## 2023-11-17 RX ADMIN — ENOXAPARIN SODIUM 40 MG: 40 INJECTION SUBCUTANEOUS at 12:13

## 2023-11-17 RX ADMIN — Medication 15 ML: at 12:13

## 2023-11-17 RX ADMIN — BUPRENORPHINE AND NALOXONE 1 FILM: 4; 1 FILM BUCCAL; SUBLINGUAL at 19:50

## 2023-11-17 RX ADMIN — Medication 40 MG: at 07:48

## 2023-11-17 RX ADMIN — LEVOTHYROXINE SODIUM 100 MCG: 100 TABLET ORAL at 06:17

## 2023-11-17 RX ADMIN — BUPRENORPHINE AND NALOXONE 1 FILM: 4; 1 FILM BUCCAL; SUBLINGUAL at 07:47

## 2023-11-17 RX ADMIN — OXYCODONE HYDROCHLORIDE 15 MG: 5 TABLET ORAL at 10:37

## 2023-11-17 RX ADMIN — WHITE PETROLATUM: 1.75 OINTMENT TOPICAL at 14:06

## 2023-11-17 RX ADMIN — SENNOSIDES AND DOCUSATE SODIUM 1 TABLET: 8.6; 5 TABLET ORAL at 07:49

## 2023-11-17 RX ADMIN — OXYCODONE HYDROCHLORIDE 15 MG: 5 TABLET ORAL at 21:28

## 2023-11-17 RX ADMIN — OXYCODONE HYDROCHLORIDE 15 MG: 5 TABLET ORAL at 04:34

## 2023-11-17 RX ADMIN — WHITE PETROLATUM: 1.75 OINTMENT TOPICAL at 00:50

## 2023-11-17 RX ADMIN — WHITE PETROLATUM: 1.75 OINTMENT TOPICAL at 07:50

## 2023-11-17 RX ADMIN — SENNOSIDES AND DOCUSATE SODIUM 1 TABLET: 8.6; 5 TABLET ORAL at 19:50

## 2023-11-17 RX ADMIN — HYDROMORPHONE HYDROCHLORIDE 0.2 MG: 0.2 INJECTION, SOLUTION INTRAMUSCULAR; INTRAVENOUS; SUBCUTANEOUS at 02:40

## 2023-11-17 RX ADMIN — ACETAMINOPHEN 975 MG: 325 TABLET, FILM COATED ORAL at 06:12

## 2023-11-17 RX ADMIN — MAGNESIUM OXIDE TAB 400 MG (241.3 MG ELEMENTAL MG) 400 MG: 400 (241.3 MG) TAB at 12:13

## 2023-11-17 RX ADMIN — ACETAMINOPHEN 975 MG: 325 TABLET, FILM COATED ORAL at 14:06

## 2023-11-17 RX ADMIN — CALCITRIOL 0.5 MCG: 1 SOLUTION ORAL at 07:49

## 2023-11-17 RX ADMIN — CALCIUM CARBONATE (ANTACID) CHEW TAB 500 MG 2500 MG: 500 CHEW TAB at 10:38

## 2023-11-17 RX ADMIN — POTASSIUM CHLORIDE 20 MEQ: 1.5 SOLUTION ORAL at 10:37

## 2023-11-17 RX ADMIN — CHLORHEXIDINE GLUCONATE 15 ML: 1.2 RINSE ORAL at 19:50

## 2023-11-17 RX ADMIN — CALCIUM CARBONATE (ANTACID) CHEW TAB 500 MG 2500 MG: 500 CHEW TAB at 15:55

## 2023-11-17 RX ADMIN — OXYCODONE HYDROCHLORIDE 15 MG: 5 TABLET ORAL at 07:48

## 2023-11-17 ASSESSMENT — ACTIVITIES OF DAILY LIVING (ADL)
ADLS_ACUITY_SCORE: 38

## 2023-11-17 NOTE — DISCHARGE INSTRUCTIONS
[facility] - resume home RN  Ph: [phone]  Fax: [phone]  Nurse Naa plans to see you at home tomorrow, 11/22/23.      [facility] - resume tube feed formula and supplies  Ph: [phone]  Fax: [phone]     [facility] - suction, humidity, laryngectomy care supplies (saline, gauze, ect.)  [address] [facility] [address] [place] [id number]  Ph: [phone]  Fax: [phone]    [facility] - laryngectomy supplies (ever tubes, HMEs, ect)  Ph: [phone]  *Please refer to handout provided by speech therapist on how to order laryngectomy supplies

## 2023-11-17 NOTE — PROGRESS NOTES
"Otolaryngology Progress Note    S: No acute events overnight. Tmax 99.4. Patient states pain feels better controlled today with changes to oxycodone. Pain team has changed frequency of oxycodone to q3hr. Tolerating laryngectomy tube well.     O: /76 (BP Location: Right arm, Patient Position: Semi-Sanchez's, Cuff Size: Adult Regular)   Pulse 63   Temp 99  F (37.2  C) (Oral)   Resp 15   Ht 1.727 m (5' 8\")   Wt 55.1 kg (121 lb 7.6 oz)   SpO2 95%   BMI 18.47 kg/m    General: Alert and oriented x 3, No acute distress  HEENT: EOMI. HB 1/6. Incision line clean dry and intact. Some new point tenderness to right neck incision line. Area appears clean and intact without dehiscence. Right AFSHAN x2 with serous output, no obvious saliva. Left neck Jpx1 and chest Jpx2 with serosang output. Larngectomy tube is in place, stoma appears healthy with intact suture line, no crusting seen.   Pulmonary: Breathing non-labored, no stridor, no accessory muscle use.    AFSHAN drain output(s): (last 24 hours)/(last shift)  L Chest drain: 0.5 / 3 / 0  Drain 1 neck: 3.5 / 9 / 8  Drain 2 neck: 16 / 11 / 4  Drain 3 neck: 3.5 / 3 / 4  Drain 4 chest: 20 / 37 / 10    BMP WNL, calcium levels (corrected to 3.4 albumin): 8.6 yesterday morning, 8.4 yesterday afternoon, 8.9 this morning.     WBC 7.7, hgb 10.0 (10.2)    A/P: Mr. Jules is a 68M with recurrent laryngeal cancer s/p total laryngectomy, bilateral ND, with left pectoralis myofascial pedicled onlay 11/14.      HEENT:  - Laryngectomy cares  - Keep ever tube in place with HME  - AFSHAN cares, incision cares  - Peridex    Neuro:  - Tylenol, oxy, dilaudid prn  - PTA suboxone continued  - Pain management consult - appreciate recs  - Get up to chair on POD 1    CV:  - Vitals    GI:  - Inadequate oral intake related to minimal oral intake, weight loss as evidenced by need for alternative route for nutrition to meet 100% of estimated nutritional needs     - Nutrition Education: Provided education on " RD role in nutrition POC, TF initiation with continuous TF   - NPO  - Pt has PTA G tube, trickle TF POD1  - BMP, Mg, Phos   - D51/2NS+Kcl @ 75 until up to goal for TF  - Zofran x48h, then prn  - Bowel reg    :   - Doxazosin    Endocrine:  - Follow electrolytes  - PTA synthroid continued    ID:  - Unasyn x 48 hours  - Peridex mouth rinses QID on POD 1    Heme:  - Unasyn x48h (completed)   - CBC    Consults  - PT  - OT  - Nutrition  - SLP POD1  - Pain Management  - PLC laryngectomy     -- Patient and above plan discussed with Demetrio Valencia MD  Otolaryngology - Head and Neck Surgery PGY-3

## 2023-11-17 NOTE — PROGRESS NOTES
Care Management Follow Up    Length of Stay (days): 3    Expected Discharge Date: 11/20/2023     Concerns to be Addressed: discharge planning     Patient plan of care discussed at interdisciplinary rounds: Yes    Anticipated Discharge Disposition: Home  Anticipated Discharge Services: Home Care  Anticipated Discharge DME: laryngectomy supplies, resume enteral formula and supplies    Patient/family educated on Medicare website which has current facility and service quality ratings: no  Education Provided on the Discharge Plan: Yes  Patient/Family in Agreement with the Plan: yes    Referrals Placed by CM/SW: HC, DME  Private pay costs discussed: Not applicable    Additional Information:  Patient status reviewed, discussed in discharge rounds. Per provider, patient likely ready for discharge on Monday.     Updated [facility] liaison, on discharge plan.    Spoke with daughter, Artis, and informed her discharge is planned for Monday. Asked Artis to bring the patient's suction machine from home to have for the ride, she agreed.     Call placed to [facility] to confirm supplies were sent out. Staff unavailable so left a message requesting a call back.     [facility] - accepted home RN  Ph: [phone]  Fax: [phone]  *orders placed     [facility] - resume home enteral formula and supplies  Ph: [phone]  Fax: [phone]  *orders placed     [facility] [place] - laryngectomy supplies  Ph: [phone]  Fax: [phone]     Saba Huddleston RN, BSN  6A RN Care Coordinator  Ph: [phone]   Pager: [phone]

## 2023-11-17 NOTE — PLAN OF CARE
Vitals: VSS    Neuros: Intact - Writes or mouths words to communicate   IV:  PIV SLd (tolerating TF)   Labs/Electrolytes: K and Mg replaced   Resp/trach: Laryngectomy with ever tube and HME in place. Lavaged x1, suctioned x (last at 1440) - Pt has good cough. Oral suctions with red maribel independently  Diet: NPO. Continuous TF was stopped this am. Pt tolerated 1/2 can bolus and water flushes. Will get full can at next feeding. Pt does TF via syringe indep.   Bowel status: BM PTA - Bowel meds given. Passing gas   : Voided multiple times. Bladder scanned for 330 ml  Skin: Laryngectomy stoma, bilateral neck incisions sutures intact, AFSHAN x3, JEFFERSON. L neck bump. L chest incision closed with sutures, AFSHAN x2, JEFFERSON. Aquaphor applied to all incisions. PEG site WNL. Back wound dressing CDI, to be changed 11/18  Pain: Generalized pain managed with scheduled medications and oxycodone.   Activity: SBA - Activity encouraged   Social: NA  Plan/updates: Laryngectomy PLC 11/19 @ 1430, daughter Artis will join via iPad. Pt able to take Ever tube in and out indep. Will need encouragement to clean tube and suction, SLP worked with pt today regarding ever cares. Continue POC       Goal Outcome Evaluation:      Plan of Care Reviewed With: patient    Overall Patient Progress: improvingOverall Patient Progress: improving

## 2023-11-17 NOTE — PLAN OF CARE
Status: S/p total laryngectomy, bilateral neck dissection, L pectoralis flap 11/14. Hx of laryngeal cancer and tracheostomy and PEG in 9/2023   Vitals: VSS on RA, intermittently gail, mid 50s. continuous pulse ox  Neuros: intact, mouth and writes to communicate  IV: Left PIV infusing LR at 50 mL/hr. Right PIV SL  Labs/Electrolytes: Creatinine 0.50,   Resp/trach:  Laryngectomy stoma with ever tube and HME. Suctioned x5. Small amount of bloody/clear secretions, good productive cough. Oral suctions done independently with red maribel   Diet: NPO. TF infusing at 40 mL/hr with 60ml of FWF Q4hrs   Bowel status: LBM PTA. Has scheduled and PRN bowel meds  : voids spontaneously using bedside urinal  Skin: Laryngectomy stoma, bilateral neck incisions closed with sutures, AFSAHN x3 to neck, JEFFERSON. L neck bump. L chest incision closed with sutures, AFSHAN x2, JEFFERSON. PEG site WNL. Skin tear to back, dressing CDI (Change 11/18)   Pain: 7-8/10 incisional pain of the neck managed with PRN oxy (x2), dilaudid (x2), and scheduled tylenol  Activity: SBA, needs help with lines  Plan: Laryngectomy PLC 11/19 at 1430 with daughter via iPad. Continue POC    Goal Outcome Evaluation:      Plan of Care Reviewed With: patient    Overall Patient Progress: improvingOverall Patient Progress: improving    Outcome Evaluation: Pain management. Pt reports minimal relief

## 2023-11-17 NOTE — PROGRESS NOTES
Brief Endocrine note    - Chart reviewed.  - TPO antibodies elevated.    Plan:  Will likely need levothyroxine long term as TPO antibodies are elevated . Continue levothyroxine 100 mcg daily and repeat labs in 1 months.

## 2023-11-17 NOTE — PLAN OF CARE
Status: S/p total laryngectomy, bilateral neck dissection, L pectoralis flap 11/14. Hx of laryngeal cancer and tracheostomy and PEG in 9/2023   Vitals: VSS    Neuros: Intact ex writes to communicate  IV:  PIV infusing LR @ 50 mL/hr, PIV SL   Labs/Electrolytes: WNL  Resp/trach: Laryngectomy stoma with ever tube and HME (last cleaned/changed @ 2130). Lavaged x1, suctioned x2 superficially. Small amount of bloody/clear secretions, good cough. Oral suctions with red maribel independently  Diet: NPO. TF via PEG infusing @ 40 mL/hr. 60 mL FWF q4  Bowel status: LBM PTA, took senna  : Voiding with retention, cath > 500 mL.  mL @ 1800  Skin: Laryngectomy stoma, bilateral neck incisions closed with sutures, AFSHAN x3, JEFFERSON. L neck bump. L chest incision closed with sutures, AFSHAN x2, JEFFERSON. PEG site WNL. Back wound dressing CDI, to be changed 11/18  Pain: Incisional pain controlled with scheduled tylenol, PRN oxycodone, PRN dilaudid x1  Activity: Up with SBA  Social: No visitors  Plan/updates: Laryngectomy PLC 11/19 @ 1430, daughter Artis will join via iPad. Continue POC

## 2023-11-17 NOTE — PROGRESS NOTES
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       Pain service following to assist with pain management.    Pain is still around the neck .  Pain is rated at 7/10 similar to yesterday.  States has been \"shifting\" more to use the bathroom etc which is increasing pain.  We discussed trying adjuvants as well.  He is agreeable to trying Lyrica, robaxin as well as switching from PO oxycodone to PO Dilaudid.  Indications,risks and benefits of the medications reviewed.      Plan/Recommendations:    Continue PTA dose of Suboxone 4/1 film SL BID.  Discontinue oxycodone to 10-15 mg q3h PRN.   Start Dilaudid 4-6mg PO Q 3 hours PRN. Start at 4mg.  Allow IV Dilaudid 0.2mg BID PRN for rescue.    Start Lyrica 25mg PO BID.  (Did not want to use gabapentin because he states in past it caused nausea-no other side effects).  Start Robaxin 500mg PO Q 6 hours PRN.   Lidocaine patches, apply 1-2 next to bilateral neck incisions.  IF not adhering,can consider lidocaine ointment.   Continue Tylenol   bowel regimen, last BM on 11/14/23.     Pain Service will continue to follow.     Discussed with attending anesthesiologist       Jocy Hurley PA-C  11/17/2023       Diet: NPO for Medical/Clinical Reasons Except for: No Exceptions, Other; Specify: Ok to use PEG for meds only  Adult Formula Bolus Feeding: TwoCal HN; Route: Gastrostomy; 4 times daily; Volume per Bolus: 1; Can(s)/ Carton(s); Additional free water (mL): 150 mL before and after each bolus feeding; Hold continuous feedings x 1-2 hours. Start first bolus " "at 0...    Relevant Labs:  Recent Labs   Lab Test 11/17/23  0558      BUN 10.1       Physical Exam:  Vitals: /76 (BP Location: Right arm, Patient Position: Semi-Sanchez's, Cuff Size: Adult Regular)   Pulse 63   Temp 99  F (37.2  C) (Oral)   Resp 15   Ht 1.727 m (5' 8\")   Wt 55.1 kg (121 lb 7.6 oz)   SpO2 95%   BMI 18.47 kg/m      Physical Exam:   CONSTITUTIONAL/GENERAL APPEARANCE: communicative  EYES: EOMI, sclerae clear  ENT/NECK: neck is supple  RESPIRATORY: on trach dome  CARDIOVASCULAR: HR within normal limits  GI:soft, nontender,   MUSCULOSKELETAL/BACK/SPINE/EXTREMITIES: Moving UE and LE independently.     NEURO:  AAOx3.   SKIN/VASCULAR EXAM:  Dry and warm.  PSYCHIATRIC/BEHAVIORAL/OBSERVATIONS:  No objective signs of pain observed during our interview.   Judgment/Insight -fair   Orientation - x3   Memory -fair   Mood and affect - calm, pleasant, cooperative         Relevant Medications:  Current Pain Medications:  Medications related to Pain Management (From now, onward)      Start     Dose/Rate Route Frequency Ordered Stop    11/17/23 0000  acetaminophen (TYLENOL) tablet 650 mg         650 mg Per G Tube EVERY 4 HOURS PRN 11/14/23 1847      11/16/23 1000  oxyCODONE IR (ROXICODONE) tablet 10 mg        See Hyperspace for full Linked Orders Report.    10 mg Per G Tube EVERY 3 HOURS PRN 11/16/23 0951      11/16/23 1000  oxyCODONE (ROXICODONE) tablet 15 mg        See Hyperspace for full Linked Orders Report.    15 mg Per G Tube EVERY 3 HOURS PRN 11/16/23 0951      11/15/23 0930  Lidocaine (LIDOCARE) 4 % Patch 1-2 patch         1-2 patch  over 12 Hours Transdermal EVERY 24 HOURS 0800 11/15/23 0913      11/15/23 0930  HYDROmorphone (DILAUDID) injection 0.2 mg        See Hyperspace for full Linked Orders Report.    0.2 mg Intravenous 2 TIMES DAILY PRN 11/15/23 0913      11/15/23 0800  polyethylene glycol (MIRALAX) Packet 17 g         17 g Oral DAILY 11/14/23 1832      11/14/23 2200  acetaminophen " "(TYLENOL) tablet 975 mg         975 mg Per G Tube EVERY 8 HOURS 11/14/23 1832 11/17/23 2159    11/14/23 2000  buprenorphine HCl-naloxone HCl (SUBOXONE) 4-1 MG per film 1 Film        Note to Pharmacy: EILEEN Sig:Place 1 Film under the tongue 2 times daily      1 Film Sublingual 2 TIMES DAILY 11/14/23 1832      11/14/23 2000  senna-docusate (SENOKOT-S/PERICOLACE) 8.6-50 MG per tablet 1 tablet         1 tablet Per G Tube 2 TIMES DAILY 11/14/23 1847 11/14/23 1812  magnesium hydroxide (MILK OF MAGNESIA) suspension 30 mL         30 mL Oral DAILY PRN 11/14/23 1832 11/14/23 1812  bisacodyl (DULCOLAX) suppository 10 mg         10 mg Rectal DAILY PRN 11/14/23 1832              Primary Service Contacted with Recommendations? Yes            Acute Inpatient Pain Service [facility]  Hours of pain coverage 24/7   Page via Amcom- Please Page the Pain Team Via Amcom: \"PAIN MANAGEMENT ACUTE INPATIENT/ [facility]\"            "

## 2023-11-18 ENCOUNTER — APPOINTMENT (OUTPATIENT)
Dept: SPEECH THERAPY | Facility: CLINIC | Age: 68
DRG: 011 | End: 2023-11-18
Attending: OTOLARYNGOLOGY
Payer: MEDICARE

## 2023-11-18 LAB
ANION GAP SERPL CALCULATED.3IONS-SCNC: 9 MMOL/L (ref 7–15)
BUN SERPL-MCNC: 10.7 MG/DL (ref 8–23)
CALCIUM SERPL-MCNC: 8.9 MG/DL (ref 8.8–10.2)
CHLORIDE SERPL-SCNC: 99 MMOL/L (ref 98–107)
CREAT SERPL-MCNC: 0.56 MG/DL (ref 0.67–1.17)
DEPRECATED HCO3 PLAS-SCNC: 29 MMOL/L (ref 22–29)
EGFRCR SERPLBLD CKD-EPI 2021: >90 ML/MIN/1.73M2
ERYTHROCYTE [DISTWIDTH] IN BLOOD BY AUTOMATED COUNT: 13.7 % (ref 10–15)
GLUCOSE SERPL-MCNC: 103 MG/DL (ref 70–99)
HCT VFR BLD AUTO: 34.5 % (ref 40–53)
HGB BLD-MCNC: 10.8 G/DL (ref 13.3–17.7)
MAGNESIUM SERPL-MCNC: 1.9 MG/DL (ref 1.7–2.3)
MCH RBC QN AUTO: 29.1 PG (ref 26.5–33)
MCHC RBC AUTO-ENTMCNC: 31.3 G/DL (ref 31.5–36.5)
MCV RBC AUTO: 93 FL (ref 78–100)
PHOSPHATE SERPL-MCNC: 3.9 MG/DL (ref 2.5–4.5)
PLATELET # BLD AUTO: 229 10E3/UL (ref 150–450)
POTASSIUM SERPL-SCNC: 4.5 MMOL/L (ref 3.4–5.3)
RBC # BLD AUTO: 3.71 10E6/UL (ref 4.4–5.9)
SODIUM SERPL-SCNC: 137 MMOL/L (ref 135–145)
WBC # BLD AUTO: 6.7 10E3/UL (ref 4–11)

## 2023-11-18 PROCEDURE — 250N000013 HC RX MED GY IP 250 OP 250 PS 637: Performed by: INTERNAL MEDICINE

## 2023-11-18 PROCEDURE — 250N000013 HC RX MED GY IP 250 OP 250 PS 637: Performed by: STUDENT IN AN ORGANIZED HEALTH CARE EDUCATION/TRAINING PROGRAM

## 2023-11-18 PROCEDURE — 999N000157 HC STATISTIC RCP TIME EA 10 MIN

## 2023-11-18 PROCEDURE — 92507 TX SP LANG VOICE COMM INDIV: CPT | Mod: GN

## 2023-11-18 PROCEDURE — 83735 ASSAY OF MAGNESIUM: CPT | Performed by: OTOLARYNGOLOGY

## 2023-11-18 PROCEDURE — 36415 COLL VENOUS BLD VENIPUNCTURE: CPT

## 2023-11-18 PROCEDURE — 85014 HEMATOCRIT: CPT

## 2023-11-18 PROCEDURE — 250N000011 HC RX IP 250 OP 636: Mod: JZ | Performed by: PHYSICIAN ASSISTANT

## 2023-11-18 PROCEDURE — 250N000013 HC RX MED GY IP 250 OP 250 PS 637: Performed by: OTOLARYNGOLOGY

## 2023-11-18 PROCEDURE — 80048 BASIC METABOLIC PNL TOTAL CA: CPT

## 2023-11-18 PROCEDURE — 99232 SBSQ HOSP IP/OBS MODERATE 35: CPT | Mod: GC | Performed by: ANESTHESIOLOGY

## 2023-11-18 PROCEDURE — 84100 ASSAY OF PHOSPHORUS: CPT | Performed by: OTOLARYNGOLOGY

## 2023-11-18 PROCEDURE — 250N000013 HC RX MED GY IP 250 OP 250 PS 637: Performed by: PHYSICIAN ASSISTANT

## 2023-11-18 PROCEDURE — 120N000002 HC R&B MED SURG/OB UMMC

## 2023-11-18 RX ORDER — PREGABALIN 25 MG/1
25 CAPSULE ORAL 2 TIMES DAILY
Status: DISCONTINUED | OUTPATIENT
Start: 2023-11-18 | End: 2023-11-19

## 2023-11-18 RX ORDER — OXYCODONE HYDROCHLORIDE 10 MG/1
10 TABLET ORAL
Status: DISCONTINUED | OUTPATIENT
Start: 2023-11-18 | End: 2023-11-21 | Stop reason: HOSPADM

## 2023-11-18 RX ORDER — MAGNESIUM OXIDE 400 MG/1
400 TABLET ORAL EVERY 4 HOURS
Status: COMPLETED | OUTPATIENT
Start: 2023-11-18 | End: 2023-11-18

## 2023-11-18 RX ORDER — HYDROMORPHONE HYDROCHLORIDE 4 MG/1
4 TABLET ORAL
Status: DISCONTINUED | OUTPATIENT
Start: 2023-11-18 | End: 2023-11-18

## 2023-11-18 RX ORDER — METHOCARBAMOL 500 MG/1
500 TABLET, FILM COATED ORAL EVERY 6 HOURS PRN
Status: DISCONTINUED | OUTPATIENT
Start: 2023-11-18 | End: 2023-11-21 | Stop reason: HOSPADM

## 2023-11-18 RX ADMIN — OXYCODONE HYDROCHLORIDE 15 MG: 5 TABLET ORAL at 02:07

## 2023-11-18 RX ADMIN — CALCIUM CARBONATE (ANTACID) CHEW TAB 500 MG 2500 MG: 500 CHEW TAB at 16:54

## 2023-11-18 RX ADMIN — Medication 40 MG: at 07:32

## 2023-11-18 RX ADMIN — DOXAZOSIN 1 MG: 1 TABLET ORAL at 07:32

## 2023-11-18 RX ADMIN — MAGNESIUM HYDROXIDE 30 ML: 400 SUSPENSION ORAL at 10:20

## 2023-11-18 RX ADMIN — Medication 15 ML: at 13:32

## 2023-11-18 RX ADMIN — OXYCODONE HYDROCHLORIDE 15 MG: 5 TABLET ORAL at 10:20

## 2023-11-18 RX ADMIN — OXYCODONE HYDROCHLORIDE 15 MG: 5 TABLET ORAL at 13:31

## 2023-11-18 RX ADMIN — CHLORHEXIDINE GLUCONATE 15 ML: 1.2 RINSE ORAL at 07:32

## 2023-11-18 RX ADMIN — MAGNESIUM OXIDE TAB 400 MG (241.3 MG ELEMENTAL MG) 400 MG: 400 (241.3 MG) TAB at 14:49

## 2023-11-18 RX ADMIN — CALCITRIOL 0.5 MCG: 1 SOLUTION ORAL at 07:33

## 2023-11-18 RX ADMIN — WHITE PETROLATUM: 1.75 OINTMENT TOPICAL at 07:33

## 2023-11-18 RX ADMIN — ENOXAPARIN SODIUM 40 MG: 40 INJECTION SUBCUTANEOUS at 13:31

## 2023-11-18 RX ADMIN — WHITE PETROLATUM: 1.75 OINTMENT TOPICAL at 13:32

## 2023-11-18 RX ADMIN — ACETAMINOPHEN 650 MG: 325 TABLET, FILM COATED ORAL at 13:32

## 2023-11-18 RX ADMIN — BUPRENORPHINE AND NALOXONE 1 FILM: 4; 1 FILM BUCCAL; SUBLINGUAL at 20:09

## 2023-11-18 RX ADMIN — CALCIUM CARBONATE (ANTACID) CHEW TAB 500 MG 2500 MG: 500 CHEW TAB at 22:21

## 2023-11-18 RX ADMIN — OXYCODONE HYDROCHLORIDE 15 MG: 5 TABLET ORAL at 07:32

## 2023-11-18 RX ADMIN — CALCIUM CARBONATE (ANTACID) CHEW TAB 500 MG 2500 MG: 500 CHEW TAB at 10:23

## 2023-11-18 RX ADMIN — ACETAMINOPHEN 650 MG: 325 TABLET, FILM COATED ORAL at 07:33

## 2023-11-18 RX ADMIN — MAGNESIUM OXIDE TAB 400 MG (241.3 MG ELEMENTAL MG) 400 MG: 400 (241.3 MG) TAB at 10:20

## 2023-11-18 RX ADMIN — POLYETHYLENE GLYCOL 3350 17 G: 17 POWDER, FOR SOLUTION ORAL at 07:32

## 2023-11-18 RX ADMIN — OXYCODONE HYDROCHLORIDE 15 MG: 5 TABLET ORAL at 17:01

## 2023-11-18 RX ADMIN — PREGABALIN 25 MG: 25 CAPSULE ORAL at 20:09

## 2023-11-18 RX ADMIN — SENNOSIDES AND DOCUSATE SODIUM 1 TABLET: 8.6; 5 TABLET ORAL at 07:32

## 2023-11-18 RX ADMIN — SENNOSIDES AND DOCUSATE SODIUM 1 TABLET: 8.6; 5 TABLET ORAL at 20:09

## 2023-11-18 RX ADMIN — LEVOTHYROXINE SODIUM 100 MCG: 100 TABLET ORAL at 06:00

## 2023-11-18 RX ADMIN — BUPRENORPHINE AND NALOXONE 1 FILM: 4; 1 FILM BUCCAL; SUBLINGUAL at 07:31

## 2023-11-18 RX ADMIN — OXYCODONE HYDROCHLORIDE 15 MG: 5 TABLET ORAL at 20:09

## 2023-11-18 ASSESSMENT — ACTIVITIES OF DAILY LIVING (ADL)
ADLS_ACUITY_SCORE: 38

## 2023-11-18 NOTE — PROGRESS NOTES
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       Pain service following to assist with pain management.    Patient states he is happy with his pain control.    Plan/Recommendations:    Continue PTA dose of Suboxone 4/1 film SL BID.  Start Dilaudid 4-6mg PO Q 3 hours PRN. Start at 4mg.  Allow IV Dilaudid 0.2mg BID PRN for rescue.  Start Lyrica 25mg PO BID.  (Did not want to use gabapentin because he states in past it caused nausea-no other side effects).  Start Robaxin 500mg PO Q 6 hours PRN.   Lidocaine patches, apply 1-2 next to bilateral neck incisions.  IF not adhering,can consider lidocaine ointment.   Continue Tylenol   bowel regimen, last BM on 11/14/23.     Pain Service will continue to follow.     Discussed with attending anesthesiologist       Theo Yin MD  11/18/2023       Diet: NPO for Medical/Clinical Reasons Except for: No Exceptions, Other; Specify: Ok to use PEG for meds only  Adult Formula Bolus Feeding: TwoCal HN; Route: Gastrostomy; 4 times daily; Volume per Bolus: 1; Can(s)/ Carton(s); Additional free water (mL): 150 mL before and after each bolus feeding; Hold continuous feedings x 1-2 hours. Start first bolus at 0...    Relevant Labs:  Recent Labs   Lab Test 11/17/23  0558      BUN 10.1       Physical Exam:  Vitals: /80 (BP Location: Right arm)   Pulse 61   Temp 37.2  C (99  F) (Oral)   Resp 16   Ht 1.727 m (5' 8\")   Wt 54.8 kg (120 lb 12.8 oz)   SpO2 98%   BMI 18.37 kg/m      Physical Exam:   CONSTITUTIONAL/GENERAL APPEARANCE: " communicative  EYES: EOMI, sclerae clear  ENT/NECK: neck is supple  RESPIRATORY: on trach dome  CARDIOVASCULAR: HR within normal limits  GI:soft, nontender,   MUSCULOSKELETAL/BACK/SPINE/EXTREMITIES: Moving UE and LE independently.     NEURO:  AAOx3.   SKIN/VASCULAR EXAM:  Dry and warm.  PSYCHIATRIC/BEHAVIORAL/OBSERVATIONS:  No objective signs of pain observed during our interview.   Judgment/Insight -fair   Orientation - x3   Memory -fair   Mood and affect - calm, pleasant, cooperative         Relevant Medications:  Current Pain Medications:  Medications related to Pain Management (From now, onward)      Start     Dose/Rate Route Frequency Ordered Stop    11/17/23 0000  acetaminophen (TYLENOL) tablet 650 mg         650 mg Per G Tube EVERY 4 HOURS PRN 11/14/23 1847      11/16/23 1000  oxyCODONE IR (ROXICODONE) tablet 10 mg        See Hyperspace for full Linked Orders Report.    10 mg Per G Tube EVERY 3 HOURS PRN 11/16/23 0951      11/16/23 1000  oxyCODONE (ROXICODONE) tablet 15 mg        See Hyperspace for full Linked Orders Report.    15 mg Per G Tube EVERY 3 HOURS PRN 11/16/23 0951      11/15/23 0930  Lidocaine (LIDOCARE) 4 % Patch 1-2 patch         1-2 patch  over 12 Hours Transdermal EVERY 24 HOURS 0800 11/15/23 0913      11/15/23 0930  HYDROmorphone (DILAUDID) injection 0.2 mg        See Hyperspace for full Linked Orders Report.    0.2 mg Intravenous 2 TIMES DAILY PRN 11/15/23 0913      11/15/23 0800  polyethylene glycol (MIRALAX) Packet 17 g         17 g Oral DAILY 11/14/23 1832 11/14/23 2200  acetaminophen (TYLENOL) tablet 975 mg         975 mg Per G Tube EVERY 8 HOURS 11/14/23 1832 11/17/23 2159    11/14/23 2000  buprenorphine HCl-naloxone HCl (SUBOXONE) 4-1 MG per film 1 Film        Note to Pharmacy: PTA Sig:Place 1 Film under the tongue 2 times daily      1 Film Sublingual 2 TIMES DAILY 11/14/23 1832 11/14/23 2000  senna-docusate (SENOKOT-S/PERICOLACE) 8.6-50 MG per tablet 1 tablet         1  "tablet Per G Tube 2 TIMES DAILY 11/14/23 1847      11/14/23 1812  magnesium hydroxide (MILK OF MAGNESIA) suspension 30 mL         30 mL Oral DAILY PRN 11/14/23 1832 11/14/23 1812  bisacodyl (DULCOLAX) suppository 10 mg         10 mg Rectal DAILY PRN 11/14/23 1832              Primary Service Contacted with Recommendations? Yes            Acute Inpatient Pain Service [facility]  Hours of pain coverage 24/7   Page via Amcom- Please Page the Pain Team Via Amcom: \"PAIN MANAGEMENT ACUTE INPATIENT/ [facility]\"            "

## 2023-11-18 NOTE — PLAN OF CARE
Status: S/p total laryngectomy, bilateral العراقي dissection, L pectoralis flap 11/14. Hx of laryngeal cancer and tracheostomy and PEG site 9/2023  Vitals: VSS OVN  Neuros: Intact, Writes or mouths words to communicate   IV: PIV SL  Labs/Electrolytes: WNL   Resp/trach: Ever tube in place w/ HME on. Suctioned x2. Good cough, oral suction w/ red maribel independently   Diet: NPO. Bolus TF Goal of 4 cans/day.   Bowel status: LBM PTA  : Voiding spot via urinal bedside  Skin: Laryngectomy stoma, bilateral neck incisions with AFSHAN x3, JEFFERSON. L neck bump/swelling. L chest incision with AFSHAN x2, JEFFERSON. PEG site CDI.    Pain:  Neck and incisional pain managed with PRN oxycodone.    Activity: SBA GB  Plan:  Laryngectomy PLC scheduled for 11/19 at 1430. Pt taking ever tube in and out independently. Needs encouragement to suction and clean tube independently. Continue to monitor and follow POC

## 2023-11-18 NOTE — PLAN OF CARE
Status: S/p total laryngectomy, bilateral neck dissection, L pectoralis flap 11/14. Hx of laryngeal cancer and tracheostomy and PEG site 9/2023  Vitals: VSS  Neuros: Intact, writes or mouths words to communicate   IV: PIV SL   Labs/Electrolytes: WNL   Resp/trach: Ever tube in place with HME on. Suctioned x2 this shift, last suction and cleaning at 2130, pt has good cough and is able to cough up most secretions. Secretions are thick and clear with some blood. Oral suctions with red maribel independently.   Diet: NPO. Bolus TF with goal of 4 cans/day. Pt only tolerated 3/4 can this shift, received about 1 1/4 cans total today. Pt does TF independently via syringe.    Bowel status: LBM PTA, BS+, senna given this shift   : Voiding spontaneously via urinal at bedside   Skin: Laryngectomy stoma, bilateral neck incisions with AFSHAN x3, JEFFERSON. L neck bump/swelling. L chest incision with AFSHAN x2, JEFFERSON. PEG site CDI.   Pain: Neck and incisional pain managed with PRN oxycodone.   Activity: SBA  Social: Daughter updated by writer this shift  Plan/Updates this shift: Laryngectomy PLC scheduled for 11/19 at 1430. Pt taking ever tube in and out independently. Pt suctioned laryngectomy stoma independently once this shift, needs more encouragement to suction and clean tube independently. Continue with POC.

## 2023-11-18 NOTE — PROGRESS NOTES
"Otolaryngology Progress Note    S: No acute events overnight. Tmax 99. Patient states pain feels better controlled with the new pain management regimen. Tolerating laryngectomy tube well. Tolerating tube feeds well.     O: /80 (BP Location: Right arm)   Pulse 61   Temp 99  F (37.2  C) (Oral)   Resp 16   Ht 1.727 m (5' 8\")   Wt 55.1 kg (121 lb 7.6 oz)   SpO2 98%   BMI 18.47 kg/m    General: Alert and oriented x 3, No acute distress  HEENT: EOMI. HB 1/6. Incision line clean dry and intact. Generalized tenderness around the incision line. Area appears clean and intact without dehiscence. Right AFSHAN x2 with serosang output, no obvious saliva. Left neck Jpx1 and chest Jpx2 with serosang output. Larngectomy tube is in place, stoma appears healthy with intact suture line, no crusting seen.   Pulmonary: Breathing non-labored, no stridor, no accessory muscle use.    AFSHAN drain output(s): (last shift)/(last 24 hours)  L Chest drain: 3/0.5/26.5  Drain 1 neck: 9/3.5/7  Drain 2 neck: 11/16/8.5  Drain 3 neck: 3/3.5/4  Drain 4 chest: 37/20/19    BMP WNL    WBC 6.7, hgb 10.8    A/P: Mr. Jules is a 68M with recurrent laryngeal cancer s/p total laryngectomy, bilateral ND, with left pectoralis myofascial pedicled onlay 11/14.      HEENT:  - Laryngectomy cares  - Keep ever tube in place with HME  - AFSHAN cares, incision cares  - Peridex    Neuro:   - Pt states oxy is working for him so will keep oxy 10-15 q3h and not add oral dilaudid  - Pain control per pain management recs:   Continue PTA dose of Suboxone 4/1 film SL BID.  Discontinue oxycodone to 10-15 mg q3h PRN.   Start Dilaudid 4-6mg PO Q 3 hours PRN. Start at 4mg.  Allow IV Dilaudid 0.2mg BID PRN for rescue.  Start Lyrica 25mg PO BID.  (Did not want to use gabapentin because he states in past it caused nausea-no other side effects).  Start Robaxin 500mg PO Q 6 hours PRN.   Lidocaine patches, apply 1-2 next to bilateral neck incisions.  IF not adhering,can consider " lidocaine ointment.   Continue Tylenol  - Get up to chair on POD 1    CV:  - Vitals    GI:  - Inadequate oral intake related to minimal oral intake, weight loss as evidenced by need for alternative route for nutrition to meet 100% of estimated nutritional needs     - Nutrition Education: Provided education on RD role in nutrition POC, TF initiation with continuous TF  - NPO  - Pt has PTA G tube, trickle TF POD1  - BMP, Mg, Phos   - D51/2NS+Kcl @ 75 until up to goal for TF  - Zofran x48h, then prn  - Bowel reg    :   - Doxazosin    Endocrine:  - Follow electrolytes  - PTA synthroid continued    ID:  - Unasyn x 48 hours  - Peridex mouth rinses QID on POD 1    Heme:  - Unasyn x48h (completed)   - CBC    Consults  - PT  - OT  - Nutrition  - SLP POD1  - Pain Management  - PLC laryngectomy     -- Patient and above plan discussed with Demetrio Jimenez MD  Otolaryngology - Head and Neck Surgery PGY-1

## 2023-11-18 NOTE — PLAN OF CARE
Vitals: VSS    Neuros: Intact - Writes or mouths words to communicate   IV:  PIV SLd   Labs/Electrolytes: Mg replaced   Resp/trach: Laryngectomy with ever tube and HME in place. Lavaged x1, indep suctioned x 5 - Pt has good cough. Oral suctions with red maribel independently  Diet: NPO. Tolerated 2 cans of TF today. Pt does TF via syringe indep.   Bowel status: BM PTA - Bowel meds given. Passing gas   : Voided multiple times  Skin: Laryngectomy stoma, bilateral neck incisions sutures intact, AFSHAN x3, JEFFERSON. L neck/chest edema unchanged. L chest incision closed with sutures. Aquaphor applied to all incisions. PEG site WNL. Back wound dressing CDI, changed today   Pain: Generalized pain managed with scheduled medications and oxycodone.   Activity: SBA - Walked halls with staff   Social: NA  Plan/updates: Laryngectomy PLC 11/19 @ 1204, daughter Artis will join via iPad.        Goal Outcome Evaluation:       Plan of Care Reviewed With: patient     Overall Patient Progress: improvingOverall Patient Progress: improving                             Principal Discharge DX:	RSV infection

## 2023-11-19 ENCOUNTER — APPOINTMENT (OUTPATIENT)
Dept: SPEECH THERAPY | Facility: CLINIC | Age: 68
DRG: 011 | End: 2023-11-19
Attending: OTOLARYNGOLOGY
Payer: MEDICARE

## 2023-11-19 ENCOUNTER — APPOINTMENT (OUTPATIENT)
Dept: OCCUPATIONAL THERAPY | Facility: CLINIC | Age: 68
DRG: 011 | End: 2023-11-19
Attending: OTOLARYNGOLOGY
Payer: MEDICARE

## 2023-11-19 ENCOUNTER — APPOINTMENT (OUTPATIENT)
Dept: EDUCATION SERVICES | Facility: CLINIC | Age: 68
DRG: 011 | End: 2023-11-19
Attending: OTOLARYNGOLOGY
Payer: MEDICARE

## 2023-11-19 LAB
AMYLASE BODY FLUID SOURCE: NORMAL
AMYLASE FLD-CCNC: 10 U/L
ANION GAP SERPL CALCULATED.3IONS-SCNC: 7 MMOL/L (ref 7–15)
BUN SERPL-MCNC: 14.8 MG/DL (ref 8–23)
CALCIUM SERPL-MCNC: 8.9 MG/DL (ref 8.8–10.2)
CHLORIDE SERPL-SCNC: 100 MMOL/L (ref 98–107)
CREAT SERPL-MCNC: 0.57 MG/DL (ref 0.67–1.17)
DEPRECATED HCO3 PLAS-SCNC: 30 MMOL/L (ref 22–29)
EGFRCR SERPLBLD CKD-EPI 2021: >90 ML/MIN/1.73M2
ERYTHROCYTE [DISTWIDTH] IN BLOOD BY AUTOMATED COUNT: 13.5 % (ref 10–15)
GLUCOSE SERPL-MCNC: 106 MG/DL (ref 70–99)
HCT VFR BLD AUTO: 33.8 % (ref 40–53)
HGB BLD-MCNC: 10.7 G/DL (ref 13.3–17.7)
MAGNESIUM SERPL-MCNC: 2.2 MG/DL (ref 1.7–2.3)
MCH RBC QN AUTO: 29.1 PG (ref 26.5–33)
MCHC RBC AUTO-ENTMCNC: 31.7 G/DL (ref 31.5–36.5)
MCV RBC AUTO: 92 FL (ref 78–100)
PHOSPHATE SERPL-MCNC: 3.3 MG/DL (ref 2.5–4.5)
PLATELET # BLD AUTO: 237 10E3/UL (ref 150–450)
POTASSIUM SERPL-SCNC: 4.1 MMOL/L (ref 3.4–5.3)
RBC # BLD AUTO: 3.68 10E6/UL (ref 4.4–5.9)
SODIUM SERPL-SCNC: 137 MMOL/L (ref 135–145)
WBC # BLD AUTO: 5.2 10E3/UL (ref 4–11)

## 2023-11-19 PROCEDURE — 250N000013 HC RX MED GY IP 250 OP 250 PS 637: Performed by: INTERNAL MEDICINE

## 2023-11-19 PROCEDURE — 83735 ASSAY OF MAGNESIUM: CPT | Performed by: OTOLARYNGOLOGY

## 2023-11-19 PROCEDURE — 82150 ASSAY OF AMYLASE: CPT

## 2023-11-19 PROCEDURE — 92507 TX SP LANG VOICE COMM INDIV: CPT | Mod: GN

## 2023-11-19 PROCEDURE — 250N000013 HC RX MED GY IP 250 OP 250 PS 637: Performed by: PHYSICIAN ASSISTANT

## 2023-11-19 PROCEDURE — 250N000013 HC RX MED GY IP 250 OP 250 PS 637: Performed by: OTOLARYNGOLOGY

## 2023-11-19 PROCEDURE — 97535 SELF CARE MNGMENT TRAINING: CPT | Mod: GO | Performed by: OCCUPATIONAL THERAPIST

## 2023-11-19 PROCEDURE — 250N000013 HC RX MED GY IP 250 OP 250 PS 637: Performed by: STUDENT IN AN ORGANIZED HEALTH CARE EDUCATION/TRAINING PROGRAM

## 2023-11-19 PROCEDURE — 36415 COLL VENOUS BLD VENIPUNCTURE: CPT

## 2023-11-19 PROCEDURE — 120N000002 HC R&B MED SURG/OB UMMC

## 2023-11-19 PROCEDURE — 99231 SBSQ HOSP IP/OBS SF/LOW 25: CPT | Mod: GC | Performed by: ANESTHESIOLOGY

## 2023-11-19 PROCEDURE — 85027 COMPLETE CBC AUTOMATED: CPT

## 2023-11-19 PROCEDURE — 999N000157 HC STATISTIC RCP TIME EA 10 MIN

## 2023-11-19 PROCEDURE — 94799 UNLISTED PULMONARY SVC/PX: CPT

## 2023-11-19 PROCEDURE — 80048 BASIC METABOLIC PNL TOTAL CA: CPT

## 2023-11-19 PROCEDURE — 84100 ASSAY OF PHOSPHORUS: CPT | Performed by: OTOLARYNGOLOGY

## 2023-11-19 PROCEDURE — 250N000011 HC RX IP 250 OP 636: Mod: JZ | Performed by: PHYSICIAN ASSISTANT

## 2023-11-19 RX ORDER — PREGABALIN 25 MG/1
25 CAPSULE ORAL 2 TIMES DAILY
Status: DISCONTINUED | OUTPATIENT
Start: 2023-11-19 | End: 2023-11-21 | Stop reason: HOSPADM

## 2023-11-19 RX ADMIN — ACETAMINOPHEN 650 MG: 325 TABLET, FILM COATED ORAL at 00:46

## 2023-11-19 RX ADMIN — OXYCODONE HYDROCHLORIDE 15 MG: 5 TABLET ORAL at 04:03

## 2023-11-19 RX ADMIN — OXYCODONE HYDROCHLORIDE 15 MG: 5 TABLET ORAL at 20:22

## 2023-11-19 RX ADMIN — LEVOTHYROXINE SODIUM 100 MCG: 100 TABLET ORAL at 06:04

## 2023-11-19 RX ADMIN — OXYCODONE HYDROCHLORIDE 15 MG: 5 TABLET ORAL at 14:00

## 2023-11-19 RX ADMIN — OXYCODONE HYDROCHLORIDE 15 MG: 5 TABLET ORAL at 07:41

## 2023-11-19 RX ADMIN — ACETAMINOPHEN 650 MG: 325 TABLET, FILM COATED ORAL at 14:01

## 2023-11-19 RX ADMIN — PREGABALIN 25 MG: 25 CAPSULE ORAL at 07:56

## 2023-11-19 RX ADMIN — CHLORHEXIDINE GLUCONATE 15 ML: 1.2 RINSE ORAL at 20:17

## 2023-11-19 RX ADMIN — WHITE PETROLATUM: 1.75 OINTMENT TOPICAL at 14:01

## 2023-11-19 RX ADMIN — ACETAMINOPHEN 650 MG: 325 TABLET, FILM COATED ORAL at 07:42

## 2023-11-19 RX ADMIN — CHLORHEXIDINE GLUCONATE 15 ML: 1.2 RINSE ORAL at 07:41

## 2023-11-19 RX ADMIN — CALCIUM CARBONATE (ANTACID) CHEW TAB 500 MG 2500 MG: 500 CHEW TAB at 11:03

## 2023-11-19 RX ADMIN — OXYCODONE HYDROCHLORIDE 15 MG: 5 TABLET ORAL at 00:46

## 2023-11-19 RX ADMIN — CALCITRIOL 0.5 MCG: 1 SOLUTION ORAL at 07:41

## 2023-11-19 RX ADMIN — BUPRENORPHINE AND NALOXONE 1 FILM: 4; 1 FILM BUCCAL; SUBLINGUAL at 07:42

## 2023-11-19 RX ADMIN — PREGABALIN 25 MG: 25 CAPSULE ORAL at 20:22

## 2023-11-19 RX ADMIN — WHITE PETROLATUM: 1.75 OINTMENT TOPICAL at 00:41

## 2023-11-19 RX ADMIN — Medication 40 MG: at 07:41

## 2023-11-19 RX ADMIN — OXYCODONE HYDROCHLORIDE 15 MG: 5 TABLET ORAL at 17:12

## 2023-11-19 RX ADMIN — Medication 15 ML: at 11:03

## 2023-11-19 RX ADMIN — OXYCODONE HYDROCHLORIDE 15 MG: 5 TABLET ORAL at 11:03

## 2023-11-19 RX ADMIN — METHOCARBAMOL 500 MG: 500 TABLET ORAL at 10:02

## 2023-11-19 RX ADMIN — BUPRENORPHINE AND NALOXONE 1 FILM: 4; 1 FILM BUCCAL; SUBLINGUAL at 20:26

## 2023-11-19 RX ADMIN — DOXAZOSIN 1 MG: 1 TABLET ORAL at 07:42

## 2023-11-19 RX ADMIN — CALCIUM CARBONATE (ANTACID) CHEW TAB 500 MG 2500 MG: 500 CHEW TAB at 15:57

## 2023-11-19 RX ADMIN — WHITE PETROLATUM: 1.75 OINTMENT TOPICAL at 07:43

## 2023-11-19 RX ADMIN — ENOXAPARIN SODIUM 40 MG: 40 INJECTION SUBCUTANEOUS at 11:03

## 2023-11-19 RX ADMIN — SENNOSIDES AND DOCUSATE SODIUM 1 TABLET: 8.6; 5 TABLET ORAL at 20:21

## 2023-11-19 ASSESSMENT — ACTIVITIES OF DAILY LIVING (ADL)
ADLS_ACUITY_SCORE: 38
ADLS_ACUITY_SCORE: 37

## 2023-11-19 NOTE — PLAN OF CARE
Vitals: VSS    Neuros: Intact - Writes or mouths words to communicate   IV:  PIV SLd   Resp/trach: Laryngectomy with ever tube and HME in place. Lavaged/suctioned x 1- Pt has good cough. Oral suctions with red maribel independently  Diet: NPO. Tolerated 2 cans of TF today. Pt does TF via syringe indep.   Bowel status: BM 11/18  : Voided multiple times  Skin: Laryngectomy stoma, bilateral neck incisions sutures intact, AFSHAN x3, JEFFERSON. L neck/chest edema unchanged. L chest incision closed with sutures. Aquaphor applied to all incisions. PEG site WNL. Back wound dressing CDI  Pain: Generalized incisional pain and left shoulder pain managed with scheduled medications, robaxin and oxycodone.   Activity: SBA - Walked halls with staff   Social: NA  Plan/updates: Had laryngectomy PLC today, pt doing well with PEG and ever care        Goal Outcome Evaluation:       Plan of Care Reviewed With: patient     Overall Patient Progress: improvingOverall Patient Progress: improving

## 2023-11-19 NOTE — CONSULTS
Met with patient at the bedside for laryngectomy education. Patient's daughter, Artis joined for education over video call. Patient cleaned his ever tube and changed his HME independently at the beginning of class. We reviewed how to clean the stoma and remove crusts on model. We talked about when and how to suction using red blank catheter. Showed them when and how to irrigate (lavage) the airway with saline on model. We talked about how to protect the stoma, importance of humidity and fluids to keep secretions thin, and methods of communication in case of emergency. We discussed when to call the care team and when to call 911. Patient communicated questions by writing them down. Patient had all questions answered and Artis verbalized understanding. Encouraged patient to keep practicing cares and to do his next saline lavage with bedside nursing.    Literature given: Learning About Handwashing, Caring for Yourself After a Laryngectomy.

## 2023-11-19 NOTE — PLAN OF CARE
Status: S/p total laryngectomy, bilateral neck dissection, L pectoralis flap 11/14. Hx of laryngeal cancer and tracheostomy and PEG site 9/2023  Vitals: VSS  Neuros: Intact, writes or mouths words to communicate   IV: PIV SL   Labs/Electrolytes: WNL   Resp/trach: Ever tube in place with HME on. Pt independently suctioned x4 this shift and independently cleaned ever tube x1 this shift. Good productive cough. Pt oral suctions with red maribel independently as needed.   Diet: NPO. Bolus TF with goal of 4 cans/day. Tolerated 3 cans total today. Pt does TF independently via syringe.    Bowel status: Two soft BM's this shift.   : Voiding spontaneously via urinal at bedside   Skin: Laryngectomy stoma, bilateral neck incisions with AFSHAN x2, JEFFERSON. L neck bump/swelling. L chest incision with AFSHAN x1, JEFFERSON. PEG site CDI.   Pain: Neck and incisional pain managed with PRN oxycodone and tylenol   Activity: SBA, walked halls once this shift  Plan/Updates this shift: Laryngectomy PLC scheduled for 11/19 at 1430, daughter Artis will join via iPad. Continue with POC.

## 2023-11-19 NOTE — PLAN OF CARE
Status: S/p total laryngectomy, bilateral neck dissection, L pectoralis flap 11/14. Hx of laryngeal cancer and tracheostomy and PEG site 9/2023  Vitals: VSS  Neuros: Writes or mouths words to communicate, LUE 4/5 d/t pain.  IV: PIV SL   Labs/Electrolytes: WNL   Resp/trach: Ever tube in place with HME on. Suctioned q4h and lavaged, ever tube cleaned and HME exchanged.   Diet: NPO. Bolus TF with goal of 4 cans/day.   Bowel status: Last BM 11/18  : Voiding spontaneously via urinal at bedside   Skin: Laryngectomy stoma, bilateral neck incisions with AFSHAN x2, JEFFERSON. L neck swelling above left check incision. L chest incision with AFSHAN x1, JEFFERSON. PEG site CDI.   Pain: Neck and incisional pain managed with PRN oxycodone and tylenol   Activity: Up SBA.  Plan/Updates this shift: Laryngectomy PLC scheduled for 11/19 at 1430, daughter Artis will join via iPad. Continue with POC. Continue to encourage independence with PEG and ever cares.

## 2023-11-19 NOTE — PROGRESS NOTES
"Otolaryngology Progress Note    S: No acute events overnight. Tmax 99. Patient states pain feels better controlled with the new pain management regimen. Tolerating laryngectomy tube well. Tolerating tube feeds well.     O: /60 (BP Location: Left arm)   Pulse 58   Temp 98.5  F (36.9  C) (Oral)   Resp 16   Ht 1.727 m (5' 8\")   Wt 54.8 kg (120 lb 12.8 oz)   SpO2 96%   BMI 18.37 kg/m    General: Alert and oriented x 3, No acute distress  HEENT: EOMI. HB 1/6. Incision line clean dry and intact. Generalized tenderness around the incision line. Area appears clean and intact without dehiscence. Right AFSHAN x2 with serosang output, no obvious saliva. Left neck Jpx1 and chest Jpx2 with serosang output. Larngectomy tube is in place, stoma appears healthy with intact suture line, no crusting seen.   Pulmonary: Breathing non-labored, no stridor, no accessory muscle use.    AFSHAN drain output(s): (last shift)/(last 24 hours)  Chest drain: 5/5/1  Drain 2 right neck: 3/7/1  Drain 3 neck: Removed today      BMP WNL    WBC 5.2 , hgb 10.7    A/P: Mr. Jules is a 68M with recurrent laryngeal cancer s/p total laryngectomy, bilateral ND, with left pectoralis myofascial pedicled onlay 11/14.      HEENT:  - Laryngectomy cares  - Keep ever tube in place with HME  - AFSHAN cares, incision cares  - Peridex    Neuro:   - Pt states oxy is working for him so will keep oxy 10-15 q3h and not add oral dilaudid  - Pain control per pain management recs:   Continue PTA dose of Suboxone 4/1 film SL BID.  Discontinue oxycodone to 10-15 mg q3h PRN.   Start Dilaudid 4-6mg PO Q 3 hours PRN. Start at 4mg.  Allow IV Dilaudid 0.2mg BID PRN for rescue.  Start Lyrica 25mg PO BID.  (Did not want to use gabapentin because he states in past it caused nausea-no other side effects).  Start Robaxin 500mg PO Q 6 hours PRN.   Lidocaine patches, apply 1-2 next to bilateral neck incisions.  IF not adhering,can consider lidocaine ointment.   Continue Tylenol  - Get up " to chair on POD 1    CV:  -Hemodynamically stable    GI:  - Inadequate oral intake related to minimal oral intake, weight loss as evidenced by need for alternative route for nutrition to meet 100% of estimated nutritional needs     - Nutrition Education: Provided education on RD role in nutrition POC, TF initiation with continuous TF  - NPO  - Pt has PTA G tube, trickle TF POD1  - BMP, Mg, Phos   - D51/2NS+Kcl @ 75 until up to goal for TF; tube feeds are at goal  - Zofran x48h, then prn  - Bowel reg    :   - Doxazosin    Endocrine:  - Follow electrolytes  - PTA synthroid continued    ID:  - Unasyn x 48 hours  - Peridex mouth rinses QID on POD 1    Heme:  - Unasyn x48h (completed)   - CBC    Consults  - PT  - OT  - Nutrition  - SLP POD1  - Pain Management  - PLC laryngectomy     -- Patient and above plan discussed with Demetrio Ferro MD  Otolaryngology Head and Neck Surgery Resident, PGY-2

## 2023-11-19 NOTE — PROGRESS NOTES
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       Pain service following to assist with pain management.    Patient states he is happy with his pain control on the current regimen. Has not required IV Dilaudid PRN in over 48 hours.     Plan/Recommendations:  Current regimen:   Continue PTA dose of Suboxone 4/1 film SL BID.  Continue Lyrica 25mg PO BID.   Continue acetaminophen 650 mg Q4 hours PRN   Encourage bowel regimen      Pain Service will sign off at this time. Thank you for allowing us to participate in the care of this patient.      Discussed with attending anesthesiologist, Dr. Edinson Bunn MD  PGY-3 Anesthesiology  11/19/2023       Diet: NPO for Medical/Clinical Reasons Except for: No Exceptions, Other; Specify: Ok to use PEG for meds only  Adult Formula Bolus Feeding: TwoCal HN; Route: Gastrostomy; 4 times daily; Volume per Bolus: 1; Can(s)/ Carton(s); Additional free water (mL): 150 mL before and after each bolus feeding; Hold continuous feedings x 1-2 hours. Start first bolus at 0...    Relevant Labs:  Recent Labs   Lab Test 11/17/23  0558      BUN 10.1       Physical Exam:  Vitals: /60 (BP Location: Left arm)   Pulse 58   Temp 36.9  C (98.5  F) (Oral)   Resp 16   Ht 1.727 m (5' 8\")   Wt 54.8 kg (120 lb 12.8 oz)   SpO2 97%   BMI 18.37 kg/m      Physical Exam:   CONSTITUTIONAL/GENERAL APPEARANCE: communicative, comfortable appearing  EYES: EOMI, sclerae clear  ENT/NECK: neck is supple  RESPIRATORY: on trach dome  CARDIOVASCULAR: HR within " normal limits  GI:soft, nontender,   MUSCULOSKELETAL/BACK/SPINE/EXTREMITIES: Moving UE and LE independently.     NEURO:  AAOx3.   SKIN/VASCULAR EXAM:  Dry and warm.  PSYCHIATRIC/BEHAVIORAL/OBSERVATIONS:  No objective signs of pain observed during our interview.   Judgment/Insight -fair   Orientation - x3   Memory -fair   Mood and affect - calm, pleasant, cooperative         Relevant Medications:  Current Pain Medications:  Medications related to Pain Management (From now, onward)      Start     Dose/Rate Route Frequency Ordered Stop    11/17/23 0000  acetaminophen (TYLENOL) tablet 650 mg         650 mg Per G Tube EVERY 4 HOURS PRN 11/14/23 1847      11/16/23 1000  oxyCODONE IR (ROXICODONE) tablet 10 mg        See Hyperspace for full Linked Orders Report.    10 mg Per G Tube EVERY 3 HOURS PRN 11/16/23 0951      11/16/23 1000  oxyCODONE (ROXICODONE) tablet 15 mg        See Hyperspace for full Linked Orders Report.    15 mg Per G Tube EVERY 3 HOURS PRN 11/16/23 0951      11/15/23 0930  Lidocaine (LIDOCARE) 4 % Patch 1-2 patch         1-2 patch  over 12 Hours Transdermal EVERY 24 HOURS 0800 11/15/23 0913      11/15/23 0930  HYDROmorphone (DILAUDID) injection 0.2 mg        See Hyperspace for full Linked Orders Report.    0.2 mg Intravenous 2 TIMES DAILY PRN 11/15/23 0913      11/15/23 0800  polyethylene glycol (MIRALAX) Packet 17 g         17 g Oral DAILY 11/14/23 1832 11/14/23 2200  acetaminophen (TYLENOL) tablet 975 mg         975 mg Per G Tube EVERY 8 HOURS 11/14/23 1832 11/17/23 2159 11/14/23 2000  buprenorphine HCl-naloxone HCl (SUBOXONE) 4-1 MG per film 1 Film        Note to Pharmacy: PTA Sig:Place 1 Film under the tongue 2 times daily      1 Film Sublingual 2 TIMES DAILY 11/14/23 1832 11/14/23 2000  senna-docusate (SENOKOT-S/PERICOLACE) 8.6-50 MG per tablet 1 tablet         1 tablet Per G Tube 2 TIMES DAILY 11/14/23 1847 11/14/23 1812  magnesium hydroxide (MILK OF MAGNESIA) suspension 30 mL       "   30 mL Oral DAILY PRN 11/14/23 1832 11/14/23 1812  bisacodyl (DULCOLAX) suppository 10 mg         10 mg Rectal DAILY PRN 11/14/23 1832              Primary Service Contacted with Recommendations? Yes            Acute Inpatient Pain Service [facility]  Hours of pain coverage 24/7   Page via [employer]- Please Page the Pain Team Via Am[employer]: \"PAIN MANAGEMENT ACUTE INPATIENT/ [facility]\"            "

## 2023-11-20 ENCOUNTER — APPOINTMENT (OUTPATIENT)
Dept: SPEECH THERAPY | Facility: CLINIC | Age: 68
DRG: 011 | End: 2023-11-20
Attending: OTOLARYNGOLOGY
Payer: MEDICARE

## 2023-11-20 ENCOUNTER — APPOINTMENT (OUTPATIENT)
Dept: OCCUPATIONAL THERAPY | Facility: CLINIC | Age: 68
DRG: 011 | End: 2023-11-20
Attending: OTOLARYNGOLOGY
Payer: MEDICARE

## 2023-11-20 LAB
ANION GAP SERPL CALCULATED.3IONS-SCNC: 9 MMOL/L (ref 7–15)
BUN SERPL-MCNC: 16.3 MG/DL (ref 8–23)
CALCIUM SERPL-MCNC: 9 MG/DL (ref 8.8–10.2)
CHLORIDE SERPL-SCNC: 100 MMOL/L (ref 98–107)
CREAT SERPL-MCNC: 0.55 MG/DL (ref 0.67–1.17)
DEPRECATED HCO3 PLAS-SCNC: 28 MMOL/L (ref 22–29)
EGFRCR SERPLBLD CKD-EPI 2021: >90 ML/MIN/1.73M2
ERYTHROCYTE [DISTWIDTH] IN BLOOD BY AUTOMATED COUNT: 13.7 % (ref 10–15)
GLUCOSE SERPL-MCNC: 101 MG/DL (ref 70–99)
HCT VFR BLD AUTO: 33.1 % (ref 40–53)
HGB BLD-MCNC: 10.3 G/DL (ref 13.3–17.7)
MAGNESIUM SERPL-MCNC: 2 MG/DL (ref 1.7–2.3)
MCH RBC QN AUTO: 29.2 PG (ref 26.5–33)
MCHC RBC AUTO-ENTMCNC: 31.1 G/DL (ref 31.5–36.5)
MCV RBC AUTO: 94 FL (ref 78–100)
PHOSPHATE SERPL-MCNC: 4.3 MG/DL (ref 2.5–4.5)
PLATELET # BLD AUTO: 242 10E3/UL (ref 150–450)
POTASSIUM SERPL-SCNC: 4 MMOL/L (ref 3.4–5.3)
RBC # BLD AUTO: 3.53 10E6/UL (ref 4.4–5.9)
SODIUM SERPL-SCNC: 137 MMOL/L (ref 135–145)
WBC # BLD AUTO: 6 10E3/UL (ref 4–11)

## 2023-11-20 PROCEDURE — 82310 ASSAY OF CALCIUM: CPT

## 2023-11-20 PROCEDURE — 120N000002 HC R&B MED SURG/OB UMMC

## 2023-11-20 PROCEDURE — 250N000013 HC RX MED GY IP 250 OP 250 PS 637: Performed by: STUDENT IN AN ORGANIZED HEALTH CARE EDUCATION/TRAINING PROGRAM

## 2023-11-20 PROCEDURE — 250N000011 HC RX IP 250 OP 636: Mod: JZ | Performed by: PHYSICIAN ASSISTANT

## 2023-11-20 PROCEDURE — 36415 COLL VENOUS BLD VENIPUNCTURE: CPT

## 2023-11-20 PROCEDURE — 92507 TX SP LANG VOICE COMM INDIV: CPT | Mod: GN

## 2023-11-20 PROCEDURE — 250N000013 HC RX MED GY IP 250 OP 250 PS 637: Performed by: OTOLARYNGOLOGY

## 2023-11-20 PROCEDURE — 250N000013 HC RX MED GY IP 250 OP 250 PS 637: Performed by: INTERNAL MEDICINE

## 2023-11-20 PROCEDURE — 250N000013 HC RX MED GY IP 250 OP 250 PS 637: Performed by: PHYSICIAN ASSISTANT

## 2023-11-20 PROCEDURE — 85027 COMPLETE CBC AUTOMATED: CPT

## 2023-11-20 PROCEDURE — 83735 ASSAY OF MAGNESIUM: CPT | Performed by: OTOLARYNGOLOGY

## 2023-11-20 PROCEDURE — 97110 THERAPEUTIC EXERCISES: CPT | Mod: GO

## 2023-11-20 PROCEDURE — 84100 ASSAY OF PHOSPHORUS: CPT | Performed by: OTOLARYNGOLOGY

## 2023-11-20 RX ORDER — AMOXICILLIN 250 MG
2 CAPSULE ORAL 2 TIMES DAILY
Status: DISCONTINUED | OUTPATIENT
Start: 2023-11-20 | End: 2023-11-21 | Stop reason: HOSPADM

## 2023-11-20 RX ORDER — MAGNESIUM OXIDE 400 MG/1
400 TABLET ORAL EVERY 4 HOURS
Qty: 2 TABLET | Refills: 0 | Status: COMPLETED | OUTPATIENT
Start: 2023-11-20 | End: 2023-11-20

## 2023-11-20 RX ADMIN — CALCIUM CARBONATE (ANTACID) CHEW TAB 500 MG 2500 MG: 500 CHEW TAB at 15:54

## 2023-11-20 RX ADMIN — ENOXAPARIN SODIUM 40 MG: 40 INJECTION SUBCUTANEOUS at 11:59

## 2023-11-20 RX ADMIN — WHITE PETROLATUM: 1.75 OINTMENT TOPICAL at 00:09

## 2023-11-20 RX ADMIN — LEVOTHYROXINE SODIUM 100 MCG: 100 TABLET ORAL at 06:39

## 2023-11-20 RX ADMIN — OXYCODONE HYDROCHLORIDE 15 MG: 5 TABLET ORAL at 14:55

## 2023-11-20 RX ADMIN — OXYCODONE HYDROCHLORIDE 15 MG: 5 TABLET ORAL at 11:58

## 2023-11-20 RX ADMIN — Medication 15 ML: at 11:59

## 2023-11-20 RX ADMIN — PREGABALIN 25 MG: 25 CAPSULE ORAL at 20:34

## 2023-11-20 RX ADMIN — OXYCODONE HYDROCHLORIDE 15 MG: 5 TABLET ORAL at 08:40

## 2023-11-20 RX ADMIN — DOCUSATE SODIUM AND SENNOSIDES 2 TABLET: 8.6; 5 TABLET, FILM COATED ORAL at 08:40

## 2023-11-20 RX ADMIN — METHOCARBAMOL 500 MG: 500 TABLET ORAL at 00:32

## 2023-11-20 RX ADMIN — DOXAZOSIN 1 MG: 1 TABLET ORAL at 08:39

## 2023-11-20 RX ADMIN — OXYCODONE HYDROCHLORIDE 15 MG: 5 TABLET ORAL at 00:32

## 2023-11-20 RX ADMIN — ACETAMINOPHEN 650 MG: 325 TABLET, FILM COATED ORAL at 08:40

## 2023-11-20 RX ADMIN — OXYCODONE HYDROCHLORIDE 15 MG: 5 TABLET ORAL at 04:58

## 2023-11-20 RX ADMIN — CHLORHEXIDINE GLUCONATE 15 ML: 1.2 RINSE ORAL at 20:34

## 2023-11-20 RX ADMIN — BUPRENORPHINE AND NALOXONE 1 FILM: 4; 1 FILM BUCCAL; SUBLINGUAL at 20:34

## 2023-11-20 RX ADMIN — POLYETHYLENE GLYCOL 3350 17 G: 17 POWDER, FOR SOLUTION ORAL at 08:39

## 2023-11-20 RX ADMIN — ACETAMINOPHEN 650 MG: 325 TABLET, FILM COATED ORAL at 04:58

## 2023-11-20 RX ADMIN — MAGNESIUM OXIDE TAB 400 MG (241.3 MG ELEMENTAL MG) 400 MG: 400 (241.3 MG) TAB at 08:39

## 2023-11-20 RX ADMIN — ACETAMINOPHEN 650 MG: 325 TABLET, FILM COATED ORAL at 00:32

## 2023-11-20 RX ADMIN — BUPRENORPHINE AND NALOXONE 1 FILM: 4; 1 FILM BUCCAL; SUBLINGUAL at 08:40

## 2023-11-20 RX ADMIN — ACETAMINOPHEN 650 MG: 325 TABLET, FILM COATED ORAL at 19:01

## 2023-11-20 RX ADMIN — PREGABALIN 25 MG: 25 CAPSULE ORAL at 08:40

## 2023-11-20 RX ADMIN — DOCUSATE SODIUM AND SENNOSIDES 2 TABLET: 8.6; 5 TABLET, FILM COATED ORAL at 20:34

## 2023-11-20 RX ADMIN — ACETAMINOPHEN 650 MG: 325 TABLET, FILM COATED ORAL at 14:55

## 2023-11-20 RX ADMIN — CALCIUM CARBONATE (ANTACID) CHEW TAB 500 MG 2500 MG: 500 CHEW TAB at 00:32

## 2023-11-20 RX ADMIN — CALCIUM CARBONATE (ANTACID) CHEW TAB 500 MG 2500 MG: 500 CHEW TAB at 21:00

## 2023-11-20 RX ADMIN — CALCITRIOL 0.5 MCG: 1 SOLUTION ORAL at 08:40

## 2023-11-20 RX ADMIN — CHLORHEXIDINE GLUCONATE 15 ML: 1.2 RINSE ORAL at 08:39

## 2023-11-20 RX ADMIN — OXYCODONE HYDROCHLORIDE 15 MG: 5 TABLET ORAL at 20:53

## 2023-11-20 RX ADMIN — WHITE PETROLATUM: 1.75 OINTMENT TOPICAL at 14:55

## 2023-11-20 RX ADMIN — CALCIUM CARBONATE (ANTACID) CHEW TAB 500 MG 2500 MG: 500 CHEW TAB at 10:25

## 2023-11-20 RX ADMIN — METHOCARBAMOL 500 MG: 500 TABLET ORAL at 06:39

## 2023-11-20 RX ADMIN — WHITE PETROLATUM: 1.75 OINTMENT TOPICAL at 10:24

## 2023-11-20 RX ADMIN — OXYCODONE HYDROCHLORIDE 15 MG: 5 TABLET ORAL at 17:58

## 2023-11-20 RX ADMIN — Medication 40 MG: at 08:39

## 2023-11-20 RX ADMIN — MAGNESIUM OXIDE TAB 400 MG (241.3 MG ELEMENTAL MG) 400 MG: 400 (241.3 MG) TAB at 11:59

## 2023-11-20 ASSESSMENT — ACTIVITIES OF DAILY LIVING (ADL)
ADLS_ACUITY_SCORE: 37

## 2023-11-20 NOTE — PLAN OF CARE
Status: S/p total laryngectomy, bilateral neck dissection, L pectoralis flap 11/14. Hx of laryngeal cancer and tracheostomy and PEG site 9/2023  Vitals: VSS  Neuros: Intact, writes or mouths words to communicate   IV: PIV SL   Labs/Electrolytes: WNL   Resp/trach: Ever tube in place with HME on. Pt independently suctioned x1 this shift and independently cleaned ever tube x1 this shift. Good productive cough. Pt oral suctions with red maribel independently as needed.   Diet: NPO. Bolus TF with goal of 4 cans/day. Tolerated 3 cans total today. Pt does TF independently via syringe.    Bowel status: LBM 11/18, BS+  : Voiding spontaneously via urinal at bedside   Skin: Laryngectomy stoma, bilateral neck incisions with AFSHAN x1, JEFFERSON. L neck bump/swelling. L chest incision with AFSHAN x1, JEFFERSON. PEG site CDI.   Pain: Neck and incisional pain managed with PRN oxycodone, tylenol, and robaxin   Activity: SBA   Plan/Updates this shift: Laryngectomy PLC done today, continue to encourage pt to do laryngectomy cares independently. Continue with POC.

## 2023-11-20 NOTE — PLAN OF CARE
Status: S/p total laryngectomy, bilateral neck dissection, L pectoralis flap 11/14. Hx of laryngeal cancer and tracheostomy and PEG site 9/2023   Vitals: VSS on RA, ever tube in place w/ HME  Neuros: intact, writes to communicate  IV: PIV SL x2  Labs/Electrolytes: Mg replaced this AM  Resp/trach: ever w/ tube and HME in place; pt not lavaged/sxn this shift; pt has good cough  Diet: NPO; 4/4 can today, 2 this shift; pt does PEG cares IND  Bowel status: BS+, LBM 11/20, bowel meds given  : void spont via urinal  Skin: Laryngectomy stoma, bilateral neck incisions sutures intact, AFSHAN x1. L neck/chest edema unchanged. L chest incision closed with sutures. Aquaphor applied to all incisions. PEG site WNL. Back wound dressing CDI   Pain: managed w/ PRN oxy and tylenol  Activity: IND, pt walked halls x2  Plan: discharge tomorrow to home; continue to have pt do ever and PEG cares IND; needs AFSHAN education before discharge

## 2023-11-20 NOTE — PLAN OF CARE
Vitals: VSS    Neuros: Intact - Writes or mouths words to communicate   Labs: Mg replaced   IV:  PIV SLd   Resp/trach: Laryngectomy with ever tube and HME in place. Lavaged/suctioned x 2- Indep. Pt has good cough.   Diet: NPO. Tolerated 2 cans of TF today. Pt does TF via syringe indep.   Bowel status:  11/20  : Voided multiple times  Skin: Laryngectomy stoma, bilateral neck incisions sutures intact, AFSHAN x1. L neck/chest edema unchanged. L chest incision closed with sutures. Aquaphor applied to all incisions. PEG site WNL. Back wound dressing CDI  Pain: Generalized incisional pain and left shoulder pain managed with scheduled medications and oxycodone.   Activity: SBA - Walked halls with staff and indep   Social: Daughter updated about discharge plan for tomorrow   Plan/updates: Doing well with ever and peg cares, pt also was instructed how to empty AFSHAN. Discharge home tomorrow.         Goal Outcome Evaluation:       Plan of Care Reviewed With: patient     Overall Patient Progress: improvingOverall Patient Progress: improving

## 2023-11-20 NOTE — PROGRESS NOTES
Care Management Follow Up    Length of Stay (days): 6    Expected Discharge Date: 11/21/2023     Concerns to be Addressed: discharge planning     Patient plan of care discussed at interdisciplinary rounds: Yes    Anticipated Discharge Disposition: Home  Anticipated Discharge Services: Resume home care  Anticipated Discharge DME: laryngectomy supplies, resume enteral formula and supplies     Patient/family educated on Medicare website which has current facility and service quality ratings: no  Education Provided on the Discharge Plan: Yes  Patient/Family in Agreement with the Plan: yes     Referrals Placed by CM/SW: HC, DME  Private pay costs discussed: Not applicable     Additional Information:  Patient status reviewed, discussed in discharge rounds. Provider is wanting patient to get up to goal 4/4 cans tube feeds prior to discharge. It is anticipated patient will be ready for discharge tomorrow. Daughter had planned to be ride home and will bring suction machine for transport.    Met with patient to discuss discharge plan. He gave permission to coordinate a ride home with his daughter, Artis.    Call placed to Artis (ph:[phone]). She was able to take tomorrow off of work and will be at the hospital around noon to pick the patient up. She will bring patient's suction machine.    Call placed to [facility] to verify patient has received all supplies requested on laryngectomy supply order.  Per staff, patient has received all requested supplies with the exception of cotton tip applicators. They plan to review order and recent shipment again and ship applicators if not previously sent. They will follow up with RNCC with any questions or concerns.    Updated [facility] liaison, on discharge plan for tomorrow.    Updated [facility] on discharge plan for tomorrow.    RNCC will continue to follow.    [facility] - accepted home RN  Ph: [phone]  Fax: [phone]  *orders placed      [facility] - resume home enteral formula and supplies  Ph: [phone]  Fax: [phone]  *orders placed     [facility] [place] - laryngectomy supplies  Ph: [phone]  Fax: [phone]     Saba Huddleston RN, BSN  6A RN Care Coordinator  Ph: [phone]   Pager: [phone]

## 2023-11-20 NOTE — PROGRESS NOTES
"Otolaryngology Progress Note    S: LUCITA SULLIVAN yesterday, still working on tube feeding and getting feeds to goal. No BM for a few days. Patient states he does not feel fully comfortable with his cares yet.     O: /70 (BP Location: Right arm)   Pulse 56   Temp 98.6  F (37  C) (Oral)   Resp 16   Ht 1.727 m (5' 8\")   Wt 55 kg (121 lb 4.1 oz)   SpO2 96%   BMI 18.44 kg/m    General: Alert and oriented x 3, No acute distress  HEENT: No tenderness on incision line. Area appears clean and intact without dehiscence. Right AFSHAN x1 with serosang output, possible saliva in drain. Chest Jpx1 with serosang output. Larngectomy tube is in place.   Pulmonary: Breathing non-labored, no stridor, no accessory muscle use.    AFSHAN drain output(s): (last shift)/(last 24 hours)  Chest drain: 5/1/1  Drain 2 right neck: 6/4/5    BMP WNL    WBC 6.0, Hgb 10.3 (10.7)    Amylase neck drain 10.0    A/P: Mr. Jules is a 68M with recurrent laryngeal cancer s/p total laryngectomy, bilateral ND, with left pectoralis myofascial pedicled onlay 11/14. Plan to pull chest drain today, work on cares with patient.     HEENT:  - Laryngectomy cares  - Keep ever tube in place with HME  - AFSHAN cares, incision cares  - Peridex    Neuro:   - Pt states oxy is working for him so will keep oxy 10-15 q3h and not add oral dilaudid  - Pain control per pain management recs:   Continue PTA dose of Suboxone 4/1 film SL BID.  Discontinue oxycodone to 10-15 mg q3h PRN.   Start Dilaudid 4-6mg PO Q 3 hours PRN. Start at 4mg.  Allow IV Dilaudid 0.2mg BID PRN for rescue.  Start Lyrica 25mg PO BID.  (Did not want to use gabapentin because he states in past it caused nausea-no other side effects).  Start Robaxin 500mg PO Q 6 hours PRN.   Lidocaine patches, apply 1-2 next to bilateral neck incisions.  IF not adhering,can consider lidocaine ointment.   Continue Tylenol    CV:  -Hemodynamically stable    GI:  - Inadequate oral intake related to minimal oral intake, weight " loss as evidenced by need for alternative route for nutrition to meet 100% of estimated nutritional needs     - Nutrition Education: Provided education on RD role in nutrition POC, TF initiation with continuous TF  - NPO  - Pt has PTA G tube, trickle TF POD1  - BMP, Mg, Phos   - Zofran x48h, then prn  - Bowel reg    :   - Doxazosin    Endocrine:  - Follow electrolytes  - PTA synthroid continued    ID:  - Unasyn x 48 hours (completed)  - Peridex mouth rinses QID on POD 1    Heme:  - Unasyn x48h (completed)   - CBC    Consults  - PT  - OT  - Nutrition  - SLP   - Pain Management  - PLC laryngectomy     -- Patient and above plan discussed with Demetrio Valencia MD  Otolaryngology - Head and Neck Surgery PGY-3

## 2023-11-20 NOTE — PLAN OF CARE
Status: S/p total laryngectomy, bilateral neck dissection, L pectoralis flap 11/14. Hx of laryngeal cancer and tracheostomy and PEG site 9/2023  Vitals: VSS. Ever tube in place with HME.   Neuros: Intact, writes or mouths words to communicate   IV: PIV SL   Labs/Electrolytes: WNL   Resp/trach: Ever tube in place with HME, tube pinned to gown no ties around neck. Pt. Self suctioned ever x1 with red maalika for moderate amount of creamy thick secretions. Pt oral suctions with red maribel independently as needed.   Diet: NPO with bolus TF TwoCal HN via PEG. Goal is 4 cans/day, received only 3/4 cans yesterday d/t being too full. 150mL FWF before/after each bolus.   Bowel status: LBM 11/19  : Voiding spontaneously via urinal at bedside   Skin: Bilateral neck incisions JEFFERSON with sutures. L. Neck JPx1 to bulb with 5mL serous output. L neck swollen. L. Chest incision JEFFERSON with sutures and JPx1 to bulb with 1mL bloody output/clots. Laryngectomy stoma with ever tube in place+HME. PEG site with dressing, CDI.   Pain: Neck and incisional pain managed with PRN oxycodone, tylenol, and robaxin   Activity: SBA   Plan/Updates this shift: Anticipated discharge home with home care. Continue with POC

## 2023-11-21 ENCOUNTER — DOCUMENTATION ONLY (OUTPATIENT)
Dept: NURSING | Facility: CLINIC | Age: 68
End: 2023-11-21
Payer: MEDICARE

## 2023-11-21 ENCOUNTER — APPOINTMENT (OUTPATIENT)
Dept: SPEECH THERAPY | Facility: CLINIC | Age: 68
DRG: 011 | End: 2023-11-21
Attending: OTOLARYNGOLOGY
Payer: MEDICARE

## 2023-11-21 ENCOUNTER — APPOINTMENT (OUTPATIENT)
Dept: OCCUPATIONAL THERAPY | Facility: CLINIC | Age: 68
DRG: 011 | End: 2023-11-21
Attending: OTOLARYNGOLOGY
Payer: MEDICARE

## 2023-11-21 VITALS
OXYGEN SATURATION: 98 % | TEMPERATURE: 97.8 F | HEART RATE: 59 BPM | SYSTOLIC BLOOD PRESSURE: 112 MMHG | RESPIRATION RATE: 16 BRPM | BODY MASS INDEX: 17.88 KG/M2 | WEIGHT: 118 LBS | DIASTOLIC BLOOD PRESSURE: 70 MMHG | HEIGHT: 68 IN

## 2023-11-21 DIAGNOSIS — Z09 HOSPITAL DISCHARGE FOLLOW-UP: ICD-10-CM

## 2023-11-21 LAB
ERYTHROCYTE [DISTWIDTH] IN BLOOD BY AUTOMATED COUNT: 13.5 % (ref 10–15)
HCT VFR BLD AUTO: 35 % (ref 40–53)
HGB BLD-MCNC: 10.9 G/DL (ref 13.3–17.7)
HOLD SPECIMEN: NORMAL
MAGNESIUM SERPL-MCNC: 2.1 MG/DL (ref 1.7–2.3)
MCH RBC QN AUTO: 29.6 PG (ref 26.5–33)
MCHC RBC AUTO-ENTMCNC: 31.1 G/DL (ref 31.5–36.5)
MCV RBC AUTO: 95 FL (ref 78–100)
PHOSPHATE SERPL-MCNC: 4 MG/DL (ref 2.5–4.5)
PLATELET # BLD AUTO: 260 10E3/UL (ref 150–450)
POTASSIUM SERPL-SCNC: 4.3 MMOL/L (ref 3.4–5.3)
RBC # BLD AUTO: 3.68 10E6/UL (ref 4.4–5.9)
WBC # BLD AUTO: 5.4 10E3/UL (ref 4–11)

## 2023-11-21 PROCEDURE — 250N000013 HC RX MED GY IP 250 OP 250 PS 637: Performed by: STUDENT IN AN ORGANIZED HEALTH CARE EDUCATION/TRAINING PROGRAM

## 2023-11-21 PROCEDURE — 83735 ASSAY OF MAGNESIUM: CPT | Performed by: OTOLARYNGOLOGY

## 2023-11-21 PROCEDURE — 250N000013 HC RX MED GY IP 250 OP 250 PS 637: Performed by: PHYSICIAN ASSISTANT

## 2023-11-21 PROCEDURE — 85027 COMPLETE CBC AUTOMATED: CPT

## 2023-11-21 PROCEDURE — 84132 ASSAY OF SERUM POTASSIUM: CPT | Performed by: OTOLARYNGOLOGY

## 2023-11-21 PROCEDURE — 97530 THERAPEUTIC ACTIVITIES: CPT | Mod: GO

## 2023-11-21 PROCEDURE — 250N000013 HC RX MED GY IP 250 OP 250 PS 637: Performed by: OTOLARYNGOLOGY

## 2023-11-21 PROCEDURE — 84100 ASSAY OF PHOSPHORUS: CPT | Performed by: OTOLARYNGOLOGY

## 2023-11-21 PROCEDURE — 250N000013 HC RX MED GY IP 250 OP 250 PS 637: Performed by: INTERNAL MEDICINE

## 2023-11-21 PROCEDURE — 92507 TX SP LANG VOICE COMM INDIV: CPT | Mod: GN

## 2023-11-21 PROCEDURE — 36415 COLL VENOUS BLD VENIPUNCTURE: CPT | Performed by: OTOLARYNGOLOGY

## 2023-11-21 RX ORDER — AMOXICILLIN AND CLAVULANATE POTASSIUM 400; 57 MG/5ML; MG/5ML
875 POWDER, FOR SUSPENSION ORAL 2 TIMES DAILY
Qty: 153.16 ML | Refills: 0 | Status: SHIPPED | OUTPATIENT
Start: 2023-11-21 | End: 2023-11-28

## 2023-11-21 RX ORDER — POLYETHYLENE GLYCOL 3350 17 G/17G
17 POWDER, FOR SOLUTION ORAL DAILY
Qty: 510 G | Refills: 0 | Status: SHIPPED | OUTPATIENT
Start: 2023-11-21

## 2023-11-21 RX ORDER — CALCIUM CARBONATE 500 MG/1
TABLET, CHEWABLE ORAL
Qty: 105 TABLET | Refills: 0 | Status: SHIPPED | OUTPATIENT
Start: 2023-11-21 | End: 2023-12-05

## 2023-11-21 RX ORDER — GUAIFENESIN 600 MG/1
15 TABLET, EXTENDED RELEASE ORAL DAILY
Qty: 237 ML | Refills: 0 | Status: SHIPPED | OUTPATIENT
Start: 2023-11-21

## 2023-11-21 RX ORDER — MINERAL OIL/HYDROPHIL PETROLAT
OINTMENT (GRAM) TOPICAL 3 TIMES DAILY
Qty: 99 G | Refills: 3 | Status: SHIPPED | OUTPATIENT
Start: 2023-11-21

## 2023-11-21 RX ORDER — AMOXICILLIN 250 MG
2 CAPSULE ORAL 2 TIMES DAILY PRN
Qty: 30 TABLET | Refills: 0 | Status: SHIPPED | OUTPATIENT
Start: 2023-11-21

## 2023-11-21 RX ORDER — OXYCODONE HYDROCHLORIDE 10 MG/1
10-15 TABLET ORAL EVERY 4 HOURS PRN
Qty: 60 TABLET | Refills: 0 | Status: SHIPPED | OUTPATIENT
Start: 2023-11-21 | End: 2024-08-07

## 2023-11-21 RX ORDER — ACETAMINOPHEN 325 MG/1
650 TABLET ORAL EVERY 4 HOURS PRN
Qty: 50 TABLET | Refills: 0 | Status: SHIPPED | OUTPATIENT
Start: 2023-11-21

## 2023-11-21 RX ORDER — CHLORHEXIDINE GLUCONATE ORAL RINSE 1.2 MG/ML
15 SOLUTION DENTAL 2 TIMES DAILY
Qty: 473 ML | Refills: 0 | Status: SHIPPED | OUTPATIENT
Start: 2023-11-21

## 2023-11-21 RX ORDER — PREGABALIN 25 MG/1
25 CAPSULE ORAL 2 TIMES DAILY
Qty: 28 CAPSULE | Refills: 0 | Status: SHIPPED | OUTPATIENT
Start: 2023-11-21 | End: 2023-12-05

## 2023-11-21 RX ORDER — CALCITRIOL 1 UG/ML
0.5 SOLUTION ORAL DAILY
Qty: 7 ML | Refills: 0 | Status: SHIPPED | OUTPATIENT
Start: 2023-11-22 | End: 2023-12-06

## 2023-11-21 RX ORDER — DOXAZOSIN 1 MG/1
1 TABLET ORAL DAILY
Qty: 7 TABLET | Refills: 0 | Status: SHIPPED | OUTPATIENT
Start: 2023-11-22 | End: 2023-11-29

## 2023-11-21 RX ORDER — LIDOCAINE 4 G/G
1-2 PATCH TOPICAL EVERY 24 HOURS
Qty: 15 PATCH | Refills: 0 | Status: SHIPPED | OUTPATIENT
Start: 2023-11-21 | End: 2024-08-07

## 2023-11-21 RX ADMIN — OXYCODONE HYDROCHLORIDE 15 MG: 5 TABLET ORAL at 05:00

## 2023-11-21 RX ADMIN — OXYCODONE HYDROCHLORIDE 15 MG: 5 TABLET ORAL at 01:09

## 2023-11-21 RX ADMIN — OXYCODONE HYDROCHLORIDE 15 MG: 5 TABLET ORAL at 12:22

## 2023-11-21 RX ADMIN — OXYCODONE HYDROCHLORIDE 15 MG: 5 TABLET ORAL at 09:01

## 2023-11-21 RX ADMIN — LEVOTHYROXINE SODIUM 100 MCG: 100 TABLET ORAL at 05:02

## 2023-11-21 RX ADMIN — ACETAMINOPHEN 650 MG: 325 TABLET, FILM COATED ORAL at 09:03

## 2023-11-21 RX ADMIN — DOCUSATE SODIUM AND SENNOSIDES 2 TABLET: 8.6; 5 TABLET, FILM COATED ORAL at 09:01

## 2023-11-21 RX ADMIN — ACETAMINOPHEN 650 MG: 325 TABLET, FILM COATED ORAL at 04:59

## 2023-11-21 RX ADMIN — BUPRENORPHINE AND NALOXONE 1 FILM: 4; 1 FILM BUCCAL; SUBLINGUAL at 08:59

## 2023-11-21 RX ADMIN — CALCITRIOL 0.5 MCG: 1 SOLUTION ORAL at 09:01

## 2023-11-21 RX ADMIN — POLYETHYLENE GLYCOL 3350 17 G: 17 POWDER, FOR SOLUTION ORAL at 09:03

## 2023-11-21 RX ADMIN — PREGABALIN 25 MG: 25 CAPSULE ORAL at 09:02

## 2023-11-21 RX ADMIN — CHLORHEXIDINE GLUCONATE 15 ML: 1.2 RINSE ORAL at 09:01

## 2023-11-21 RX ADMIN — Medication 40 MG: at 09:03

## 2023-11-21 RX ADMIN — CALCIUM CARBONATE (ANTACID) CHEW TAB 500 MG 2500 MG: 500 CHEW TAB at 09:04

## 2023-11-21 RX ADMIN — LIDOCAINE 2 PATCH: 4 PATCH TOPICAL at 09:02

## 2023-11-21 RX ADMIN — Medication 15 ML: at 12:22

## 2023-11-21 RX ADMIN — DOXAZOSIN 1 MG: 1 TABLET ORAL at 09:03

## 2023-11-21 RX ADMIN — WHITE PETROLATUM: 1.75 OINTMENT TOPICAL at 01:12

## 2023-11-21 RX ADMIN — WHITE PETROLATUM: 1.75 OINTMENT TOPICAL at 09:11

## 2023-11-21 RX ADMIN — ACETAMINOPHEN 650 MG: 325 TABLET, FILM COATED ORAL at 01:06

## 2023-11-21 ASSESSMENT — ACTIVITIES OF DAILY LIVING (ADL)
ADLS_ACUITY_SCORE: 37
ADLS_ACUITY_SCORE: 38

## 2023-11-21 NOTE — PLAN OF CARE
Discharge time/date: 11/21/23 1:15PM  Walked or Wheelchair: wheelchair  PIV removed: yes  Reviewed AVS with patient: yes & w/ daughter   Medication due times added to AVS in EPIC: yes   Verbalized understanding of discharge with teachback: yes  Medications retrieved from pharmacy: yes  Supplies sent home: AFSHAN education sheet, TF supplies, ever supplies, including education sheets.    Belongings from security with patient: n/a       S/p total ever, bilateral neck dissection, L pectoralis flap. VSS on RA w/ HME. AOx4, writes to communicate. Lungs clear. NPO with bolus feeds. Pt independently managing PEG, ever, & AFSHAN. AFSHAN education reinforced. BM 11/21. Voids without difficultly. Pain managed with tylenol, oxy. Laryngectomy stoma, bilateral neck incisions sutures intact, AFSHAN x1. L neck/chest edema unchanged. L chest incision closed with sutures. Aquaphor applied to all incisions. Up independently. Daughter at bedside for discharge, going home today.

## 2023-11-21 NOTE — PLAN OF CARE
Occupational Therapy Discharge Summary    Reason for therapy discharge:    Discharged to home.    Progress towards therapy goal(s). See goals on Care Plan in [facility] electronic health record for goal details.  Goals met    Therapy recommendation(s):    No further therapy is recommended.

## 2023-11-21 NOTE — PROGRESS NOTES
Care Management Discharge Note    Discharge Date: 11/21/2023     Discharge Disposition: Home  Discharge Services: Home Care  Discharge DME:  laryngectomy supplies, resume enteral formula and supplies     Discharge Transportation: family or friend will provide    Private pay costs discussed: Not applicable    Does the patient's insurance plan have a 3 day qualifying hospital stay waiver?  No    Education Provided on the Discharge Plan: Yes  Persons Notified of Discharge Plans: Patient, family, provider, nurse  Patient/Family in Agreement with the Plan: Yes    Handoff Referral Completed: Yes    Additional Information:  Patient status reviewed, discussed in discharge rounds. Spoke with provider who states patient is medically ready for discharge today. Daughter had planned to be here at noon to take patient home.    Call received from daughter this morning stating [place] and [place] the patient's suction machine at home.    Call placed to [facility], spoke with Arcadio. Arcadio confirmed they will send out cotton tip applicators requested yesterday. Inquired about getting a suction machine for the patient's ride home, Arcadio advised to speak with local [facility] branch.     Call placed to [facility], spoke with Meaghan. Meaghan states they are able to deliver a suction machine to the hospital around 1130 this morning. She states they will request that the patient sign a waiver stating he will be charged for the extra suction machine if it is not returned to the [place] store by Friday. Informed both patient and daughter who are in agreement.     Spoke with Meaghan [facility] liaison. She has met with patient and he does not need any additional enteral supplies delivered to the hospital prior to discharge.     Call placed to [facility] to update on discharge. They plan to have a nurse out to see patient tomorrow. AVS updated with this information. Discharge orders faxed to  [facility].     Spoke with bedside nurse and provided update on discharge plan. She will plan to send patient home with extra ever and enteral supplies, including extra cotton tip applicators.     Handoff complete. IMM given by CHW.     [facility] - accepted home RN  Ph: [phone]  Fax: [phone]  *orders placed     [facility] - resume home enteral formula and supplies  Ph: [phone]  Fax: [phone]  *orders placed     [facility] [place] - laryngectomy supplies  Ph: [phone]  Fax: [phone]     Saba Huddleston, RN, BSN  6A RN Care Coordinator  Ph: [phone]   Pager: [phone]

## 2023-11-21 NOTE — PLAN OF CARE
Goal Outcome Evaluation:      Plan of Care Reviewed With: patient    Status:  S/p total laryngectomy, bilateral neck dissection, L pectoralis flap 11/14. Hx of laryngeal cancer and tracheostomy and PEG site 9/2023   Vitals: VSS on RA, Michelle tube in place with HME  Neuros: A&Ox4, writes to communicate  IV: PIV SL x2 R and L  Resp/trach: Michelle w/tube and HME in place,   Diet: NPO, pt does PEG cares independently  Bowel status: LBM 11/20, Bs+  : voids spont via urinal at bedside  Skin: : Laryngectomy stoma, bilateral neck incisions sutures intact, AFSHAN x1. L neck/chest edema unchanged. L chest incision closed with sutures. Aquaphor applied to all incisions. PEG site WNL. Back wound dressing CDI, pt declined suctioning--no secretion observed  Pain: managed with PRN oxy and tylenol  Activity: Independent  Social: no visitors  Plan: to be discharged this morning, needs AFSHAN education before discharge.

## 2023-11-21 NOTE — PROGRESS NOTES
Discharge date: 11/21/23 ([facility])  Discharge therapies to be provided by [facility]: Enteral  Formula: TwoCal HN   Rate/Dose: 1 can qid via gravity  Provider to manage enteral at home: Same physician as prior to this admission.  Estimated length of need: 90 days or more  Education completed: None needed. Had enteral feedings prior to admission.  Delivery/supplies: Delivery to pt's home. No supplies needed at this time.  [facility]  SNV: Unknown  Notes: Met with pt at bedside. Pt is non verbal and answers yes/no questions or writes to communicate.  Went over discharge orders of enteral feeding.  Pt agreed that he understood the orders.  Asked if he was in need of supplies and he stated he has enough in the home for now. Informed him of the water flushes 180ml before and after each feeding. Along with 15-30ml flushes before and after feeding.  Explained his head needs to be 30 degrees or higher when receiving tube feeding and medications and to stay up for 30-60 minutes after to decrease chance of aspiration. Pt requested for this writer to call his daughter and inform her of this information as well.  Call placed to pt's daughter Artis and she asked for communication go through her due to pt non verbal.  And she stated she has no questions at this time.      Thank you,  Meaghan Cantu LPN  Enteral Nurse Liaison  [facility]  [address] [place] [address]  [phone]

## 2023-11-21 NOTE — PLAN OF CARE
Speech Language Therapy Discharge Summary     Reason for therapy discharge:    Discharged to home with outpatient therapy.     Progress towards therapy goal(s). See goals on Care Plan in [facility] electronic health record for goal details.  Goals partially met.  Barriers to achieving goals:   discharge from facility.     Therapy recommendation(s):    Continued therapy is recommended.  Rationale/Recommendations:  for ongoing speech therapy as pt is a new total laryngectomy .

## 2023-11-21 NOTE — PROGRESS NOTES
"Otolaryngology Progress Note    S: NAEON. Tolerating bolus tube feeds, at goal. BM yesterday. Feeling more comfortable with cares. Pain well controlled.     O: /71 (BP Location: Right arm)   Pulse 70   Temp 97.6  F (36.4  C) (Axillary)   Resp 18   Ht 1.727 m (5' 8\")   Wt 53.5 kg (118 lb)   SpO2 100%   BMI 17.94 kg/m    General: Alert and oriented x 3, No acute distress  HEENT: No tenderness on incision line. Area appears clean and intact without dehiscence. Right AFSHAN x1 with serosang output, possible saliva in drain. Larngectomy tube is in place with HME.   Pulmonary: Breathing non-labored, no stridor, no accessory muscle use.    AFSHAN drain output(s): (last shift)/(last 24 hours)  Drain 2 right neck: 3/2.5/2    Labs pending this AM.     A/P: Mr. Jules is a 68M with recurrent laryngeal cancer s/p total laryngectomy, bilateral ND, with left pectoralis myofascial pedicled onlay 11/14. Plan to pull chest drain today, work on cares with patient.     HEENT:  - Laryngectomy cares  - Keep ever tube in place with HME  - AFSHAN cares, incision cares  - Peridex    Neuro:   - Pt states oxy is working for him so will keep oxy 10-15 q3h and not add oral dilaudid  - Pain control per pain management recs:   Continue PTA dose of Suboxone 4/1 film SL BID.  Start Dilaudid 4-6mg PO Q 3 hours PRN. Start at 4mg.  Start Lyrica 25mg PO BID.  (Did not want to use gabapentin because he states in past it caused nausea-no other side effects).  Start Robaxin 500mg PO Q 6 hours PRN.   Lidocaine patches, apply 1-2 next to bilateral neck incisions.  IF not adhering,can consider lidocaine ointment.   Continue Tylenol    CV:  -Hemodynamically stable    GI:  - Inadequate oral intake related to minimal oral intake, weight loss as evidenced by need for alternative route for nutrition to meet 100% of estimated nutritional needs     - Nutrition Education: Provided education on RD role in nutrition POC, TF initiation with continuous TF  - NPO  - Pt " has PTA G tube, trickle TF POD1  - BMP, Mg, Phos   - Zofran x48h, then prn  - Bowel reg - increased yesterday, patient had BM    :   - Doxazosin    Endocrine:  - Follow electrolytes  - PTA synthroid continued    ID:  - Unasyn x 48 hours (completed)  - Peridex mouth rinses QID on POD 1    Heme:  - Unasyn x48h (completed)   - CBC    Consults  - PT  - OT  - Nutrition  - SLP   - Pain Management  - PLC laryngectomy     Likely discharge today    -- Patient and above plan discussed with Demetrio Nicolas MD  Otolaryngology - Head and Neck Surgery PGY-1

## 2023-11-22 ENCOUNTER — TELEPHONE (OUTPATIENT)
Dept: SPEECH THERAPY | Facility: CLINIC | Age: 68
End: 2023-11-22
Payer: MEDICARE

## 2023-11-22 ENCOUNTER — TELEPHONE (OUTPATIENT)
Dept: FAMILY MEDICINE | Facility: OTHER | Age: 68
End: 2023-11-22

## 2023-11-22 ENCOUNTER — NURSE TRIAGE (OUTPATIENT)
Dept: OTOLARYNGOLOGY | Facility: CLINIC | Age: 68
End: 2023-11-22
Payer: MEDICARE

## 2023-11-22 DIAGNOSIS — Z53.9 PERSONS ENCOUNTERING HEALTH SERVICES FOR SPECIFIC PROCEDURES, NOT CARRIED OUT: Primary | ICD-10-CM

## 2023-11-22 PROCEDURE — G0180 MD CERTIFICATION HHA PATIENT: HCPCS | Performed by: FAMILY MEDICINE

## 2023-11-22 NOTE — TELEPHONE ENCOUNTER
Not a  pt, routed back to Ana Palm RN.  Please send to [facility] - Jerald Kumar RN, BSN  [facility] [place]

## 2023-11-22 NOTE — TELEPHONE ENCOUNTER
"Nurse Triage SBAR    Is this a 2nd Level Triage? NO    Situation:  Patient admitted to Home Care and PHN Edelmira calls with concern noted \"  swelling yam-shaped over LEFT clavicle below LEFT AFSHAN drain, looks much different than RIGHT neck\"  Site is not reddened and no drainage noted. Chest and laryngectomy, neck dissection incisions and RIGHT AFSHAN site look good, dry, intact.  Patent doing very well with cares.            Background: Dr Atkinson: procedure:Direct laryngoscopy  Total laryngectomy  Bilateral neck dissection (levels II-IV)  Cricopharyngeal myotomy  Pectoralis myofascial flap          Assessment: PHN calling with concern regarding appearance of LEFT neck/ clavicular area. She has photo but patient can't access [employer]         Recommendation:  Review with clinic staff- send photo for review if secure email available, but I think that the PHN is describing patient's pectoralis flap reconstruction over the left clavicle. Op note reviewed.    Routed to provider    Does the patient meet one of the following criteria for ADS visit consideration? No    Additional Information   Negative: Major abdominal surgical incision and wound gaping open with visible internal organs   Negative: Bleeding from incision and won't stop after 10 minutes of direct pressure   Negative: Bleeding (more than a few drops) from incision and after tracheostomy or blood vessel surgery (e.g., carotid endarterectomy, femoral bypass graft, kidney dialysis fistula)   Negative: SEVERE pain in the incision   Negative: Incision gaping open and < 2 days (48 hours) since wound re-opened   Negative: Incision gaping open and length of opening > 2 inches (5 cm)   Negative: Bright red, wide-spread, sunburn-like rash   Negative: Patient sounds very sick or weak to the triager   Negative: Sounds like a serious complication to the triager   Negative: Fever > 100.4 F (38.0 C)    Answer Assessment - Initial Assessment Questions  1. SYMPTOM: \"What's the main " "symptom you're concerned about?\" (e.g., drainage, incision opening up, pain, redness)    Home nurse calls for concern regarding area at LEFt clavicle above AFSHAN site is fluid filled and shaped like a yam\"  2. ONSET:        Noted on Home care visit today. Discharged yesterday [facility]  3. SURGERY: \"What surgery did you have?\"       Laryngectomy and pect flap LEFT  4. DATE of SURGERY: \"When was the surgery?\"       11/14/23  5. INCISION SITE: \"Where is the incision located?\"    As noted  6. REDNESS: \"Is there any redness at the incision site?\" If Yes, ask: \"How wide across is the redness?\" (Inches, centimeters)       NONE  7. PAIN: \"Is there any pain?\" If Yes, ask: \"How bad is it?\"  (Scale 1-10; or mild, moderate, severe)        - MILD (1-3): doesn't interfere with normal activities       8. BLEEDING: \"Is there any bleeding?\" If Yes, ask: \"How much?\" and \"Where?\"   NONE  9. DRAINAGE: \"Is there any drainage from the incision site?\" If Yes, ask: \"What color and how much?\" (e.g., red, cloudy, pus; drops, teaspoon)  NONE  10. FEVER: \"Do you have a fever?\" If Yes, ask: \"What is your temperature, how was it measured, and when did it start?\"    NO FEVER  11. OTHER SYMPTOMS: \"Do you have any other symptoms?\" (e.g., dizziness, rash elsewhere on body, shaking chills, weakness)        DOING well at home    Protocols used: Post-Op Incision Symptoms and [id number]    "

## 2023-11-22 NOTE — TELEPHONE ENCOUNTER
Request for Home Care Skilled Nursing orders as follows:    SN eval and treat date: 11/22/2023    Continuation frequency =  ___1____ X week X ____3___ weeks, 5 prn            Effective date= 11/22/2023    Code status = Full    Interventions include:    Assessment  Medication management  O2 sat monitoring (all disciplines as needed)  Patient/caregiver education  Tracheostomy/laryngectomy education  PEG tube education as needed  Post surgical assessment and education  Fall risk: instruct on fall prevention strategies, home safety, assess environment   Instruct on pain relief measures and assess pain with each visit, if has pain. Assess effectiveness of medications.        Aan Palm RN on 11/22/2023 at 4:27 PM        Please respond with .HOMECAREAGREE, if you are in agreement. Please sign with your comments and signature, if you are not in agreement.

## 2023-11-22 NOTE — TELEPHONE ENCOUNTER
"To:    Dr. Hicks or Covering provider    URGENT: per CMS best practice, response is requested within 24 hours.    Home Care regulation mandates that you are notified about drug discrepancies, interactions & contraindications. Response within a 24 hour timeframe is established by CMS as \"best practice\" for the delivery of home health care. Home Care is required to report if the 24 hour timeframe was met. The home health clinician will contact you again if this timeframe is not met or if the response does not address all concerns.       Situation:        The patient was admitted to [facility], after being discharged from [facility] on 2023. Upon admission, a med reconciliation was completed to identify any drug discrepancies, interactions or contraindications. Home Care's drug regime review has revealed significant medication issues.     You are being contacted for clarifying orders related to the medication issues.     Background:  Possible Severe Medication Interactions:      Buprenorphine/pregabalin   Oxycodone/buprenorphine   Oxycodone/pregabalin    Medication Changes:   Omeprazole discontinued     New Medications:   Pantoprazole 2mg/ml suspension:  20ml once daily for 30 days started.   Pregabalin 25mg 2 times daily for 14 days started  Augmentin 400-57mg/5ml suspension: 10.54ml twice daily x 7 days  Calcitriol 0.5mg daily x 14 days  Aquaphor 3 times daily to incisions  Chlorhexidine 0.12%, swish and spit 15ml twice daily  Doxazosin 1mg daily x 7 days for urinary retention  Multivitamin w/ minerals liquid 15ml once daily  Lidocaine 4% patches: 1-2 patches daily as needed  Calcium Carbonate liquid 2,500mg per Gtube twice daily x 7 days, then 2,500mg once daily x 7 days.   Oxycodone IR 10m-1.5 tabs every 4 hours as needed         Assessment:    The pregabalin is only for 14 more days. Patient is taking the oxycodone sparingly and no refills ordered. No signs noted or " reports of severe interactions.     Recommendations:    Please evaluate this information and indicate below whether or not changes are required. A copy of the patient's drug interaction/contraindications report is available upon request.       CLINIC NURSE - If changes are made, please update the Epic medication profile.     Ana Palm RN on 11/22/2023 at 4:17 PM

## 2023-11-22 NOTE — TELEPHONE ENCOUNTER
Not a [facility] pt, routed back to Ana Palm to re route to appropriate pool.    Jennifer Kumar RN, BSN  [facility]

## 2023-11-22 NOTE — TELEPHONE ENCOUNTER
This patient would like to reschedule their Video Swallow Study. Needs to be completed at the [facility]. First availability I found was 12/14

## 2023-11-22 NOTE — TELEPHONE ENCOUNTER
[facility] Call Center    Phone Message    May a detailed message be left on voicemail: yes     Reason for Call: Other: Pts daughter called back returning call to Rafael. Please call Artis back to discuss. Artis requests if possible to leave a direct call back number as she is in and out of rooms with patients. Thank you.      Action Taken: Message routed to:  [facility] ([facility]    Travel Screening: Not Applicable

## 2023-11-22 NOTE — TELEPHONE ENCOUNTER
Called home health nurse to inquire about concerns. Nurse sent picture to writer's secure email, looks like the flap. Advised nurse that the flap and drain sites looked good and it is normal to note some brusing and swelling with healing. Writer noted to nurse if patient is to develop fever, lethargy, puss, or other drainage patient should go to the ER or Urgent care to be evaluated. Patient nurse was agreeable to this and will reach out if other symptoms arise. Quita Brown RN on 11/22/2023 at 2:53 PM

## 2023-11-22 NOTE — TELEPHONE ENCOUNTER
[facility] Call Center    Phone Message    May a detailed message be left on voicemail: yes     Reason for Call: Other: Pt's daughter called to reschedule swallow study appts. Please call back to reschedule     Action Taken: Other: [facility] E[facility]/SLP    Travel Screening: Not Applicable                                                                    Name band;

## 2023-11-24 DIAGNOSIS — F11.90 OPIOID USE DISORDER: ICD-10-CM

## 2023-11-24 NOTE — CONFIDENTIAL NOTE
RN received call from Ana Orr  Nurse looking for verbal orders to see patient. Please see her note below.  Thank you  Elizabeth Wray RN CC

## 2023-11-24 NOTE — TELEPHONE ENCOUNTER
Please see note below regarding drug interactions on patient.  Please advise to HC Nurse listed Ana Palm.  Thank you  Elizabeth Wray RN CC

## 2023-11-24 NOTE — PROGRESS NOTES
Prior Authorization Approval    Medication: OXYCODONE HCL 10 MG PO TABS  PA Initiated: 11/21/2023  PA Type: Quantity Limit    Insurance: [employer] - Phone [phone] Fax [phone]  Count includes the [facility] Key / Reference #: [id number] / [id number]   Authorization Effective Dates: 11/21/2023 -  until further notice    Expected CoPay: $ 0.00  CoPay Card Eligible: No    Filling Pharmacy: [facility] [place], [place] - [address] [place] [address]    Sandra Voss  [facility] Pharm[facility] Liaison  Ph: [phone]  Fax: [phone]  Available on Vocera (learn more here)

## 2023-11-27 ENCOUNTER — TELEPHONE (OUTPATIENT)
Dept: FAMILY MEDICINE | Facility: OTHER | Age: 68
End: 2023-11-27

## 2023-11-27 NOTE — TELEPHONE ENCOUNTER
MTM referral from: Transitions of Care (recent hospital discharge or ED visit)    MT referral outreach attempt #2 on November 27, 2023 at 9:37 AM      Outcome: Patient not reachable after several attempts, will route to [facility] Pharmacist/Provider as an FYI.  [facility] scheduling number is .  Thank you for the referral.    Use vbc for the carrier/Plan on the flowsheet      [employer] Message Sent    PEDRO Magana

## 2023-11-28 LAB
PATH REPORT.ADDENDUM SPEC: ABNORMAL
PATH REPORT.COMMENTS IMP SPEC: ABNORMAL
PATH REPORT.COMMENTS IMP SPEC: YES
PATH REPORT.FINAL DX SPEC: ABNORMAL
PATH REPORT.GROSS SPEC: ABNORMAL
PATH REPORT.INTRAOP OBS SPEC DOC: ABNORMAL
PATH REPORT.MICROSCOPIC SPEC OTHER STN: ABNORMAL
PATH REPORT.RELEVANT HX SPEC: ABNORMAL
PATHOLOGY SYNOPTIC REPORT: ABNORMAL
PHOTO IMAGE: ABNORMAL

## 2023-11-28 RX ORDER — BUPRENORPHINE AND NALOXONE 4; 1 MG/1; MG/1
FILM, SOLUBLE BUCCAL; SUBLINGUAL
Refills: 0 | OUTPATIENT
Start: 2023-11-28
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
## 2023-11-28 NOTE — TELEPHONE ENCOUNTER
Suboxone       Last Written Prescription Date:  10/26/2023  Last Fill Quantity: 56,   # refills: 0  Last Office Visit: 10/26/2023  Future Office visit:    Next 5 appointments (look out 90 days)      Nov 29, 2023 10:40 AM  (Arrive by 10:25 AM)  Return Visit with Birdie Atkinson MD  [facility] [place] ([facility] ) [address]  [address] [facility] [id number] [phone]     Dec 07, 2023 11:15 AM  (Arrive by 11:00 AM)  SHORT with Regina Hicks MD  [facility] [place] ([facility] [place] ) [address]  [place] [address] [phone]

## 2023-11-29 ENCOUNTER — ANCILLARY PROCEDURE (OUTPATIENT)
Dept: GENERAL RADIOLOGY | Facility: CLINIC | Age: 68
End: 2023-11-29
Attending: OTOLARYNGOLOGY
Payer: MEDICARE

## 2023-11-29 ENCOUNTER — THERAPY VISIT (OUTPATIENT)
Dept: SPEECH THERAPY | Facility: CLINIC | Age: 68
End: 2023-11-29
Attending: OTOLARYNGOLOGY
Payer: MEDICARE

## 2023-11-29 ENCOUNTER — OFFICE VISIT (OUTPATIENT)
Dept: OTOLARYNGOLOGY | Facility: CLINIC | Age: 68
End: 2023-11-29
Payer: MEDICARE

## 2023-11-29 VITALS
HEIGHT: 68 IN | WEIGHT: 119 LBS | HEART RATE: 66 BPM | DIASTOLIC BLOOD PRESSURE: 68 MMHG | SYSTOLIC BLOOD PRESSURE: 113 MMHG | OXYGEN SATURATION: 100 % | BODY MASS INDEX: 18.04 KG/M2

## 2023-11-29 DIAGNOSIS — C32.9 LARYNGEAL CANCER (H): Primary | ICD-10-CM

## 2023-11-29 DIAGNOSIS — C32.9 LARYNGEAL CANCER (H): ICD-10-CM

## 2023-11-29 PROCEDURE — 92610 EVALUATE SWALLOWING FUNCTION: CPT | Mod: GN | Performed by: SPEECH-LANGUAGE PATHOLOGIST

## 2023-11-29 PROCEDURE — 74230 X-RAY XM SWLNG FUNCJ C+: CPT | Mod: GC | Performed by: RADIOLOGY

## 2023-11-29 PROCEDURE — 92611 MOTION FLUOROSCOPY/SWALLOW: CPT | Mod: GN | Performed by: SPEECH-LANGUAGE PATHOLOGIST

## 2023-11-29 PROCEDURE — 92526 ORAL FUNCTION THERAPY: CPT | Mod: GN | Performed by: SPEECH-LANGUAGE PATHOLOGIST

## 2023-11-29 PROCEDURE — 99024 POSTOP FOLLOW-UP VISIT: CPT | Performed by: OTOLARYNGOLOGY

## 2023-11-29 RX ORDER — BARIUM SULFATE 400 MG/ML
SUSPENSION ORAL ONCE
Status: COMPLETED | OUTPATIENT
Start: 2023-11-29 | End: 2023-11-29

## 2023-11-29 RX ADMIN — BARIUM SULFATE 10 ML: 400 SUSPENSION ORAL at 14:15

## 2023-11-29 ASSESSMENT — PAIN SCALES - GENERAL: PAINLEVEL: MILD PAIN (2)
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
## 2023-11-29 NOTE — PROGRESS NOTES
SPEECH LANGUAGE PATHOLOGY EVALUATION    See electronic medical record for Abuse and Falls Screening details.    Subjective      Presenting condition or subjective complaint:  Pt presented today for post-operative video swallow study.  He reports feeling very excited to potentially start eating/drinking.  Date of onset: 11/14/23    Relevant medical history:   Arthritis, COPD, tobacco use,   Dates & types of surgery:  11/14/2023: Total laryngectomy, bilateral neck dissection, pectoralis overlay flap    Prior diagnostic imaging/testing results:     VFSS prior to surgery with significant aspiration noted  Prior therapy history for the same diagnosis, illness or injury:    He participated in SLP services prior to surgery and inpatient postoperatively    Living Environment  Social support:   Daughter present at evaluation, pt reports he lives with family members  Help at home:   Self Cares (home health aide/personal care attendant, family, etc); Medication and/or finances; Assist for driving and community activities   Equipment owned:       Employment:    no  Hobbies/Interests:      Patient goals for therapy:  to return to eating/drinking if appropriate    Pain assessment: Pain denied     Objective     SWALLOW EVALUTION  Dysphagia history: Pt has been NPO with enteral support since prior to total laryngectomy surgery. He is hopeful to return to PO intake after evaluation today.  Current Diet/Method of Nutritional Intake: NPO, gastrostomy tube (PEG)        CLINICAL SWALLOW EVALUATION  Oral Motor Function: Dentition: upper dentures  Secretion management: WFL  Mucosal quality: good  Mandibular function: intact  Oral labial function: WFL  Lingual function: impaired strength on right  Velar function: intact   Buccal function: intact  S/p total laryngectomy so anatomy difference noted      Level of assist required for feeding: no assistance needed      ADDITIONAL EVAL COMPLETED TODAY : yes; rationale: objective assessment of  pharyngeal phase indicated in post operative patient.     VIDEOFLUOROSCOPIC SWALLOW STUDY  Radiologist: Resident  Views Taken: left lateral, A/P   Physical location of procedure: [facility]  Patient sitting upright on chair/stool for lateral views and standing for A/P views    VFSS textures trialed:   VFSS Eval: Thin Liquids  Mode of Presentation: cup, self-fed   Order of Presentation: Series 2, 3, 4, 5, 11, 13 (A/P)  Preparatory Phase: WFL  Oral Phase: premature pharyngeal entry  NeoPharyngeal Phase:  decreased transit with residue after the initial swallow  Strategies and Compensations: double swallow, or pt brings residual back into his mouth to reswallow  Diagnostic Statement: Pt demonstrates decreased pharyngeal constriction on the swallow.     VFSS Eval: Mildly Thick Liquids  Mode of Presentation: cup, self-fed   Order of Presentation: Series 6, 7  Preparatory Phase: WFL  Oral Phase: premature pharyngeal entry  NeoPharyngeal Phase:  Pt demonstrates residual in the pseudovallecula after the initial swallow.   Strategies and Compensations: double swallow  Diagnostic Statement: Pt demonstrates residue in the pseudovallecula which he brings back up into this mouth to swallow again. He has weakness at the base of his tongue and pharynx during today's exam.     VFSS Eval: Purees  Mode of Presentation: spoon, self-fed   Order of Presentation: Series 8, 9, 12 (A/P)  Preparatory Phase: WFL  Oral Phase: WFL  Bolus Location When Swallow Initiated: posterior angle of ramus  NeoPharyngeal Phase:  Pt demonstrates residual at the base of the tongue after initial swallow  Strategies and Compensations: double swallow  Diagnostic Statement: Pt demonstrates residual in the pseudovallecula which he brings back into his mouth and is able to clear on the second swallow.     VFSS Eval: Soft & Bite Sized  Mode of Presentation: spoon, self-fed   Order of Presentation: Series 10  Preparatory Phase: WFL  Oral Phase:  WFL  Pharyngeal Phase:  Mild residue in the neopharynx after the initial swallow.   Strategies and Compensations: liquid wash, double swallow  Diagnostic Statement: Pt demonstrates mild residue in the neopharynx after the initial swallow. He is able to bring the residual back into the pharynx which he swallows again and is able to clear most of the bolus. He then clears the minimal residue with a sip of liquid.      ESOPHAGEAL PHASE OF SWALLOW  no observed or reported concerns related to esophageal function     SWALLOW ASSESSMENT CLINICAL IMPRESSIONS AND RATIONALE  Diet Consistency Recommendations: clear liquid diet  initially, transition to full liquid after 4 days and then to soft solids after another 4 days.   Recommended Feeding/Eating Techniques: small bolus size, slow rate of intake, alternate food and liquid intake, double swallow   Medication Administration Recommendations: Continue through PEG at this time.   Instrumental Assessment Recommendations: none    Assessment & Plan   CLINICAL IMPRESSIONS   Medical Diagnosis: SCC larynx s/p total laryngectomy    Treatment Diagnosis: moderate oropharyngeal dysphagia   Impression/Assessment: Pt is a 68 year old male with changes in swallowing following surgery. The following significant findings have been identified: impaired swallowing, characterized by multiple swallows needed to pass bolus through the neopharynx. All contrast remains within the lumen on all consistencies. Pt brings some of the residual back into his mouth to swallow a second time. Identified deficits interfere with their ability to consume an oral diet and maintain nutrition as compared to previous level of function.    PLAN OF CARE  Treatment Interventions: Swallowing dysfunction and/or oral function for feeding    Prognosis to achieve stated therapy goals is good   Rehab potential is impacted by: comorbidities, current level of function    Long Term Goals   SLP Goal 1  Goal Identifier: PO  Goal  Description: Pt will tolerate least restrictive diet while adhering to safe swallow precuations independently 100% of the time.  Rationale: To maximize safety, ease and/or independence of oral intake  Target Date: 02/25/24      Frequency of Treatment: 2-4 times per month  Duration of Treatment: 12 months     Recommended Referrals to Other Professionals: none  Education Assessment:   Learner/Method: Patient;Family;No Barriers to Learning    Risks and benefits of evaluation/treatment have been explained.   Patient/Family/caregiver agrees with Plan of Care.     Evaluation Time:    SLP Eval: oral/pharyngeal swallow function, clinical minutes (74655): 15  SLP Eval: VideoFluoroscopic Swallow function Minutes (35908): 15      Signing Clinician: GILES Vernon    [facility]                                                                                   OUTPATIENT SPEECH LANGUAGE PATHOLOGY      PLAN OF TREATMENT FOR OUTPATIENT REHABILITATION   Patient's Last Name, First Name, BRITTNYCARLOS  Stephan Mendosashaina GARIBAY YOB: 1955   Provider's Name   [facility]   Medical Record No.  [id number]     Onset Date: 11/14/23 Start of Care Date:       Medical Diagnosis:  SCC larynx s/p total laryngectomy      SLP Treatment Diagnosis: moderate oropharyngeal dysphagia  Plan of Treatment  Frequency/Duration: 2-4 times per month  / 12 months     Certification date from 11/29/23   To 02/26/24          See note for plan of treatment details and functional goals     GILES Vernon                         I CERTIFY THE NEED FOR THESE SERVICES FURNISHED UNDER        THIS PLAN OF TREATMENT AND WHILE UNDER MY CARE     (Physician attestation of this document indicates review and certification of the therapy plan).              Referring Provider:  Birdie Atkinson    Initial Assessment  See Epic Evaluation-

## 2023-11-29 NOTE — NURSING NOTE
"Chief Complaint   Patient presents with    RECHECK     Post op       Blood pressure 113/68, pulse 66, height 1.727 m (5' 8\"), weight 54 kg (119 lb), SpO2 100%.    Valerie Steward, EMT    "

## 2023-11-29 NOTE — PROGRESS NOTES
Patient in clinic today for AFSHAN drain removal. Drain output in last 24 hours was 5 ml, indicating AFSHAN removal as it was less than 30 ml in 24 hours. Incision site evaluated and it was clean, dry and intact without evidence of redness, swelling or drainage. AFSHAN site was cleansed, suture around tubing was removed, drain bulb was opened, and drain was removed easily. Patient educated on signs and symptoms of infection, site care and provided number to call with further questions or concerns. Patient verbalized understanding and was encouraged to call with any further questions or concerns.      Sutures from TL and pec flap removed easily. Stoma suture stayed in place.     Ana Cristina Lacy BSN, RN

## 2023-11-29 NOTE — PATIENT INSTRUCTIONS
Videofluoroscopic Swallow Study Results    Problem Identified: Adequate passage of the bolus through the oral and neopharyngeal phases. Everything moves through well after surgery.     Diet Recommended:   Begin today:  Clear liquids:  Any liquid you can see through   Clear broth, tea or coffee with no cream or milk added, cranberry juice, Jell-O, popsicles (without bits of fruit, fruit pulp or yogurt in them), apple juice, ginger ale, lemon-lime soda, grape juice, sports drinks that don't have color    Begin December 3rd:   Full liquid:     strained creamy soups, tea, juice, jell-o, milkshakes, pudding, popsicles, yogurt, custard, frozen yogurt, plain ice cream, boost, ensure, resource, other liquid supplements, tea or coffee with milk, cream,  sugar, or honey. Cream of wheat, grits, oatmeal, cream of rice, and all of the items on the clear liquid also    Begin December 7th  Soft foods- see other handout    Swallowing Suggestions:  Sit upright at 90 degrees  Eat slowly  No straw  Small bites/sips  Alternate liquids and solids  Remain upright for 3 hours after intake  Tip your head back if you notice liquid coming out your nose.     Additional information provided:    Minced and Moist solids handout    Please monitor for the following signs: redness, fever, chills, swelling in the neck, sabrina leakage through the the neck. If any of these occur, please present to the [facility] emergency department for further evaluation.

## 2023-12-01 ENCOUNTER — TUMOR CONFERENCE (OUTPATIENT)
Dept: ONCOLOGY | Facility: CLINIC | Age: 68
End: 2023-12-01
Payer: MEDICARE

## 2023-12-01 NOTE — TUMOR CONFERENCE
Head & Neck Tumor Conference Note   Status: Established  Staff: Dr. Atkinson     Tumor Site: Larynx  Tumor Pathology: SCC  Tumor Stage: S3cI5L1, now rpT3 N0  Tumor Treatment:   - Radiation, completed 2/14/2019  - Total laryngectomy, bilateral neck dissection, pectoralis rotational flap 11/14/23  Reason for Review: Review imaging, path, and POC     Brief History:   Jorge A Mendosa is a 68 year old man referred for evaluation of recurrent laryngeal cancer. He has a history of a J2bY5C0 SCC of the larynx treated with radiation. He started treatment 12/17/2018, completed 2/14/2019. Of note, he only received 5512 cGy in 26 fractions in prolonged fashion rather than the planned 70 Gy in 33 fractions due to compliance issues. He saw local ENT PA on 8/15/2023 (follow-up issues by patient) with worsening hoarseness. Scope exam at that time demonstrated glottic gap. He presented to local ER on 8/22/2023 with shortness of breath. He presented to local ER on 9/3/2023 again with shortness of breath. Scope exam by Dr Mcnally demonstrated bulky exophytic tumor of the left supraglottis with involvement of the anterior commissure, left cord fixation, decreased mobility of right cord, narrowed glottis to 2-3 mm. He was taken to the OR for awake tracheostomy and DL with biopsy on 9/5/2023 by Dr Mcnally. Pathology demonstrated invasive SCC. During his inpatient admission he was cleared for speaking valve use with SLP. He had PEG placement on 9/8 by surgery team after failing video swallow study. He had a CT neck on 9/6/2023 which showed extensive pneumomediastinum which complicated evaluation, no obvious thyroid cartilage erosion. He had repeat CT neck on 9/14/2023 which showed laryngeal mass without thyroid cartilage erosion, no lymphadenopathy. He had a PET scan on 9/27/2023 which showed FDG avid supraglottic tumor, no metastatic lymphadenopathy, 12 x 9 mm mildly FDG avid left lower lobe nodular density. He says his breathing is  much better after the tracheostomy. He felt that it was not particularly bad before the trach but that he just had a sudden episode of breathing issues that resulted in the tracheostomy. He he felt before the tracheostomy he felt his swallowing ok. He is is not taking anything by mouth currently. He is able to swallow his saliva, does not cough on the saliva. He is not having pain in his neck or throat. He wants to get rid of his PEG. He has no ear pain. He has a small amount of blood on his inner cannula at times. He does 5 cans of tube feeds per day. Gaining weight, lost 40 lbs in the last year. Our team performed a total laryngectomy bilateral neck dissections with pectoralis rotational flap on 23    Pertinent PMH:   Past Medical History:   Diagnosis Date     Chronic pain syndrome 2011     COPD (chronic obstructive pulmonary disease) (H)      GERD (gastroesophageal reflux disease)      Laryngeal cancer (H)      Lumbago 2002     Opioid abuse, in remission (H)      Sinus bradycardia 09/10/2023      Smoking Hx:   Social History     Tobacco Use     Smoking status: Former     Packs/day: 0.25     Years: 30.00     Additional pack years: 0.00     Total pack years: 7.50     Types: Cigarettes     Start date: 1976     Quit date: 2023     Years since quittin.4     Passive exposure: Current     Smokeless tobacco: Never   Substance Use Topics     Alcohol use: No     Drug use: No         Pathology:   Final Diagnosis   A. LYMPH NODES, LEFT LEVEL 2A, NECK DISSECTION:  - Ten benign lymph nodes (0/10).     B. LYMPH NODES, LEFT LEVEL 3, NECK DISSECTION:  - Four benign lymph nodes (0/4).     C. LYMPH NODES, LEFT LEVEL 4, NECK DISSECTION:  - Fourteen benign lymph nodes (0/14).     D. LYMPH NODES, LEFT LEVEL 2B, NECK DISSECTION:  - Five benign lymph nodes (0/5).     E. LYMPH NODES, RIGHT LEVEL 2A, NECK DISSECTION:  - Eleven benign lymph nodes (0/11).     F. LYMPH NODES, RIGHT LEVEL 3, NECK DISSECTION:  -  Six benign lymph nodes (0/6).     G. LYMPH NODES, RIGHT LEVEL 4, NECK DISSECTION:  - Fourteen benign lymph nodes (0/14).     H. LYMPH NODES, RIGHT LEVEL 2B, NECK DISSECTION:  - Seven benign lymph nodes (0/7).     I.  LYMPH NODE AND SOFT TISSUE, MEDIAL TO RIGHT THYROID LOBE, EXCISION:  -Fragment of benign thyroid tissue and one benign lymph node (0/1).     J. LYMPH NODE, RIGHT LEVEL 6, NECK DISSECTION:  - One benign lymph node (0/1).     K. RIGHT POST CRICOID MUCOSA, TOTAL LARYNGECTOMY:  - Negative for dysplasia or malignancy.     L. LEFT POST CRICOID MUCOSA, TOTAL LARYNGECTOMY:  - Negative for dysplasia or malignancy.     M. LEFT INFERIOR PHARYNGEAL WALL MUCOSA, TOTAL LARYNGECTOMY:  - Negative for dysplasia or malignancy.     N. LEFT SUPERIOR PHARYNGEAL WALL MUCOSA, TOTAL LARYNGECTOMY:  - Negative for dysplasia or malignancy.     O. LEFT VALLECULA MUCOSA MARGIN, TOTAL LARYNGECTOMY:  - Negative for dysplasia or malignancy.     P. RIGHT VALLECULA MUCOSA MARGIN, TOTAL LARYNGECTOMY:  - Negative for dysplasia or malignancy.     Q. RIGHT SUPERIOR PHARYNGEAL WALL MUCOSA MARGIN, TOTAL LARYNGECTOMY:  - Negative for malignancy.     R. RIGHT INFERIOR PHARYNGEAL WALL MUCOSA MARGIN, TOTAL LARYNGECTOMY:  - Negative for dysplasia or malignancy.     S. POSTERIOR TRACHEAL WALL MUCOSAL MARGIN, TOTAL LARYNGECTOMY:  - Negative for malignancy.     T. RIGHT ANTERIOR TRACHEAL WALL MUCOSAL MARGIN, TOTAL LARYNGECTOMY:  - Negative for malignancy.     U. LEFT ANTERIOR TRACHEAL WALL MUCOSA MARGIN, TOTAL LARYNGECTOMY:  - Negative for malignancy.     V. LARYNX, TOTAL LARYNGECTOMY:  - RECURRENT INVASIVE KERATINIZING SQUAMOUS CELL CARCINOMA, moderately differentiated, 5.5 cm in greatest dimension.  - Tumor is centered within left glottis/supraglottis with extension to the right side and involves left arytenoid cartilage, anterior commissure, left and right false and true vocal cords and extends to a maximum depth of 8 mm; thyroid cartilage is not  involved.  - Perineural invasion is focally observed (small nerve within submucosa).  - Margins are negative for tumor; closest uninvolved margin is soft tissue at 1.1 cm; tracheal margin is at 4.6 cm.  - One benign lymph node (0/1).  - AJCC pathologic staging is rpT3 N0.  - See comment and synoptic report.       Tumor Board Recommendation:   Discussion:   The pathology demonstrates SCC, keratinizing type, PNI is present, the margins are negative. The final pathology is staged at rpT3N0. Given his previous radiation, we will opt for radiation at this time.  Plan:   - Observation, no plan for adjuvant treatment  - Follow up with Dr. Atkinson in clinic on 12/15    Osito Valencia MD  Otolaryngology Head and Neck Surgery Resident, PGY-3    Documentation / Disclaimer Cancer Tumor Board Note: Cancer tumor board recommendations do not override what is determined to be reasonable care and treatment, which is dependent on the circumstances of a patient's case; the patient's medical, social, and personal concerns; and the clinical judgment of the oncologist [physician].

## 2023-12-01 NOTE — TUMOR CONFERENCE
Called and left message for patient and daughter to review the following pathology results:    Final Diagnosis   A. LYMPH NODES, LEFT LEVEL 2A, NECK DISSECTION:  - Ten benign lymph nodes (0/10).     B. LYMPH NODES, LEFT LEVEL 3, NECK DISSECTION:  - Four benign lymph nodes (0/4).     C. LYMPH NODES, LEFT LEVEL 4, NECK DISSECTION:  - Fourteen benign lymph nodes (0/14).     D. LYMPH NODES, LEFT LEVEL 2B, NECK DISSECTION:  - Five benign lymph nodes (0/5).     E. LYMPH NODES, RIGHT LEVEL 2A, NECK DISSECTION:  - Eleven benign lymph nodes (0/11).     F. LYMPH NODES, RIGHT LEVEL 3, NECK DISSECTION:  - Six benign lymph nodes (0/6).     G. LYMPH NODES, RIGHT LEVEL 4, NECK DISSECTION:  - Fourteen benign lymph nodes (0/14).     H. LYMPH NODES, RIGHT LEVEL 2B, NECK DISSECTION:  - Seven benign lymph nodes (0/7).     I.  LYMPH NODE AND SOFT TISSUE, MEDIAL TO RIGHT THYROID LOBE, EXCISION:  -Fragment of benign thyroid tissue and one benign lymph node (0/1).     J. LYMPH NODE, RIGHT LEVEL 6, NECK DISSECTION:  - One benign lymph node (0/1).     K. RIGHT POST CRICOID MUCOSA, TOTAL LARYNGECTOMY:  - Negative for dysplasia or malignancy.     L. LEFT POST CRICOID MUCOSA, TOTAL LARYNGECTOMY:  - Negative for dysplasia or malignancy.     M. LEFT INFERIOR PHARYNGEAL WALL MUCOSA, TOTAL LARYNGECTOMY:  - Negative for dysplasia or malignancy.     N. LEFT SUPERIOR PHARYNGEAL WALL MUCOSA, TOTAL LARYNGECTOMY:  - Negative for dysplasia or malignancy.     O. LEFT VALLECULA MUCOSA MARGIN, TOTAL LARYNGECTOMY:  - Negative for dysplasia or malignancy.     P. RIGHT VALLECULA MUCOSA MARGIN, TOTAL LARYNGECTOMY:  - Negative for dysplasia or malignancy.     Q. RIGHT SUPERIOR PHARYNGEAL WALL MUCOSA MARGIN, TOTAL LARYNGECTOMY:  - Negative for malignancy.     R. RIGHT INFERIOR PHARYNGEAL WALL MUCOSA MARGIN, TOTAL LARYNGECTOMY:  - Negative for dysplasia or malignancy.     S. POSTERIOR TRACHEAL WALL MUCOSAL MARGIN, TOTAL LARYNGECTOMY:  - Negative for malignancy.      T. RIGHT ANTERIOR TRACHEAL WALL MUCOSAL MARGIN, TOTAL LARYNGECTOMY:  - Negative for malignancy.     U. LEFT ANTERIOR TRACHEAL WALL MUCOSA MARGIN, TOTAL LARYNGECTOMY:  - Negative for malignancy.     V. LARYNX, TOTAL LARYNGECTOMY:  - RECURRENT INVASIVE KERATINIZING SQUAMOUS CELL CARCINOMA, moderately differentiated, 5.5 cm in greatest dimension.  - Tumor is centered within left glottis/supraglottis with extension to the right side and involves left arytenoid cartilage, anterior commissure, left and right false and true vocal cords and extends to a maximum depth of 8 mm; thyroid cartilage is not involved.  - Perineural invasion is focally observed (small nerve within submucosa).  - Margins are negative for tumor; closest uninvolved margin is soft tissue at 1.1 cm; tracheal margin is at 4.6 cm.  - One benign lymph node (0/1).  - AJCC pathologic staging is rpT3 N0.  - See comment and synoptic report.         Advised that recommendation is to proceed with observation. Patient has follow-up scheduled on 12/15 with Dr. Atkinson. Left direct line for return call if there are any further questions or concerns.       Eloisa Francis, RN, BSN

## 2023-12-06 ENCOUNTER — TRANSFERRED RECORDS (OUTPATIENT)
Dept: HEALTH INFORMATION MANAGEMENT | Facility: CLINIC | Age: 68
End: 2023-12-06

## 2023-12-06 NOTE — PROGRESS NOTES
Assessment & Plan     Opioid use disorder  Doing great; no post op pain control issues; no cravings; quit smoking even  Continue Suboxone. Follow up 2 months.  - Urine Drug Screen Buprenorphine Urine Qualitative [id number], Ethanol Urine Qualitative [id number], Methadone Urine Qualitative [id number], Oxycodone Urine Qualitative [id number], Creatinine Urine Random [id number]  - buprenorphine HCl-naloxone HCl (SUBOXONE) 4-1 MG per film; Place 1 Film under the tongue 2 times daily     MED REC REQUIRED  Post Medication Reconciliation Status: discharge medications reconciled, continue medications without change  See Patient Instructions    Return in about 2 months (around 2/7/2024) for MAT.    Regina Hobbs MD  [facility] DARRELL Jules is a 68 year old, presenting for the following health issues:  Recheck Medication      HPI       He is currently taking 4/1 mg of buprenorphine 2 times daily.   Last fill PDMP states 11.5.23 #56.    Status Since Last Visit:  Have you used any opioids since your last visit?: Yes - prescribed Oxycodone for postop pain control   Do you feel that your dose of suboxone is too high or too low? Adequate  Have there been cravings for opioids? No   Any withdrawal symptoms?  None     Any side effects from the medication?  None   Any alcohol use? None  Any other recreational drug use? None    Precipitating Factors:  Triggers have been: non-existent   Other Supports:  Do you attend counseling or meet with a therapist? No  Do you attend NA or AA meetings? No  Do you have/meet with a sponsor? No  Family and support systems have been: Helpful   What other goals have you been working on (job, family, relationships, etc)? Recovering from surgery  Recently had laryngectomy - 11/14/23  Was prescribed post op pain meds - which he has finished now  Attending speech therapy   Close follow up with ENT        9/28/2020     1:00 PM 7/14/2022    11:00 AM 10/6/2022    11:00 AM   PHQ-9 SCORE  "  PHQ-9 Total Score 0 0 0           3/22/2023    10:56 AM 6/14/2023    11:16 AM 6/14/2023    11:26 AM   MICHAEL-7 SCORE   Total Score  0 (minimal anxiety)    Total Score 0 0 0     PDMP Review         Value Time User    State PDMP site checked  Yes 10/26/2023  9:54 AM Regina Hicks MD                  Review of Systems   Constitutional, HEENT, cardiovascular, pulmonary, gi and gu systems are negative, except as otherwise noted.      Objective    /70 (BP Location: Right arm, Patient Position: Sitting, Cuff Size: Adult Regular)   Pulse 62   Temp 97.3  F (36.3  C) (Tympanic)   Ht 1.727 m (5' 8\")   Wt 56.7 kg (125 lb)   SpO2 97%   BMI 19.01 kg/m    Body mass index is 19.01 kg/m .  Physical Exam   GENERAL: healthy, alert and no distress  NECK: trach in place; right neck incision healing very well  RESP: lungs clear to auscultation - no rales, rhonchi or wheezes  CV: regular rate and rhythm, normal S1 S2, no S3 or S4, no murmur, click or rub, no peripheral edema and peripheral pulses strong  MS: no gross musculoskeletal defects noted, no edema  PSYCH: mentation appears normal, affect normal/bright    Results for orders placed or performed in visit on 12/07/23 (from the past 24 hour(s))   Urine Drug Screen Buprenorphine Urine Qualitative [id number], Ethanol Urine Qualitative [id number], Methadone Urine Qualitative [id number], Oxycodone Urine Qualitative [id number], Creatinine Urine Random [id number]    Narrative    The following orders were created for panel order Urine Drug Screen Buprenorphine Urine Qualitative [id number], Ethanol Urine Qualitative [id number], Methadone Urine Qualitative [id number], Oxycodone Urine Qualitative [id number], Creatinine Urine Random [id number].  Procedure                               Abnormality         Status                     ---------                               -----------         ------                     Urine Drug Screen Panel[[id number]]      Normal              Final result             "   Buprenorphine Urine, Dangelo...[[id number]]  Abnormal            Final result               Ethanol urine[[id number]]                Normal              Final result               Methadone Qual Urine[[id number]]         Normal              Final result               Oxycodone Urine, Qualita...[[id number]]  Normal              Final result               Creatinine random urine[[id number]]                          Final result                 Please view results for these tests on the individual orders.   Urine Drug Screen Panel   Result Value Ref Range    Amphetamines Urine Screen Negative Screen Negative    Barbituates Urine Screen Negative Screen Negative    Benzodiazepine Urine Screen Negative Screen Negative    Cannabinoids Urine Screen Negative Screen Negative    Cocaine Urine Screen Negative Screen Negative    Fentanyl Qual Urine Screen Negative Screen Negative    Opiates Urine Screen Negative Screen Negative    PCP Urine Screen Negative Screen Negative   Buprenorphine Urine, Qualitative   Result Value Ref Range    Buprenorphine Qual Urine Screen Positive (A) Screen Negative   Ethanol urine   Result Value Ref Range    Ethanol Urine Screen Negative Screen Negative   Methadone Qual Urine   Result Value Ref Range    Methadone Urine Screen Negative Screen Negative   Oxycodone Urine, Qualitative   Result Value Ref Range    Oxycodone Urine Screen Negative Screen Negative   Creatinine random urine   Result Value Ref Range    Creatinine Urine mg/dL 66.5 mg/dL

## 2023-12-07 ENCOUNTER — PATIENT OUTREACH (OUTPATIENT)
Dept: CARE COORDINATION | Facility: OTHER | Age: 68
End: 2023-12-07

## 2023-12-07 ENCOUNTER — APPOINTMENT (OUTPATIENT)
Dept: LAB | Facility: OTHER | Age: 68
End: 2023-12-07
Attending: FAMILY MEDICINE
Payer: MEDICARE

## 2023-12-07 ENCOUNTER — OFFICE VISIT (OUTPATIENT)
Dept: FAMILY MEDICINE | Facility: OTHER | Age: 68
End: 2023-12-07
Attending: FAMILY MEDICINE
Payer: MEDICARE

## 2023-12-07 VITALS
OXYGEN SATURATION: 97 % | HEIGHT: 68 IN | WEIGHT: 125 LBS | SYSTOLIC BLOOD PRESSURE: 110 MMHG | HEART RATE: 62 BPM | TEMPERATURE: 97.3 F | BODY MASS INDEX: 18.94 KG/M2 | DIASTOLIC BLOOD PRESSURE: 70 MMHG

## 2023-12-07 DIAGNOSIS — F11.90 OPIOID USE DISORDER: Primary | ICD-10-CM

## 2023-12-07 LAB
AMPHETAMINES UR QL SCN: NORMAL
BARBITURATES UR QL SCN: NORMAL
BENZODIAZ UR QL SCN: NORMAL
BUPRENORPHINE UR QL: ABNORMAL
BZE UR QL SCN: NORMAL
CANNABINOIDS UR QL SCN: NORMAL
CREAT UR-MCNC: 66.5 MG/DL
ETHANOL UR QL SCN: NORMAL
FENTANYL UR QL: NORMAL
METHADONE UR QL SCN: NORMAL
OPIATES UR QL SCN: NORMAL
OXYCODONE UR QL: NORMAL
PCP QUAL URINE (ROCHE): NORMAL

## 2023-12-07 PROCEDURE — 82570 ASSAY OF URINE CREATININE: CPT | Mod: ZL,XU | Performed by: FAMILY MEDICINE

## 2023-12-07 PROCEDURE — G0463 HOSPITAL OUTPT CLINIC VISIT: HCPCS

## 2023-12-07 PROCEDURE — 99213 OFFICE O/P EST LOW 20 MIN: CPT | Performed by: FAMILY MEDICINE

## 2023-12-07 PROCEDURE — 80307 DRUG TEST PRSMV CHEM ANLYZR: CPT | Mod: ZL | Performed by: FAMILY MEDICINE

## 2023-12-07 PROCEDURE — 80307 DRUG TEST PRSMV CHEM ANLYZR: CPT | Mod: ZL,91 | Performed by: FAMILY MEDICINE

## 2023-12-07 RX ORDER — BUPRENORPHINE AND NALOXONE 4; 1 MG/1; MG/1
1 FILM, SOLUBLE BUCCAL; SUBLINGUAL 2 TIMES DAILY
Qty: 56 EACH | Refills: 0 | Status: SHIPPED | OUTPATIENT
Start: 2023-12-07 | End: 2024-01-01

## 2023-12-07 NOTE — PROGRESS NOTES
Clinic Care Coordination Contact  Follow Up Progress Note      Assessment: CC attended office visit with Jorge A and Dr. Hicks this date.  Jorge A is doing relatively well since our last visit.  He did have a laryngectomy on 11/14/23.  Has had follow up with surgeon.  Tumor conference was held on 12/1/23 where it stated the plan is for observation, no plan for adjuvant treatment.  Next appt is 12/15/23 with Dr. Atkinson.  Jorge A has been participating in sleep therapy.  He is hoping he can get his PEG tube removed in the next coming months.    He has been able to start taking in oral foods, which he is happy with.  He has gained 6# since 11/29/23.  He is in very good spirits today.  Had no difficulties with taking postop Oxycodone - didn't take all the doses.      Care Gaps:    Health Maintenance Due   Topic Date Due    SPIROMETRY  Never done    COPD ACTION PLAN  Never done    COVID-19 Vaccine (1) Never done    ZOSTER IMMUNIZATION (1 of 2) Never done    HEPATITIS A IMMUNIZATION (1 of 2 - Risk 2-dose series) Never done    Pneumococcal Vaccine: 65+ Years (2 - PCV) 11/02/2012    RSV VACCINE (Pregnancy & 60+) (1 - 1-dose 60+ series) Never done    AORTIC ANEURYSM SCREENING (SYSTEM ASSIGNED)  Never done    DTAP/TDAP/TD IMMUNIZATION (2 - Td or Tdap) 10/30/2021    INFLUENZA VACCINE (1) 09/01/2023       Will continue to work on these    Care Plans      Intervention/Education provided during outreach: Continue medications as prescribed.  Attend upcoming scheduled appts - ENT and speech therapy.  Reach out if you need to be seen sooner.     Outreach Frequency: 2 months, more frequently as needed        Plan:   No change in treatment plan at this time.   Care Coordinator will follow up in 8 weeks, sooner if he has concerns.    Margot Joel, RN-BSN, [facility] Care Coordinator  [phone] opt. #3

## 2023-12-09 ENCOUNTER — MEDICAL CORRESPONDENCE (OUTPATIENT)
Dept: HEALTH INFORMATION MANAGEMENT | Facility: CLINIC | Age: 68
End: 2023-12-09
Payer: MEDICARE
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
## 2023-12-15 ENCOUNTER — OFFICE VISIT (OUTPATIENT)
Dept: OTOLARYNGOLOGY | Facility: CLINIC | Age: 68
End: 2023-12-15
Payer: MEDICARE

## 2023-12-15 ENCOUNTER — THERAPY VISIT (OUTPATIENT)
Dept: SPEECH THERAPY | Facility: CLINIC | Age: 68
End: 2023-12-15
Payer: MEDICARE

## 2023-12-15 DIAGNOSIS — C32.8 CANCER OF OVERLAPPING SITES OF LARYNX (H): ICD-10-CM

## 2023-12-15 DIAGNOSIS — E89.0 HYPOTHYROIDISM DUE TO RADIATION: ICD-10-CM

## 2023-12-15 DIAGNOSIS — Z90.02 S/P LARYNGECTOMY: ICD-10-CM

## 2023-12-15 DIAGNOSIS — C32.9 LARYNGEAL CANCER (H): ICD-10-CM

## 2023-12-15 DIAGNOSIS — C32.9 LARYNGEAL CANCER (H): Primary | ICD-10-CM

## 2023-12-15 DIAGNOSIS — R49.1 APHONIA: Primary | ICD-10-CM

## 2023-12-15 PROCEDURE — 92522 EVALUATE SPEECH PRODUCTION: CPT | Mod: GN | Performed by: SPEECH-LANGUAGE PATHOLOGIST

## 2023-12-15 PROCEDURE — 92507 TX SP LANG VOICE COMM INDIV: CPT | Mod: GN | Performed by: SPEECH-LANGUAGE PATHOLOGIST

## 2023-12-15 PROCEDURE — 92526 ORAL FUNCTION THERAPY: CPT | Mod: GN | Performed by: SPEECH-LANGUAGE PATHOLOGIST

## 2023-12-15 PROCEDURE — 99024 POSTOP FOLLOW-UP VISIT: CPT | Performed by: OTOLARYNGOLOGY

## 2023-12-15 RX ORDER — METHYLPREDNISOLONE 32 MG/1
32 TABLET ORAL DAILY
Qty: 2 TABLET | Refills: 0 | Status: SHIPPED | OUTPATIENT
Start: 2023-12-15 | End: 2024-08-07

## 2023-12-15 ASSESSMENT — PAIN SCALES - GENERAL: PAINLEVEL: NO PAIN (0)
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
## 2023-12-15 NOTE — PATIENT INSTRUCTIONS
Follow-up on 1/31/2024 with PET scan and labs (clinic will be reopened on 1/31/2023 for pm clinic - patient knows clinic appt will not be schedule today but schedule rest of appts so he can be seen at 1 or 2 pm return slot that day)

## 2023-12-15 NOTE — PROGRESS NOTES
SPEECH LANGUAGE PATHOLOGY EVALUATION    Subjective      Presenting condition or subjective complaint: Pt presents today in conjunction with ENT clinic visit and is seen per MD request. He is cleared medically to participate in the electrolarynx.   Date of onset: 11/14/23    Relevant medical history:   Arthritis, COPD, tobacco use, recurrent laryngeal cancer s/p total laryngectomy  Dates & types of surgery:  11/14/2023: Total laryngectomy, bilateral neck dissection, pectoralis overlay flap    Prior therapy history for the same diagnosis, illness or injury: yes, patient seen at last visit for dysphagia and currently     Living Environment  Social support: Daughter present at evaluation, pt reports he lives with family members  Help at home: Self Cares (home health aide/personal care attendant, family, etc); Medication and/or finances; Assist for driving and community activities      Employment:  no    Pain assessment: Pain denied     Objective       POST-OPERATIVE ASSESSMENT    Primary Communication Method: written expression, gestures, mouthing words   Oral Motor Structure Assessment: all generally WFL except laryngeal function 2/2 total laryngectomy    Date of Surgery: 11/14/23  Primary Site of Tumor: larynx, recurrent  Extent of Surgery: total laryngectomy, bilateral neck dissection, cricopharyngeal myotomy, pec flap      LARYNGECTOMY PULMONARY REHAB  Laryngectomy tube brand: Atos   Laryngectomy tube size: 9  Laryngectomy tube length: 36 and 55  HME brand/type: GO and HOME   Arvin with stoma cares: Independent    ELECTROLARYNX  Model: [place]  Placement: cheek, right cheek; pt cleared for neck placement but reports neck is tender and prefers cheek placement     Instrument pitch: moderate  Speech intelligibility:  80-85% to a trained listener  Factors impacting speech intelligibility: rapid rate of speech, absence of stress/emphasis, lack of plosive/fricative features, lack of exaggerated oral movement  "    Assessment & Plan   CLINICAL IMPRESSIONS   Medical Diagnosis: SCC larynx s/p total laryngectomy    Treatment Diagnosis: Aphonia   Impression/Assessment: Pt is a 68 year old male with communication complaints. The following significant findings have been identified: impaired communication, characterized by aphonia 2/2 total laryngectomy. Identified deficits interfere with their ability to communicate wants and needs and communicate within the home or community as compared to previous level of function.    PLAN OF CARE  Treatment Interventions: Speech, Communication    Prognosis to achieve stated therapy goals is good   Rehab potential is impacted by: current level of function, family/caregiver support, patient awareness of deficits, patient motivation    Long Term Goals   SLP Goal 2  Goal Identifier: Pulmonary Rehab  Goal Description: 2. Pt will demonstrated understanding and use for all pulmonary rehab options (i.e. baseplates, HMEs, larytube, etc) for maintaining good pulmonary function post laryngectomy 100% of the time independently.  Rationale: To maximize functional communication within the home or community  Target Date: 03/13/24  SLP Goal 3  Goal Identifier: EL Placement  Goal Description: 3.Pt will demonstrated adequate cheek and/or neck electrolarynx placement in the \"sweet spot\" independently in 4/5 opportunities  Rationale: To maximize functional communication within the home or community  Target Date: 03/13/24  SLP Goal 4  Goal Identifier: EL Intelligibility  Goal Description: 4.Pt will use electrolarynx in conversation with ~90% or greater intelligibility as judged by a trained listener 80% of the time  Rationale: To maximize functional communication within the home or community  Target Date: 03/13/24      Frequency of Treatment: 2x/month  Duration of Treatment: 12 months       Education Assessment:   Learner/Method: Patient;Family;No Barriers to Learning    Risks and benefits of " evaluation/treatment have been explained.   Patient/Family/caregiver agrees with Plan of Care.     Evaluation Time:    Sound production (artic, phonology, apraxia, dysarthria) Minutes (80541): 15    Signing Clinician: GILES Riggs      [facility]                                                                                   OUTPATIENT SPEECH LANGUAGE PATHOLOGY      PLAN OF TREATMENT FOR OUTPATIENT REHABILITATION   Patient's Last Name, First Name, Jorge A Qureshi YOB: 1955   Provider's Name   [facility]   Medical Record No.  [id number]     Onset Date: 11/14/23 Start of Care Date: 12/15/23     Medical Diagnosis:  SCC larynx s/p total laryngectomy      SLP Treatment Diagnosis: Aphonia  Plan of Treatment  Frequency/Duration: 2x/month  / 12 months     Certification date from 12/15/23   To 03/13/24          See note for plan of treatment details and functional goals     GILES Riggs                         I CERTIFY THE NEED FOR THESE SERVICES FURNISHED UNDER        THIS PLAN OF TREATMENT AND WHILE UNDER MY CARE     (Physician attestation of this document indicates review and certification of the therapy plan).              Referring Provider:  Dr. Birdie Atkinson    Initial Assessment  See Epic Evaluation- 12/15/23

## 2023-12-19 ENCOUNTER — TELEPHONE (OUTPATIENT)
Dept: OTOLARYNGOLOGY | Facility: CLINIC | Age: 68
End: 2023-12-19
Payer: MEDICARE

## 2023-12-19 DIAGNOSIS — C32.9 LARYNGEAL CANCER (H): Primary | ICD-10-CM

## 2023-12-19 NOTE — TELEPHONE ENCOUNTER
[facility] Call Center    Phone Message    May a detailed message be left on voicemail: yes     Reason for Call: Other: Pharmacy calling in regards to rx Protonix. They do not carry this rx, and neither do the other pharmacy's in the area. Wondering about an alternative. Please call. Thanks.     Action Taken: Other: ENT    Travel Screening: Not Applicable

## 2023-12-20 ENCOUNTER — TELEPHONE (OUTPATIENT)
Dept: OTOLARYNGOLOGY | Facility: CLINIC | Age: 68
End: 2023-12-20
Payer: MEDICARE

## 2023-12-20 RX ORDER — PANTOPRAZOLE SODIUM 40 MG/1
40 TABLET, DELAYED RELEASE ORAL DAILY
Qty: 90 TABLET | Refills: 1 | Status: SHIPPED | OUTPATIENT
Start: 2023-12-20

## 2023-12-25 ENCOUNTER — MYC REFILL (OUTPATIENT)
Dept: FAMILY MEDICINE | Facility: OTHER | Age: 68
End: 2023-12-25

## 2023-12-25 DIAGNOSIS — F11.90 OPIOID USE DISORDER: ICD-10-CM

## 2023-12-25 RX ORDER — BUPRENORPHINE AND NALOXONE 4; 1 MG/1; MG/1
1 FILM, SOLUBLE BUCCAL; SUBLINGUAL 2 TIMES DAILY
Qty: 56 EACH | Refills: 0 | Status: CANCELLED | OUTPATIENT
Start: 2023-12-25

## 2023-12-27 NOTE — TELEPHONE ENCOUNTER
SUBOXONE      Last Written Prescription Date:  12-7-23  Last Fill Quantity: 56,   # refills: 0  Last Office Visit: 12-7-23  Future Office visit:    Next 5 appointments (look out 90 days)      Jan 31, 2024  1:00 PM  (Arrive by 12:45 PM)  Return Visit with Birdie Atkinson MD  [facility] Ear N[facility] Th[facility] ([facility] [place] ) [address] [facility]  [address] [facility] [id number] [phone]     Feb 01, 2024 11:15 AM  (Arrive by 11:00 AM)  SHORT with Regina Hicks MD  [facility] ([facility] ) [address] [place] [address] [facility] [address]  [phone]             Routing refill request to provider for review/approval because:  Drug not on the [facility], P or [facility] refill protocol or controlled substance

## 2024-01-01 ENCOUNTER — MYC REFILL (OUTPATIENT)
Dept: FAMILY MEDICINE | Facility: OTHER | Age: 69
End: 2024-01-01

## 2024-01-01 DIAGNOSIS — F11.90 OPIOID USE DISORDER: ICD-10-CM

## 2024-01-02 ENCOUNTER — MYC MEDICAL ADVICE (OUTPATIENT)
Dept: FAMILY MEDICINE | Facility: OTHER | Age: 69
End: 2024-01-02

## 2024-01-02 RX ORDER — BUPRENORPHINE AND NALOXONE 4; 1 MG/1; MG/1
1 FILM, SOLUBLE BUCCAL; SUBLINGUAL 2 TIMES DAILY
Qty: 56 EACH | Refills: 0 | Status: SHIPPED | OUTPATIENT
Start: 2024-01-02 | End: 2024-01-29

## 2024-01-02 NOTE — TELEPHONE ENCOUNTER
Suboxone      Last Written Prescription Date:  12.7.23  Last Fill Quantity: #56,   # refills: 0  Last Office Visit: 12.7.23  Future Office visit:    Next 5 appointments (look out 90 days)      Jan 31, 2024  1:00 PM  (Arrive by 12:45 PM)  Return Visit with Birdie Atkinson MD  [facility] [place] ([facility] [place] ) [address] [facility]  [address] [facility] [address] [phone]     Feb 01, 2024 11:15 AM  (Arrive by 11:00 AM)  SHORT with Regina Hicks MD  [facility] ([facility] ) [address] [place] [address]  [phone]             Routing refill request to provider for review/approval because:  Drug not on the [facility], P or [facility] refill protocol or controlled substance

## 2024-01-17 ENCOUNTER — VIRTUAL VISIT (OUTPATIENT)
Dept: SPEECH THERAPY | Facility: CLINIC | Age: 69
End: 2024-01-17
Payer: MEDICARE

## 2024-01-17 DIAGNOSIS — R49.1 APHONIA: Primary | ICD-10-CM

## 2024-01-17 DIAGNOSIS — C32.9 LARYNGEAL CANCER (H): ICD-10-CM

## 2024-01-17 PROCEDURE — 92526 ORAL FUNCTION THERAPY: CPT | Mod: GN | Performed by: SPEECH-LANGUAGE PATHOLOGIST

## 2024-01-17 PROCEDURE — 92507 TX SP LANG VOICE COMM INDIV: CPT | Mod: GN | Performed by: SPEECH-LANGUAGE PATHOLOGIST

## 2024-01-29 ENCOUNTER — MYC REFILL (OUTPATIENT)
Dept: FAMILY MEDICINE | Facility: OTHER | Age: 69
End: 2024-01-29

## 2024-01-29 DIAGNOSIS — F11.90 OPIOID USE DISORDER: ICD-10-CM

## 2024-01-29 RX ORDER — BUPRENORPHINE AND NALOXONE 4; 1 MG/1; MG/1
1 FILM, SOLUBLE BUCCAL; SUBLINGUAL 2 TIMES DAILY
Qty: 56 EACH | Refills: 0 | Status: SHIPPED | OUTPATIENT
Start: 2024-01-29 | End: 2024-02-22

## 2024-01-29 NOTE — TELEPHONE ENCOUNTER
Suboxone      Last Written Prescription Date:  1.2.24  Last Fill Quantity: #56,   # refills: 0  Last Office Visit: 12.7.23  Future Office visit:    Next 5 appointments (look out 90 days)      Jan 31, 2024  1:00 PM  (Arrive by 12:45 PM)  Return Visit with Birdie Atkinson MD  [facility] [place] ([facility] [place] ) [address] [facility]  [address] [facility] [address] [phone]     Feb 01, 2024 11:15 AM  (Arrive by 11:00 AM)  SHORT with Regina Hicks MD  [facility] ([facility] ) [address] [place] [address]  [phone]             Routing refill request to provider for review/approval because:  Drug not on the [facility], P or [facility] refill protocol or controlled substance
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
## 2024-01-31 ENCOUNTER — LAB (OUTPATIENT)
Dept: LAB | Facility: CLINIC | Age: 69
End: 2024-01-31
Payer: MEDICARE

## 2024-01-31 ENCOUNTER — HOSPITAL ENCOUNTER (OUTPATIENT)
Dept: PET IMAGING | Facility: CLINIC | Age: 69
Discharge: HOME | End: 2024-01-31
Attending: OTOLARYNGOLOGY
Payer: MEDICARE

## 2024-01-31 ENCOUNTER — THERAPY VISIT (OUTPATIENT)
Dept: SPEECH THERAPY | Facility: CLINIC | Age: 69
End: 2024-01-31
Payer: MEDICARE

## 2024-01-31 ENCOUNTER — OFFICE VISIT (OUTPATIENT)
Dept: OTOLARYNGOLOGY | Facility: CLINIC | Age: 69
End: 2024-01-31
Payer: MEDICARE

## 2024-01-31 VITALS — BODY MASS INDEX: 19.01 KG/M2 | HEIGHT: 68 IN

## 2024-01-31 DIAGNOSIS — C32.8 CANCER OF OVERLAPPING SITES OF LARYNX (H): ICD-10-CM

## 2024-01-31 DIAGNOSIS — C32.9 LARYNGEAL CANCER (H): ICD-10-CM

## 2024-01-31 DIAGNOSIS — R13.12 DYSPHAGIA, OROPHARYNGEAL PHASE: Primary | ICD-10-CM

## 2024-01-31 DIAGNOSIS — Z90.02 S/P LARYNGECTOMY: ICD-10-CM

## 2024-01-31 DIAGNOSIS — C32.9 LARYNGEAL CANCER (H): Primary | ICD-10-CM

## 2024-01-31 DIAGNOSIS — R49.1 APHONIA: ICD-10-CM

## 2024-01-31 DIAGNOSIS — E89.0 HYPOTHYROIDISM DUE TO RADIATION: ICD-10-CM

## 2024-01-31 LAB — TSH SERPL DL<=0.005 MIU/L-ACNC: 1.94 UIU/ML (ref 0.3–4.2)

## 2024-01-31 PROCEDURE — 99024 POSTOP FOLLOW-UP VISIT: CPT | Performed by: OTOLARYNGOLOGY

## 2024-01-31 PROCEDURE — 71250 CT THORAX DX C-: CPT

## 2024-01-31 PROCEDURE — 343N000001 HC RX 343: Performed by: OTOLARYNGOLOGY

## 2024-01-31 PROCEDURE — 70490 CT SOFT TISSUE NECK W/O DYE: CPT | Mod: 26 | Performed by: STUDENT IN AN ORGANIZED HEALTH CARE EDUCATION/TRAINING PROGRAM

## 2024-01-31 PROCEDURE — 92526 ORAL FUNCTION THERAPY: CPT | Mod: GN | Performed by: SPEECH-LANGUAGE PATHOLOGIST

## 2024-01-31 PROCEDURE — A9552 F18 FDG: HCPCS | Performed by: OTOLARYNGOLOGY

## 2024-01-31 PROCEDURE — 84443 ASSAY THYROID STIM HORMONE: CPT | Performed by: PATHOLOGY

## 2024-01-31 PROCEDURE — 74176 CT ABD & PELVIS W/O CONTRAST: CPT | Mod: 26 | Performed by: STUDENT IN AN ORGANIZED HEALTH CARE EDUCATION/TRAINING PROGRAM

## 2024-01-31 PROCEDURE — G1010 CDSM STANSON: HCPCS | Performed by: STUDENT IN AN ORGANIZED HEALTH CARE EDUCATION/TRAINING PROGRAM

## 2024-01-31 PROCEDURE — 36415 COLL VENOUS BLD VENIPUNCTURE: CPT | Performed by: PATHOLOGY

## 2024-01-31 PROCEDURE — 70490 CT SOFT TISSUE NECK W/O DYE: CPT

## 2024-01-31 PROCEDURE — 78816 PET IMAGE W/CT FULL BODY: CPT | Mod: 26 | Performed by: STUDENT IN AN ORGANIZED HEALTH CARE EDUCATION/TRAINING PROGRAM

## 2024-01-31 PROCEDURE — 78816 PET IMAGE W/CT FULL BODY: CPT | Mod: MG,PS

## 2024-01-31 PROCEDURE — 71250 CT THORAX DX C-: CPT | Mod: 26 | Performed by: STUDENT IN AN ORGANIZED HEALTH CARE EDUCATION/TRAINING PROGRAM

## 2024-01-31 RX ORDER — LIDOCAINE HYDROCHLORIDE 20 MG/ML
1 SOLUTION OROPHARYNGEAL EVERY 6 HOURS PRN
Qty: 100 ML | Refills: 3 | Status: SHIPPED | OUTPATIENT
Start: 2024-01-31 | End: 2024-08-07

## 2024-01-31 RX ADMIN — FLUDEOXYGLUCOSE F-18 10.13 MILLICURIE: 500 INJECTION, SOLUTION INTRAVENOUS at 10:29

## 2024-01-31 ASSESSMENT — PAIN SCALES - GENERAL: PAINLEVEL: NO PAIN (0)

## 2024-01-31 NOTE — PROGRESS NOTES
PEG tube removed in clinic. Patient tolerated removal. Area cleansed and gauze applied over site. Instructed patient on signs and symptoms of infection. Provided patient with gauze and tape. Patient verbalized understanding of care.

## 2024-02-07 ENCOUNTER — MEDICAL CORRESPONDENCE (OUTPATIENT)
Dept: HEALTH INFORMATION MANAGEMENT | Facility: HOSPITAL | Age: 69
End: 2024-02-07

## 2024-02-20 NOTE — PROGRESS NOTES
Assessment & Plan     Opioid use disorder  Stable.  Continue MAT at current dose.  Follow up 3 months.  - buprenorphine HCl-naloxone HCl (SUBOXONE) 4-1 MG per film; Place 1 Film under the tongue 2 times daily    Opioid abuse, in remission (H)  As above  - Urine Drug Screen Buprenorphine Urine Qualitative [id number], Ethanol Urine Qualitative [id number], Methadone Urine Qualitative [id number], Oxycodone Urine Qualitative [id number], Creatinine Urine Random [id number]      See Patient Instructions    Return in about 3 months (around 5/22/2024) for MAT.    Teresa Jules is a 68 year old, presenting for the following health issues:  Recheck Medication        2/22/2024    10:07 AM   Additional Questions   Roomed by Mayo Yin   Accompanied by None         2/22/2024    10:07 AM   Patient Reported Additional Medications   Patient reports taking the following new medications Multi Vitamin, Vitamin A,C and D     HPI     He is currently taking 4/1 mg of buprenorphine 2 times daily.   Last fill 1.29.24 #56.    Status Since Last Visit:  Have you used any opioids since your last visit?: no use since last visit  Do you feel that your dose of suboxone is too high or too low? Adequate  Have there been cravings for opioids? No   Any withdrawal symptoms?  None     Any side effects from the medication?  None  Any alcohol use? None  Any other recreational drug use? None     Precipitating Factors:  Triggers have been: non-existent   Other Supports:  Do you attend counseling or meet with a therapist? No  Do you attend NA or AA meetings? No  Do you have/meet with a sponsor? No  Family and support systems have been: Helpful   What other goals have you been working on (job, family, relationships, etc)?     Feeding tube out.  Weight up 15 pounds          9/28/2020     1:00 PM 7/14/2022    11:00 AM 10/6/2022    11:00 AM   PHQ-9 SCORE   PHQ-9 Total Score 0 0 0           3/22/2023    10:56 AM 6/14/2023    11:16 AM 6/14/2023    11:26 AM   MICHAEL-7  SCORE   Total Score  0 (minimal anxiety)    Total Score 0 0 0     PDMP Review         Value Time User    State PDMP site checked  Yes 2/22/2024 10:33 AM Regina Hicks MD                  Review of Systems  Constitutional, HEENT, cardiovascular, pulmonary, gi and gu systems are negative, except as otherwise noted.      Objective    /77 (BP Location: Left arm, Patient Position: Sitting, Cuff Size: Adult Regular)   Pulse 58   Temp 98.8  F (37.1  C) (Tympanic)   Resp 18   Wt 64.4 kg (142 lb)   BMI 21.59 kg/m    Body mass index is 21.59 kg/m .  Physical Exam   GENERAL: alert and no distress; uses voice assistance device  NECK: tracheostomy present  RESP: lungs clear to auscultation - no rales, rhonchi or wheezes  CV: regular rate and rhythm, normal S1 S2, no S3 or S4, no murmur, click or rub, no peripheral edema  MS: no gross musculoskeletal defects noted, no edema  PSYCH: mentation appears normal, affect normal/bright    Results for orders placed or performed in visit on 02/22/24 (from the past 24 hour(s))   Urine Drug Screen Buprenorphine Urine Qualitative [id number], Ethanol Urine Qualitative [id number], Methadone Urine Qualitative [id number], Oxycodone Urine Qualitative [id number], Creatinine Urine Random [id number]    Narrative    The following orders were created for panel order Urine Drug Screen Buprenorphine Urine Qualitative [id number], Ethanol Urine Qualitative [id number], Methadone Urine Qualitative [id number], Oxycodone Urine Qualitative [id number], Creatinine Urine Random [id number].  Procedure                               Abnormality         Status                     ---------                               -----------         ------                     Urine Drug Screen Panel[[id number]]      Normal              Final result               Buprenorphine Urine, Dangelo...[[id number]]  Abnormal            Final result               Ethanol urine[[id number]]                Normal              Final result               Methadone  Qual Urine[[id number]]         Normal              Final result               Oxycodone Urine, Qualita...[[id number]]  Normal              Final result               Creatinine random urine[[id number]]                          Final result                 Please view results for these tests on the individual orders.   Urine Drug Screen Panel   Result Value Ref Range    Amphetamines Urine Screen Negative Screen Negative    Barbituates Urine Screen Negative Screen Negative    Benzodiazepine Urine Screen Negative Screen Negative    Cannabinoids Urine Screen Negative Screen Negative    Cocaine Urine Screen Negative Screen Negative    Fentanyl Qual Urine Screen Negative Screen Negative    Opiates Urine Screen Negative Screen Negative    PCP Urine Screen Negative Screen Negative   Buprenorphine Urine, Qualitative   Result Value Ref Range    Buprenorphine Qual Urine Screen Positive (A) Screen Negative   Ethanol urine   Result Value Ref Range    Ethanol Urine Screen Negative Screen Negative   Methadone Qual Urine   Result Value Ref Range    Methadone Urine Screen Negative Screen Negative   Oxycodone Urine, Qualitative   Result Value Ref Range    Oxycodone Urine Screen Negative Screen Negative   Creatinine random urine   Result Value Ref Range    Creatinine Urine mg/dL 92.3 mg/dL           Signed Electronically by: Regina Hobbs MD

## 2024-02-22 ENCOUNTER — OFFICE VISIT (OUTPATIENT)
Dept: FAMILY MEDICINE | Facility: OTHER | Age: 69
End: 2024-02-22
Attending: FAMILY MEDICINE
Payer: MEDICARE

## 2024-02-22 ENCOUNTER — APPOINTMENT (OUTPATIENT)
Dept: LAB | Facility: OTHER | Age: 69
End: 2024-02-22
Payer: MEDICARE

## 2024-02-22 ENCOUNTER — PATIENT OUTREACH (OUTPATIENT)
Dept: CARE COORDINATION | Facility: OTHER | Age: 69
End: 2024-02-22

## 2024-02-22 VITALS
WEIGHT: 142 LBS | HEART RATE: 58 BPM | DIASTOLIC BLOOD PRESSURE: 77 MMHG | RESPIRATION RATE: 18 BRPM | BODY MASS INDEX: 21.59 KG/M2 | TEMPERATURE: 98.8 F | SYSTOLIC BLOOD PRESSURE: 129 MMHG

## 2024-02-22 DIAGNOSIS — F11.11 OPIOID ABUSE, IN REMISSION (H): ICD-10-CM

## 2024-02-22 DIAGNOSIS — F11.90 OPIOID USE DISORDER: Primary | ICD-10-CM

## 2024-02-22 LAB
AMPHETAMINES UR QL SCN: NORMAL
BARBITURATES UR QL SCN: NORMAL
BENZODIAZ UR QL SCN: NORMAL
BUPRENORPHINE UR QL: ABNORMAL
BZE UR QL SCN: NORMAL
CANNABINOIDS UR QL SCN: NORMAL
CREAT UR-MCNC: 92.3 MG/DL
ETHANOL UR QL SCN: NORMAL
FENTANYL UR QL: NORMAL
METHADONE UR QL SCN: NORMAL
OPIATES UR QL SCN: NORMAL
OXYCODONE UR QL: NORMAL
PCP QUAL URINE (ROCHE): NORMAL

## 2024-02-22 PROCEDURE — 80307 DRUG TEST PRSMV CHEM ANLYZR: CPT | Mod: ZL,91 | Performed by: FAMILY MEDICINE

## 2024-02-22 PROCEDURE — 80307 DRUG TEST PRSMV CHEM ANLYZR: CPT | Mod: ZL | Performed by: FAMILY MEDICINE

## 2024-02-22 PROCEDURE — 99213 OFFICE O/P EST LOW 20 MIN: CPT | Performed by: FAMILY MEDICINE

## 2024-02-22 PROCEDURE — 82570 ASSAY OF URINE CREATININE: CPT | Mod: ZL,XU | Performed by: FAMILY MEDICINE

## 2024-02-22 PROCEDURE — G0463 HOSPITAL OUTPT CLINIC VISIT: HCPCS

## 2024-02-22 RX ORDER — BUPRENORPHINE AND NALOXONE 4; 1 MG/1; MG/1
1 FILM, SOLUBLE BUCCAL; SUBLINGUAL 2 TIMES DAILY
Qty: 56 EACH | Refills: 2 | Status: SHIPPED | OUTPATIENT
Start: 2024-02-22 | End: 2024-05-16

## 2024-02-22 ASSESSMENT — PAIN SCALES - GENERAL: PAINLEVEL: SEVERE PAIN (6)

## 2024-02-22 ASSESSMENT — ACTIVITIES OF DAILY LIVING (ADL): DEPENDENT_IADLS:: INDEPENDENT

## 2024-02-22 NOTE — PROGRESS NOTES
Chief Complaint   Patient presents with   â¢ ER F/U     abd pain        SUBJECTIVE:  Sahara Hidalgo is a 61year old  female who presents for follow-up of her emergency room visit on July 16. Patient presented to the urgent care initially with left lower quadrant abdominal pain and was referred to the emergency room for abdominal/pelvic CT. CT scan revealed diverticulitis of the sigmoid colon with no abscess. Patient was started on ciprofloxacin as well as metronidazole and has finally noticed improvement in her pain today. Patient has been fine a low residual diet as well. Patient's last colonoscopy was at age 47 which revealed diverticulosis but no polyps. Denies any fevers. Patient had been having left lower quadrant pain off and on for the last several months and was seen by her gynecologist.  Patient also questions if she still needs an MRI of her liver. Recent CT scan did not reveal any abnormality therefore would like to cancel her MRI. This was ordered by her oncologist.    Past Medical History:   Diagnosis Date   â¢ Breast cancer (CMS/HCC) 02/03/2017    left breast, moderately differentiated invasive ductal carcinoma   â¢ Salivary duct stone 11/30/2016   â¢ Sigmoid diverticulosis 2017   â¢ Wears glasses          Past Surgical History:   Procedure Laterality Date   â¢ Appendectomy  07/29/2017    Dr Marco Altman Breast biopsy Right 2013    [facility] in [place], negative biospsy   â¢ Breast biopsy  02/02/2017    left breast biopsy   â¢ Breast lumpectomy Left 02/15/2017    2 lymph nodes removed. â¢ Carpal tunnel release Bilateral 2013   â¢ Colonoscopy  11/2012    normal, repeat in 10 yrs   â¢ Ectopic pregnancy surgery  1987    both tubes removed   â¢ Tubal ligation  1985   â¢ Vaginal delivery  x3         Current Outpatient Prescriptions   Medication Sig   â¢ ciprofloxacin (CIPRO) 500 MG tablet Take 1 tablet by mouth every 12 hours for 10 days.    â¢ metroNIDAZOLE (FLAGYL) 500 MG tablet Take 1 tablet by mouth every 8 hours for 10 Clinic Care Coordination Contact  Follow Up Progress Note      Assessment: CC attended office visit with Jorge A and Dr. Hicks this date.  Jorge A is doing very well in recovery.  Suboxone dose is adequate and would like to continue on current dose.  No use of any sorts of substances.  Has been following up with all specialty appointments - recently got his feeding tube out.  Has been tolerating oral foods - weight is up 15#, which he is very happy about.  Has good support system in place.  Still living with daughter Artis.  Jorge A is in good spirits today - very positive outlook since his recent cancer diagnosis.      Care Gaps:    Health Maintenance Due   Topic Date Due    SPIROMETRY  Never done    COPD ACTION PLAN  Never done    COVID-19 Vaccine (1) Never done    ZOSTER IMMUNIZATION (1 of 2) Never done    HEPATITIS A IMMUNIZATION (1 of 2 - Risk 2-dose series) Never done    Pneumococcal Vaccine: 65+ Years (2 of 2 - PCV) 11/02/2012    RSV VACCINE (Pregnancy & 60+) (1 - 1-dose 60+ series) Never done    DTAP/TDAP/TD IMMUNIZATION (2 - Td or Tdap) 10/30/2021    INFLUENZA VACCINE (1) 09/01/2023       Will continue to work on these   - declines vaccines     Care Plans  Care Plan: Health Maintenance       Problem: Health Maintenance Due or Overdue       Goal: Become up-to-date with health maintenance visit(s)                           Care Plan: Transportation       Problem: Lack of transportation       Goal: Establish reliable transportation       This Visit's Progress: 90%                            Intervention/Education provided during outreach: Continue current meds as prescribed.  Follow up with specialty appointments as scheduled.  Continue to practice using your electrolarynx.       Outreach Frequency: 3 months, more frequently as needed        Plan:   No change in treatment plan at this time.  Care Coordinator will follow up in 12 weeks, sooner if he has concerns.    Margot Joel, RN-BSN, [facility]  Coordinator  [phone]       days.   â¢ anastrozole (ARIMIDEX) 1 MG tablet Take 1 tablet by mouth daily. â¢ cholecalciferol (VITAMIN D3) 1000 UNITS tablet Take 2,000 Units by mouth daily. â¢ Hydrocortisone Butyr Lipo Base 0.1 % Cream Apply to rash twice a day sparingly   â¢ ibuprofen 200 MG capsule Take 200 mg by mouth every 6 hours as needed for Pain. â¢ aspirin 81 MG chewable tablet Chew 81 mg by mouth daily. â¢ POTASSIUM AMINOBENZOATE PO Take by mouth daily. â¢ Wheat Dextrin (BENEFIBER DRINK MIX PO) Take by mouth daily. No current facility-administered medications for this visit. ALLERGIES:  No Known Allergies       Alcohol Use: Yes           8.4 oz/week       Comment: ususally 1-2 gabriel a day      Tobacco Use: Yes           Packs/Day: .2    Years: 25         Last Attempt to quit: 02/18/2017       Comment: a couple cigarettes a day      ROS:  Rest of review of systems are negative except what is stated above. OBJECTIVE:  Visit Vitals  /70   Pulse 76   Wt 71.6 kg   BMI 27.09 kg/mÂ²     BP Readings from Last 3 Encounters:   07/18/18 120/70   07/16/18 128/71   07/16/18 122/73   ,   Wt Readings from Last 3 Encounters:   07/18/18 71.6 kg   07/16/18 70.3 kg   07/16/18 70.9 kg     GENERAL: Appears healthy, alert, in no acute distress . Abdomen: Soft, left lower quadrant tenderness but no rebound or guarding    CT ABDOMEN PELVIS W CONTRAST  Â   HISTORY:  ABDOMINAL PAIN, LLQ abd pain  Â   TECHNIQUE:  Axial images are obtained of the abdomen and pelvis following  the administration of 100 mL of Isovue-300. Sagittal and coronal  reconstructions were performed. Â   COMPARISON:  7/29/2017  Â   FINDINGS:  Â   Abdomen:    Â   Lung bases: Lung bases are clear. Â   Liver: Normal.  Â   Gallbladder: Normal.  Â   Pancreas: Normal.  Â   Spleen: Normal.  Â   Adrenal glands: Normal.  Â   Kidneys: Normal. No hydronephrosis. Â   Aorta: No abdominal aortic aneurysm. No retroperitoneal hemorrhage.  Aortic  and arterial calcification indicates atherosclerosis. Â   Bowel: No dilated loops of small bowel. Â   Retroperitoneum: No retroperitoneal lymphadenopathy. Â   Additional findings: No free air. No free fluid. Â   Pelvis:    Â   Small bowel: No dilated loops of small bowel. Â   Appendix: Status post appendectomy      Â   Colon: There is focal inflammation of the left lower quadrant extending  around one diverticula as well as around a focus of fat (series 3 image  [id number]). Â   There is mild diverticulosis elsewhere in the sigmoid colon. Â   Additional findings: No free air. No free fluid. Â   Â   IMPRESSION:  Â   There is focal inflammation of the left lower quadrant near one diverticula  involving the sigmoid colon as well as near a focus of fat. This may be due  to a localized area of acute diverticulitis or due to a localized area of  epiploic appendagitis. Clinical and laboratory correlation is recommended. Â   Mild diverticulosis is seen elsewhere in the sigmoid colon. Â   Status post appendectomy. Â   No evidence of bowel obstruction, free air, or abscess. Â     ASSESSMENT AND PLAN:  [facility] was seen today for er f/u. Diagnoses and all orders for this visit:    Diverticulitis of sigmoid colon-continue antibiotics until gone. Recommend over-the-counter probiotics twice daily to prevent side effects from the antibiotics. Continue daily Benefiber to prevent constipation in the future    Diverticulosis of large intestine without hemorrhage-stable    Patient will discuss with her oncologist regarding canceling her MRI of her liver. No orders of the defined types were placed in this encounter. Â   Return if symptoms worsen or fail to improve.     Ainsley Choi MD

## 2024-03-18 DIAGNOSIS — E89.0 HYPOTHYROIDISM DUE TO RADIATION: ICD-10-CM

## 2024-03-18 RX ORDER — LEVOTHYROXINE SODIUM 100 UG/1
100 TABLET ORAL DAILY
Qty: 60 TABLET | Refills: 3 | Status: SHIPPED | OUTPATIENT
Start: 2024-03-18

## 2024-03-20 ENCOUNTER — MYC REFILL (OUTPATIENT)
Dept: FAMILY MEDICINE | Facility: OTHER | Age: 69
End: 2024-03-20

## 2024-03-20 DIAGNOSIS — F11.90 OPIOID USE DISORDER: ICD-10-CM

## 2024-03-20 RX ORDER — BUPRENORPHINE AND NALOXONE 4; 1 MG/1; MG/1
1 FILM, SOLUBLE BUCCAL; SUBLINGUAL 2 TIMES DAILY
Qty: 56 EACH | Refills: 2 | OUTPATIENT
Start: 2024-03-20

## 2024-03-20 NOTE — TELEPHONE ENCOUNTER
buprenorphine HCl-naloxone HCl (SUBOXONE) 4-1 MG per film       Last Written Prescription Date:  2/22/24  Last Fill Quantity: 56,   # refills: 2  Last Office Visit: 2/22/24  Future Office visit:    Next 5 appointments (look out 90 days)      May 16, 2024 10:15 AM  (Arrive by 10:00 AM)  [facility]  [facility] [address] [facility] [address]  [phone]     May 20, 2024 11:20 AM  (Arrive by 11:05 AM)  Return Visit with Birdie Atkinson MD  [facility] ([facility] ) [address] [place] [id number]  [phone]             Routing refill request to provider for review/approval because:  Drug not on the [facility], P or [facility] refill protocol or controlled substance

## 2024-05-15 NOTE — PROGRESS NOTES
Assessment & Plan     Opioid abuse, in remission (H)  Stable.  Continue Suboxone.  Effective.  Tolerating well.  - Urine Drug Screen Buprenorphine Urine Qualitative [id number], Ethanol Urine Qualitative KNQ274, Methadone Urine Qualitative OIH2998, Oxycodone Urine Qualitative CCM1534, Creatinine Urine Random [id number]    Opioid use disorder  As above  - buprenorphine HCl-naloxone HCl (SUBOXONE) 4-1 MG per film; Place 1 Film under the tongue 2 times daily          See Patient Instructions    Return in about 3 months (around 8/16/2024) for MAT.    Teresa Jules is a 68 year old, presenting for the following health issues:  Recheck Medication        5/16/2024     9:55 AM   Additional Questions   Roomed by April   Accompanied by self         5/16/2024     9:55 AM   Patient Reported Additional Medications   Patient reports taking the following new medications none     HPI     He is currently taking 4/1 mg of buprenorphine 2 times daily.   Last fill 4.20.24 #56.    Status Since Last Visit:  Have you used any opioids since your last visit?: no use since last visit  Do you feel that your dose of suboxone is too high or too low? Adequate  Have there been cravings for opioids? No   Any withdrawal symptoms?  None     Any side effects from the medication?  None  Any alcohol use? None  Any other recreational drug use? None     Precipitating Factors:  Triggers have been: non-existent   Other Supports:  Do you attend counseling or meet with a therapist? No  Do you attend NA or AA meetings? No  Do you have/meet with a sponsor? No  Family and support systems have been: Helpful  What other goals have you been working on (job, family, relationships, etc)? fishing    Weight up from 142 to 153 in past 3 months.  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  Getting use to speaking with trach and voice assist.      PDMP Review         Value Time User    State PDMP site checked  Yes 5/16/2024 10:12 AM Regina Hicks  "MD JAMES                Review of Systems  Constitutional, HEENT, cardiovascular, pulmonary, gi and gu systems are negative, except as otherwise noted.      Objective    /74 (BP Location: Left arm, Patient Position: Sitting, Cuff Size: Adult Regular)   Pulse 66   Temp 97.8  F (36.6  C) (Tympanic)   Resp 18   Ht 1.727 m (5' 8\")   Wt 69.4 kg (153 lb)   SpO2 96%   BMI 23.26 kg/m    Body mass index is 23.26 kg/m .  Physical Exam   GENERAL: alert and no distress  NECK: tracheostomy  RESP: lungs clear to auscultation - no rales, rhonchi or wheezes  CV: regular rate and rhythm, normal S1 S2, no S3 or S4, no murmur, click or rub, no peripheral edema  MS: no gross musculoskeletal defects noted, no edema  PSYCH: mentation appears normal, affect normal/bright    Results for orders placed or performed in visit on 05/16/24 (from the past 24 hour(s))   Urine Drug Screen Buprenorphine Urine Qualitative [id number], Ethanol Urine Qualitative BTF682, Methadone Urine Qualitative BOS5503, Oxycodone Urine Qualitative TFE4004, Creatinine Urine Random IJO571    Narrative    The following orders were created for panel order Urine Drug Screen Buprenorphine Urine Qualitative [id number], Ethanol Urine Qualitative [id number], Methadone Urine Qualitative OEF6194, Oxycodone Urine Qualitative [id number], Creatinine Urine Random [id number].  Procedure                               Abnormality         Status                     ---------                               -----------         ------                     Urine Drug Screen Panel[[id number]]                          In process                 Buprenorphine Urine, Dangelo...[[id number]]                      In process                 Ethanol urine[[id number]]                                    In process                 Methadone Qual Urine[[id number]]                             In process                 Oxycodone Urine, Qualita...[[id number]]                      In process                 Creatinine " random urine[[id number]]                          In process                   Please view results for these tests on the individual orders.           Signed Electronically by: Regina Hobbs MD

## 2024-05-16 ENCOUNTER — APPOINTMENT (OUTPATIENT)
Dept: LAB | Facility: OTHER | Age: 69
End: 2024-05-16
Payer: MEDICARE

## 2024-05-16 ENCOUNTER — PATIENT OUTREACH (OUTPATIENT)
Dept: CARE COORDINATION | Facility: OTHER | Age: 69
End: 2024-05-16

## 2024-05-16 ENCOUNTER — OFFICE VISIT (OUTPATIENT)
Dept: FAMILY MEDICINE | Facility: OTHER | Age: 69
End: 2024-05-16
Attending: FAMILY MEDICINE
Payer: MEDICARE

## 2024-05-16 VITALS
DIASTOLIC BLOOD PRESSURE: 74 MMHG | HEIGHT: 68 IN | SYSTOLIC BLOOD PRESSURE: 138 MMHG | HEART RATE: 66 BPM | BODY MASS INDEX: 23.19 KG/M2 | OXYGEN SATURATION: 96 % | RESPIRATION RATE: 18 BRPM | WEIGHT: 153 LBS | TEMPERATURE: 97.8 F

## 2024-05-16 DIAGNOSIS — F11.90 OPIOID USE DISORDER: ICD-10-CM

## 2024-05-16 DIAGNOSIS — F11.11 OPIOID ABUSE, IN REMISSION (H): Primary | ICD-10-CM

## 2024-05-16 LAB
AMPHETAMINES UR QL SCN: NORMAL
BARBITURATES UR QL SCN: NORMAL
BENZODIAZ UR QL SCN: NORMAL
BUPRENORPHINE UR QL: ABNORMAL
BZE UR QL SCN: NORMAL
CANNABINOIDS UR QL SCN: NORMAL
CREAT UR-MCNC: 144.1 MG/DL
ETHANOL UR QL SCN: NORMAL
FENTANYL UR QL: NORMAL
METHADONE UR QL SCN: NORMAL
OPIATES UR QL SCN: NORMAL
OXYCODONE UR QL: NORMAL
PCP QUAL URINE (ROCHE): NORMAL

## 2024-05-16 PROCEDURE — 80307 DRUG TEST PRSMV CHEM ANLYZR: CPT | Mod: ZL | Performed by: FAMILY MEDICINE

## 2024-05-16 PROCEDURE — G0463 HOSPITAL OUTPT CLINIC VISIT: HCPCS

## 2024-05-16 PROCEDURE — 80307 DRUG TEST PRSMV CHEM ANLYZR: CPT | Mod: ZL,91 | Performed by: FAMILY MEDICINE

## 2024-05-16 PROCEDURE — 99213 OFFICE O/P EST LOW 20 MIN: CPT | Performed by: FAMILY MEDICINE

## 2024-05-16 PROCEDURE — 82570 ASSAY OF URINE CREATININE: CPT | Mod: ZL,XU | Performed by: FAMILY MEDICINE

## 2024-05-16 RX ORDER — BUPRENORPHINE AND NALOXONE 4; 1 MG/1; MG/1
1 FILM, SOLUBLE BUCCAL; SUBLINGUAL 2 TIMES DAILY
Qty: 56 EACH | Refills: 2 | Status: SHIPPED | OUTPATIENT
Start: 2024-05-16 | End: 2024-07-09

## 2024-05-16 ASSESSMENT — PAIN SCALES - GENERAL: PAINLEVEL: NO PAIN (0)

## 2024-05-16 NOTE — PROGRESS NOTES
Clinic Care Coordination Contact  Follow Up Progress Note      Assessment: CC attended office visit with Jorge A and Dr. Hicks this date.  Jorge A is doing very well in recovery.   Suboxone dose is adequate and would like to continue on current dose.  Has been in our program for over 4 years now - with no slips or lapses. Commended him on this.  Doing very well since his laryngectomy.  Following through with all of his specialist appointments.  Getting comfortable with his electrolarynx - getting very good at using it.  He spends quite a bit of time practicing.  Has good support system - family very supportive.  Has been able to get out and do things that he enjoys - has been fishing quite a bit lately.  Still smoke free and no desire to start smoking again.  Commended him on this.  Mood is upbeat and positive.     Care Gaps:    Health Maintenance Due   Topic Date Due    SPIROMETRY  Never done    COPD ACTION PLAN  Never done    COVID-19 Vaccine (1) Never done    HEPATITIS A IMMUNIZATION (1 of 2 - Risk 2-dose series) Never done    ZOSTER IMMUNIZATION (1 of 2) Never done    Pneumococcal Vaccine: 65+ Years (2 of 2 - PCV) 11/02/2012    RSV VACCINE (Pregnancy & 60+) (1 - 1-dose 60+ series) Never done    DTAP/TDAP/TD IMMUNIZATION (2 - Td or Tdap) 10/30/2021    MEDICARE ANNUAL WELLNESS VISIT  06/14/2024    FALL RISK ASSESSMENT  06/14/2024       Will continue to work on these    Care Plans  Care Plan: Health Maintenance       Problem: Health Maintenance Due or Overdue       Goal: Become up-to-date with health maintenance visit(s)       Note:     Barriers: refuses most vaccines  Strengths: honest, compliant  Patient expressed understanding of goal: yes  Action steps to achieve this goal:  1. I will take my medications as prescribed  2. I will attend my scheduled appointments  3. I will reach out if I change my mind about vaccines                                 Intervention/Education provided during outreach: Continue  current meds as prescribed.  Continue practicing with your electrolarynx.  Reach out with any questions or concerns.     Outreach Frequency: 3 months, more frequently as needed        Plan:   No change int treatment plan at this time.    Care Coordinator will follow up in 12 weeks, sooner if he has concerns.    Margot Joel RN-BSN, [facility] Care Coordinator  [phone]
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
## 2024-05-20 ENCOUNTER — THERAPY VISIT (OUTPATIENT)
Dept: SPEECH THERAPY | Facility: CLINIC | Age: 69
End: 2024-05-20
Payer: MEDICARE

## 2024-05-20 ENCOUNTER — OFFICE VISIT (OUTPATIENT)
Dept: OTOLARYNGOLOGY | Facility: CLINIC | Age: 69
End: 2024-05-20
Payer: MEDICARE

## 2024-05-20 VITALS
WEIGHT: 154 LBS | DIASTOLIC BLOOD PRESSURE: 91 MMHG | SYSTOLIC BLOOD PRESSURE: 171 MMHG | BODY MASS INDEX: 23.34 KG/M2 | OXYGEN SATURATION: 97 % | HEART RATE: 55 BPM | TEMPERATURE: 98.4 F | HEIGHT: 68 IN

## 2024-05-20 DIAGNOSIS — C32.9 LARYNGEAL CANCER (H): ICD-10-CM

## 2024-05-20 DIAGNOSIS — R13.12 DYSPHAGIA, OROPHARYNGEAL PHASE: ICD-10-CM

## 2024-05-20 DIAGNOSIS — R49.1 APHONIA: Primary | ICD-10-CM

## 2024-05-20 DIAGNOSIS — E89.0 HYPOTHYROIDISM DUE TO RADIATION: Primary | ICD-10-CM

## 2024-05-20 PROCEDURE — 31615 TRCHEOBRNCHSC EST TRACHS INC: CPT | Performed by: OTOLARYNGOLOGY

## 2024-05-20 PROCEDURE — 99214 OFFICE O/P EST MOD 30 MIN: CPT | Mod: 25 | Performed by: OTOLARYNGOLOGY

## 2024-05-20 PROCEDURE — 92507 TX SP LANG VOICE COMM INDIV: CPT | Mod: GN | Performed by: SPEECH-LANGUAGE PATHOLOGIST

## 2024-05-20 PROCEDURE — 31575 DIAGNOSTIC LARYNGOSCOPY: CPT | Mod: 51 | Performed by: OTOLARYNGOLOGY

## 2024-05-20 RX ORDER — METHYLPREDNISOLONE 32 MG/1
32 TABLET ORAL DAILY
Qty: 2 TABLET | Refills: 0 | Status: SHIPPED | OUTPATIENT
Start: 2024-05-20

## 2024-05-20 RX ORDER — DIPHENHYDRAMINE HCL 25 MG
25 CAPSULE ORAL EVERY 6 HOURS PRN
Qty: 3 CAPSULE | Refills: 0 | Status: SHIPPED | OUTPATIENT
Start: 2024-05-20

## 2024-05-20 ASSESSMENT — PAIN SCALES - GENERAL: PAINLEVEL: MODERATE PAIN (5)

## 2024-05-20 NOTE — NURSING NOTE
"Chief Complaint   Patient presents with    RECHECK     3 month follow up     Blood pressure (!) 171/91, pulse 55, temperature 98.4  F (36.9  C), height 1.727 m (5' 8\"), weight 69.9 kg (154 lb), SpO2 97%.  Huber Mulligan LPN    "
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
## 2024-05-20 NOTE — PROGRESS NOTES
[facility]                                                                                   OUTPATIENT SPEECH LANGUAGE PATHOLOGY    PLAN OF TREATMENT FOR OUTPATIENT REHABILITATION   Patient's Last Name, First Name, Jorge A Qureshi YOB: 1955   Provider's Name   [facility]   Medical Record No.  [id number]     Onset Date: 11/14/23 Start of Care Date: 12/15/23     Medical Diagnosis:  SCC larynx s/p total laryngectomy      SLP Treatment Diagnosis: Aphonia  Plan of Treatment  Frequency/Duration: 2x/month  / 12 months     Certification date from 03/14/24   To 06/11/24          See note for plan of treatment details and functional goals     GILES Riggs                         I CERTIFY THE NEED FOR THESE SERVICES FURNISHED UNDER        THIS PLAN OF TREATMENT AND WHILE UNDER MY CARE     (Physician attestation of this document indicates review and certification of the therapy plan).              Referring Provider:  Dr. Birdie Atkinson    Initial Assessment  See Epic Evaluation- 12/15/23         05/20/24 1120   Appointment Info   Treating Provider Deandra Harrison MA, CCC-SLP   Visits Used 4   Medical Diagnosis SCC larynx s/p total laryngectomy   SLP Tx Diagnosis Aphonia   Quick Adds Certification   Progress Note/Certification   Start Of Care Date 12/15/23   Onset Of Illness/injury Or Date Of Surgery 11/14/23   Therapy Frequency 2x/month   Predicted Duration 12 months   Certification date from 03/14/24   Certification date to 06/11/24   Progress Note Due Date 06/11/24   Subjective Report   Subjective Report Pt seen today in conjunction with ENT clinic visit accompanied by his daughter. Reports doing very well.   SLP Goals   SLP Goals 1;2;3;4   SLP Goal 1   Goal Identifier PO   Goal Description 1. Pt will tolerate least restrictive diet while adhering to safe swallow precuations independently 100% of the time.   Rationale To maximize  "safety, ease and/or independence of oral intake   Target Date 06/11/24   SLP Goal 2   Goal Identifier Pulmonary Rehab   Goal Description 2. Pt will demonstrated understanding and use for all pulmonary rehab options (i.e. baseplates, HMEs, larytube, etc) for maintaining good pulmonary function post laryngectomy 100% of the time independently.   Rationale To maximize functional communication within the home or community   Goal Progress Pt presents with larytube, laryclips and HOME HME in place. Reports he likes the laryclips. He inquired about different types of HMEs. Trained pt on difference from the GO vs HOME. Provided samples. He will try this.   Target Date 06/11/24   SLP Goal 3   Goal Identifier EL Placement   Goal Description 3.Pt will demonstrated adequate cheek and/or neck electrolarynx placement in the \"sweet spot\" independently in 4/5 opportunities   Rationale To maximize functional communication within the home or community   Goal Progress Pt using neck placement during session with success finding sweet spot ~98% of the time. Congratulated him on his hard work with the EL.   Target Date 06/11/24   SLP Goal 4   Goal Identifier EL Intelligibility   Goal Description 4.Pt will use electrolarynx in conversation with ~90% or greater intelligibility as judged by a trained listener 80% of the time   Rationale To maximize functional communication within the home or community   Goal Progress Pt 90% intelligible today to trained listener. He inquired about a TEP. Broadly discussed need for daily maintenance of prosthesis. He is not sure he wants this. Discussed option of completing insufflation test at next ENT visit; pt agreeable. Will plan for this.   Target Date 06/11/24   Treatment Interventions (SLP)   Treatment Interventions Treatment Speech/Lang/Voice   Treatment Swallow/Oral dysfunction   Treatment of Swallowing Dysfunction &/or Oral Function for Feeding Minutes (32561) 15 Minutes   Treatment " Speech/Lang/Voice   Treatment of Speech, Language, Voice Communication&/or Auditory Processing (20765) 15 Minutes   Skilled Intervention Provided written and verbal information on.;Modeled compensatory strategies;Provided feedback on performance of tasks;Facilitated respiratory, laryngeal, oral integration;Other  (EL training; pulm rehab)   Patient Response/Progress Pt and his daughter demonstrated understanding of all material provided.   Education   Learner/Method Patient;Family;No Barriers to Learning   Plan   Plan for next session Follow-up in conjunction with ENT clinic visit; plan for insufflation test at next visit

## 2024-07-07 ENCOUNTER — MYC MEDICAL ADVICE (OUTPATIENT)
Dept: OTHER | Age: 69
End: 2024-07-07

## 2024-07-07 DIAGNOSIS — F11.90 OPIOID USE DISORDER: ICD-10-CM

## 2024-07-09 NOTE — TELEPHONE ENCOUNTER
Suboxone - asking for 90 day supply to [facility] due to cost      Last Written Prescription Date:  6.17.24  Last Fill Quantity: #56,   # refills: 0  Last Office Visit: 5.16.24  Future Office visit:       Routing refill request to provider for review/approval because:  Drug not on the [facility], [facility] or [facility] refill protocol or controlled substance

## 2024-07-10 RX ORDER — BUPRENORPHINE AND NALOXONE 4; 1 MG/1; MG/1
1 FILM, SOLUBLE BUCCAL; SUBLINGUAL 2 TIMES DAILY
Qty: 180 EACH | Refills: 0 | Status: SHIPPED | OUTPATIENT
Start: 2024-07-10 | End: 2024-07-11

## 2024-07-10 NOTE — TELEPHONE ENCOUNTER
Signed RX faxed to [facility] - fax number [phone].    Margot Joel RN-BSN, [facility] Care Coordinator  [phone]

## 2024-07-10 NOTE — TELEPHONE ENCOUNTER
Looks like [facility] will not accept Suboxone electronically.  RX set to local print.  You will have to physically sign RX and we can then fax to [facility].

## 2024-07-11 DIAGNOSIS — F11.90 OPIOID USE DISORDER: ICD-10-CM

## 2024-07-11 RX ORDER — BUPRENORPHINE AND NALOXONE 4; 1 MG/1; MG/1
1 FILM, SOLUBLE BUCCAL; SUBLINGUAL 2 TIMES DAILY
Qty: 180 EACH | Refills: 0 | Status: SHIPPED | OUTPATIENT
Start: 2024-07-11 | End: 2024-07-12

## 2024-07-11 NOTE — TELEPHONE ENCOUNTER
Can you try signing this again - in refill encounter versus MyChart.   Daughter sent message stated that RX has not been received - it was faxed yesterday.     This writer tried to call Mom and left a message.

## 2024-07-11 NOTE — TELEPHONE ENCOUNTER
[facility] notified via [employer].    Margot Joel RN-BSN, [facility] Care Coordinator  [phone]

## 2024-07-12 DIAGNOSIS — F11.90 OPIOID USE DISORDER: ICD-10-CM

## 2024-07-12 RX ORDER — BUPRENORPHINE AND NALOXONE 4; 1 MG/1; MG/1
1 FILM, SOLUBLE BUCCAL; SUBLINGUAL 2 TIMES DAILY
Qty: 10 EACH | Refills: 0 | Status: SHIPPED | OUTPATIENT
Start: 2024-07-12 | End: 2024-07-15

## 2024-07-12 NOTE — TELEPHONE ENCOUNTER
[facility] won't be able to fill his Suboxone for 3-5 days.  Can a short supply be sent in to [facility]?  5 day supply pended in this encounter.

## 2024-07-15 DIAGNOSIS — F11.90 OPIOID USE DISORDER: ICD-10-CM

## 2024-07-15 RX ORDER — BUPRENORPHINE AND NALOXONE 4; 1 MG/1; MG/1
1 FILM, SOLUBLE BUCCAL; SUBLINGUAL 2 TIMES DAILY
Qty: 56 EACH | Refills: 0 | Status: SHIPPED | OUTPATIENT
Start: 2024-07-15 | End: 2024-08-06

## 2024-08-06 DIAGNOSIS — F11.90 OPIOID USE DISORDER: ICD-10-CM

## 2024-08-06 RX ORDER — BUPRENORPHINE AND NALOXONE 4; 1 MG/1; MG/1
1 FILM, SOLUBLE BUCCAL; SUBLINGUAL 2 TIMES DAILY
Qty: 180 EACH | Refills: 0 | Status: SHIPPED | OUTPATIENT
Start: 2024-08-06

## 2024-08-06 NOTE — TELEPHONE ENCOUNTER
Suboxone - switching to [facility] - needs 90 day supply      Last Written Prescription Date:  7.16.24  Last Fill Quantity: #56,   # refills: 0  Last Office Visit: 5.16.24  Future Office visit:       Routing refill request to provider for review/approval because:  Drug not on the [facility], [facility] or [facility] refill protocol or controlled substance

## 2024-08-06 NOTE — PROGRESS NOTES
Assessment & Plan     Opioid abuse, in remission (H)  Doing great.   Good support.  Time with family.  Enjoys hobbies.  Desires to continue MAT.  Refills sent yesterday.  Follow up 3 months.  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            See Patient Instructions    Return in about 3 months (around 11/7/2024) for MAT.    Teresa Jules is a 68 year old, presenting for the following health issues:  Recheck Medication        8/7/2024    10:06 AM   Additional Questions   Roomed by Mayo Yin   Accompanied by None         8/7/2024    10:06 AM   Patient Reported Additional Medications   Patient reports taking the following new medications None     HPI     He is currently taking 4/1 mg of buprenorphine 2 times daily.   Last fill 7.16.24 #56.    90 day supply sent to [facility] on 8.6.24.    Weight continues to increase - up 7 pounds since last 3 months.    Birthday today - will be with daughter and grandkids tonight.  BBQ and cake.    Enjoys walking and fishing.  Occasional golf.    Status Since Last Visit:  Have you used any opioids since your last visit?: no use since last visit  Do you feel that your dose of suboxone is too high or too low? Adequate  Have there been cravings for opioids? No   Any withdrawal symptoms? no    Any side effects from the medication?  no  Any alcohol use? no  Any other recreational drug use? no    Precipitating Factors:  Triggers have been: non-existent   Other Supports:  Do you attend counseling or meet with a therapist? No  Do you attend NA or AA meetings? No  Do you have/meet with a sponsor? No  Family and support systems have been: Very helpful and supportive  What other goals have you been working on (job, family, relationships, etc)? Attending  scheduled appointments  Communicating effectively with health care team  Issues with insurance - getting fills; cost,etc  90 day refill sent yesterday [facility]  4 years MAT  Prn Tylenol; soak in tub - pain control        PDMP Review         Value Time User    State PDMP site checked  Yes 8/7/2024 10:20 AM Regina Hicks MD              Review of Systems  Constitutional, HEENT, cardiovascular, pulmonary, gi and gu systems are negative, except as otherwise noted.      Objective    BP (!) 151/92 (BP Location: Left arm, Patient Position: Sitting, Cuff Size: Adult Regular)   Pulse 66   Temp 98.6  F (37  C) (Tympanic)   Resp 18   Wt 72.6 kg (160 lb)   SpO2 97%   BMI 24.33 kg/m    Body mass index is 24.33 kg/m .  Physical Exam   GENERAL: alert and no distress  NECK: no adenopathy, no asymmetry, masses, or scars, and s/p laryngectomy; trach  RESP: lungs clear to auscultation - no rales, rhonchi or wheezes  CV: regular rate and rhythm, normal S1 S2, no S3 or S4, no murmur, click or rub, no peripheral edema  MS: no gross musculoskeletal defects noted, no edema  PSYCH: mentation appears normal, affect normal/bright    Results for orders placed or performed in visit on 08/07/24 (from the past 24 hour(s))   Urine Drug Screen Buprenorphine Urine Qualitative [id number], Ethanol Urine Qualitative [id number], Methadone Urine Qualitative [id number], Oxycodone Urine Qualitative [id number], Creatinine Urine Random [id number]    Narrative    The following orders were created for panel order Urine Drug Screen Buprenorphine Urine Qualitative [id number], Ethanol Urine Qualitative [id number], Methadone Urine Qualitative [id number], Oxycodone Urine Qualitative [id number], Creatinine Urine Random [id number].  Procedure                               Abnormality         Status                     ---------                               -----------         ------                     Urine Drug Screen Panel[[id number]]                          In process                  Buprenorphine Urine, Dangelo...[[id number]]                      In process                 Ethanol urine[[id number]]                                    In process                 Methadone Qual Urine[[id number]]                             In process                 Oxycodone Urine, Qualita...[[id number]]                      In process                 Creatinine random urine[[id number]]                          In process                   Please view results for these tests on the individual orders.           Signed Electronically by: Regina Hobbs MD

## 2024-08-07 ENCOUNTER — OFFICE VISIT (OUTPATIENT)
Dept: FAMILY MEDICINE | Facility: OTHER | Age: 69
End: 2024-08-07
Attending: FAMILY MEDICINE
Payer: MEDICARE

## 2024-08-07 ENCOUNTER — LAB (OUTPATIENT)
Dept: LAB | Facility: OTHER | Age: 69
End: 2024-08-07
Attending: FAMILY MEDICINE
Payer: MEDICARE

## 2024-08-07 ENCOUNTER — PATIENT OUTREACH (OUTPATIENT)
Dept: CARE COORDINATION | Facility: OTHER | Age: 69
End: 2024-08-07

## 2024-08-07 VITALS
HEART RATE: 66 BPM | OXYGEN SATURATION: 97 % | SYSTOLIC BLOOD PRESSURE: 151 MMHG | RESPIRATION RATE: 18 BRPM | WEIGHT: 160 LBS | BODY MASS INDEX: 24.33 KG/M2 | DIASTOLIC BLOOD PRESSURE: 92 MMHG | TEMPERATURE: 98.6 F

## 2024-08-07 DIAGNOSIS — E89.0 HYPOTHYROIDISM DUE TO RADIATION: ICD-10-CM

## 2024-08-07 DIAGNOSIS — F11.99 OPIOID USE, UNSPECIFIED WITH UNSPECIFIED OPIOID-INDUCED DISORDER (H): ICD-10-CM

## 2024-08-07 DIAGNOSIS — J44.1 COPD EXACERBATION (H): ICD-10-CM

## 2024-08-07 DIAGNOSIS — C32.9 LARYNGEAL CANCER (H): ICD-10-CM

## 2024-08-07 DIAGNOSIS — F11.11 OPIOID ABUSE, IN REMISSION (H): Primary | ICD-10-CM

## 2024-08-07 DIAGNOSIS — E43 SEVERE PROTEIN-CALORIE MALNUTRITION (H): ICD-10-CM

## 2024-08-07 LAB
AMPHETAMINES UR QL SCN: NORMAL
BARBITURATES UR QL SCN: NORMAL
BENZODIAZ UR QL SCN: NORMAL
BUPRENORPHINE UR QL: ABNORMAL
BZE UR QL SCN: NORMAL
CANNABINOIDS UR QL SCN: NORMAL
CREAT UR-MCNC: 165.4 MG/DL
ETHANOL UR QL SCN: NORMAL
FENTANYL UR QL: NORMAL
METHADONE UR QL SCN: NORMAL
OPIATES UR QL SCN: NORMAL
OXYCODONE UR QL: NORMAL
PCP QUAL URINE (ROCHE): NORMAL
T4 FREE SERPL-MCNC: 1.08 NG/DL (ref 0.9–1.7)
TSH SERPL DL<=0.005 MIU/L-ACNC: 4.9 UIU/ML (ref 0.3–4.2)

## 2024-08-07 PROCEDURE — 82570 ASSAY OF URINE CREATININE: CPT | Mod: ZL,XU | Performed by: FAMILY MEDICINE

## 2024-08-07 PROCEDURE — 80307 DRUG TEST PRSMV CHEM ANLYZR: CPT | Mod: ZL | Performed by: FAMILY MEDICINE

## 2024-08-07 PROCEDURE — 80307 DRUG TEST PRSMV CHEM ANLYZR: CPT | Mod: ZL,91 | Performed by: FAMILY MEDICINE

## 2024-08-07 PROCEDURE — 84443 ASSAY THYROID STIM HORMONE: CPT | Mod: ZL

## 2024-08-07 PROCEDURE — 36415 COLL VENOUS BLD VENIPUNCTURE: CPT | Mod: ZL

## 2024-08-07 PROCEDURE — G0463 HOSPITAL OUTPT CLINIC VISIT: HCPCS | Performed by: FAMILY MEDICINE

## 2024-08-07 PROCEDURE — 99213 OFFICE O/P EST LOW 20 MIN: CPT | Performed by: FAMILY MEDICINE

## 2024-08-07 PROCEDURE — 84439 ASSAY OF FREE THYROXINE: CPT | Mod: ZL

## 2024-08-07 RX ORDER — LEVOTHYROXINE SODIUM 100 UG/1
100 TABLET ORAL DAILY
Qty: 60 TABLET | Refills: 3 | Status: CANCELLED | OUTPATIENT
Start: 2024-08-07

## 2024-08-07 ASSESSMENT — PAIN SCALES - GENERAL: PAINLEVEL: MODERATE PAIN (5)

## 2024-08-07 NOTE — PROGRESS NOTES
Clinic Care Coordination Contact  Follow Up Progress Note      Assessment: RN CC attended office visit with Jorge A and Dr. Hicks this date.  Jorge A is doing very well in recovery.  It is his 69th birthday today!  Suboxone dose is adequate and would like to continue on current dose.  Has been in our program for over 4 years now with no slips or lapses!  Jorge A has been attending all of his scheduled appointments - specialty and med follow ups.  Has upcoming appointments in September - ENT in [place].  Has been compliant with all treatment recommendations.  Spending time outside - fishing, spending time with family and grandchildren.  He is doing very well.  No concerns today.     Care Gaps:    Health Maintenance Due   Topic Date Due    SPIROMETRY  Never done    COPD ACTION PLAN  Never done    COVID-19 Vaccine (1) Never done    HEPATITIS A IMMUNIZATION (1 of 2 - Risk 2-dose series) Never done    ZOSTER IMMUNIZATION (1 of 2) Never done    Pneumococcal Vaccine: 65+ Years (2 of 2 - PCV) 11/02/2012    RSV VACCINE (Pregnancy & 60+) (1 - 1-dose 60+ series) Never done    DTAP/TDAP/TD IMMUNIZATION (2 - Td or Tdap) 10/30/2021    MEDICARE ANNUAL WELLNESS VISIT  06/14/2024       Will continue to work on these    Care Plans  Care Plan: Health Maintenance       Problem: Health Maintenance Due or Overdue       Goal: Become up-to-date with health maintenance visit(s)       This Visit's Progress: 80%    Note:     Barriers: refuses most vaccines  Strengths: honest, compliant  Patient expressed understanding of goal: yes  Action steps to achieve this goal:  1. I will take my medications as prescribed  2. I will attend my scheduled appointments  3. I will reach out if I change my mind about vaccines                                 Intervention/Education provided during outreach: Continue current meds as prescribed.  Attend scheduled appointments.  Continue to get outside and enjoy yourself!       Outreach Frequency: 3 months,  more frequently as needed        Plan:   No change in treatment plan at this time.  Care Coordinator will follow up in 12 weeks, sooner if he has concerns.    Margot Joel RN-BSN, [facility] Care Coordinator  [phone]

## 2024-08-12 DIAGNOSIS — F11.11 OPIOID ABUSE, IN REMISSION (H): Primary | ICD-10-CM

## 2024-08-12 RX ORDER — BUPRENORPHINE AND NALOXONE 4; 1 MG/1; MG/1
1 FILM, SOLUBLE BUCCAL; SUBLINGUAL 2 TIMES DAILY
Qty: 10 EACH | Refills: 0 | Status: SHIPPED | OUTPATIENT
Start: 2024-08-12 | End: 2024-08-16

## 2024-08-12 NOTE — TELEPHONE ENCOUNTER
Suboxone - needs short supply sent to local pharmacy - waiting on mail order      Last Written Prescription Date:  7.16.24  Last Fill Quantity: #56,   # refills: 0  Last Office Visit: 8.7.24  Future Office visit:       Routing refill request to provider for review/approval because:  Drug not on the [facility], [facility] or [facility] refill protocol or controlled substance

## 2024-08-16 DIAGNOSIS — F11.11 OPIOID ABUSE, IN REMISSION (H): ICD-10-CM

## 2024-08-16 RX ORDER — BUPRENORPHINE AND NALOXONE 4; 1 MG/1; MG/1
1 FILM, SOLUBLE BUCCAL; SUBLINGUAL 2 TIMES DAILY
Qty: 6 EACH | Refills: 0 | Status: SHIPPED | OUTPATIENT
Start: 2024-08-16

## 2024-08-16 NOTE — TELEPHONE ENCOUNTER
Jorge A's Suboxone was shipped from [facility] - but has only made it to [place].  It won't be delivered until Monday, 8/19.  Daughter is asking for a 3 day supply to be sent to [facility] in [place].

## 2024-09-01 ENCOUNTER — MYC MEDICAL ADVICE (OUTPATIENT)
Dept: OTOLARYNGOLOGY | Facility: CLINIC | Age: 69
End: 2024-09-01
Payer: MEDICARE

## 2024-09-01 DIAGNOSIS — C32.9 LARYNGEAL CANCER (H): Primary | ICD-10-CM
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
## 2024-09-03 ENCOUNTER — DOCUMENTATION ONLY (OUTPATIENT)
Dept: OTOLARYNGOLOGY | Facility: CLINIC | Age: 69
End: 2024-09-03
Payer: MEDICARE

## 2024-09-03 RX ORDER — METHYLPREDNISOLONE 32 MG/1
TABLET ORAL
Qty: 2 TABLET | Refills: 0 | Status: SHIPPED | OUTPATIENT
Start: 2024-09-03

## 2024-09-04 ENCOUNTER — OFFICE VISIT (OUTPATIENT)
Dept: OTOLARYNGOLOGY | Facility: CLINIC | Age: 69
End: 2024-09-04
Payer: MEDICARE

## 2024-09-04 ENCOUNTER — HOSPITAL ENCOUNTER (OUTPATIENT)
Dept: CT IMAGING | Facility: CLINIC | Age: 69
Discharge: HOME | End: 2024-09-04
Attending: OTOLARYNGOLOGY
Payer: MEDICARE

## 2024-09-04 ENCOUNTER — THERAPY VISIT (OUTPATIENT)
Dept: SPEECH THERAPY | Facility: CLINIC | Age: 69
End: 2024-09-04
Payer: MEDICARE

## 2024-09-04 VITALS
HEIGHT: 68 IN | SYSTOLIC BLOOD PRESSURE: 164 MMHG | WEIGHT: 158.3 LBS | DIASTOLIC BLOOD PRESSURE: 97 MMHG | BODY MASS INDEX: 23.99 KG/M2 | HEART RATE: 68 BPM | OXYGEN SATURATION: 92 %

## 2024-09-04 DIAGNOSIS — C32.9 LARYNGEAL CANCER (H): ICD-10-CM

## 2024-09-04 DIAGNOSIS — R13.12 DYSPHAGIA, OROPHARYNGEAL PHASE: Primary | ICD-10-CM

## 2024-09-04 DIAGNOSIS — Z90.02 S/P LARYNGECTOMY: ICD-10-CM

## 2024-09-04 DIAGNOSIS — E89.0 HYPOTHYROIDISM DUE TO RADIATION: Primary | ICD-10-CM

## 2024-09-04 DIAGNOSIS — M54.40 CHRONIC MIDLINE LOW BACK PAIN WITH SCIATICA, SCIATICA LATERALITY UNSPECIFIED: ICD-10-CM

## 2024-09-04 DIAGNOSIS — G89.29 CHRONIC MIDLINE LOW BACK PAIN WITH SCIATICA, SCIATICA LATERALITY UNSPECIFIED: ICD-10-CM

## 2024-09-04 PROCEDURE — G1010 CDSM STANSON: HCPCS | Mod: GC | Performed by: RADIOLOGY

## 2024-09-04 PROCEDURE — 250N000011 HC RX IP 250 OP 636: Performed by: STUDENT IN AN ORGANIZED HEALTH CARE EDUCATION/TRAINING PROGRAM

## 2024-09-04 PROCEDURE — 31575 DIAGNOSTIC LARYNGOSCOPY: CPT | Mod: 51 | Performed by: OTOLARYNGOLOGY

## 2024-09-04 PROCEDURE — 71260 CT THORAX DX C+: CPT | Mod: MG

## 2024-09-04 PROCEDURE — 31615 TRCHEOBRNCHSC EST TRACHS INC: CPT | Performed by: OTOLARYNGOLOGY

## 2024-09-04 PROCEDURE — 71260 CT THORAX DX C+: CPT | Mod: 26 | Performed by: RADIOLOGY

## 2024-09-04 PROCEDURE — 70491 CT SOFT TISSUE NECK W/DYE: CPT | Mod: 26 | Performed by: RADIOLOGY

## 2024-09-04 PROCEDURE — 92507 TX SP LANG VOICE COMM INDIV: CPT | Mod: GN | Performed by: SPEECH-LANGUAGE PATHOLOGIST

## 2024-09-04 PROCEDURE — 99214 OFFICE O/P EST MOD 30 MIN: CPT | Mod: 25 | Performed by: OTOLARYNGOLOGY

## 2024-09-04 PROCEDURE — 70491 CT SOFT TISSUE NECK W/DYE: CPT | Mod: MG

## 2024-09-04 PROCEDURE — 999N000127 HC STATISTIC PERIPHERAL IV START W US GUIDANCE

## 2024-09-04 RX ORDER — IOPAMIDOL 755 MG/ML
100 INJECTION, SOLUTION INTRAVASCULAR ONCE
Status: COMPLETED | OUTPATIENT
Start: 2024-09-04 | End: 2024-09-04

## 2024-09-04 RX ADMIN — IOPAMIDOL 100 ML: 755 INJECTION, SOLUTION INTRAVENOUS at 12:12

## 2024-09-04 ASSESSMENT — ANXIETY QUESTIONNAIRES
1. FEELING NERVOUS, ANXIOUS, OR ON EDGE: NOT AT ALL
4. TROUBLE RELAXING: NOT AT ALL
6. BECOMING EASILY ANNOYED OR IRRITABLE: NOT AT ALL
GAD7 TOTAL SCORE: 0
3. WORRYING TOO MUCH ABOUT DIFFERENT THINGS: NOT AT ALL
5. BEING SO RESTLESS THAT IT IS HARD TO SIT STILL: NOT AT ALL
IF YOU CHECKED OFF ANY PROBLEMS ON THIS QUESTIONNAIRE, HOW DIFFICULT HAVE THESE PROBLEMS MADE IT FOR YOU TO DO YOUR WORK, TAKE CARE OF THINGS AT HOME, OR GET ALONG WITH OTHER PEOPLE: NOT DIFFICULT AT ALL
7. FEELING AFRAID AS IF SOMETHING AWFUL MIGHT HAPPEN: NOT AT ALL
2. NOT BEING ABLE TO STOP OR CONTROL WORRYING: NOT AT ALL
GAD7 TOTAL SCORE: 0

## 2024-09-04 ASSESSMENT — PAIN SCALES - GENERAL: PAINLEVEL: MODERATE PAIN (5)
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
## 2024-09-15 ENCOUNTER — HEALTH MAINTENANCE LETTER (OUTPATIENT)
Age: 69
End: 2024-09-15

## 2024-10-25 DIAGNOSIS — F11.11 OPIOID ABUSE, IN REMISSION (H): ICD-10-CM

## 2024-10-25 RX ORDER — BUPRENORPHINE AND NALOXONE 4; 1 MG/1; MG/1
1 FILM, SOLUBLE BUCCAL; SUBLINGUAL 2 TIMES DAILY
Qty: 56 EACH | Refills: 0 | Status: SHIPPED | OUTPATIENT
Start: 2024-10-25 | End: 2024-11-11

## 2024-10-25 NOTE — TELEPHONE ENCOUNTER
Suboxone - unable to reorder from [facility] until he pays his bill with them.  Will need RX sent to local pharmacy.  Not due yet - but daughter requested it so it will be at pharmacy      Last Written Prescription Date:  8.9.24  Last Fill Quantity: #180,   # refills: 0  Last Office Visit: 8.7.24  Future Office visit:   11/8/2024    Routing refill request to provider for review/approval because:  Drug not on the [facility] or [facility] refill protocol or controlled substance

## 2024-11-07 NOTE — PROGRESS NOTES
Assessment & Plan     Opioid abuse, in remission (H)  Continue with current Suboxone dosing.  Refills sent.  - Urine Drug Screen Buprenorphine Urine Qualitative [id number], Ethanol Urine Qualitative [id number], Methadone Urine Qualitative [id number], Oxycodone Urine Qualitative [id number], Creatinine Urine Random [id number]    Opioid use disorder  As above.  Close follow up.    Benign essential hypertension  Not controlled.  Trending up past several visits.    No diabetes. No CAD.  No edema.  Start Norvasc 5 mg daily today.  Check CBC, CMP.  Recheck at follow up.            See Patient Instructions    No follow-ups on file.    Teresa Jules is a 69 year old, presenting for the following health issues:  Recheck Medication        8/7/2024    10:06 AM   Additional Questions   Roomed by Maoy Yin   Accompanied by None     HPI     He is currently taking 4/1 mg of buprenorphine 2 times daily.   Last fill 10.25.24 #56.    Status Since Last Visit:  Have you used any opioids since your last visit?: no use since last visit  Do you feel that your dose of suboxone is too high or too low? Adequate  Have there been cravings for opioids? No   Any withdrawal symptoms?  None     Any side effects from the medication?  None  Any alcohol use? None  Any other recreational drug use? None    Precipitating Factors:  Triggers have been: non-existent   Other Supports:  Do you attend counseling or meet with a therapist? No  Do you attend NA or AA meetings? No  Do you have/meet with a sponsor? No  Family and support systems have been: Helpful   What other goals have you been working on (job, family, relationships, etc)? Family time        PDMP Review         Value Time User    State PDMP site checked  Yes 11/8/2024 10:17 AM Regina Hicks MD              Hypertension Follow-up  Do you check your blood pressure regularly outside of the clinic? No   Are you following a low salt diet? No  Are your blood pressures ever more than 140 on the top  "number (systolic) OR more   than 90 on the bottom number (diastolic), for example 140/90? N/a - not checking  Doxazosin - 1 mg only  No other prior antihypertensives.    Hunting - 9 birds.  Deer hunting this weekend with family.    Review of Systems  Constitutional, HEENT, cardiovascular, pulmonary, gi and gu systems are negative, except as otherwise noted.      Objective    BP (!) 150/80 (BP Location: Right arm, Patient Position: Sitting, Cuff Size: Adult Regular)   Pulse 69   Temp 97.5  F (36.4  C)   Ht 1.727 m (5' 8\")   Wt 73.5 kg (162 lb)   SpO2 98%   BMI 24.63 kg/m    Body mass index is 24.63 kg/m .  Physical Exam   GENERAL: alert and no distress  EYES: Eyes grossly normal to inspection, PERRL and conjunctivae and sclerae normal  NECK: no adenopathy, no asymmetry, masses, or scars, and tracheostomy  RESP: lungs clear to auscultation - no rales, rhonchi or wheezes  CV: regular rate and rhythm, normal S1 S2, no S3 or S4, no murmur, click or rub, no peripheral edema  MS: no gross musculoskeletal defects noted, no edema  PSYCH: mentation appears normal, affect normal/bright    CBC, CMP pending.    Results for orders placed or performed in visit on 11/08/24 (from the past 24 hours)   Urine Drug Screen Buprenorphine Urine Qualitative [id number], Ethanol Urine Qualitative PKS471, Methadone Urine Qualitative [id number], Oxycodone Urine Qualitative PHT5631, Creatinine Urine Random OLC766    Narrative    The following orders were created for panel order Urine Drug Screen Buprenorphine Urine Qualitative [id number], Ethanol Urine Qualitative IKA150, Methadone Urine Qualitative [id number], Oxycodone Urine Qualitative RTW4005, Creatinine Urine Random TJM018.  Procedure                               Abnormality         Status                     ---------                               -----------         ------                     Urine Drug Screen Panel[[id number]]                          In process                 Buprenorphine " Urine, Dangelo...[[id number]]                      In process                 Ethanol urine[[id number]]                                    In process                 Methadone Qual Urine[[id number]]                             In process                 Oxycodone Urine, Qualita...[[id number]]                      In process                 Creatinine random urine[[id number]]                          In process                   Please view results for these tests on the individual orders.           Signed Electronically by: Regina Hobbs MD

## 2024-11-08 ENCOUNTER — OFFICE VISIT (OUTPATIENT)
Dept: FAMILY MEDICINE | Facility: OTHER | Age: 69
End: 2024-11-08
Attending: FAMILY MEDICINE
Payer: MEDICARE

## 2024-11-08 ENCOUNTER — PATIENT OUTREACH (OUTPATIENT)
Dept: CARE COORDINATION | Facility: OTHER | Age: 69
End: 2024-11-08

## 2024-11-08 ENCOUNTER — APPOINTMENT (OUTPATIENT)
Dept: LAB | Facility: OTHER | Age: 69
End: 2024-11-08
Attending: FAMILY MEDICINE
Payer: MEDICARE

## 2024-11-08 VITALS
BODY MASS INDEX: 24.55 KG/M2 | HEIGHT: 68 IN | WEIGHT: 162 LBS | HEART RATE: 69 BPM | OXYGEN SATURATION: 98 % | DIASTOLIC BLOOD PRESSURE: 80 MMHG | TEMPERATURE: 97.5 F | SYSTOLIC BLOOD PRESSURE: 140 MMHG

## 2024-11-08 DIAGNOSIS — I10 BENIGN ESSENTIAL HYPERTENSION: ICD-10-CM

## 2024-11-08 DIAGNOSIS — F11.11 OPIOID ABUSE, IN REMISSION (H): Primary | ICD-10-CM

## 2024-11-08 DIAGNOSIS — F11.90 OPIOID USE DISORDER: ICD-10-CM

## 2024-11-08 LAB
ALBUMIN SERPL BCG-MCNC: 4.1 G/DL (ref 3.5–5.2)
ALP SERPL-CCNC: 154 U/L (ref 40–150)
ALT SERPL W P-5'-P-CCNC: 24 U/L (ref 0–70)
AMPHETAMINES UR QL SCN: NORMAL
ANION GAP SERPL CALCULATED.3IONS-SCNC: 12 MMOL/L (ref 7–15)
AST SERPL W P-5'-P-CCNC: 28 U/L (ref 0–45)
BARBITURATES UR QL SCN: NORMAL
BASOPHILS # BLD AUTO: 0 10E3/UL (ref 0–0.2)
BASOPHILS NFR BLD AUTO: 0 %
BENZODIAZ UR QL SCN: NORMAL
BILIRUB SERPL-MCNC: 0.4 MG/DL
BUN SERPL-MCNC: 13.3 MG/DL (ref 8–23)
BUPRENORPHINE UR QL: ABNORMAL
BZE UR QL SCN: NORMAL
CALCIUM SERPL-MCNC: 8.9 MG/DL (ref 8.8–10.4)
CANNABINOIDS UR QL SCN: NORMAL
CHLORIDE SERPL-SCNC: 106 MMOL/L (ref 98–107)
CREAT SERPL-MCNC: 0.66 MG/DL (ref 0.67–1.17)
CREAT UR-MCNC: 212.9 MG/DL
EGFRCR SERPLBLD CKD-EPI 2021: >90 ML/MIN/1.73M2
EOSINOPHIL # BLD AUTO: 0 10E3/UL (ref 0–0.7)
EOSINOPHIL NFR BLD AUTO: 1 %
ERYTHROCYTE [DISTWIDTH] IN BLOOD BY AUTOMATED COUNT: 13.9 % (ref 10–15)
ETHANOL UR QL SCN: NORMAL
FENTANYL UR QL: NORMAL
GLUCOSE SERPL-MCNC: 71 MG/DL (ref 70–99)
HCO3 SERPL-SCNC: 24 MMOL/L (ref 22–29)
HCT VFR BLD AUTO: 38.1 % (ref 40–53)
HGB BLD-MCNC: 12.3 G/DL (ref 13.3–17.7)
IMM GRANULOCYTES # BLD: 0 10E3/UL
IMM GRANULOCYTES NFR BLD: 0 %
LYMPHOCYTES # BLD AUTO: 1 10E3/UL (ref 0.8–5.3)
LYMPHOCYTES NFR BLD AUTO: 21 %
MCH RBC QN AUTO: 28.9 PG (ref 26.5–33)
MCHC RBC AUTO-ENTMCNC: 32.3 G/DL (ref 31.5–36.5)
MCV RBC AUTO: 89 FL (ref 78–100)
METHADONE UR QL SCN: NORMAL
MONOCYTES # BLD AUTO: 0.6 10E3/UL (ref 0–1.3)
MONOCYTES NFR BLD AUTO: 12 %
NEUTROPHILS # BLD AUTO: 3.3 10E3/UL (ref 1.6–8.3)
NEUTROPHILS NFR BLD AUTO: 67 %
NRBC # BLD AUTO: 0 10E3/UL
NRBC BLD AUTO-RTO: 0 /100
OPIATES UR QL SCN: NORMAL
OXYCODONE UR QL: NORMAL
PCP QUAL URINE (ROCHE): NORMAL
PLATELET # BLD AUTO: 285 10E3/UL (ref 150–450)
POTASSIUM SERPL-SCNC: 3.9 MMOL/L (ref 3.4–5.3)
PROT SERPL-MCNC: 7.1 G/DL (ref 6.4–8.3)
RBC # BLD AUTO: 4.26 10E6/UL (ref 4.4–5.9)
SODIUM SERPL-SCNC: 142 MMOL/L (ref 135–145)
WBC # BLD AUTO: 4.9 10E3/UL (ref 4–11)

## 2024-11-08 PROCEDURE — 80307 DRUG TEST PRSMV CHEM ANLYZR: CPT | Mod: ZL | Performed by: FAMILY MEDICINE

## 2024-11-08 PROCEDURE — 36415 COLL VENOUS BLD VENIPUNCTURE: CPT | Mod: ZL | Performed by: FAMILY MEDICINE

## 2024-11-08 PROCEDURE — 82310 ASSAY OF CALCIUM: CPT | Mod: ZL | Performed by: FAMILY MEDICINE

## 2024-11-08 PROCEDURE — 99214 OFFICE O/P EST MOD 30 MIN: CPT | Performed by: FAMILY MEDICINE

## 2024-11-08 PROCEDURE — G0463 HOSPITAL OUTPT CLINIC VISIT: HCPCS

## 2024-11-08 PROCEDURE — 82570 ASSAY OF URINE CREATININE: CPT | Mod: ZL | Performed by: FAMILY MEDICINE

## 2024-11-08 PROCEDURE — 85004 AUTOMATED DIFF WBC COUNT: CPT | Mod: ZL | Performed by: FAMILY MEDICINE

## 2024-11-08 RX ORDER — AMLODIPINE BESYLATE 5 MG/1
5 TABLET ORAL DAILY
Qty: 90 TABLET | Refills: 1 | Status: SHIPPED | OUTPATIENT
Start: 2024-11-08

## 2024-11-08 RX ORDER — BUPRENORPHINE AND NALOXONE 4; 1 MG/1; MG/1
1 FILM, SOLUBLE BUCCAL; SUBLINGUAL 2 TIMES DAILY
Qty: 180 EACH | Refills: 0 | Status: SHIPPED | OUTPATIENT
Start: 2024-11-08 | End: 2024-11-11

## 2024-11-08 ASSESSMENT — PAIN SCALES - GENERAL: PAINLEVEL_OUTOF10: NO PAIN (0)

## 2024-11-08 NOTE — LETTER
My COPD Action Plan     Name: Jorge A Mendosa    YOB: 1955   Date: 11/8/2024    My doctor: NOEMÍ PEREIRA RN   My clinic: [facility] [place]    [address]  [place] [id number] [phone]  My Controller Medicine: { :511491}   Dose: ***     My Rescue Medicine: { :708515}   Dose: ***     My Flare Up Medicine: { :714184}   Dose: ***     My COPD Severity: { :176400}      Use of Oxygen: Oxygen Not Prescribed      Make sure you've had your pneumonia   vaccines.          GREEN ZONE       Doing well today    Usual level of activity and exercise  Usual amount of cough and mucus  No shortness of breath  Usual level of health (thinking clearly, sleeping well, feel like eating) Actions:    Take daily medicines  Use oxygen as prescribed  Follow regular exercise and diet plan  Avoid cigarette smoke and other irritants that harm the lungs           YELLOW ZONE          Having a bad day or flare up    Short of breath more than usual  A lot more sputum (mucus) than usual  Sputum looks yellow, green, tan, brown or bloody  More coughing or wheezing  Fever or chills  Less energy; trouble completing activities  Trouble thinking or focusing  Using quick relief inhaler or nebulizer more often  Poor sleep; symptoms wake me up  Do not feel like eating Actions:    Get plenty of rest  Take daily medicines  Use quick relief inhaler every *** hours  If you use oxygen, call you doctor to see if you should adjust your oxygen  Do breathing exercises or other things to help you relax  Let a loved one, friend or neighbor know you are feeling worse  Call your care team if you have 2 or more symptoms.  Start taking steroids or antibiotics if directed by your care team           RED ZONE       Need medical care now    Severe shortness of breath (feel you can't breathe)  Fever, chills  Not enough breath to do any activity  Trouble coughing up mucus, walking or talking  Blood in mucus  Frequent coughing Rescue  medicines are not working  Not able to sleep because of breathing  Feel confused or drowsy  Chest pain    Actions:    Call your health care team.  If you cannot reach your care team, call 911 or go to the emergency room.        Annual Reminders:  Meet with Care Team, Flu Shot every Fall  Pharmacy:    [id number] - [place] - [id number]   [facility] - [address], [place] [address] [id number] - [place] - [address] [facility]  [address]

## 2024-11-08 NOTE — PROGRESS NOTES
Clinic Care Coordination Contact  Follow Up Progress Note      Assessment: RN CC attended office visit with Jorge A and Dr. Hicks this date.  Jorge A is doing very well in recovery.  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  Suboxone dose is adequate and would like to continue on current dose.  Jorge A has been spending quality time with family.  Has been able to get outdoors - bird hunting - looking forward to deer hunting this weekend.  BP has been running a bit high over the last few visits.  Dr. Hicks educated him on risks associated with untreated HTN and it was mutually agreed upon to start a BP medication - Norvasc 5mg daily.  Watch for swelling in ankles/feet.  Notify care team if you develop LLE edema.  Dr. Hicks updating lab work today as well.    Care Gaps:    Health Maintenance Due   Topic Date Due    SPIROMETRY  Never done    COPD ACTION PLAN  Never done    COVID-19 Vaccine (1) Never done    ZOSTER IMMUNIZATION (1 of 2) Never done    Pneumococcal Vaccine: 65+ Years (2 of 2 - PCV) 11/02/2012    RSV VACCINE (1 - Risk 60-74 years 1-dose series) Never done    DTAP/TDAP/TD IMMUNIZATION (2 - Td or Tdap) 10/30/2021    MEDICARE ANNUAL WELLNESS VISIT  06/14/2024    INFLUENZA VACCINE (1) 09/01/2024    BMP  11/20/2024       Will continue to work on these - he declines vaccines     Care Plans  Care Plan: Financial Wellbeing       Problem: Patient expresses financial resource strain       Goal: Create an action plan to increase financial stability       This Visit's Progress: 60%    Note:     Barriers: Transportation, limited income  Strengths: honest, willing to accept help  Patient expressed understanding of goal: yes  Action steps to achieve this goal:  1. I will work on completing the application for [facility] with my daughter, Artis  2. I will hand in completed application  3. I will reach out if I  have questions                                 Intervention/Education provided during outreach: Continue current meds as prescribed.  Provided Jorge A with an application for [facility] this date and briefly went over instructions on filling this form out.  He will work with his daughter, Artis, on gathering the needed proofs for the application.  Suboxone treatment agreement renewed and signed this date by Jorge A and Dr. Hicks.  Signed agreement sent down to scanning.  Lab work updated today - results will be available via [email or web address].  If there is an abnormal lab result, you will get a phone call.    Would individual benefit from a referral for additional services/resources?    Yes - Health-related social needs - [facility]       Outreach Frequency: 3 months, more frequently as needed      Plan:   No change in treatment plan at this time.    Care Coordinator will follow up in 12 weeks, sooner if he has concerns.    Margot Joel RN-BSN, [facility] Care Coordinator  [phone]

## 2024-11-08 NOTE — LETTER
Jorge A Mendosa  [id number]         November 8, 2024    Suboxone (Buprenorphine/Naloxone) Treatment Agreement    This is an agreement between you and your provider about the safe and appropriate use of Suboxone (Buprenorphine/Naloxone) prescribed by your care team.    We are committed to collaborating with you in your efforts to get better. To support you in this work, we will help you schedule regular office appointments for medicine refills. If we must cancel or change your appointment for any reason, we will make sure you have enough medicine to last until your next appointment.     As a Provider, I will:  Listen carefully to your concerns and treat you with respect.   Recommend a treatment plan that I believe is in your best interest. This plan may involve therapies other than Suboxone (Buprenorphine/Naloxone).   Talk with you often about the possible benefits, and the risk of harm of any medicine that we prescribe for you.   Provide a plan on how to taper (discontinue or go off) using this medicine if the decision is made to stop its use.    As a Patient, I understand that Suboxone (Buprenorphine/Naloxone):   Is a controlled substance prescribed by my care team to help me function or work and manage my condition(s).   Needs to be taken exactly as prescribed. Combining Suboxone (Buprenorphine/Naloxone) with certain medicines or chemicals (such as illegal drugs, sedatives, sleeping pills, and benzodiazepines) can be dangerous or even fatal. If I stop Suboxone (Buprenorphine/Naloxone) suddenly, I may have severe withdrawal symptoms.    The risks, benefits and side effects of these medicine(s) were explained to me. I agree that:  I will take part in other treatments as advised by my care team. This may be psychiatry or counseling, physical therapy, behavioral therapy, group treatment or a referral to a specialist.     I will keep all my appointments. I understand that this is part of the monitoring of Suboxone  (Buprenorphine/Naloxone). My care team may require an office visit for EVERY refill. If I miss appointments or do not follow instructions, my care team may stop my medicine.    I will be respectful and considerate to all staff and providers at the clinic. Rude or aggressive behavior to staff including providers, nursing staff, , and any others will not be tolerated.    I will be honest with my care team.     I will take my medicines as prescribed. I will not change the dose or schedule unless my care team tells me to. There will be no refills if I run out early.     I may be asked to come to the clinic and complete a urine drug test or complete a film/pill count at any time. If I do not give a urine sample or participate in a film/pill count, my care team may stop my medicine.    It is up to me to make sure that I do not run out of my medicines on weekends or holidays. If my care team is willing to refill my Suboxone (Buprenorphine/Naloxone) prescription without a visit, I must request refills only during office hours. Refills may take up to three business days to process. I will use one pharmacy to fill all my Suboxone (Buprenorphine/Naloxone) and other controlled substance prescriptions. I will notify the clinic about any changes to my insurance or medication availability.    I am responsible for my prescriptions. If the medicine/prescription is lost, stolen, or destroyed, it will not be replaced. I will store my medication in a secure location away from children. I also agree not to share controlled substance medicines with anyone.    I am aware I should not use any illegal or recreational drugs. I agree not to drink alcohol unless my care team says I can.     I will tell my care team right away if I become pregnant, have a new medical problem treated outside of my regular clinic, or have a change in my medications.    I understand that this medicine can affect my thinking, judgment, and reaction time.  Alcohol and drugs affect the brain and body, which can affect the safety of my driving. Being under the influence of alcohol or drugs can affect my decision-making, behaviors, personal safety, and the safety of others. Driving while impaired (DWI) can occur if a person is driving, operating, or in physical control of a car, motorcycle, boat, snowmobile, ATV, motorbike, off-road vehicle, or any other motor vehicle ([place] Statute 169A.20). I understand the risk if I choose to drive or operate any vehicle or machinery.    I understand that if I do not follow any of the conditions above, my prescriptions or treatment may be stopped or changed.    I agree that my provider, clinic care team, and pharmacy may work with any city, state or federal law enforcement agency that investigates the misuse, sale, or other diversion of my controlled medicine. I will allow my provider to discuss my care with, or share a copy of, this agreement with any other treating provider, pharmacy, or emergency room where I receive care.    This agreement will remain in effect for duration of therapy and updated as appropriate, and/or  annually.    I have read this agreement and have asked questions about anything I did not understand.    __________________________________            ____________________________________  Patient     Date          Regina Hobbs MD                     Date

## 2024-11-11 ENCOUNTER — MYC MEDICAL ADVICE (OUTPATIENT)
Dept: OTHER | Age: 69
End: 2024-11-11

## 2024-11-11 DIAGNOSIS — F11.11 OPIOID ABUSE, IN REMISSION (H): ICD-10-CM

## 2024-11-11 RX ORDER — BUPRENORPHINE AND NALOXONE 4; 1 MG/1; MG/1
1 FILM, SOLUBLE BUCCAL; SUBLINGUAL 2 TIMES DAILY
Qty: 56 EACH | Refills: 0 | Status: SHIPPED | OUTPATIENT
Start: 2024-11-21

## 2024-11-11 NOTE — TELEPHONE ENCOUNTER
Suboxone - does not want to go through [facility] - RN CC cancelled that RX.      Last Written Prescription Date:  10.25.24 - dated 11.21.24.  Last Fill Quantity: #56,   # refills: 0  Last Office Visit: 11.8.24  Future Office visit:       Routing refill request to provider for review/approval because:  Drug not on the [facility], [facility] or [facility] refill protocol or controlled substance

## 2024-12-02 DIAGNOSIS — E89.0 HYPOTHYROIDISM DUE TO RADIATION: ICD-10-CM

## 2024-12-02 RX ORDER — LEVOTHYROXINE SODIUM 100 UG/1
100 TABLET ORAL DAILY
Qty: 60 TABLET | Refills: 11 | Status: SHIPPED | OUTPATIENT
Start: 2024-12-02

## 2024-12-18 ENCOUNTER — MYC REFILL (OUTPATIENT)
Dept: FAMILY MEDICINE | Facility: OTHER | Age: 69
End: 2024-12-18

## 2024-12-18 DIAGNOSIS — F11.11 OPIOID ABUSE, IN REMISSION (H): ICD-10-CM

## 2024-12-18 RX ORDER — BUPRENORPHINE AND NALOXONE 4; 1 MG/1; MG/1
1 FILM, SOLUBLE BUCCAL; SUBLINGUAL 2 TIMES DAILY
Qty: 56 EACH | Refills: 1 | Status: SHIPPED | OUTPATIENT
Start: 2024-12-18

## 2024-12-18 NOTE — TELEPHONE ENCOUNTER
Suboxone 4-1 MG film      Last Written Prescription Date:  11/21/24  Last Fill Quantity: 56,   # refills: 0  Last Office Visit: 11/08/24  Future Office visit:       Routing refill request to provider for review/approval because:  Drug not on the [facility], [facility] or [facility] refill protocol or controlled substance

## 2025-02-06 ENCOUNTER — MYC MEDICAL ADVICE (OUTPATIENT)
Dept: OTHER | Age: 70
End: 2025-02-06

## 2025-02-06 DIAGNOSIS — F11.11 OPIOID ABUSE, IN REMISSION (H): ICD-10-CM

## 2025-02-06 RX ORDER — BUPRENORPHINE AND NALOXONE 4; 1 MG/1; MG/1
1 FILM, SOLUBLE BUCCAL; SUBLINGUAL 2 TIMES DAILY
Qty: 180 EACH | Refills: 0 | Status: CANCELLED | OUTPATIENT
Start: 2025-02-06

## 2025-02-06 RX ORDER — BUPRENORPHINE AND NALOXONE 4; 1 MG/1; MG/1
1 FILM, SOLUBLE BUCCAL; SUBLINGUAL 2 TIMES DAILY
Qty: 180 EACH | Refills: 0 | Status: SHIPPED | OUTPATIENT
Start: 2025-02-06 | End: 2025-02-06

## 2025-02-06 NOTE — TELEPHONE ENCOUNTER
Suboxone - switching back to [facility] -needs 90 day supply which is pended.  Not due at this time, but it can take a week or so for [facility] to mail RX      Last Written Prescription Date:  1.15.25  Last Fill Quantity: #56,   # refills: 0  Last Office Visit: 11.8.24  Future Office visit:       Routing refill request to provider for review/approval because:  Drug not on the [facility], [facility] or [facility] refill protocol or controlled substance

## 2025-02-06 NOTE — TELEPHONE ENCOUNTER
Received VM from [place] - the cost is over $600 for Jorge A to get his Suboxone through [facility], which he cannot afford.  RN CC did cancel RX through [facility] - Jorge A will continue to use local pharmacy and get refill at next appointment.    Margot Joel RN-BSN, [facility] Care Coordinator  [phone]

## 2025-02-14 ENCOUNTER — APPOINTMENT (OUTPATIENT)
Dept: LAB | Facility: OTHER | Age: 70
End: 2025-02-14
Attending: FAMILY MEDICINE
Payer: MEDICARE
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
## 2025-03-19 ENCOUNTER — ANCILLARY PROCEDURE (OUTPATIENT)
Dept: CT IMAGING | Facility: CLINIC | Age: 70
End: 2025-03-19
Attending: OTOLARYNGOLOGY
Payer: MEDICARE

## 2025-03-19 ENCOUNTER — OFFICE VISIT (OUTPATIENT)
Dept: OTOLARYNGOLOGY | Facility: CLINIC | Age: 70
End: 2025-03-19
Payer: MEDICARE

## 2025-03-19 ENCOUNTER — THERAPY VISIT (OUTPATIENT)
Dept: SPEECH THERAPY | Facility: CLINIC | Age: 70
End: 2025-03-19
Payer: MEDICARE

## 2025-03-19 ENCOUNTER — LAB (OUTPATIENT)
Dept: LAB | Facility: CLINIC | Age: 70
End: 2025-03-19
Payer: MEDICARE

## 2025-03-19 VITALS
BODY MASS INDEX: 25.04 KG/M2 | HEART RATE: 71 BPM | DIASTOLIC BLOOD PRESSURE: 84 MMHG | HEIGHT: 68 IN | SYSTOLIC BLOOD PRESSURE: 134 MMHG | WEIGHT: 165.2 LBS | OXYGEN SATURATION: 96 %

## 2025-03-19 DIAGNOSIS — Z90.02 S/P LARYNGECTOMY: ICD-10-CM

## 2025-03-19 DIAGNOSIS — E89.0 HYPOTHYROIDISM DUE TO RADIATION: Primary | ICD-10-CM

## 2025-03-19 DIAGNOSIS — E89.0 HYPOTHYROIDISM DUE TO RADIATION: ICD-10-CM

## 2025-03-19 DIAGNOSIS — C32.9 LARYNGEAL CANCER (H): ICD-10-CM

## 2025-03-19 DIAGNOSIS — R49.1 APHONIA: Primary | ICD-10-CM

## 2025-03-19 LAB
CREAT BLD-MCNC: 0.9 MG/DL (ref 0.7–1.2)
EGFRCR SERPLBLD CKD-EPI 2021: >60 ML/MIN/1.73M2
TSH SERPL DL<=0.005 MIU/L-ACNC: 0.44 UIU/ML (ref 0.3–4.2)

## 2025-03-19 PROCEDURE — 84443 ASSAY THYROID STIM HORMONE: CPT | Performed by: PATHOLOGY

## 2025-03-19 PROCEDURE — 36415 COLL VENOUS BLD VENIPUNCTURE: CPT | Performed by: PATHOLOGY

## 2025-03-19 PROCEDURE — 71260 CT THORAX DX C+: CPT | Mod: GC | Performed by: RADIOLOGY

## 2025-03-19 RX ORDER — IOPAMIDOL 755 MG/ML
79 INJECTION, SOLUTION INTRAVASCULAR ONCE
Status: COMPLETED | OUTPATIENT
Start: 2025-03-19 | End: 2025-03-19

## 2025-03-19 RX ADMIN — IOPAMIDOL 79 ML: 755 INJECTION, SOLUTION INTRAVASCULAR at 13:23

## 2025-03-19 ASSESSMENT — PAIN SCALES - GENERAL: PAINLEVEL_OUTOF10: MODERATE PAIN (5)
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
## 2025-03-19 NOTE — DISCHARGE INSTRUCTIONS

## 2025-03-19 NOTE — PROGRESS NOTES
Pt was seen today for an allied health visit. Pt lost one electrolarynx and the other electrolarynx is older and reports it does not hold a battery charge. Therefore, Pt was provided with information on how to order another EL. Recommended TruTone Emote EL. Pt and daughter report they will call and order this device. No other issues or concerns related to TL supplies. No billable visit provided.       Radha Rodriguez MS, CCC-SLP  Speech-Language Pathology  [facility]  Department of Otolaryngology/D&T - 4th floor  Phone: [phone]  Email: [email or web address]

## 2025-03-20 ENCOUNTER — PATIENT OUTREACH (OUTPATIENT)
Dept: OTOLARYNGOLOGY | Facility: CLINIC | Age: 70
End: 2025-03-20
Payer: MEDICARE

## 2025-03-20 NOTE — TELEPHONE ENCOUNTER
LVM for patient's daughter to review results from CT neck and chest. Plan for patient to follow up with Dr. Atkinson as scheduled. Provided direct call back number for daughter to return call to discuss further.    Ana Cristina MORENON, RN

## 2025-04-05 ENCOUNTER — HOSPITAL ENCOUNTER (INPATIENT)
Facility: HOSPITAL | Age: 70
LOS: 1 days | Discharge: HOME | End: 2025-04-07
Attending: INTERNAL MEDICINE | Admitting: INTERNAL MEDICINE
Payer: MEDICARE

## 2025-04-05 DIAGNOSIS — L03.113 CELLULITIS OF RIGHT UPPER EXTREMITY: ICD-10-CM

## 2025-04-05 LAB
ALBUMIN SERPL BCG-MCNC: 3.8 G/DL (ref 3.5–5.2)
ALP SERPL-CCNC: 118 U/L (ref 40–150)
ALT SERPL W P-5'-P-CCNC: 20 U/L (ref 0–70)
ANION GAP SERPL CALCULATED.3IONS-SCNC: 14 MMOL/L (ref 7–15)
AST SERPL W P-5'-P-CCNC: 18 U/L (ref 0–45)
BASOPHILS # BLD AUTO: 0 10E3/UL (ref 0–0.2)
BASOPHILS NFR BLD AUTO: 0 %
BILIRUB SERPL-MCNC: 1.1 MG/DL
BUN SERPL-MCNC: 20.2 MG/DL (ref 8–23)
CALCIUM SERPL-MCNC: 8.7 MG/DL (ref 8.8–10.4)
CHLORIDE SERPL-SCNC: 95 MMOL/L (ref 98–107)
CREAT SERPL-MCNC: 0.76 MG/DL (ref 0.67–1.17)
CRP SERPL-MCNC: 262.88 MG/L
EGFRCR SERPLBLD CKD-EPI 2021: >90 ML/MIN/1.73M2
EOSINOPHIL # BLD AUTO: 0 10E3/UL (ref 0–0.7)
EOSINOPHIL NFR BLD AUTO: 0 %
ERYTHROCYTE [DISTWIDTH] IN BLOOD BY AUTOMATED COUNT: 13.2 % (ref 10–15)
ERYTHROCYTE [SEDIMENTATION RATE] IN BLOOD BY WESTERGREN METHOD: 42 MM/HR (ref 0–20)
GLUCOSE SERPL-MCNC: 149 MG/DL (ref 70–99)
HCO3 SERPL-SCNC: 24 MMOL/L (ref 22–29)
HCT VFR BLD AUTO: 38.9 % (ref 40–53)
HGB BLD-MCNC: 12.9 G/DL (ref 13.3–17.7)
IMM GRANULOCYTES # BLD: 0.2 10E3/UL
IMM GRANULOCYTES NFR BLD: 1 %
LACTATE SERPL-SCNC: 1.2 MMOL/L (ref 0.7–2)
LYMPHOCYTES # BLD AUTO: 0.8 10E3/UL (ref 0.8–5.3)
LYMPHOCYTES NFR BLD AUTO: 4 %
MCH RBC QN AUTO: 29.4 PG (ref 26.5–33)
MCHC RBC AUTO-ENTMCNC: 33.2 G/DL (ref 31.5–36.5)
MCV RBC AUTO: 89 FL (ref 78–100)
MONOCYTES # BLD AUTO: 2.3 10E3/UL (ref 0–1.3)
MONOCYTES NFR BLD AUTO: 10 %
NEUTROPHILS # BLD AUTO: 18.9 10E3/UL (ref 1.6–8.3)
NEUTROPHILS NFR BLD AUTO: 85 %
NRBC # BLD AUTO: 0 10E3/UL
NRBC BLD AUTO-RTO: 0 /100
PLAT MORPH BLD: NORMAL
PLATELET # BLD AUTO: 208 10E3/UL (ref 150–450)
POTASSIUM SERPL-SCNC: 3.6 MMOL/L (ref 3.4–5.3)
PROCALCITONIN SERPL IA-MCNC: 0.84 NG/ML
PROT SERPL-MCNC: 7.6 G/DL (ref 6.4–8.3)
RBC # BLD AUTO: 4.39 10E6/UL (ref 4.4–5.9)
RBC MORPH BLD: NORMAL
SODIUM SERPL-SCNC: 133 MMOL/L (ref 135–145)
WBC # BLD AUTO: 22.2 10E3/UL (ref 4–11)

## 2025-04-05 PROCEDURE — 87040 BLOOD CULTURE FOR BACTERIA: CPT | Performed by: INTERNAL MEDICINE

## 2025-04-05 PROCEDURE — 86140 C-REACTIVE PROTEIN: CPT | Performed by: INTERNAL MEDICINE

## 2025-04-05 PROCEDURE — 36415 COLL VENOUS BLD VENIPUNCTURE: CPT | Performed by: INTERNAL MEDICINE

## 2025-04-05 PROCEDURE — 82550 ASSAY OF CK (CPK): CPT | Performed by: INTERNAL MEDICINE

## 2025-04-05 PROCEDURE — 99285 EMERGENCY DEPT VISIT HI MDM: CPT | Performed by: INTERNAL MEDICINE

## 2025-04-05 PROCEDURE — 80053 COMPREHEN METABOLIC PANEL: CPT | Performed by: INTERNAL MEDICINE

## 2025-04-05 PROCEDURE — 85652 RBC SED RATE AUTOMATED: CPT | Performed by: INTERNAL MEDICINE

## 2025-04-05 PROCEDURE — 85025 COMPLETE CBC W/AUTO DIFF WBC: CPT | Performed by: INTERNAL MEDICINE

## 2025-04-05 PROCEDURE — 258N000003 HC RX IP 258 OP 636: Performed by: INTERNAL MEDICINE

## 2025-04-05 PROCEDURE — 99285 EMERGENCY DEPT VISIT HI MDM: CPT | Mod: 25 | Performed by: INTERNAL MEDICINE

## 2025-04-05 PROCEDURE — 84145 PROCALCITONIN (PCT): CPT | Performed by: INTERNAL MEDICINE

## 2025-04-05 PROCEDURE — 83605 ASSAY OF LACTIC ACID: CPT | Performed by: INTERNAL MEDICINE

## 2025-04-05 PROCEDURE — 96365 THER/PROPH/DIAG IV INF INIT: CPT | Performed by: INTERNAL MEDICINE

## 2025-04-05 RX ORDER — CLINDAMYCIN PHOSPHATE 900 MG/50ML
900 INJECTION, SOLUTION INTRAVENOUS ONCE
Status: COMPLETED | OUTPATIENT
Start: 2025-04-06 | End: 2025-04-06

## 2025-04-05 RX ADMIN — SODIUM CHLORIDE 1000 ML: 9 INJECTION, SOLUTION INTRAVENOUS at 23:08

## 2025-04-05 ASSESSMENT — ENCOUNTER SYMPTOMS
ABDOMINAL PAIN: 0
PALPITATIONS: 0
NECK PAIN: 0
FLANK PAIN: 0
MYALGIAS: 0
COLOR CHANGE: 0
FREQUENCY: 0
SHORTNESS OF BREATH: 0
BLOOD IN STOOL: 0
ANAL BLEEDING: 0
NAUSEA: 0
DIAPHORESIS: 0
ABDOMINAL DISTENTION: 0
NUMBNESS: 0
VOICE CHANGE: 0
CHEST TIGHTNESS: 0
BACK PAIN: 0
SLEEP DISTURBANCE: 0
CHILLS: 1
WEAKNESS: 0
WHEEZING: 0
LIGHT-HEADEDNESS: 0
DIZZINESS: 0
COUGH: 0
VOMITING: 0
FEVER: 0
DYSURIA: 0
HEADACHES: 0
CONFUSION: 0

## 2025-04-05 ASSESSMENT — COLUMBIA-SUICIDE SEVERITY RATING SCALE - C-SSRS
1. IN THE PAST MONTH, HAVE YOU WISHED YOU WERE DEAD OR WISHED YOU COULD GO TO SLEEP AND NOT WAKE UP?: NO
6. HAVE YOU EVER DONE ANYTHING, STARTED TO DO ANYTHING, OR PREPARED TO DO ANYTHING TO END YOUR LIFE?: NO
2. HAVE YOU ACTUALLY HAD ANY THOUGHTS OF KILLING YOURSELF IN THE PAST MONTH?: NO

## 2025-04-05 ASSESSMENT — ACTIVITIES OF DAILY LIVING (ADL): ADLS_ACUITY_SCORE: 57

## 2025-04-06 PROBLEM — L03.113 CELLULITIS OF RIGHT UPPER EXTREMITY: Status: ACTIVE | Noted: 2025-04-06

## 2025-04-06 LAB
ALBUMIN SERPL BCG-MCNC: 3.5 G/DL (ref 3.5–5.2)
ALP SERPL-CCNC: 110 U/L (ref 40–150)
ALT SERPL W P-5'-P-CCNC: 20 U/L (ref 0–70)
ANION GAP SERPL CALCULATED.3IONS-SCNC: 11 MMOL/L (ref 7–15)
AST SERPL W P-5'-P-CCNC: 23 U/L (ref 0–45)
BILIRUB SERPL-MCNC: 1 MG/DL
BUN SERPL-MCNC: 19.6 MG/DL (ref 8–23)
CALCIUM SERPL-MCNC: 8.5 MG/DL (ref 8.8–10.4)
CHLORIDE SERPL-SCNC: 101 MMOL/L (ref 98–107)
CK SERPL-CCNC: 40 U/L (ref 39–308)
CREAT SERPL-MCNC: 0.7 MG/DL (ref 0.67–1.17)
CRP SERPL-MCNC: 206.14 MG/L
EGFRCR SERPLBLD CKD-EPI 2021: >90 ML/MIN/1.73M2
ERYTHROCYTE [DISTWIDTH] IN BLOOD BY AUTOMATED COUNT: 13.2 % (ref 10–15)
GLUCOSE SERPL-MCNC: 114 MG/DL (ref 70–99)
HCO3 SERPL-SCNC: 25 MMOL/L (ref 22–29)
HCT VFR BLD AUTO: 36.3 % (ref 40–53)
HGB BLD-MCNC: 11.9 G/DL (ref 13.3–17.7)
HOLD SPECIMEN: NORMAL
MAGNESIUM SERPL-MCNC: 2.2 MG/DL (ref 1.7–2.3)
MCH RBC QN AUTO: 29 PG (ref 26.5–33)
MCHC RBC AUTO-ENTMCNC: 32.8 G/DL (ref 31.5–36.5)
MCV RBC AUTO: 88 FL (ref 78–100)
PLATELET # BLD AUTO: 214 10E3/UL (ref 150–450)
POTASSIUM SERPL-SCNC: 3.6 MMOL/L (ref 3.4–5.3)
PROT SERPL-MCNC: 7.2 G/DL (ref 6.4–8.3)
RBC # BLD AUTO: 4.11 10E6/UL (ref 4.4–5.9)
SODIUM SERPL-SCNC: 137 MMOL/L (ref 135–145)
WBC # BLD AUTO: 19.2 10E3/UL (ref 4–11)

## 2025-04-06 PROCEDURE — 120N000001 HC R&B MED SURG/OB

## 2025-04-06 PROCEDURE — 83735 ASSAY OF MAGNESIUM: CPT | Performed by: INTERNAL MEDICINE

## 2025-04-06 PROCEDURE — 99222 1ST HOSP IP/OBS MODERATE 55: CPT | Mod: AI | Performed by: INTERNAL MEDICINE

## 2025-04-06 PROCEDURE — 250N000011 HC RX IP 250 OP 636: Performed by: INTERNAL MEDICINE

## 2025-04-06 PROCEDURE — 85014 HEMATOCRIT: CPT | Performed by: INTERNAL MEDICINE

## 2025-04-06 PROCEDURE — 84155 ASSAY OF PROTEIN SERUM: CPT | Performed by: INTERNAL MEDICINE

## 2025-04-06 PROCEDURE — 258N000003 HC RX IP 258 OP 636: Performed by: INTERNAL MEDICINE

## 2025-04-06 PROCEDURE — 84460 ALANINE AMINO (ALT) (SGPT): CPT | Performed by: INTERNAL MEDICINE

## 2025-04-06 PROCEDURE — 250N000013 HC RX MED GY IP 250 OP 250 PS 637: Performed by: INTERNAL MEDICINE

## 2025-04-06 PROCEDURE — 36415 COLL VENOUS BLD VENIPUNCTURE: CPT | Performed by: INTERNAL MEDICINE

## 2025-04-06 PROCEDURE — 250N000011 HC RX IP 250 OP 636: Mod: JZ | Performed by: INTERNAL MEDICINE

## 2025-04-06 PROCEDURE — 86140 C-REACTIVE PROTEIN: CPT | Performed by: INTERNAL MEDICINE

## 2025-04-06 PROCEDURE — 85041 AUTOMATED RBC COUNT: CPT | Performed by: INTERNAL MEDICINE

## 2025-04-06 RX ORDER — ENOXAPARIN SODIUM 100 MG/ML
40 INJECTION SUBCUTANEOUS EVERY 24 HOURS
Status: DISCONTINUED | OUTPATIENT
Start: 2025-04-06 | End: 2025-04-07 | Stop reason: HOSPADM

## 2025-04-06 RX ORDER — ONDANSETRON 4 MG/1
4 TABLET, ORALLY DISINTEGRATING ORAL EVERY 6 HOURS PRN
Status: DISCONTINUED | OUTPATIENT
Start: 2025-04-06 | End: 2025-04-07 | Stop reason: HOSPADM

## 2025-04-06 RX ORDER — AMOXICILLIN 250 MG
1 CAPSULE ORAL 2 TIMES DAILY PRN
Status: DISCONTINUED | OUTPATIENT
Start: 2025-04-06 | End: 2025-04-07 | Stop reason: HOSPADM

## 2025-04-06 RX ORDER — SODIUM CHLORIDE, SODIUM LACTATE, POTASSIUM CHLORIDE, CALCIUM CHLORIDE 600; 310; 30; 20 MG/100ML; MG/100ML; MG/100ML; MG/100ML
INJECTION, SOLUTION INTRAVENOUS CONTINUOUS
Status: DISCONTINUED | OUTPATIENT
Start: 2025-04-06 | End: 2025-04-07 | Stop reason: HOSPADM

## 2025-04-06 RX ORDER — ACETAMINOPHEN 325 MG/1
650 TABLET ORAL EVERY 4 HOURS PRN
Status: DISCONTINUED | OUTPATIENT
Start: 2025-04-06 | End: 2025-04-07 | Stop reason: HOSPADM

## 2025-04-06 RX ORDER — OXYCODONE HYDROCHLORIDE 5 MG/1
5 TABLET ORAL EVERY 4 HOURS PRN
Status: DISCONTINUED | OUTPATIENT
Start: 2025-04-06 | End: 2025-04-06

## 2025-04-06 RX ORDER — BUPRENORPHINE HYDROCHLORIDE AND NALOXONE HYDROCHLORIDE DIHYDRATE 2; .5 MG/1; MG/1
4 TABLET SUBLINGUAL 2 TIMES DAILY
Status: DISCONTINUED | OUTPATIENT
Start: 2025-04-06 | End: 2025-04-06

## 2025-04-06 RX ORDER — LIDOCAINE 40 MG/G
CREAM TOPICAL
Status: DISCONTINUED | OUTPATIENT
Start: 2025-04-06 | End: 2025-04-07 | Stop reason: HOSPADM

## 2025-04-06 RX ORDER — NALOXONE HYDROCHLORIDE 0.4 MG/ML
0.4 INJECTION, SOLUTION INTRAMUSCULAR; INTRAVENOUS; SUBCUTANEOUS
Status: DISCONTINUED | OUTPATIENT
Start: 2025-04-06 | End: 2025-04-07 | Stop reason: HOSPADM

## 2025-04-06 RX ORDER — ONDANSETRON 2 MG/ML
4 INJECTION INTRAMUSCULAR; INTRAVENOUS EVERY 6 HOURS PRN
Status: DISCONTINUED | OUTPATIENT
Start: 2025-04-06 | End: 2025-04-07 | Stop reason: HOSPADM

## 2025-04-06 RX ORDER — BUPRENORPHINE HYDROCHLORIDE AND NALOXONE HYDROCHLORIDE DIHYDRATE 2; .5 MG/1; MG/1
2 TABLET SUBLINGUAL 2 TIMES DAILY
Status: DISCONTINUED | OUTPATIENT
Start: 2025-04-06 | End: 2025-04-07 | Stop reason: HOSPADM

## 2025-04-06 RX ORDER — AMOXICILLIN 250 MG
2 CAPSULE ORAL 2 TIMES DAILY PRN
Status: DISCONTINUED | OUTPATIENT
Start: 2025-04-06 | End: 2025-04-07 | Stop reason: HOSPADM

## 2025-04-06 RX ORDER — CLINDAMYCIN PHOSPHATE 900 MG/50ML
900 INJECTION, SOLUTION INTRAVENOUS EVERY 8 HOURS
Status: COMPLETED | OUTPATIENT
Start: 2025-04-06 | End: 2025-04-06

## 2025-04-06 RX ORDER — NALOXONE HYDROCHLORIDE 0.4 MG/ML
0.2 INJECTION, SOLUTION INTRAMUSCULAR; INTRAVENOUS; SUBCUTANEOUS
Status: DISCONTINUED | OUTPATIENT
Start: 2025-04-06 | End: 2025-04-07 | Stop reason: HOSPADM

## 2025-04-06 RX ORDER — HYDRALAZINE HYDROCHLORIDE 20 MG/ML
10 INJECTION INTRAMUSCULAR; INTRAVENOUS EVERY 4 HOURS PRN
Status: DISCONTINUED | OUTPATIENT
Start: 2025-04-06 | End: 2025-04-07 | Stop reason: HOSPADM

## 2025-04-06 RX ORDER — HYDRALAZINE HYDROCHLORIDE 10 MG/1
10 TABLET, FILM COATED ORAL EVERY 4 HOURS PRN
Status: DISCONTINUED | OUTPATIENT
Start: 2025-04-06 | End: 2025-04-07 | Stop reason: HOSPADM

## 2025-04-06 RX ORDER — LEVOTHYROXINE SODIUM 100 UG/1
100 TABLET ORAL DAILY
Status: DISCONTINUED | OUTPATIENT
Start: 2025-04-07 | End: 2025-04-06

## 2025-04-06 RX ORDER — AMLODIPINE BESYLATE 5 MG/1
5 TABLET ORAL DAILY
Status: DISCONTINUED | OUTPATIENT
Start: 2025-04-07 | End: 2025-04-06

## 2025-04-06 RX ORDER — ENOXAPARIN SODIUM 100 MG/ML
40 INJECTION SUBCUTANEOUS EVERY 24 HOURS
Status: DISCONTINUED | OUTPATIENT
Start: 2025-04-06 | End: 2025-04-06

## 2025-04-06 RX ORDER — AMLODIPINE BESYLATE 5 MG/1
5 TABLET ORAL DAILY
Status: DISCONTINUED | OUTPATIENT
Start: 2025-04-06 | End: 2025-04-07 | Stop reason: HOSPADM

## 2025-04-06 RX ORDER — CALCIUM CARBONATE 500 MG/1
1000 TABLET, CHEWABLE ORAL 4 TIMES DAILY PRN
Status: DISCONTINUED | OUTPATIENT
Start: 2025-04-06 | End: 2025-04-07 | Stop reason: HOSPADM

## 2025-04-06 RX ORDER — ACETAMINOPHEN 650 MG/1
650 SUPPOSITORY RECTAL EVERY 4 HOURS PRN
Status: DISCONTINUED | OUTPATIENT
Start: 2025-04-06 | End: 2025-04-07 | Stop reason: HOSPADM

## 2025-04-06 RX ORDER — CLINDAMYCIN HYDROCHLORIDE 150 MG/1
300 CAPSULE ORAL 4 TIMES DAILY
Status: DISCONTINUED | OUTPATIENT
Start: 2025-04-06 | End: 2025-04-07 | Stop reason: HOSPADM

## 2025-04-06 RX ORDER — LEVOTHYROXINE SODIUM 100 UG/1
100 TABLET ORAL DAILY
Status: DISCONTINUED | OUTPATIENT
Start: 2025-04-06 | End: 2025-04-07 | Stop reason: HOSPADM

## 2025-04-06 RX ADMIN — LEVOTHYROXINE SODIUM 100 MCG: 0.1 TABLET ORAL at 13:28

## 2025-04-06 RX ADMIN — ENOXAPARIN SODIUM 40 MG: 40 INJECTION SUBCUTANEOUS at 08:13

## 2025-04-06 RX ADMIN — CLINDAMYCIN IN 5 PERCENT DEXTROSE 900 MG: 18 INJECTION, SOLUTION INTRAVENOUS at 08:13

## 2025-04-06 RX ADMIN — CLINDAMYCIN PHOSPHATE 900 MG: 900 INJECTION, SOLUTION INTRAVENOUS at 00:16

## 2025-04-06 RX ADMIN — ACETAMINOPHEN 650 MG: 325 TABLET, FILM COATED ORAL at 01:31

## 2025-04-06 RX ADMIN — AMLODIPINE BESYLATE 5 MG: 5 TABLET ORAL at 13:28

## 2025-04-06 RX ADMIN — BUPRENORPHINE AND NALOXONE 2 TABLET: 2; .5 TABLET SUBLINGUAL at 20:38

## 2025-04-06 RX ADMIN — CLINDAMYCIN HYDROCHLORIDE 300 MG: 150 CAPSULE ORAL at 23:03

## 2025-04-06 RX ADMIN — ACETAMINOPHEN 650 MG: 325 TABLET, FILM COATED ORAL at 16:37

## 2025-04-06 RX ADMIN — SODIUM CHLORIDE, SODIUM LACTATE, POTASSIUM CHLORIDE, AND CALCIUM CHLORIDE: .6; .31; .03; .02 INJECTION, SOLUTION INTRAVENOUS at 11:27

## 2025-04-06 RX ADMIN — BUPRENORPHINE AND NALOXONE 2 TABLET: 2; .5 TABLET SUBLINGUAL at 09:27

## 2025-04-06 RX ADMIN — SODIUM CHLORIDE, SODIUM LACTATE, POTASSIUM CHLORIDE, AND CALCIUM CHLORIDE: .6; .31; .03; .02 INJECTION, SOLUTION INTRAVENOUS at 01:14

## 2025-04-06 RX ADMIN — CLINDAMYCIN IN 5 PERCENT DEXTROSE 900 MG: 18 INJECTION, SOLUTION INTRAVENOUS at 16:31

## 2025-04-06 RX ADMIN — BUPRENORPHINE AND NALOXONE 2 TABLET: 2; .5 TABLET SUBLINGUAL at 02:01

## 2025-04-06 RX ADMIN — ACETAMINOPHEN 650 MG: 325 TABLET, FILM COATED ORAL at 23:03

## 2025-04-06 ASSESSMENT — ACTIVITIES OF DAILY LIVING (ADL)
ADLS_ACUITY_SCORE: 34
ADLS_ACUITY_SCORE: 31
ADLS_ACUITY_SCORE: 33
ADLS_ACUITY_SCORE: 33
ADLS_ACUITY_SCORE: 31
ADLS_ACUITY_SCORE: 31
ADLS_ACUITY_SCORE: 34
ADLS_ACUITY_SCORE: 33
ADLS_ACUITY_SCORE: 33
ADLS_ACUITY_SCORE: 34
ADLS_ACUITY_SCORE: 33
ADLS_ACUITY_SCORE: 33
ADLS_ACUITY_SCORE: 34
ADLS_ACUITY_SCORE: 33
ADLS_ACUITY_SCORE: 33
ADLS_ACUITY_SCORE: 34
ADLS_ACUITY_SCORE: 33
ADLS_ACUITY_SCORE: 31
ADLS_ACUITY_SCORE: 34
ADLS_ACUITY_SCORE: 57
ADLS_ACUITY_SCORE: 33

## 2025-04-06 NOTE — PLAN OF CARE
[facility] Inpatient Admission Note:    Patient admitted to 3226/3226-1 at approximately 0047 via wheel chair accompanied by transport tech from emergency room . Report received from LOLLY Lai in SBAR format at 0035 via telephone. Patient ambulated to bed via self.. Patient is alert and oriented X 3, denies pain; rates at 0 on 0-10 scale.  Patient oriented to room, unit, hourly rounding, and plan of care. Explained admission packet and patient handbook with patient bill of rights brochure. Will continue to monitor and document as needed.     Inpatient Nursing criteria listed below was met:    Health care directives status obtained and documented:  Pt reports no document    Patient identifies a surrogate decision maker: Yes If yes, who: Ranjith Wisdom Contact Information: see facesheet     If initial lactic acid greater than 2.0, repeat lactic acid drawn within one hour of arrival to unit: NA.     Clergy visit ordered if patient requests: N/A    Skin issues/needs documented: Yes    Isolation Patient: no Education given, correct sign in place and documentation row added to PCS:  Yes    Fall Prevention Yes: Care plan updated, education given and documented, sticker and magnet in place: Yes    Care Plan initiated: Yes    Education Documented (including assessment): Yes    Patient has discharge needs : No

## 2025-04-06 NOTE — ED TRIAGE NOTES
Patient presents via triage for redness of upper right arm. Patient states that he first noted the redness 2 days ago. Patient states that he has been using some antibiotic ointment. Patient ambulatory to ED room 2 with family accompanying.

## 2025-04-06 NOTE — PLAN OF CARE
Goal Outcome Evaluation:      Plan of Care Reviewed With: patient    Overall Patient Progress: improvingOverall Patient Progress: improving      A/O, VSS, Denies pain in RUE, having pain in LUE where IV is when bending, has infiltrated IV fluids. Warm pack applied. , otherwise, cellulitis is improving within drawn lines to RUE, OK to keep IV out per Dr. Jha.  Up I voiding in bathroom, offers no other concerns. Assessment as charted, Face to face report given with opportunity to observe patient.    Report given to Anna Wang RN   4/6/2025  7:25 PM

## 2025-04-06 NOTE — PHARMACY-MEDICATION REGIMEN REVIEW
Pharmacy Antimicrobial Stewardship Assessment     Current Antimicrobial Therapy:  Anti-infectives (From now, onward)      Start     Dose/Rate Route Frequency Ordered Stop    25 2200  clindamycin (CLEOCIN) capsule 300 mg         300 mg Oral 4 TIMES DAILY 25 0915      25 0800  clindamycin (CLEOCIN) 900 mg in 50 mL D5W intermittent infusion         900 mg  100 mL/hr over 30 Minutes Intravenous EVERY 8 HOURS 25 0052 25 2359          Indication: SSTI    Days of Therapy: 1     Pertinent Labs:    Recent Labs   Lab Test 25   WBC 19.2* 22.2*       Recent Labs   Lab Test 25   LACT  --  1.2  --    CRPI 206.14*  --  262.88*   PCAL  --   --  0.84*    < > = values in this interval not displayed.        Temperature:  Temp (24hrs), Av.8  F (37.7  C), Min:98.6  F (37  C), Max:100.8  F (38.2  C)      Culture Results:   30-Day Micro Results       Collected Updated Procedure Result Status      2025 232 Blood Culture Peripheral Blood [[id number]]   Peripheral Blood    In process Component Value   No component results               2025 2324 Blood Culture Peripheral Blood [[id number]]   Peripheral Blood    In process Component Value   No component results                      Recommendations/Interventions:  1.  Once transitioned to oral clindamycin, UpToDate recommends a duration of >=5 days for cellulitis.     Margarita Barakat, [facility]  2025

## 2025-04-06 NOTE — PLAN OF CARE
Face to face report given with opportunity to observe patient.    Report given to LOLLY Rashid RN   4/6/2025  7:06 AM
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
## 2025-04-06 NOTE — PROGRESS NOTES
Hospitalist brief progress note    Patient seen and examined at bedside.  Please see H&P dated 4/6/2025 by Dr. Esposito for billing purposes.  Patient's status post tracheostomy, speaks with synthesizer.  Endorses dramatic improvement in his right upper extremity as far as redness and swelling.  No subjective fevers or chills.  CRP decreased from 262 to 206, WBC decreased from 22 to 19.  Blood cultures from admission showing no growth yet.  With dramatic improvement, will switch to p.o. clindamycin this evening and monitor overnight for continued improvement and possible discharge tomorrow.    Juanito Swain MD
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
## 2025-04-06 NOTE — PLAN OF CARE
Plan of Care Reviewed With:     Overall Patient Progress:     Pt is alert and oriented. Denies pain. PRN tylenol given per pt request for temp of 100 and overall comfort. Pt declines any pain to site at rest. Reddened area stayed within marked borders. Pt is up IND in room. VSS and assessment as charted. Call light in reach, uses appropriately.

## 2025-04-07 ENCOUNTER — TELEPHONE (OUTPATIENT)
Dept: FAMILY MEDICINE | Facility: OTHER | Age: 70
End: 2025-04-07

## 2025-04-07 VITALS
DIASTOLIC BLOOD PRESSURE: 75 MMHG | TEMPERATURE: 97.6 F | WEIGHT: 156.09 LBS | RESPIRATION RATE: 14 BRPM | HEART RATE: 61 BPM | HEIGHT: 68 IN | OXYGEN SATURATION: 93 % | BODY MASS INDEX: 23.66 KG/M2 | SYSTOLIC BLOOD PRESSURE: 134 MMHG

## 2025-04-07 LAB
CRP SERPL-MCNC: 168.92 MG/L
ERYTHROCYTE [DISTWIDTH] IN BLOOD BY AUTOMATED COUNT: 13.2 % (ref 10–15)
HCT VFR BLD AUTO: 37.7 % (ref 40–53)
HGB BLD-MCNC: 12.3 G/DL (ref 13.3–17.7)
MAGNESIUM SERPL-MCNC: 2.1 MG/DL (ref 1.7–2.3)
MCH RBC QN AUTO: 29.1 PG (ref 26.5–33)
MCHC RBC AUTO-ENTMCNC: 32.6 G/DL (ref 31.5–36.5)
MCV RBC AUTO: 89 FL (ref 78–100)
PLATELET # BLD AUTO: 224 10E3/UL (ref 150–450)
POTASSIUM SERPL-SCNC: 3.5 MMOL/L (ref 3.4–5.3)
RBC # BLD AUTO: 4.22 10E6/UL (ref 4.4–5.9)
WBC # BLD AUTO: 12.5 10E3/UL (ref 4–11)

## 2025-04-07 PROCEDURE — 99239 HOSP IP/OBS DSCHRG MGMT >30: CPT | Performed by: INTERNAL MEDICINE

## 2025-04-07 PROCEDURE — 36415 COLL VENOUS BLD VENIPUNCTURE: CPT | Performed by: INTERNAL MEDICINE

## 2025-04-07 PROCEDURE — 250N000013 HC RX MED GY IP 250 OP 250 PS 637: Performed by: INTERNAL MEDICINE

## 2025-04-07 PROCEDURE — 250N000011 HC RX IP 250 OP 636: Performed by: INTERNAL MEDICINE

## 2025-04-07 PROCEDURE — 85027 COMPLETE CBC AUTOMATED: CPT | Performed by: INTERNAL MEDICINE

## 2025-04-07 PROCEDURE — 83735 ASSAY OF MAGNESIUM: CPT | Performed by: INTERNAL MEDICINE

## 2025-04-07 PROCEDURE — 84132 ASSAY OF SERUM POTASSIUM: CPT | Performed by: INTERNAL MEDICINE

## 2025-04-07 PROCEDURE — 86140 C-REACTIVE PROTEIN: CPT | Performed by: INTERNAL MEDICINE

## 2025-04-07 RX ORDER — CLINDAMYCIN HYDROCHLORIDE 300 MG/1
300 CAPSULE ORAL 4 TIMES DAILY
Qty: 24 CAPSULE | Refills: 0 | Status: SHIPPED | OUTPATIENT
Start: 2025-04-07 | End: 2025-04-13

## 2025-04-07 RX ORDER — SWAB
1 SWAB, NON-MEDICATED MISCELLANEOUS DAILY
COMMUNITY

## 2025-04-07 RX ORDER — ACETAMINOPHEN 325 MG/1
650 TABLET ORAL EVERY 4 HOURS PRN
COMMUNITY

## 2025-04-07 RX ORDER — CHLORHEXIDINE GLUCONATE ORAL RINSE 1.2 MG/ML
15 SOLUTION DENTAL 2 TIMES DAILY PRN
COMMUNITY

## 2025-04-07 RX ORDER — ASCORBIC ACID 500 MG
500 TABLET ORAL EVERY MORNING
COMMUNITY

## 2025-04-07 RX ADMIN — CLINDAMYCIN HYDROCHLORIDE 300 MG: 150 CAPSULE ORAL at 08:00

## 2025-04-07 RX ADMIN — AMLODIPINE BESYLATE 5 MG: 5 TABLET ORAL at 08:00

## 2025-04-07 RX ADMIN — CLINDAMYCIN HYDROCHLORIDE 300 MG: 150 CAPSULE ORAL at 12:54

## 2025-04-07 RX ADMIN — BUPRENORPHINE AND NALOXONE 2 TABLET: 2; .5 TABLET SUBLINGUAL at 08:00

## 2025-04-07 RX ADMIN — ENOXAPARIN SODIUM 40 MG: 40 INJECTION SUBCUTANEOUS at 08:00

## 2025-04-07 RX ADMIN — LEVOTHYROXINE SODIUM 100 MCG: 0.1 TABLET ORAL at 05:18

## 2025-04-07 RX ADMIN — ACETAMINOPHEN 650 MG: 325 TABLET, FILM COATED ORAL at 09:32

## 2025-04-07 ASSESSMENT — ACTIVITIES OF DAILY LIVING (ADL)
ADLS_ACUITY_SCORE: 33

## 2025-04-07 NOTE — MEDICATION SCRIBE - ADMISSION MEDICATION HISTORY
Medication Scribe Admission Medication History    Admission medication history is complete. The information provided in this note is only as accurate as the sources available at the time of the update.    Information Source(s): Patient and CareEverywhere/SureScripts via in-person    Pertinent Information:   Patient manages his own medications.     Changes made to PTA medication list:  Added: vitamin A, vitamin D, vitamin C, biotin  Deleted: calcitriol x 14 days, doxazosin x 7 days- therapies completed  Changed:   Patient no longer has feeding tube and takes meds orally  Peridex from BID to PRN    Allergies reviewed with patient and updates made in EHR: yes    Medication History Completed By: Johanna Collins 4/7/2025 10:29 AM    PTA Med List   Medication Sig Last Dose/Taking    acetaminophen (TYLENOL) 325 MG tablet Take 650 mg by mouth every 4 hours as needed for pain. Past Month    amLODIPine (NORVASC) 5 MG tablet Take 1 tablet (5 mg) by mouth daily. 4/5/2025 Morning    BIOTIN PO Take 1 capsule by mouth every morning. 4/5/2025 Morning    buprenorphine-naloxone (SUBOXONE) 8-2 MG SUBL sublingual tablet Place 0.5 tablets under the tongue 2 times daily. 4/5/2025 Morning    chlorhexidine 0.12 % solution Swish and spit 15 mLs in mouth 2 times daily as needed. More than a month    diphenhydrAMINE (BENADRYL ALLERGY) 25 MG capsule Take 1 capsule (25 mg) by mouth every 6 hours as needed for itching or allergies. Administer 30 min - 2 hours pre - IV contrast injection and Patient must have a . Taking As Needed    levothyroxine (SYNTHROID/LEVOTHROID) 100 MCG tablet Take 1 tablet (100 mcg) by mouth daily. 4/5/2025 Morning    methylPREDNISolone (MEDROL) 32 MG tablet Take 1 tablet (32 mg) by mouth daily 12 hours prior to the procedure with IV contrast.Take 1 tab 2 hours prior to the procedure with IV contrast Taking    naloxone (NARCAN) 4 MG/0.1ML nasal spray Spray 1 spray (4 mg) into one nostril alternating nostrils as needed  for opioid reversal every 2-3 minutes until assistance arrives Taking As Needed    VITAMIN A PO Take 1 capsule by mouth every morning. 4/5/2025 Morning    vitamin C (ASCORBIC ACID) 500 MG tablet Take 500 mg by mouth every morning. 4/5/2025 Morning    vitamin D3 (CHOLECALCIFEROL) 10 MCG (400 UNIT) capsule Take 1 capsule by mouth daily. 4/5/2025 Morning

## 2025-04-07 NOTE — PROGRESS NOTES
Medical record and Avinash Score reviewed. Participated in interdisciplinary rounds.  No apparent needs noted at this time. Care Transitions will remain available if needs arise.
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
## 2025-04-07 NOTE — TELEPHONE ENCOUNTER
1:33 PM    Reason for Call: OVERBOOK / Hospital Follow Up    Patient is having the following symptoms: Krystin on the 3rd Floor called to schedule:  Hospital Follow Up / [facility] / Admission: 4/5/25-4/7/25 for Cellulitis  No openings available within time frame given, please call patient back to schedule    The patient is requesting an appointment within 5 days with PCP.    Was an appointment offered for this call? No   If yes : Appointment type              Date    Preferred method for responding to this message: Telephone Call  What is your phone number ?  [phone]    If we cannot reach you directly, may we leave a detailed response at the number you provided? Unknown, not speaking with patient    Can this message wait until your PCP/provider returns, if unavailable today? Not applicable    Regina Harris

## 2025-04-07 NOTE — DISCHARGE INSTRUCTIONS
Appointments: [facility] will be calling you at home to schedule a Hospital Follow-up Appointment with your Primary Care Provider. If they do not contact you by tomorrow please call [phone] to schedule an appointment.

## 2025-04-07 NOTE — PLAN OF CARE
Patient discharged at 1:55 PM via ambulation accompanied by brother and staff. Prescriptions sent to patients preferred pharmacy. All belongings sent with patient.     Discharge instructions reviewed with patient. Listed belongings gathered and returned to patient.     Patient discharged to home.     Did patient receive discharge instruction on wound care and recognition of infection symptoms? Yes    MISC  Follow up appointment made:   pending call back, phone number given to pt and instructed to call for follow up if they don't call him back by tomorrow  Home medications returned to patient: N/A  Patient reports pain was well managed at discharge: Yes

## 2025-04-08 ENCOUNTER — PATIENT OUTREACH (OUTPATIENT)
Dept: CARE COORDINATION | Facility: OTHER | Age: 70
End: 2025-04-08

## 2025-04-08 DIAGNOSIS — Z09 HOSPITAL DISCHARGE FOLLOW-UP: ICD-10-CM

## 2025-04-08 NOTE — PROGRESS NOTES
Clinic Care Coordination Contact  Transitions of Care Outreach  Chief Complaint   Patient presents with    Clinic Care Coordination - Post Hospital       Most Recent Admission Date: 4/5/2025   Most Recent Admission Diagnosis: Cellulitis of right upper extremity - L03.113     Most Recent Discharge Date: 4/7/2025   Most Recent Discharge Diagnosis: Cellulitis of right upper extremity - L03.113     Transitions of Care Assessment    Unable to connect with Jorge A's daughter to complete TCM.    Follow up Plan          Future Appointments   Date Time Provider Department Center   4/14/2025 10:45 AM Regina Hicks MD [facility]   5/9/2025 11:15 AM Regina Hicks MD [facility]   7/9/2025  2:00 PM Birdie Atkinson MD [facility]       Outpatient Plan as outlined on AVS reviewed with patient.    For any urgent concerns, please contact our 24 hour nurse triage line: [phone] ([phone])       NOEMÍ PEREIRA RN

## 2025-04-10 ENCOUNTER — TELEPHONE (OUTPATIENT)
Dept: PHARMACY | Facility: PHYSICIAN GROUP | Age: 70
End: 2025-04-10
Payer: MEDICARE

## 2025-04-10 LAB
BACTERIA BLD CULT: NORMAL
BACTERIA BLD CULT: NORMAL

## 2025-04-10 NOTE — TELEPHONE ENCOUNTER
MTM referral from: Transitions of Care (recent hospital discharge, TCU discharge, or ED visit)    MTM referral outreach attempt #2 on April 10, 2025 at 2:38 PM      Outcome: Spoke with patient daughter declined, seeing pcp in a few days    Use vivian BILL VBC, RENAN discharged 4/7/2025 for the carrier/Plan on the flowsheet          Nica Vallejo [facility]  -[facility]  [phone]

## 2025-04-11 LAB
BACTERIA BLD CULT: NO GROWTH
BACTERIA BLD CULT: NO GROWTH

## 2025-04-11 NOTE — PROGRESS NOTES
Assessment & Plan     Cellulitis of right upper extremity  Resolving.  Completed antibiotics yesterday.  Feels well.  May take a bit of time for skin to completely heal.  Photos taken.  If worsening, seek repeat evaluation. May need to extend the antibiotics.  Discussed local cares - unscented hydration; prn antihistamine; avoid scratch/itch.        MED REC REQUIRED  Post Medication Reconciliation Status: discharge medications reconciled, continue medications without change    See Patient Instructions    Return if symptoms worsen or fail to improve.    The longitudinal plan of care for the diagnosis(es)/condition(s) as documented were addressed during this visit. Due to the added complexity in care, I will continue to support Jorge A in the subsequent management and with ongoing continuity of care.    Teresa Jules is a 69 year old, presenting for the following health issues:  hospital follow up    HPI        Hospital Follow-up Visit:    Hospital/Nursing Home/IP Rehab Facility: [facility]  Most Recent Admission Date: 4/5/2025   Most Recent Admission Diagnosis: Cellulitis of right upper extremity - L03.113        - down to 168 at discharge.  Procal 0.84  WBC 22 - down to 12.4 at discharge.  Blood cultures negative.  IV Clinda.  Home with oral Clinda.  Finished it yesterday.    No pets.  No travel.  No sick contacts.  Maybe from clothing detergant.  No antihistamine.  No fever      Most Recent Discharge Date: 4/7/2025   Most Recent Discharge Diagnosis: Cellulitis of right upper extremity - L03.113   Was the patient in the ICU or did the patient experience delirium during hospitalization?  No  Do you have any other stressors you would like to discuss with your provider? No    Problems taking medications regularly:  None  Medication changes since discharge: None  Problems adhering to non-medication therapy:  None    Summary of hospitalization:  [facility] discharge summary  "reviewed  Diagnostic Tests/Treatments reviewed.  Follow up needed: none  Other Healthcare Providers Involved in Patient s Care:         None  Update since discharge: improved.         Plan of care communicated with patient               Review of Systems  Constitutional, HEENT, cardiovascular, pulmonary, gi and gu systems are negative, except as otherwise noted.      Objective    /70 (BP Location: Right arm, Patient Position: Sitting, Cuff Size: Adult Regular)   Pulse 59   Temp 97.9  F (36.6  C) (Tympanic)   Resp 20   Ht 1.727 m (5' 8\")   Wt 73.3 kg (161 lb 8 oz)   SpO2 94%   BMI 24.56 kg/m    Body mass index is 24.56 kg/m .  Physical Exam   GENERAL: alert and no distress  RESP: lungs clear to auscultation - no rales, rhonchi or wheezes  CV: regular rate and rhythm, normal S1 S2, no S3 or S4, no murmur, click or rub, no peripheral edema  MS: no gross musculoskeletal defects noted, no edema  SKIN: right upper extremity - see photos below; minimal erythema, inside prior lines marked while in hospital; superficial scabbing from scratching; no induration; no drainage; no warmth to touch  PSYCH: mentation appears normal, affect normal/bright                      Signed Electronically by: Regina Hobbs MD    "

## 2025-04-14 ENCOUNTER — OFFICE VISIT (OUTPATIENT)
Dept: FAMILY MEDICINE | Facility: OTHER | Age: 70
End: 2025-04-14
Attending: FAMILY MEDICINE
Payer: MEDICARE

## 2025-04-14 VITALS
OXYGEN SATURATION: 94 % | HEIGHT: 68 IN | SYSTOLIC BLOOD PRESSURE: 130 MMHG | TEMPERATURE: 97.9 F | HEART RATE: 59 BPM | BODY MASS INDEX: 24.48 KG/M2 | DIASTOLIC BLOOD PRESSURE: 70 MMHG | WEIGHT: 161.5 LBS | RESPIRATION RATE: 20 BRPM

## 2025-04-14 DIAGNOSIS — L03.113 CELLULITIS OF RIGHT UPPER EXTREMITY: Primary | ICD-10-CM

## 2025-04-14 PROCEDURE — G0463 HOSPITAL OUTPT CLINIC VISIT: HCPCS

## 2025-04-14 PROCEDURE — 99495 TRANSJ CARE MGMT MOD F2F 14D: CPT | Performed by: FAMILY MEDICINE

## 2025-04-14 ASSESSMENT — PAIN SCALES - GENERAL: PAINLEVEL_OUTOF10: NO PAIN (0)

## 2025-05-07 ENCOUNTER — MYC REFILL (OUTPATIENT)
Dept: FAMILY MEDICINE | Facility: OTHER | Age: 70
End: 2025-05-07

## 2025-05-07 DIAGNOSIS — F11.11 OPIOID ABUSE, IN REMISSION (H): ICD-10-CM

## 2025-05-07 RX ORDER — BUPRENORPHINE HYDROCHLORIDE AND NALOXONE HYDROCHLORIDE DIHYDRATE 8; 2 MG/1; MG/1
0.5 TABLET SUBLINGUAL 2 TIMES DAILY
Qty: 28 TABLET | Refills: 2 | OUTPATIENT
Start: 2025-05-07

## 2025-05-07 RX ORDER — BUPRENORPHINE HYDROCHLORIDE AND NALOXONE HYDROCHLORIDE DIHYDRATE 8; 2 MG/1; MG/1
0.5 TABLET SUBLINGUAL DAILY
Qty: 28 TABLET | Refills: 0 | Status: SHIPPED | OUTPATIENT
Start: 2025-05-07

## 2025-05-07 NOTE — TELEPHONE ENCOUNTER
Suboxone 8-2 MG SUBL      Last Written Prescription Date:  02/14/25  Last Fill Quantity: 28,   # refills: 2  Last Office Visit: 04/14/25  Future Office visit:       Routing refill request to provider for review/approval because:  Drug not on the [facility], [facility] or [facility] refill protocol or controlled substance

## 2025-06-03 NOTE — PROGRESS NOTES
Assessment & Plan     Opioid abuse, in remission (H)  Overall doing well.  Some possible issues with absorption/dosing since changing from the film to tab due to cost.  Difficult for him to explain - inquires if he is getting immune, feels off at times.  This was since change in 2/2025 and we hadn't seem him since.  Discussed him trying to see what he is feeling - what symptoms - physical/mental - jot them down?  Try taking the full tab once daily instead of splitting.  Reach out sooner than next visit if feeling needing dose changes.  Denies any cravings or risk of relapse.  Considered 2 mg tabs qid - but insurance only covers 90 per month.  - Urine Drug Screen Buprenorphine Urine Qualitative [id number], Ethanol Urine Qualitative [id number], Methadone Urine Qualitative [id number], Oxycodone Urine Qualitative [id number], Creatinine Urine Random [id number]  - buprenorphine-naloxone (SUBOXONE) 8-2 MG SUBL sublingual tablet; Place 0.5 tablets under the tongue 2 times daily.      The longitudinal plan of care for the diagnosis(es)/condition(s) as documented were addressed during this visit. Due to the added complexity in care, I will continue to support Jorge A in the subsequent management and with ongoing continuity of care.  Follow-up  Return in about 3 months (around 9/4/2025) for MAT.    Teresa Jules is a 69 year old, presenting for the following health issues:  Medication Therapy Management        6/4/2025     9:55 AM   Additional Questions   Roomed by LORREI Peralta CMA   Accompanied by Self         6/4/2025     9:55 AM   Patient Reported Additional Medications   Patient reports taking the following new medications None     HPI      He is currently taking 4/1 mg of buprenorphine 2 times daily.   Last fill 5.7.25 #28.    Status Since Last Visit:  Have you used any opioids since your last visit?: no use since last visit  Do you feel that your dose of suboxone is too high or too low? Adequate  Have there been cravings for  opioids? No   Any withdrawal symptoms? no    Any side effects from the medication? none  Any alcohol use? None  Any other recreational drug use? none  Precipitating Factors:  Triggers have been: none  Other Supports:  Do you attend counseling or meet with a therapist? No  Do you attend NA or AA meetings? No  Do you have/meet with a sponsor? No  Family and support systems have been: Helpful  What other goals have you been working on (job, family, relationships, etc)? Attending scheduled appointments  Communicating effectively with health care team  Has been in our MOUD program for over 5 years now!  Wonders if he is getting immune  Withdrawal - nausea  No cravings  Tablets - maybe not absorbed? Changed from film to tab 2/2025 - due to cost      PDMP Review         Value Time User    State PDMP site checked  Yes 6/4/2025 10:14 AM Regina Hicks MD                  Review of Systems  Constitutional, HEENT, cardiovascular, pulmonary, gi and gu systems are negative, except as otherwise noted.      Objective    /70   Pulse 68   Temp 97.2  F (36.2  C) (Tympanic)   Resp 18   Wt 70.8 kg (156 lb)   SpO2 96%   BMI 23.72 kg/m    Body mass index is 23.72 kg/m .  Physical Exam   GENERAL: alert and no distress  RESP: lungs clear to auscultation - no rales, rhonchi or wheezes  CV: regular rate and rhythm, normal S1 S2, no S3 or S4, no murmur, click or rub, no peripheral edema  MS: no gross musculoskeletal defects noted, no edema  PSYCH: mentation appears normal, affect normal/bright    Results for orders placed or performed in visit on 06/04/25 (from the past 24 hours)   Urine Drug Screen Buprenorphine Urine Qualitative [id number], Ethanol Urine Qualitative OCS000, Methadone Urine Qualitative [id number], Oxycodone Urine Qualitative [id number], Creatinine Urine Random [id number]    Narrative    The following orders were created for panel order Urine Drug Screen Buprenorphine Urine Qualitative [id number], Ethanol Urine Qualitative  [id number], Methadone Urine Qualitative [id number], Oxycodone Urine Qualitative [id number], Creatinine Urine Random [id number].  Procedure                               Abnormality         Status                     ---------                               -----------         ------                     Urine Drug Screen Panel[[id number]]     Normal              Final result               Buprenorphine Urine, Qu...[[id number]]  Abnormal            Final result               Ethanol urine[[id number]]               Normal              Final result               Methadone Qual Urine[[id number]]        Normal              Final result               Oxycodone Urine, Qualit...[[id number]]  Normal              Final result               Creatinine random urine[[id number]]                         Final result                 Please view results for these tests on the individual orders.   Urine Drug Screen Panel   Result Value Ref Range    Amphetamines Urine Screen Negative Screen Negative    Barbituates Urine Screen Negative Screen Negative    Benzodiazepine Urine Screen Negative Screen Negative    Cannabinoids Urine Screen Negative Screen Negative    Cocaine Urine Screen Negative Screen Negative    Fentanyl Qual Urine Screen Negative Screen Negative    Opiates Urine Screen Negative Screen Negative    PCP Urine Screen Negative Screen Negative   Buprenorphine Urine, Qualitative   Result Value Ref Range    Buprenorphine Qual Urine Screen Positive (A) Screen Negative   Ethanol urine   Result Value Ref Range    Ethanol Urine Screen Negative Screen Negative   Methadone Qual Urine   Result Value Ref Range    Methadone Urine Screen Negative Screen Negative   Oxycodone Urine, Qualitative   Result Value Ref Range    Oxycodone Urine Screen Negative Screen Negative   Creatinine random urine   Result Value Ref Range    Creatinine Urine mg/dL 239.6 mg/dL           Signed Electronically by: Regina Hobbs MD

## 2025-06-04 ENCOUNTER — APPOINTMENT (OUTPATIENT)
Dept: LAB | Facility: OTHER | Age: 70
End: 2025-06-04
Attending: FAMILY MEDICINE
Payer: MEDICARE

## 2025-06-04 ENCOUNTER — PATIENT OUTREACH (OUTPATIENT)
Dept: CARE COORDINATION | Facility: OTHER | Age: 70
End: 2025-06-04

## 2025-06-04 ENCOUNTER — OFFICE VISIT (OUTPATIENT)
Dept: FAMILY MEDICINE | Facility: OTHER | Age: 70
End: 2025-06-04
Attending: FAMILY MEDICINE
Payer: MEDICARE

## 2025-06-04 VITALS
TEMPERATURE: 97.2 F | HEART RATE: 68 BPM | OXYGEN SATURATION: 96 % | WEIGHT: 156 LBS | RESPIRATION RATE: 18 BRPM | DIASTOLIC BLOOD PRESSURE: 70 MMHG | BODY MASS INDEX: 23.72 KG/M2 | SYSTOLIC BLOOD PRESSURE: 128 MMHG

## 2025-06-04 DIAGNOSIS — F11.11 OPIOID ABUSE, IN REMISSION (H): Primary | ICD-10-CM

## 2025-06-04 LAB
AMPHETAMINES UR QL SCN: NORMAL
BARBITURATES UR QL SCN: NORMAL
BENZODIAZ UR QL SCN: NORMAL
BUPRENORPHINE UR QL: ABNORMAL
BZE UR QL SCN: NORMAL
CANNABINOIDS UR QL SCN: NORMAL
CREAT UR-MCNC: 239.6 MG/DL
ETHANOL UR QL SCN: NORMAL
FENTANYL UR QL: NORMAL
METHADONE UR QL SCN: NORMAL
OPIATES UR QL SCN: NORMAL
OXYCODONE UR QL: NORMAL
PCP QUAL URINE (ROCHE): NORMAL

## 2025-06-04 PROCEDURE — 80307 DRUG TEST PRSMV CHEM ANLYZR: CPT | Mod: ZL | Performed by: FAMILY MEDICINE

## 2025-06-04 PROCEDURE — 82570 ASSAY OF URINE CREATININE: CPT | Mod: ZL | Performed by: FAMILY MEDICINE

## 2025-06-04 PROCEDURE — G0463 HOSPITAL OUTPT CLINIC VISIT: HCPCS

## 2025-06-04 RX ORDER — BUPRENORPHINE HYDROCHLORIDE AND NALOXONE HYDROCHLORIDE DIHYDRATE 8; 2 MG/1; MG/1
0.5 TABLET SUBLINGUAL 2 TIMES DAILY
Qty: 28 TABLET | Refills: 2 | Status: SHIPPED | OUTPATIENT
Start: 2025-06-04

## 2025-06-04 ASSESSMENT — PAIN SCALES - GENERAL: PAINLEVEL_OUTOF10: MODERATE PAIN (5)

## 2025-06-04 NOTE — PROGRESS NOTES
"Clinic Care Coordination Contact  Follow Up Progress Note      Assessment: RN CC attended office visit with Jorge A and Dr. Hicks this date.   Jorge A comes in for an MOUD follow up appointment today.  Has been on MOUD for over 5 years now and has remained in sustained recovery from opioids!  Reports he has been taking Suboxone 4/1mg BID as prescribed with no adverse effects or return to use.  Does state he does feel like he has become immune to his current Suboxone dose - he has a difficult time describing what he is feeling when he \"feels off\".  These \"feelings\" started shortly after switching over the Suboxone tabs in February - Jorge A's Suboxone was switched from SL films to SL tabs in February due to the cost of the SL films.    Jorge A does report that he has tried taking 4mg TID and unsure if he felt any different.  Discussed the possibility that the tabs may have a different absorption rate than the films and that cutting the tabs in half can lead to dosing variability, as they do not break in half evenly.  Tend to crumble at times too.  Jorge A ultimately does not want an increase at this time.  It was mutually agreed upon that he is going to try taking the full tab daily to see how he feels.  He was instructed to reach out sooner than his next scheduled appointment if he is still \"feeling off\".      Care Gaps:    Health Maintenance Due   Topic Date Due    SPIROMETRY  Never done    COPD ACTION PLAN  Never done    COVID-19 VACCINE (1) Never done    ZOSTER VACCINE (1 of 2) Never done    PNEUMOCOCCAL VACCINE 50+ YEARS (2 of 2 - PCV) 11/02/2012    RSV VACCINE (1 - Risk 60-74 years 1-dose series) Never done    DTAP/TDAP/TD VACCINE (2 - Td or Tdap) 10/30/2021    MEDICARE ANNUAL WELLNESS VISIT  06/14/2024       Will continue to work on these - refuses vaccines    Care Plans  Care Plan: Financial Wellbeing       Problem: Patient expresses financial resource strain       Goal: Create an action plan to increase " "financial stability       This Visit's Progress: 50% Recent Progress: 60%    Note:     Barriers: Transportation, limited income  Strengths: honest, willing to accept help  Patient expressed understanding of goal: yes  Action steps to achieve this goal:  1. I will work on completing the application for [employer] with my daughter, Artis  2. I will hand in completed application  3. I will reach out if I have questions                                 Intervention/Education provided during outreach: Stage of Change: Maintenance  Reviewed information and resources for treatment and ongoing sobriety    Facilitated understanding the importance of awareness of factors that contribute to relapse , Assisted patient in identifying personal vulnerabilities, thoughts, emotions, and situations that may lead to relapse , Coached on skills to manage factors that contribute to relapse, and Facilitated understanding of effective coping skills in response to triggers for substance use    Was there a referral placed last visit that needs follow up?     Yes - Health-related social needs - Not Completed    [employer] - has not completed this application yet - not having issues with prescription cost    Continue current meds as prescribed.  Keep a diary of your \"blah\" symptoms when they occur - what are you doing during that time, what time of day, when did you last take your medication, etc.  He was agreeable with this.       Outreach Frequency: 3 months, more frequently as needed      Plan:   No change in MOUD treatment plan at this time.    Care Coordinator will follow up in 12 weeks, sooner if he has concerns.    Margot Joel RN-BSN, [facility] Care Coordinator  [phone]      "

## 2025-06-04 NOTE — PATIENT INSTRUCTIONS
Try taking full tab once daily.  Try journaling symptoms - physical, mental, etc -   Reach out if feeling needing dose change prior to next scheduled visit.  Enjoy summer and fishing!
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
## 2025-07-09 ENCOUNTER — OFFICE VISIT (OUTPATIENT)
Dept: OTOLARYNGOLOGY | Facility: CLINIC | Age: 70
End: 2025-07-09
Payer: MEDICARE

## 2025-07-09 VITALS
DIASTOLIC BLOOD PRESSURE: 81 MMHG | BODY MASS INDEX: 23.54 KG/M2 | SYSTOLIC BLOOD PRESSURE: 134 MMHG | HEIGHT: 68 IN | WEIGHT: 155.3 LBS | HEART RATE: 53 BPM | OXYGEN SATURATION: 97 %

## 2025-07-09 DIAGNOSIS — C32.9 LARYNGEAL CANCER (H): ICD-10-CM

## 2025-07-09 DIAGNOSIS — Z90.02 S/P LARYNGECTOMY: Primary | ICD-10-CM

## 2025-07-09 DIAGNOSIS — E89.0 HYPOTHYROIDISM DUE TO RADIATION: ICD-10-CM

## 2025-07-09 PROCEDURE — 3079F DIAST BP 80-89 MM HG: CPT | Performed by: OTOLARYNGOLOGY

## 2025-07-09 PROCEDURE — 31575 DIAGNOSTIC LARYNGOSCOPY: CPT | Performed by: OTOLARYNGOLOGY

## 2025-07-09 PROCEDURE — 99213 OFFICE O/P EST LOW 20 MIN: CPT | Mod: 25 | Performed by: OTOLARYNGOLOGY

## 2025-07-09 PROCEDURE — 1125F AMNT PAIN NOTED PAIN PRSNT: CPT | Performed by: OTOLARYNGOLOGY

## 2025-07-09 PROCEDURE — 3075F SYST BP GE 130 - 139MM HG: CPT | Performed by: OTOLARYNGOLOGY

## 2025-07-09 RX ORDER — METHYLPREDNISOLONE 32 MG/1
32 TABLET ORAL DAILY
Qty: 1 TABLET | Refills: 0 | Status: CANCELLED | OUTPATIENT
Start: 2025-07-09

## 2025-07-09 RX ORDER — METHYLPREDNISOLONE 32 MG/1
32 TABLET ORAL DAILY
Qty: 2 TABLET | Refills: 0 | Status: SHIPPED | OUTPATIENT
Start: 2025-07-09

## 2025-07-09 ASSESSMENT — PAIN SCALES - GENERAL: PAINLEVEL_OUTOF10: MODERATE PAIN (6)
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
## 2025-08-20 DIAGNOSIS — F11.11 OPIOID ABUSE, IN REMISSION (H): ICD-10-CM

## 2025-08-20 RX ORDER — BUPRENORPHINE HYDROCHLORIDE AND NALOXONE HYDROCHLORIDE DIHYDRATE 8; 2 MG/1; MG/1
0.5 TABLET SUBLINGUAL 2 TIMES DAILY
Qty: 28 TABLET | Refills: 0 | Status: SHIPPED | OUTPATIENT
Start: 2025-08-20

## 2025-08-24 DIAGNOSIS — I10 BENIGN ESSENTIAL HYPERTENSION: ICD-10-CM

## 2025-08-25 RX ORDER — AMLODIPINE BESYLATE 5 MG/1
5 TABLET ORAL DAILY
Qty: 90 TABLET | Refills: 1 | Status: SHIPPED | OUTPATIENT
Start: 2025-08-25

## 2025-08-27 ENCOUNTER — PATIENT OUTREACH (OUTPATIENT)
Dept: CARE COORDINATION | Facility: OTHER | Age: 70
End: 2025-08-27

## 2025-08-27 ENCOUNTER — OFFICE VISIT (OUTPATIENT)
Dept: FAMILY MEDICINE | Facility: OTHER | Age: 70
End: 2025-08-27
Attending: FAMILY MEDICINE
Payer: MEDICARE

## 2025-08-27 VITALS
OXYGEN SATURATION: 96 % | RESPIRATION RATE: 18 BRPM | SYSTOLIC BLOOD PRESSURE: 126 MMHG | DIASTOLIC BLOOD PRESSURE: 70 MMHG | HEART RATE: 72 BPM | WEIGHT: 155 LBS | BODY MASS INDEX: 23.57 KG/M2 | TEMPERATURE: 98.5 F

## 2025-08-27 DIAGNOSIS — F11.11 OPIOID ABUSE, IN REMISSION (H): Primary | ICD-10-CM

## 2025-08-27 LAB
AMPHETAMINES UR QL SCN: NORMAL
BARBITURATES UR QL SCN: NORMAL
BENZODIAZ UR QL SCN: NORMAL
BUPRENORPHINE UR QL: ABNORMAL
BZE UR QL SCN: NORMAL
CANNABINOIDS UR QL SCN: NORMAL
CREAT UR-MCNC: 88.6 MG/DL
ETHANOL UR QL SCN: NORMAL
FENTANYL UR QL: NORMAL
METHADONE UR QL SCN: NORMAL
OPIATES UR QL SCN: NORMAL
OXYCODONE UR QL: NORMAL
PCP QUAL URINE (ROCHE): NORMAL

## 2025-08-27 PROCEDURE — 80307 DRUG TEST PRSMV CHEM ANLYZR: CPT | Mod: ZL | Performed by: FAMILY MEDICINE

## 2025-08-27 PROCEDURE — 82570 ASSAY OF URINE CREATININE: CPT | Mod: ZL | Performed by: FAMILY MEDICINE

## 2025-08-27 PROCEDURE — G0463 HOSPITAL OUTPT CLINIC VISIT: HCPCS

## 2025-08-27 RX ORDER — BUPRENORPHINE HYDROCHLORIDE AND NALOXONE HYDROCHLORIDE DIHYDRATE 8; 2 MG/1; MG/1
0.5 TABLET SUBLINGUAL 3 TIMES DAILY
Qty: 42 TABLET | Refills: 1 | Status: SHIPPED | OUTPATIENT
Start: 2025-09-16

## 2025-08-27 ASSESSMENT — PAIN SCALES - GENERAL: PAINLEVEL_OUTOF10: MODERATE PAIN (5)

## (undated) DEVICE — SPONGE KITTNER 30-101

## (undated) DEVICE — SU SILK 2-0 TIE 12X30" A305H

## (undated) DEVICE — LABEL STERILE PREPRINTED FOR OR FRRH01-2M

## (undated) DEVICE — PREP POVIDONE-IODINE 10% SOLUTION 4OZ BOTTLE MDS093944

## (undated) DEVICE — ESU GROUND PAD ADULT REM W/15' CORD E7507DB

## (undated) DEVICE — CATH TRAY FOLEY SURESTEP 16FR W/TMP PRB STLK LATEX A319416AM

## (undated) DEVICE — PACK LAPAROTOMY CUSTOM SBA32LPMBG

## (undated) DEVICE — GLOVE 6.5 PROTEXIS PI CLSC PF BD CUF STRL LF 12IN 2D72PL65X

## (undated) DEVICE — PREP CHLORAPREP 26ML TINTED HI-LITE ORANGE 930815

## (undated) DEVICE — SU VICRYL 4-0 SH-1 27" J315H

## (undated) DEVICE — SUTURE SILK 0 PSL 580H

## (undated) DEVICE — DRAPE SHEET MED 44X70" 9355

## (undated) DEVICE — PACK-SET UP-CUSTOM

## (undated) DEVICE — GLOVE PROTEXIS POWDER FREE CLSC 7.5  2D72PL75X

## (undated) DEVICE — KIT-PEG TUBE ADULT PUSH STYLE 20FR

## (undated) DEVICE — SPONGE-NEURO PATTY 1/4" X 1/4" X-RAY DETECTABLE

## (undated) DEVICE — SPONGE COTTONOID 1/2X3" 80-1407

## (undated) DEVICE — SUCTION SLEEVE NEPTUNE 2 125MM 0703-005-125

## (undated) DEVICE — SLEEVE SCD EXPRESS KNEE LENGTH MED 9529

## (undated) DEVICE — TUBING-SUCTION 20FT

## (undated) DEVICE — SCD SLEEVE-KNEE REG.

## (undated) DEVICE — IRRIGATION-H2O 1000ML

## (undated) DEVICE — CLIP HORIZON SM RED WIDE SLOT 001201

## (undated) DEVICE — CANISTER-SUCTION 2000CC

## (undated) DEVICE — SUCTION TUBE YANKAUR K61

## (undated) DEVICE — ESU GROUND PAD ADULT W/CORD E7507

## (undated) DEVICE — SU CHROMIC 2-0 SH 27" G123H

## (undated) DEVICE — SYR 10ML FINGER CONTROL W/O NDL 309695

## (undated) DEVICE — BIN-GASTROSTOMY/PEG TUBE BIN

## (undated) DEVICE — PACK BASIN SET UP SUTCNBSBBA

## (undated) DEVICE — COVER LT HANDLE 2/PK 5160-2FG

## (undated) DEVICE — TRAY SKIN PREP POVIDONE/IODINE DYND70372

## (undated) DEVICE — SU PDS II 3-0 SH 27" Z316H

## (undated) DEVICE — ESU ELEC BLADE 2.75" COATED/INSULATED E1455

## (undated) DEVICE — NDL-SPINAL 25G X 3.5IN QUINCKE

## (undated) DEVICE — Device

## (undated) DEVICE — SOL WATER IRRIG 1000ML BOTTLE 2F7114

## (undated) DEVICE — DRSG TELFA 3X8" 1238

## (undated) DEVICE — SU SILK 4-0 TIE 12X30" A303H

## (undated) DEVICE — SU PROLENE 5-0 PS-3 18" 8681G

## (undated) DEVICE — SPONGE PEANUT 3/8IN  7103

## (undated) DEVICE — ESU PENCIL W/SMOKE EVAC CVPLP2000

## (undated) DEVICE — INSTRUMENT WIPE-VISIWIPE

## (undated) DEVICE — INSTRUMENT WIPE VISIWIPE 581047

## (undated) DEVICE — DRSG-NEURO SPONGE 1/2" X 3"

## (undated) DEVICE — WATER STERILE 1000ML STERILE UROMATIC 2B-71-14

## (undated) DEVICE — TUBE NASOGASTRIC 18FR 48" 2 LUMEN 8888266148

## (undated) DEVICE — LINEN TOWEL PACK X30 5481

## (undated) DEVICE — BLANKET BAIR HUGGER LOWER BODY 42568

## (undated) DEVICE — SPONGE LAP 18X18" X8435

## (undated) DEVICE — SU CHROMIC 3-0 SH 27" G122H

## (undated) DEVICE — SENSOR-OXISENSOR II ADULT

## (undated) DEVICE — DRSG DRAIN 4X4" 7086

## (undated) DEVICE — LABEL MEDICATION SYSTEM 3303-P

## (undated) DEVICE — NDL SPINAL 25GA 3.5" QUINCKE 405180

## (undated) DEVICE — NDL BLUNT 18GA 1.5" W/O FILTER 305180

## (undated) DEVICE — PACK NEURO MINOR UMMC SNE32MNMU4

## (undated) DEVICE — BLADE 15 RB BK SS STRL LF DISPLF DISP 371215

## (undated) DEVICE — PREP SKIN SCRUB TRAY 4461A

## (undated) DEVICE — DRSG-NON ADHERING 3 X 8 TELFA

## (undated) DEVICE — BLADE KNIFE SURG 15 371115

## (undated) DEVICE — CANISTER SUCTION MEDI-VAC GUARDIAN 2000ML 90D 65651-220

## (undated) DEVICE — DRSG NON ADHERING 3 X 8 TELFA 1238

## (undated) DEVICE — TUBING SUCTION 20FT N620A

## (undated) DEVICE — RETR ELASTIC STAYS LONE STAR BLUNT DUAL LEAD 3550-1G

## (undated) DEVICE — LINEN TOWEL PACK X6 WHITE 5487

## (undated) DEVICE — DRAIN JACKSON PRATT 10MM FLAT 4/4 PERF SU130-1311

## (undated) DEVICE — ANTIFOG SOLUTION W/FOAM PAD CF-1002

## (undated) DEVICE — TUBING SMOKE EVAC 3/8"X10' 0702-045-023

## (undated) DEVICE — SUTURE-SILK 0 PSL 580H

## (undated) DEVICE — SPONGE RAY-TEC 4X8" 7318

## (undated) DEVICE — NDL COUNTER 40CT  31142311

## (undated) DEVICE — NDL-BLUNT FILL 18G 1.5"

## (undated) DEVICE — DRAIN JACKSON PRATT RESERVOIR 100ML SU130-1305

## (undated) DEVICE — SUCTION TUBE-YANKAUR

## (undated) DEVICE — GOWN SURG XL LVL 3 REINFORCED 9541

## (undated) DEVICE — SYR BULB IRRIG DOVER 60 ML LATEX FREE 67000

## (undated) DEVICE — CLIP HORIZON MED BLUE 002200

## (undated) DEVICE — DRAPE BACK TABLE  44X90" 8377

## (undated) DEVICE — IRRIGATION-NACL 1000ML

## (undated) DEVICE — SOL NACL 0.9% IRRIG 1000ML BOTTLE 2F7124

## (undated) DEVICE — ANTI-FOG AGENT

## (undated) DEVICE — SU SILK 3-0 TIE 12X30" A304H

## (undated) DEVICE — SU SILK 0 TIE 6X30" A306H

## (undated) DEVICE — SU SILK 2-0 SH CR 8X18" C012D

## (undated) DEVICE — DRAIN JACKSON PRATT RESERVOIR 400ML SU130-1000

## (undated) DEVICE — SU ETHILON 3-0 PS-1 18" 1663H

## (undated) DEVICE — GLV-6.5 PROTEXIS PI CLASSIC LF/PF

## (undated) DEVICE — BLANKET-BAIR LOWER EXTREMITY

## (undated) DEVICE — PACK SET UP CUSTOM SBA32SUMBF

## (undated) DEVICE — SU SILK 2-0 SH 30" K833H

## (undated) DEVICE — SU VICRYL 3-0 SH 8X18" UND J864D

## (undated) DEVICE — DRSG GAUZE 4X4" TRAY 6939

## (undated) DEVICE — DRSG TEGADERM 4X4 3/4" 1626W

## (undated) DEVICE — LABEL-STERILE PREPRINTED FOR OR

## (undated) DEVICE — SU CHROMIC 3-0 FS-2 27" 636

## (undated) RX ORDER — CALCIUM CHLORIDE 100 MG/ML
INJECTION INTRAVENOUS; INTRAVENTRICULAR
Status: DISPENSED
Start: 2023-11-14

## (undated) RX ORDER — DEXAMETHASONE SODIUM PHOSPHATE 4 MG/ML
INJECTION, SOLUTION INTRA-ARTICULAR; INTRALESIONAL; INTRAMUSCULAR; INTRAVENOUS; SOFT TISSUE
Status: DISPENSED
Start: 2023-11-14

## (undated) RX ORDER — LIDOCAINE HYDROCHLORIDE AND EPINEPHRINE 10; 10 MG/ML; UG/ML
INJECTION, SOLUTION INFILTRATION; PERINEURAL
Status: DISPENSED
Start: 2023-11-14

## (undated) RX ORDER — AMPICILLIN AND SULBACTAM 2; 1 G/1; G/1
INJECTION, POWDER, FOR SOLUTION INTRAMUSCULAR; INTRAVENOUS
Status: DISPENSED
Start: 2023-11-14

## (undated) RX ORDER — DEXAMETHASONE SODIUM PHOSPHATE 10 MG/ML
INJECTION, SOLUTION INTRAMUSCULAR; INTRAVENOUS
Status: DISPENSED
Start: 2023-09-05

## (undated) RX ORDER — ONDANSETRON 2 MG/ML
INJECTION INTRAMUSCULAR; INTRAVENOUS
Status: DISPENSED
Start: 2018-10-30

## (undated) RX ORDER — HYDROMORPHONE HYDROCHLORIDE 1 MG/ML
INJECTION, SOLUTION INTRAMUSCULAR; INTRAVENOUS; SUBCUTANEOUS
Status: DISPENSED
Start: 2023-11-14

## (undated) RX ORDER — DEXTROSE MONOHYDRATE, SODIUM CHLORIDE, AND POTASSIUM CHLORIDE 50; 1.49; 4.5 G/1000ML; G/1000ML; G/1000ML
INJECTION, SOLUTION INTRAVENOUS
Status: DISPENSED
Start: 2023-11-14

## (undated) RX ORDER — EPHEDRINE SULFATE 50 MG/ML
INJECTION, SOLUTION INTRAMUSCULAR; INTRAVENOUS; SUBCUTANEOUS
Status: DISPENSED
Start: 2023-11-14

## (undated) RX ORDER — ONDANSETRON 2 MG/ML
INJECTION INTRAMUSCULAR; INTRAVENOUS
Status: DISPENSED
Start: 2023-09-05

## (undated) RX ORDER — PHENYLEPHRINE HYDROCHLORIDE 10 MG/ML
INJECTION INTRAVENOUS
Status: DISPENSED
Start: 2023-09-05

## (undated) RX ORDER — FENTANYL CITRATE 50 UG/ML
INJECTION, SOLUTION INTRAMUSCULAR; INTRAVENOUS
Status: DISPENSED
Start: 2023-11-14

## (undated) RX ORDER — FENTANYL CITRATE 50 UG/ML
INJECTION, SOLUTION INTRAMUSCULAR; INTRAVENOUS
Status: DISPENSED
Start: 2023-09-08

## (undated) RX ORDER — SODIUM CHLORIDE, SODIUM LACTATE, POTASSIUM CHLORIDE, CALCIUM CHLORIDE 600; 310; 30; 20 MG/100ML; MG/100ML; MG/100ML; MG/100ML
INJECTION, SOLUTION INTRAVENOUS
Status: DISPENSED
Start: 2023-11-14

## (undated) RX ORDER — PROPOFOL 10 MG/ML
INJECTION, EMULSION INTRAVENOUS
Status: DISPENSED
Start: 2023-11-14

## (undated) RX ORDER — OXYMETAZOLINE HYDROCHLORIDE 0.05 G/100ML
SPRAY NASAL
Status: DISPENSED
Start: 2023-11-14

## (undated) RX ORDER — GLYCOPYRROLATE 0.2 MG/ML
INJECTION, SOLUTION INTRAMUSCULAR; INTRAVENOUS
Status: DISPENSED
Start: 2023-09-05

## (undated) RX ORDER — KETOROLAC TROMETHAMINE 30 MG/ML
INJECTION, SOLUTION INTRAMUSCULAR; INTRAVENOUS
Status: DISPENSED
Start: 2018-10-30

## (undated) RX ORDER — ONDANSETRON 2 MG/ML
INJECTION INTRAMUSCULAR; INTRAVENOUS
Status: DISPENSED
Start: 2023-11-14

## (undated) RX ORDER — DEXMEDETOMIDINE HYDROCHLORIDE 100 UG/ML
INJECTION, SOLUTION INTRAVENOUS
Status: DISPENSED
Start: 2023-09-05

## (undated) RX ORDER — LIDOCAINE HYDROCHLORIDE 20 MG/ML
INJECTION, SOLUTION EPIDURAL; INFILTRATION; INTRACAUDAL; PERINEURAL
Status: DISPENSED
Start: 2018-10-30

## (undated) RX ORDER — FENTANYL CITRATE 50 UG/ML
INJECTION, SOLUTION INTRAMUSCULAR; INTRAVENOUS
Status: DISPENSED
Start: 2018-10-30

## (undated) RX ORDER — PROPOFOL 10 MG/ML
INJECTION, EMULSION INTRAVENOUS
Status: DISPENSED
Start: 2018-10-30

## (undated) RX ORDER — PROPOFOL 10 MG/ML
INJECTION, EMULSION INTRAVENOUS
Status: DISPENSED
Start: 2023-09-05

## (undated) RX ORDER — SODIUM CHLORIDE 9 MG/ML
INJECTION, SOLUTION INTRAVENOUS
Status: DISPENSED
Start: 2023-09-05

## (undated) RX ORDER — EPHEDRINE SULFATE 50 MG/ML
INJECTION, SOLUTION INTRAMUSCULAR; INTRAVENOUS; SUBCUTANEOUS
Status: DISPENSED
Start: 2023-09-05